# Patient Record
Sex: FEMALE | Race: WHITE | Employment: OTHER | URBAN - METROPOLITAN AREA
[De-identification: names, ages, dates, MRNs, and addresses within clinical notes are randomized per-mention and may not be internally consistent; named-entity substitution may affect disease eponyms.]

---

## 2017-01-31 PROBLEM — R73.03 PREDIABETES: Status: ACTIVE | Noted: 2017-01-31

## 2017-02-14 ENCOUNTER — TELEPHONE (OUTPATIENT)
Dept: FAMILY MEDICINE CLINIC | Age: 72
End: 2017-02-14

## 2017-02-14 RX ORDER — DESVENLAFAXINE 50 MG/1
50 TABLET, EXTENDED RELEASE ORAL DAILY
Qty: 63 TABLET | Refills: 0 | COMMUNITY
Start: 2017-02-14 | End: 2017-05-02 | Stop reason: SDUPTHER

## 2017-03-29 ENCOUNTER — TELEPHONE (OUTPATIENT)
Dept: FAMILY MEDICINE CLINIC | Age: 72
End: 2017-03-29

## 2017-04-03 ENCOUNTER — TELEPHONE (OUTPATIENT)
Dept: FAMILY MEDICINE CLINIC | Age: 72
End: 2017-04-03

## 2017-04-03 DIAGNOSIS — Z13.9 SCREENING: Primary | ICD-10-CM

## 2017-04-08 ENCOUNTER — TELEPHONE (OUTPATIENT)
Dept: FAMILY MEDICINE CLINIC | Age: 72
End: 2017-04-08

## 2017-04-08 RX ORDER — ONDANSETRON 4 MG/1
4 TABLET, FILM COATED ORAL EVERY 8 HOURS PRN
Qty: 20 TABLET | Refills: 0 | Status: SHIPPED | OUTPATIENT
Start: 2017-04-08 | End: 2017-06-05 | Stop reason: ALTCHOICE

## 2017-04-08 RX ORDER — AMLODIPINE BESYLATE 5 MG/1
5 TABLET ORAL NIGHTLY
Qty: 10 TABLET | Refills: 0 | Status: SHIPPED | OUTPATIENT
Start: 2017-04-08 | End: 2017-04-10 | Stop reason: SDUPTHER

## 2017-04-10 ENCOUNTER — OFFICE VISIT (OUTPATIENT)
Dept: FAMILY MEDICINE CLINIC | Age: 72
End: 2017-04-10

## 2017-04-10 VITALS
BODY MASS INDEX: 27.02 KG/M2 | SYSTOLIC BLOOD PRESSURE: 124 MMHG | WEIGHT: 146.8 LBS | HEIGHT: 62 IN | HEART RATE: 143 BPM | DIASTOLIC BLOOD PRESSURE: 70 MMHG | OXYGEN SATURATION: 96 % | RESPIRATION RATE: 14 BRPM

## 2017-04-10 DIAGNOSIS — I10 ESSENTIAL HYPERTENSION: ICD-10-CM

## 2017-04-10 DIAGNOSIS — Z51.81 MEDICATION MONITORING ENCOUNTER: ICD-10-CM

## 2017-04-10 DIAGNOSIS — R11.2 NON-INTRACTABLE VOMITING WITH NAUSEA, UNSPECIFIED VOMITING TYPE: Primary | ICD-10-CM

## 2017-04-10 PROCEDURE — 99213 OFFICE O/P EST LOW 20 MIN: CPT | Performed by: FAMILY MEDICINE

## 2017-04-10 PROCEDURE — 3288F FALL RISK ASSESSMENT DOCD: CPT | Performed by: FAMILY MEDICINE

## 2017-04-10 RX ORDER — AMLODIPINE BESYLATE 5 MG/1
5 TABLET ORAL NIGHTLY
Qty: 30 TABLET | Refills: 5 | Status: SHIPPED | OUTPATIENT
Start: 2017-04-10 | End: 2017-05-08 | Stop reason: SDUPTHER

## 2017-04-12 ENCOUNTER — TELEPHONE (OUTPATIENT)
Dept: FAMILY MEDICINE CLINIC | Age: 72
End: 2017-04-12

## 2017-04-19 ENCOUNTER — TELEPHONE (OUTPATIENT)
Dept: FAMILY MEDICINE CLINIC | Age: 72
End: 2017-04-19

## 2017-04-24 ENCOUNTER — TELEPHONE (OUTPATIENT)
Dept: FAMILY MEDICINE CLINIC | Age: 72
End: 2017-04-24

## 2017-05-02 RX ORDER — DESVENLAFAXINE 50 MG/1
50 TABLET, EXTENDED RELEASE ORAL DAILY
Qty: 30 TABLET | Refills: 5 | Status: SHIPPED | OUTPATIENT
Start: 2017-05-02 | End: 2018-11-07 | Stop reason: ALTCHOICE

## 2017-05-08 ENCOUNTER — OFFICE VISIT (OUTPATIENT)
Dept: FAMILY MEDICINE CLINIC | Age: 72
End: 2017-05-08

## 2017-05-08 VITALS
WEIGHT: 146 LBS | SYSTOLIC BLOOD PRESSURE: 104 MMHG | DIASTOLIC BLOOD PRESSURE: 74 MMHG | OXYGEN SATURATION: 98 % | HEART RATE: 76 BPM | BODY MASS INDEX: 26.7 KG/M2

## 2017-05-08 DIAGNOSIS — I10 ESSENTIAL HYPERTENSION: ICD-10-CM

## 2017-05-08 DIAGNOSIS — F33.42 RECURRENT MAJOR DEPRESSIVE DISORDER, IN FULL REMISSION (HCC): Primary | ICD-10-CM

## 2017-05-08 PROCEDURE — 99213 OFFICE O/P EST LOW 20 MIN: CPT | Performed by: FAMILY MEDICINE

## 2017-05-08 PROCEDURE — G8510 SCR DEP NEG, NO PLAN REQD: HCPCS | Performed by: FAMILY MEDICINE

## 2017-05-08 RX ORDER — AMLODIPINE BESYLATE 5 MG/1
5 TABLET ORAL NIGHTLY
Qty: 90 TABLET | Refills: 3 | Status: SHIPPED | OUTPATIENT
Start: 2017-05-08 | End: 2018-06-05 | Stop reason: SDUPTHER

## 2017-05-08 ASSESSMENT — PATIENT HEALTH QUESTIONNAIRE - PHQ9
1. LITTLE INTEREST OR PLEASURE IN DOING THINGS: 0
SUM OF ALL RESPONSES TO PHQ9 QUESTIONS 1 & 2: 0
SUM OF ALL RESPONSES TO PHQ QUESTIONS 1-9: 0
2. FEELING DOWN, DEPRESSED OR HOPELESS: 0

## 2017-06-05 ENCOUNTER — OFFICE VISIT (OUTPATIENT)
Dept: FAMILY MEDICINE CLINIC | Age: 72
End: 2017-06-05

## 2017-06-05 ENCOUNTER — TELEPHONE (OUTPATIENT)
Dept: FAMILY MEDICINE CLINIC | Age: 72
End: 2017-06-05

## 2017-06-05 VITALS
HEART RATE: 67 BPM | BODY MASS INDEX: 26.76 KG/M2 | RESPIRATION RATE: 14 BRPM | HEIGHT: 62 IN | WEIGHT: 145.4 LBS | OXYGEN SATURATION: 95 % | SYSTOLIC BLOOD PRESSURE: 136 MMHG | DIASTOLIC BLOOD PRESSURE: 64 MMHG

## 2017-06-05 DIAGNOSIS — M25.562 ACUTE PAIN OF LEFT KNEE: Primary | ICD-10-CM

## 2017-06-05 DIAGNOSIS — F33.41 RECURRENT MAJOR DEPRESSIVE DISORDER, IN PARTIAL REMISSION (HCC): ICD-10-CM

## 2017-06-05 PROCEDURE — 99213 OFFICE O/P EST LOW 20 MIN: CPT | Performed by: FAMILY MEDICINE

## 2017-06-05 RX ORDER — DESVENLAFAXINE 50 MG/1
50 TABLET, EXTENDED RELEASE ORAL DAILY
Qty: 90 TABLET | Refills: 3 | Status: SHIPPED | OUTPATIENT
Start: 2017-06-05 | End: 2020-08-26 | Stop reason: SDUPTHER

## 2017-06-06 ENCOUNTER — TELEPHONE (OUTPATIENT)
Dept: FAMILY MEDICINE CLINIC | Age: 72
End: 2017-06-06

## 2017-06-07 ENCOUNTER — TELEPHONE (OUTPATIENT)
Dept: FAMILY MEDICINE CLINIC | Age: 72
End: 2017-06-07

## 2017-06-07 DIAGNOSIS — M25.562 ACUTE PAIN OF LEFT KNEE: Primary | ICD-10-CM

## 2017-07-17 ENCOUNTER — TELEPHONE (OUTPATIENT)
Dept: FAMILY MEDICINE CLINIC | Age: 72
End: 2017-07-17

## 2017-07-17 DIAGNOSIS — M25.562 LEFT KNEE PAIN, UNSPECIFIED CHRONICITY: Primary | ICD-10-CM

## 2017-10-11 ENCOUNTER — TELEPHONE (OUTPATIENT)
Dept: FAMILY MEDICINE CLINIC | Age: 72
End: 2017-10-11

## 2017-10-11 NOTE — TELEPHONE ENCOUNTER
Pt scheduled physical for 10/31/17 and is asking if she should have a mammogram or any other routine screening completed before appt.  Please advise

## 2017-10-31 ENCOUNTER — OFFICE VISIT (OUTPATIENT)
Dept: FAMILY MEDICINE CLINIC | Age: 72
End: 2017-10-31

## 2017-10-31 VITALS
DIASTOLIC BLOOD PRESSURE: 70 MMHG | HEART RATE: 67 BPM | BODY MASS INDEX: 27.75 KG/M2 | HEIGHT: 61 IN | OXYGEN SATURATION: 97 % | SYSTOLIC BLOOD PRESSURE: 138 MMHG | WEIGHT: 147 LBS

## 2017-10-31 DIAGNOSIS — E78.49 OTHER HYPERLIPIDEMIA: ICD-10-CM

## 2017-10-31 DIAGNOSIS — I10 ESSENTIAL HYPERTENSION: ICD-10-CM

## 2017-10-31 DIAGNOSIS — Z00.00 ENCOUNTER FOR MEDICARE ANNUAL WELLNESS EXAM: Primary | ICD-10-CM

## 2017-10-31 DIAGNOSIS — I34.0 MODERATE MITRAL REGURGITATION BY PRIOR ECHOCARDIOGRAM: ICD-10-CM

## 2017-10-31 DIAGNOSIS — Z23 FLU VACCINE NEED: ICD-10-CM

## 2017-10-31 DIAGNOSIS — R73.03 PREDIABETES: ICD-10-CM

## 2017-10-31 LAB
BILIRUBIN, POC: NORMAL
BLOOD URINE, POC: NORMAL
CLARITY, POC: CLEAR
COLOR, POC: YELLOW
GLUCOSE URINE, POC: NORMAL
KETONES, POC: NORMAL
LEUKOCYTE EST, POC: NORMAL
NITRITE, POC: NORMAL
PH, POC: 8.5
PROTEIN, POC: NORMAL
SPECIFIC GRAVITY, POC: 1.01
UROBILINOGEN, POC: 0.2

## 2017-10-31 PROCEDURE — 90662 IIV NO PRSV INCREASED AG IM: CPT | Performed by: FAMILY MEDICINE

## 2017-10-31 PROCEDURE — G0439 PPPS, SUBSEQ VISIT: HCPCS | Performed by: FAMILY MEDICINE

## 2017-10-31 PROCEDURE — 81002 URINALYSIS NONAUTO W/O SCOPE: CPT | Performed by: FAMILY MEDICINE

## 2017-10-31 PROCEDURE — G0008 ADMIN INFLUENZA VIRUS VAC: HCPCS | Performed by: FAMILY MEDICINE

## 2017-10-31 RX ORDER — LOSARTAN POTASSIUM AND HYDROCHLOROTHIAZIDE 25; 100 MG/1; MG/1
1 TABLET ORAL DAILY
Qty: 90 TABLET | Refills: 3 | Status: SHIPPED | OUTPATIENT
Start: 2017-10-31 | End: 2018-11-07 | Stop reason: SDUPTHER

## 2017-10-31 ASSESSMENT — PATIENT HEALTH QUESTIONNAIRE - PHQ9
SUM OF ALL RESPONSES TO PHQ9 QUESTIONS 1 & 2: 2
2. FEELING DOWN, DEPRESSED OR HOPELESS: 2
SUM OF ALL RESPONSES TO PHQ QUESTIONS 1-9: 2
1. LITTLE INTEREST OR PLEASURE IN DOING THINGS: 0

## 2017-10-31 ASSESSMENT — ANXIETY QUESTIONNAIRES: GAD7 TOTAL SCORE: 3

## 2017-10-31 NOTE — PROGRESS NOTES
Vaccine Information Sheet, \"Influenza - Inactivated\"  given to Sofia, or parent/legal guardian of  Sofia and verbalized understanding. Patient responses:    Have you ever had a reaction to a flu vaccine? No  Are you able to eat eggs without adverse effects? Yes  Do you have any current illness? No  Have you ever had Guillian Trinity Syndrome? No    Flu vaccine given per order. Please see immunization tab.

## 2017-10-31 NOTE — PROGRESS NOTES
Anxiety     Colon polyp     Headache(784.0)     Hyperlipidemia     Hypertension     Osteoarthritis      Past Surgical History:   Procedure Laterality Date    COLONOSCOPY  7/7     Family History   Problem Relation Age of Onset    Kidney Disease Mother      Patient's medications, allergies, past medical, surgical, social and family histories were reviewed and updated as appropriate. CareTeam (Including outside providers/suppliers regularly involved in providing care):   Patient Care Team:  Siobhan Gonzalez MD as PCP - Analisa Campbell MD as PCP - S Attributed Provider    Wt Readings from Last 3 Encounters:   10/31/17 147 lb (66.7 kg)   06/05/17 145 lb 6.4 oz (66 kg)   05/08/17 146 lb (66.2 kg)     Vitals:    10/31/17 1052 10/31/17 1118   BP: (!) 150/64 138/70   Pulse: 67    SpO2: 97%    Weight: 147 lb (66.7 kg)    Height: 5' 0.5\" (1.537 m)      The following problems were reviewed today and where indicated follow up appointments were made and/or referrals ordered. Risk Factor Screenings with Interventions   Please see Screenings under MA note    Fall Risk Interventions:    · None indicated  Cognitive Impairment Interventions:  · None indicated  Depression Interventions:  · None indicated  Anxiety Interventions:  · None indicated          Health Risk Assessment:   Please see Screenings under MA note  General Health Risk Interventions:  · None indicated     Wt Readings from Last 5 Encounters:   10/31/17 147 lb (66.7 kg)   06/05/17 145 lb 6.4 oz (66 kg)   05/08/17 146 lb (66.2 kg)   04/10/17 146 lb 12.8 oz (66.6 kg)   12/16/16 150 lb (68 kg)      Body mass index is 28.24 kg/m². Health Habits/Nutrition Interventions:  · None indicated    Substance Abuse:  Social History     Social History    Marital status:       Spouse name: N/A    Number of children: N/A    Years of education: N/A     Social History Main Topics    Smoking status: Never Smoker    Smokeless tobacco: Never Used    Alcohol Comment:     Desirable range <100 mg/dL for patients with CHD ordiabetes and <70 mg/dL for diabetic   patients withknown heart disease. HDL   Date Value Ref Range Status   09/28/2016 56 > OR = 46 mg/dL Final   04/14/2011 79 (H) 40 - 60 mg/dl Final     Triglycerides   Date Value Ref Range Status   09/28/2016 139 <150 mg/dL Final     Lab Results   Component Value Date    ALT 13 09/28/2016    AST 17 09/28/2016    ALKPHOS 66 09/28/2016    BILITOT 0.6 09/28/2016      Lab Results   Component Value Date    WBC 6.1 09/28/2016    HGB 12.1 09/28/2016    HCT 36.9 09/28/2016    MCV 94.8 09/28/2016     09/28/2016     TSH (mIU/L)   Date Value   09/28/2016 4.32     Lab Results   Component Value Date    LABA1C 5.7 (H) 09/28/2016     Blood pressure is Good. BP Readings from Last 5 Encounters:   10/31/17 138/70   06/05/17 136/64   05/08/17 104/74   04/10/17 124/70   12/16/16 134/70     Weight is unchanged. Wt Readings from Last 5 Encounters:   10/31/17 147 lb (66.7 kg)   06/05/17 145 lb 6.4 oz (66 kg)   05/08/17 146 lb (66.2 kg)   04/10/17 146 lb 12.8 oz (66.6 kg)   12/16/16 150 lb (68 kg)      Vitals:    10/31/17 1052 10/31/17 1118   BP: (!) 150/64 138/70   Pulse: 67    SpO2: 97%    Weight: 147 lb (66.7 kg)    Height: 5' 0.5\" (1.537 m)      Body mass index is 28.24 kg/m². Physical Exam:  /70   Pulse 67   Ht 5' 0.5\" (1.537 m)   Wt 147 lb (66.7 kg)   SpO2 97%   BMI 28.24 kg/m²   GENERAL: well-developed, well-nourished, alert, no distress, calm, assistive device: none    EYES: negative findings: lids and lashes normal, conjunctivae and sclerae normal, corneas clear and pupils equal, round, reactive to light and accomodation  ENT: normal TM's and external ear canals both ears  · External nose and ears appear normal  · Pharynx: normal. Exudates: None  · Lips, mucosa, and tongue normal and teeth normal   · Hearing grossly normal.  Patient does not have hearing aids at this time.    NECK: No adenopathy, cataracts, macular degeneration, and other eye disorders  · A preventive dental visit is recommended every 6 months  · Try to get at least 150 minutes of exercise per week or 10,000 steps per day on a pedometer  · Order FREE \"Exercise and Physical Activity\" book from the [employer] on Agin1-312.372.6527 or https://order.misael.nih.gov/health/publication/order/  · You need 7390-6070 mg of calcium and 6152-3550 IU of vitamin D per day- it is possible to meet your calcium requirement with diet alone, but a vitamin D supplement is usually necessary to meet this goal  · When exposed to the sun, use a sunscreen that protects against both UVA and UVB radiation with an SPF of 30 or greater- reapply every 2 to 3 hours or after sweating, drying off with a towel, or swimming  · Always wear a seat belt when traveling in a car, and a helmet when riding a bicycle or motorcycle    Follow up: Every 6 months or as needed  Half maintenance up-to-date

## 2017-11-03 LAB
A/G RATIO: 1.6 (CALC) (ref 1–2.5)
ALBUMIN SERPL-MCNC: 4.2 G/DL (ref 3.6–5.1)
ALP BLD-CCNC: 70 U/L (ref 33–130)
ALT SERPL-CCNC: 15 U/L (ref 6–29)
AST SERPL-CCNC: 17 U/L (ref 10–35)
ATYPICAL LYMPHOCYTE RELATIVE PERCENT: NORMAL % (ref 0–10)
BAND NEUTROPHILS: NORMAL %
BANDED NEUTROPHILS ABSOLUTE COUNT: NORMAL CELLS/UL (ref 0–750)
BASOPHILS ABSOLUTE: 49 CELLS/UL (ref 0–200)
BASOPHILS RELATIVE PERCENT: 0.8 %
BILIRUB SERPL-MCNC: 0.4 MG/DL (ref 0.2–1.2)
BLASTS ABSOLUTE COUNT: NORMAL CELLS/UL
BLASTS RELATIVE PERCENT: NORMAL %
BUN / CREAT RATIO: ABNORMAL (CALC) (ref 6–22)
BUN BLDV-MCNC: 15 MG/DL (ref 7–25)
CALCIUM SERPL-MCNC: 9.7 MG/DL (ref 8.6–10.4)
CHLORIDE BLD-SCNC: 101 MMOL/L (ref 98–110)
CHOLESTEROL, TOTAL: 226 MG/DL
CHOLESTEROL/HDL RATIO: 3.4 (CALC)
CHOLESTEROL: 159 MG/DL (CALC)
CO2: 32 MMOL/L (ref 20–31)
COMMENT: NORMAL
CREAT SERPL-MCNC: 0.76 MG/DL (ref 0.6–0.93)
EOSINOPHILS ABSOLUTE: 67 CELLS/UL (ref 15–500)
EOSINOPHILS RELATIVE PERCENT: 1.1 %
GFR AFRICAN AMERICAN: 90 ML/MIN/1.73M2
GFR SERPL CREATININE-BSD FRML MDRD: 78 ML/MIN/1.73M2
GLOBULIN: 2.6 G/DL (CALC) (ref 1.9–3.7)
GLUCOSE BLD-MCNC: 83 MG/DL (ref 65–99)
HBA1C MFR BLD: 5.3 % OF TOTAL HGB
HCT VFR BLD CALC: 36.6 % (ref 35–45)
HDLC SERPL-MCNC: 67 MG/DL
HEMOGLOBIN: 12.2 G/DL (ref 11.7–15.5)
LDL CHOLESTEROL CALCULATED: 134 MG/DL (CALC)
LYMPHOCYTES ABSOLUTE: 1257 CELLS/UL (ref 850–3900)
LYMPHOCYTES RELATIVE PERCENT: 20.6 %
MCH RBC QN AUTO: 31 PG (ref 27–33)
MCHC RBC AUTO-ENTMCNC: 33.3 G/DL (ref 32–36)
MCV RBC AUTO: 93.1 FL (ref 80–100)
METAMYELOCYTES ABSOLUTE COUNT: NORMAL CELLS/UL
METAMYELOCYTES RELATIVE PERCENT: NORMAL %
MONOCYTES ABSOLUTE: 421 CELLS/UL (ref 200–950)
MONOCYTES RELATIVE PERCENT: 6.9 %
MYELOCYTE PERCENT: NORMAL %
MYELOCYTES ABSOLUTE COUNT: NORMAL CELLS/UL
NEUTROPHILS ABSOLUTE: 4307 CELLS/UL (ref 1500–7800)
NUCLEATED RED BLOOD CELLS: NORMAL /100 WBC
NUCLEATED RED BLOOD CELLS: NORMAL CELLS/UL
PDW BLD-RTO: 12.4 % (ref 11–15)
PLATELET # BLD: 301 THOUSAND/UL (ref 140–400)
PMV BLD AUTO: 10.2 FL (ref 7.5–12.5)
POTASSIUM SERPL-SCNC: 4.7 MMOL/L (ref 3.5–5.3)
PROMYELOCYTES ABSOLUTE COUNT: NORMAL CELLS/UL
PROMYELOCYTES PERCENT: NORMAL %
RBC # BLD: 3.93 MILLION/UL (ref 3.8–5.1)
SEGMENTED NEUTROPHILS RELATIVE PERCENT: 70.6 %
SODIUM BLD-SCNC: 139 MMOL/L (ref 135–146)
TOTAL PROTEIN: 6.8 G/DL (ref 6.1–8.1)
TRIGL SERPL-MCNC: 130 MG/DL
TSH ULTRASENSITIVE: 3.43 MIU/L (ref 0.4–4.5)
WBC # BLD: 6.1 THOUSAND/UL (ref 3.8–10.8)

## 2017-11-16 LAB
LV EF: 65 %
LVEF MODALITY: NORMAL

## 2017-11-20 ENCOUNTER — HOSPITAL ENCOUNTER (OUTPATIENT)
Dept: NON INVASIVE DIAGNOSTICS | Age: 72
Discharge: HOME | End: 2017-11-16

## 2017-11-20 DIAGNOSIS — I34.0 NONRHEUMATIC MITRAL VALVE INSUFFICIENCY: ICD-10-CM

## 2018-02-12 ENCOUNTER — TELEPHONE (OUTPATIENT)
Dept: FAMILY MEDICINE CLINIC | Age: 73
End: 2018-02-12

## 2018-02-12 DIAGNOSIS — M25.562 CHRONIC PAIN OF LEFT KNEE: Primary | ICD-10-CM

## 2018-02-12 DIAGNOSIS — G89.29 CHRONIC PAIN OF LEFT KNEE: Primary | ICD-10-CM

## 2018-02-22 ENCOUNTER — TELEPHONE (OUTPATIENT)
Dept: FAMILY MEDICINE CLINIC | Age: 73
End: 2018-02-22

## 2018-03-02 ENCOUNTER — TELEPHONE (OUTPATIENT)
Dept: FAMILY MEDICINE CLINIC | Age: 73
End: 2018-03-02

## 2018-03-14 ENCOUNTER — OFFICE VISIT (OUTPATIENT)
Dept: FAMILY MEDICINE CLINIC | Age: 73
End: 2018-03-14

## 2018-03-14 VITALS
WEIGHT: 145 LBS | OXYGEN SATURATION: 95 % | TEMPERATURE: 98.3 F | DIASTOLIC BLOOD PRESSURE: 78 MMHG | BODY MASS INDEX: 27.38 KG/M2 | HEART RATE: 73 BPM | SYSTOLIC BLOOD PRESSURE: 134 MMHG | HEIGHT: 61 IN

## 2018-03-14 DIAGNOSIS — M25.562 CHRONIC PAIN OF LEFT KNEE: Primary | ICD-10-CM

## 2018-03-14 DIAGNOSIS — I34.0 MODERATE MITRAL REGURGITATION BY PRIOR ECHOCARDIOGRAM: ICD-10-CM

## 2018-03-14 DIAGNOSIS — Z01.818 PRE-OP EXAM: ICD-10-CM

## 2018-03-14 DIAGNOSIS — F33.42 RECURRENT MAJOR DEPRESSIVE EPISODES, IN FULL REMISSION (HCC): ICD-10-CM

## 2018-03-14 DIAGNOSIS — G89.29 CHRONIC PAIN OF LEFT KNEE: Primary | ICD-10-CM

## 2018-03-14 DIAGNOSIS — I10 ESSENTIAL HYPERTENSION: ICD-10-CM

## 2018-03-14 DIAGNOSIS — M23.204 DEGENERATIVE TEAR OF LEFT MEDIAL MENISCUS: ICD-10-CM

## 2018-03-14 PROCEDURE — 93000 ELECTROCARDIOGRAM COMPLETE: CPT | Performed by: FAMILY MEDICINE

## 2018-03-14 PROCEDURE — 1123F ACP DISCUSS/DSCN MKR DOCD: CPT | Performed by: FAMILY MEDICINE

## 2018-03-14 PROCEDURE — G8419 CALC BMI OUT NRM PARAM NOF/U: HCPCS | Performed by: FAMILY MEDICINE

## 2018-03-14 PROCEDURE — 4040F PNEUMOC VAC/ADMIN/RCVD: CPT | Performed by: FAMILY MEDICINE

## 2018-03-14 PROCEDURE — G8482 FLU IMMUNIZE ORDER/ADMIN: HCPCS | Performed by: FAMILY MEDICINE

## 2018-03-14 PROCEDURE — 3014F SCREEN MAMMO DOC REV: CPT | Performed by: FAMILY MEDICINE

## 2018-03-14 PROCEDURE — G8399 PT W/DXA RESULTS DOCUMENT: HCPCS | Performed by: FAMILY MEDICINE

## 2018-03-14 PROCEDURE — G8427 DOCREV CUR MEDS BY ELIG CLIN: HCPCS | Performed by: FAMILY MEDICINE

## 2018-03-14 PROCEDURE — 1036F TOBACCO NON-USER: CPT | Performed by: FAMILY MEDICINE

## 2018-03-14 PROCEDURE — 3017F COLORECTAL CA SCREEN DOC REV: CPT | Performed by: FAMILY MEDICINE

## 2018-03-14 PROCEDURE — 1090F PRES/ABSN URINE INCON ASSESS: CPT | Performed by: FAMILY MEDICINE

## 2018-03-14 PROCEDURE — 99214 OFFICE O/P EST MOD 30 MIN: CPT | Performed by: FAMILY MEDICINE

## 2018-03-14 NOTE — PROGRESS NOTES
[address] [facility] Preoperative Evaluation       Luanne Jewell M.D.     [address] [facility], [address]     (phone) [phone]       (fax) [phone]    Dear Dr. Cecille Eddy,           Thank you for referring Abdelrahman Morejon to me for Preoperative Evaluation left knee arthroscopic surgery for partial lateral meniscectomy. Below are the relevant portions of my assessment and plan of care. Abdelrahman Morejon    67 y.o.   1945    [address] [facility]    Vitals:    03/14/18 1302   BP: 134/78   Pulse: 73   Temp: 98.3 °F (36.8 °C)   SpO2: 95%   Weight: 145 lb (65.8 kg)   Height: 5' 0.5\" (1.537 m)      Wt Readings from Last 2 Encounters:   03/14/18 145 lb (65.8 kg)   10/31/17 147 lb (66.7 kg)     BP Readings from Last 3 Encounters:   03/14/18 134/78   10/31/17 138/70   06/05/17 136/64        Allergies   Allergen Reactions    Hydroxybenzoate     Prednisolone Acetate     Tixocortol Pivalate     Ace Inhibitors      Cough, itchy throat    Methylchloroisothiazolinone      Current Outpatient Prescriptions   Medication Sig Dispense Refill    losartan-hydrochlorothiazide (HYZAAR) 100-25 MG per tablet Take 1 tablet by mouth daily 90 tablet 3    desvenlafaxine succinate (PRISTIQ) 50 MG TB24 extended release tablet Take 1 tablet by mouth daily 90 tablet 3    amLODIPine (NORVASC) 5 MG tablet Take 1 tablet by mouth nightly 90 tablet 3    desvenlafaxine succinate (PRISTIQ) 50 MG TB24 extended release tablet Take 1 tablet by mouth daily 30 tablet 5    aspirin 81 MG tablet Take 81 mg by mouth daily      miconazole (MICOTIN) 2 % cream Apply topically 2 times daily. 60 g 0    clobetasol (TEMOVATE) 0.05 % cream Apply to rash or itching skin on body 3 to 4 times daily as directed      mometasone (NASONEX) 50 MCG/ACT nasal spray 2 sprays by Nasal route daily. 1 Inhaler 0    Cholecalciferol (VITAMIN D3) 2000 UNITS CAPS Take 2,000 Units by mouth daily.       econazole nitrate 1 % cream Apply  topically 2 times daily. Apply topically daily.  naproxen (NAPROSYN) 250 MG tablet Take 500 mg by mouth 2 times daily as needed.  Calcium Carbonate-Vitamin D (CALCIUM + D) 600-200 MG-UNIT TABS Take 1 tablet by mouth 2 times daily.  Multiple Vitamins-Minerals (OCUVITE) TABS tablet Take 1 tablet by mouth daily. No current facility-administered medications for this visit. She presents to the office today for a preoperative consultation at the request of surgeon, Dr. Tamara Shine who plans on performing surgery on March 20 ,2018. The current problem began 2 months ago and has worsened. Conservative therapy, including injection and physical therapy, has failed. Planned anesthesia is General.  The patient has no known anesthesia issues. Patient has no bleeding risk. NO LOOSE TEETH       Past Medical History:   Diagnosis Date    Anxiety     Colon polyp     Headache(784.0)     Hyperlipidemia     Hypertension     Osteoarthritis      Past Surgical History:   Procedure Laterality Date    COLONOSCOPY  7/7     Family History is not significant for reactions to anesthesia  Social History     Social History    Marital status:      Spouse name: N/A    Number of children: N/A    Years of education: N/A     Occupational History    Not on file. Social History Main Topics    Smoking status: Never Smoker    Smokeless tobacco: Never Used    Alcohol use 0.0 oz/week      Comment: occ wine    Drug use: No    Sexual activity: Not Currently     Other Topics Concern    Not on file     Social History Narrative    No narrative on file       REVIEW OF SYSTEMS:   CONSTITUTIONAL: No major weight gain or loss, fatigue, weakness, night sweats or fever. HEENT: No new vision difficulties or ringing in the ears. RESPIRATORY: No new SOB, PND, orthopnea or cough.    CARDIOVASCULAR: no CP, palpitations or SOB with exertion  GI: No nausea, vomiting, diarrhea, 12.2 11/02/2017    HCT 36.6 11/02/2017    MCV 93.1 11/02/2017     11/02/2017     Lab Results   Component Value Date     11/02/2017    K 4.7 11/02/2017     11/02/2017    CO2 32 (H) 11/02/2017     Lab Results   Component Value Date    CREATININE 0.76 11/02/2017     Lab Results   Component Value Date    TSH 3.43 11/02/2017       Assessment:  Encounter Diagnoses   Name Primary?  Chronic pain of left knee Yes    Pre-op exam     Degenerative tear of left medial meniscus     Essential hypertension     Moderate mitral regurgitation by prior echocardiogram         67 y.o. patient with planned surgery as above. Known risk factors for perioperative complications: None        Plan:    1. Preoperative workup as follows: ECG. 2. Change in medication regimen before surgery: discontinue NSAIDs (asaIn fish oil) 14d before surgery. 3. Prophylaxis for cardiac events with perioperative beta-blockers: not indicated. 4. Invasive hemodynamic monitoring perioperatively: not indicated. 5. Patient is cleared for upcoming surgery. If you have questions, please do not hesitate to call me. Sincerely,        Janice Guerrier.  Ivy Gonsales M.D.

## 2018-05-21 ENCOUNTER — OFFICE VISIT (OUTPATIENT)
Dept: FAMILY MEDICINE CLINIC | Age: 73
End: 2018-05-21

## 2018-05-21 VITALS
DIASTOLIC BLOOD PRESSURE: 64 MMHG | WEIGHT: 146.2 LBS | HEART RATE: 69 BPM | BODY MASS INDEX: 27.6 KG/M2 | HEIGHT: 61 IN | OXYGEN SATURATION: 96 % | SYSTOLIC BLOOD PRESSURE: 128 MMHG

## 2018-05-21 DIAGNOSIS — L23.9 ALLERGIC DERMATITIS: Primary | ICD-10-CM

## 2018-05-21 DIAGNOSIS — L98.7 LOOSE SKIN: ICD-10-CM

## 2018-05-21 DIAGNOSIS — F32.5 MAJOR DEPRESSIVE DISORDER WITH SINGLE EPISODE, IN FULL REMISSION (HCC): ICD-10-CM

## 2018-05-21 PROCEDURE — 1036F TOBACCO NON-USER: CPT | Performed by: FAMILY MEDICINE

## 2018-05-21 PROCEDURE — 3017F COLORECTAL CA SCREEN DOC REV: CPT | Performed by: FAMILY MEDICINE

## 2018-05-21 PROCEDURE — 1090F PRES/ABSN URINE INCON ASSESS: CPT | Performed by: FAMILY MEDICINE

## 2018-05-21 PROCEDURE — 1123F ACP DISCUSS/DSCN MKR DOCD: CPT | Performed by: FAMILY MEDICINE

## 2018-05-21 PROCEDURE — 99213 OFFICE O/P EST LOW 20 MIN: CPT | Performed by: FAMILY MEDICINE

## 2018-05-21 PROCEDURE — 4040F PNEUMOC VAC/ADMIN/RCVD: CPT | Performed by: FAMILY MEDICINE

## 2018-05-21 PROCEDURE — G8427 DOCREV CUR MEDS BY ELIG CLIN: HCPCS | Performed by: FAMILY MEDICINE

## 2018-05-21 PROCEDURE — G8417 CALC BMI ABV UP PARAM F/U: HCPCS | Performed by: FAMILY MEDICINE

## 2018-05-21 PROCEDURE — G8399 PT W/DXA RESULTS DOCUMENT: HCPCS | Performed by: FAMILY MEDICINE

## 2018-06-05 DIAGNOSIS — I10 ESSENTIAL HYPERTENSION: ICD-10-CM

## 2018-06-05 RX ORDER — DESVENLAFAXINE 50 MG/1
TABLET, EXTENDED RELEASE ORAL
Qty: 90 TABLET | Refills: 3 | Status: SHIPPED | OUTPATIENT
Start: 2018-06-05 | End: 2020-01-10

## 2018-06-05 RX ORDER — AMLODIPINE BESYLATE 5 MG/1
TABLET ORAL
Qty: 90 TABLET | Refills: 3 | Status: SHIPPED | OUTPATIENT
Start: 2018-06-05 | End: 2019-07-29 | Stop reason: SDUPTHER

## 2018-06-08 ENCOUNTER — TELEPHONE (OUTPATIENT)
Dept: FAMILY MEDICINE CLINIC | Age: 73
End: 2018-06-08

## 2018-06-08 DIAGNOSIS — Z12.31 SCREENING MAMMOGRAM, ENCOUNTER FOR: Primary | ICD-10-CM

## 2018-06-14 ENCOUNTER — HOSPITAL ENCOUNTER (OUTPATIENT)
Dept: MAMMOGRAPHY | Age: 73
Discharge: HOME | End: 2018-06-14
Attending: FAMILY MEDICINE | Admitting: FAMILY MEDICINE

## 2018-06-14 DIAGNOSIS — Z12.31 SCREENING MAMMOGRAM, ENCOUNTER FOR: ICD-10-CM

## 2018-08-29 ENCOUNTER — TELEPHONE (OUTPATIENT)
Dept: FAMILY MEDICINE CLINIC | Age: 73
End: 2018-08-29

## 2018-10-25 ENCOUNTER — TELEPHONE (OUTPATIENT)
Dept: FAMILY MEDICINE CLINIC | Age: 73
End: 2018-10-25

## 2018-10-25 DIAGNOSIS — F32.0 CURRENT MILD EPISODE OF MAJOR DEPRESSIVE DISORDER, UNSPECIFIED WHETHER RECURRENT (HCC): ICD-10-CM

## 2018-10-25 DIAGNOSIS — E78.2 MIXED HYPERLIPIDEMIA: ICD-10-CM

## 2018-10-25 DIAGNOSIS — I10 ESSENTIAL HYPERTENSION: Primary | ICD-10-CM

## 2018-10-30 DIAGNOSIS — I10 ESSENTIAL HYPERTENSION: ICD-10-CM

## 2018-10-30 DIAGNOSIS — F32.0 CURRENT MILD EPISODE OF MAJOR DEPRESSIVE DISORDER, UNSPECIFIED WHETHER RECURRENT (HCC): ICD-10-CM

## 2018-10-30 DIAGNOSIS — E78.2 MIXED HYPERLIPIDEMIA: ICD-10-CM

## 2018-10-30 LAB
A/G RATIO: 1.7 (ref 1.1–2.2)
ALBUMIN SERPL-MCNC: 4.7 G/DL (ref 3.4–5)
ALP BLD-CCNC: 87 U/L (ref 40–129)
ALT SERPL-CCNC: 14 U/L (ref 10–40)
ANION GAP SERPL CALCULATED.3IONS-SCNC: 17 MMOL/L (ref 3–16)
AST SERPL-CCNC: 20 U/L (ref 15–37)
BASOPHILS ABSOLUTE: 0.1 K/UL (ref 0–0.2)
BASOPHILS RELATIVE PERCENT: 0.7 %
BILIRUB SERPL-MCNC: <0.2 MG/DL (ref 0–1)
BUN BLDV-MCNC: 26 MG/DL (ref 7–20)
CALCIUM SERPL-MCNC: 9.9 MG/DL (ref 8.3–10.6)
CHLORIDE BLD-SCNC: 100 MMOL/L (ref 99–110)
CHOLESTEROL, TOTAL: 238 MG/DL (ref 0–199)
CO2: 27 MMOL/L (ref 21–32)
CREAT SERPL-MCNC: 0.8 MG/DL (ref 0.6–1.2)
EOSINOPHILS ABSOLUTE: 0.1 K/UL (ref 0–0.6)
EOSINOPHILS RELATIVE PERCENT: 1 %
GFR AFRICAN AMERICAN: >60
GFR NON-AFRICAN AMERICAN: >60
GLOBULIN: 2.7 G/DL
GLUCOSE BLD-MCNC: 93 MG/DL (ref 70–99)
HCT VFR BLD CALC: 39.3 % (ref 36–48)
HDLC SERPL-MCNC: 76 MG/DL (ref 40–60)
HEMOGLOBIN: 12.8 G/DL (ref 12–16)
LDL CHOLESTEROL CALCULATED: 147 MG/DL
LYMPHOCYTES ABSOLUTE: 1.5 K/UL (ref 1–5.1)
LYMPHOCYTES RELATIVE PERCENT: 18.9 %
MCH RBC QN AUTO: 31.2 PG (ref 26–34)
MCHC RBC AUTO-ENTMCNC: 32.7 G/DL (ref 31–36)
MCV RBC AUTO: 95.6 FL (ref 80–100)
MONOCYTES ABSOLUTE: 0.5 K/UL (ref 0–1.3)
MONOCYTES RELATIVE PERCENT: 6.6 %
NEUTROPHILS ABSOLUTE: 5.8 K/UL (ref 1.7–7.7)
NEUTROPHILS RELATIVE PERCENT: 72.8 %
PDW BLD-RTO: 14.4 % (ref 12.4–15.4)
PLATELET # BLD: 285 K/UL (ref 135–450)
PMV BLD AUTO: 9.2 FL (ref 5–10.5)
POTASSIUM SERPL-SCNC: 4.8 MMOL/L (ref 3.5–5.1)
RBC # BLD: 4.11 M/UL (ref 4–5.2)
SODIUM BLD-SCNC: 144 MMOL/L (ref 136–145)
TOTAL PROTEIN: 7.4 G/DL (ref 6.4–8.2)
TRIGL SERPL-MCNC: 76 MG/DL (ref 0–150)
TSH REFLEX: 3.02 UIU/ML (ref 0.27–4.2)
VLDLC SERPL CALC-MCNC: 15 MG/DL
WBC # BLD: 7.9 K/UL (ref 4–11)

## 2018-10-31 LAB
ESTIMATED AVERAGE GLUCOSE: 122.6 MG/DL
HBA1C MFR BLD: 5.9 %

## 2018-11-01 LAB — VITAMIN D 1,25-DIHYDROXY: 48.7 PG/ML (ref 19.9–79.3)

## 2018-11-07 ENCOUNTER — OFFICE VISIT (OUTPATIENT)
Dept: FAMILY MEDICINE CLINIC | Age: 73
End: 2018-11-07
Payer: MEDICARE

## 2018-11-07 VITALS
SYSTOLIC BLOOD PRESSURE: 132 MMHG | HEIGHT: 60 IN | WEIGHT: 146.2 LBS | BODY MASS INDEX: 28.7 KG/M2 | HEART RATE: 70 BPM | DIASTOLIC BLOOD PRESSURE: 70 MMHG | OXYGEN SATURATION: 99 % | TEMPERATURE: 98.4 F

## 2018-11-07 DIAGNOSIS — F32.5 MAJOR DEPRESSIVE DISORDER WITH SINGLE EPISODE, IN FULL REMISSION (HCC): ICD-10-CM

## 2018-11-07 DIAGNOSIS — E78.00 PURE HYPERCHOLESTEROLEMIA: ICD-10-CM

## 2018-11-07 DIAGNOSIS — I10 ESSENTIAL HYPERTENSION: Primary | ICD-10-CM

## 2018-11-07 DIAGNOSIS — I34.0 MODERATE MITRAL REGURGITATION BY PRIOR ECHOCARDIOGRAM: ICD-10-CM

## 2018-11-07 DIAGNOSIS — R73.03 PREDIABETES: ICD-10-CM

## 2018-11-07 LAB
BILIRUBIN, POC: NORMAL
BLOOD URINE, POC: NORMAL
CLARITY, POC: CLEAR
COLOR, POC: YELLOW
GLUCOSE URINE, POC: NORMAL
KETONES, POC: NORMAL
LEUKOCYTE EST, POC: NORMAL
NITRITE, POC: NORMAL
PH, POC: 8.5
PROTEIN, POC: NORMAL
SPECIFIC GRAVITY, POC: 1.02
UROBILINOGEN, POC: 0.2

## 2018-11-07 PROCEDURE — G8482 FLU IMMUNIZE ORDER/ADMIN: HCPCS | Performed by: FAMILY MEDICINE

## 2018-11-07 PROCEDURE — 1101F PT FALLS ASSESS-DOCD LE1/YR: CPT | Performed by: FAMILY MEDICINE

## 2018-11-07 PROCEDURE — 99214 OFFICE O/P EST MOD 30 MIN: CPT | Performed by: FAMILY MEDICINE

## 2018-11-07 PROCEDURE — 1036F TOBACCO NON-USER: CPT | Performed by: FAMILY MEDICINE

## 2018-11-07 PROCEDURE — 4040F PNEUMOC VAC/ADMIN/RCVD: CPT | Performed by: FAMILY MEDICINE

## 2018-11-07 PROCEDURE — 3017F COLORECTAL CA SCREEN DOC REV: CPT | Performed by: FAMILY MEDICINE

## 2018-11-07 PROCEDURE — 1123F ACP DISCUSS/DSCN MKR DOCD: CPT | Performed by: FAMILY MEDICINE

## 2018-11-07 PROCEDURE — 1090F PRES/ABSN URINE INCON ASSESS: CPT | Performed by: FAMILY MEDICINE

## 2018-11-07 PROCEDURE — G8427 DOCREV CUR MEDS BY ELIG CLIN: HCPCS | Performed by: FAMILY MEDICINE

## 2018-11-07 PROCEDURE — G8399 PT W/DXA RESULTS DOCUMENT: HCPCS | Performed by: FAMILY MEDICINE

## 2018-11-07 PROCEDURE — 81002 URINALYSIS NONAUTO W/O SCOPE: CPT | Performed by: FAMILY MEDICINE

## 2018-11-07 PROCEDURE — G8417 CALC BMI ABV UP PARAM F/U: HCPCS | Performed by: FAMILY MEDICINE

## 2018-11-07 PROCEDURE — 3288F FALL RISK ASSESSMENT DOCD: CPT | Performed by: FAMILY MEDICINE

## 2018-11-07 RX ORDER — LOSARTAN POTASSIUM AND HYDROCHLOROTHIAZIDE 25; 100 MG/1; MG/1
1 TABLET ORAL DAILY
Qty: 90 TABLET | Refills: 3 | Status: SHIPPED | OUTPATIENT
Start: 2018-11-07 | End: 2020-01-10 | Stop reason: SDUPTHER

## 2018-11-07 NOTE — PROGRESS NOTES
Chief Complaint:   Gwen Gordon is a 75 y.o. female who presents for check up, med check  And lab review  Overall doing fairly well, visited family in [facility] in [facility] this past summer  History of Present Illness:    Meds, vitamins and allergies reviewed with pt  Labs reviewed with pt in detail    Wt Readings from Last 3 Encounters:   11/07/18 146 lb 3.2 oz (66.3 kg)   05/21/18 146 lb 3.2 oz (66.3 kg)   03/14/18 145 lb (65.8 kg)       Patient Active Problem List   Diagnosis    Hypertension    Hyperlipidemia    Osteopenia    Hx of eczema    Depression    Chronic idiopathic urticaria    Moderate mitral regurgitation by prior echocardiogram    Prediabetes    BMI 27.0-27.9,adult    Major depressive disorder with single episode, in full remission ([facility] [address]     Past Medical History:   Diagnosis Date    Anxiety     Colon polyp     Headache(784.0)     Hyperlipidemia     Hypertension     Osteoarthritis        Past Surgical History:   Procedure Laterality Date    COLONOSCOPY  8/10       Current Outpatient Prescriptions   Medication Sig Dispense Refill    losartan-hydrochlorothiazide (HYZAAR) 100-25 MG per tablet Take 1 tablet by mouth daily 90 tablet 3    amLODIPine (NORVASC) 5 MG tablet TAKE 1 TABLET EVERY NIGHT 90 tablet 3    aspirin 81 MG tablet Take 81 mg by mouth daily      mometasone (NASONEX) 50 MCG/ACT nasal spray 2 sprays by Nasal route daily. 1 Inhaler 0    Cholecalciferol (VITAMIN D3) 2000 UNITS CAPS Take 2,000 Units by mouth daily.  naproxen (NAPROSYN) 250 MG tablet Take 500 mg by mouth 2 times daily as needed.  Calcium Carbonate-Vitamin D (CALCIUM + D) 600-200 MG-UNIT TABS Take 1 tablet by mouth 2 times daily.  Multiple Vitamins-Minerals (OCUVITE) TABS tablet Take 1 tablet by mouth daily.         desvenlafaxine succinate (PRISTIQ) 50 MG TB24 extended release tablet TAKE 1 TABLET EVERY DAY 90 tablet 3    desvenlafaxine succinate (PRISTIQ) 50 MG TB24 extended release tablet

## 2019-05-16 ENCOUNTER — HOSPITAL ENCOUNTER (OUTPATIENT)
Dept: NON INVASIVE DIAGNOSTICS | Age: 74
Discharge: HOME | End: 2019-05-16
Payer: MEDICARE

## 2019-05-16 DIAGNOSIS — I34.0 MODERATE MITRAL REGURGITATION BY PRIOR ECHOCARDIOGRAM: ICD-10-CM

## 2019-05-16 LAB
LV EF: 63 %
LVEF MODALITY: NORMAL

## 2019-05-16 PROCEDURE — 93306 TTE W/DOPPLER COMPLETE: CPT

## 2019-05-17 LAB
CHOLESTEROL, TOTAL: 225 MG/DL
CHOLESTEROL/HDL RATIO: 3.1 (CALC)
CHOLESTEROL: 153 MG/DL (CALC)
GLUCOSE BLD-MCNC: 97 MG/DL (ref 65–99)
HBA1C MFR BLD: 5.4 % OF TOTAL HGB
HDLC SERPL-MCNC: 72 MG/DL
LDL CHOLESTEROL CALCULATED: 133 MG/DL (CALC)
TRIGL SERPL-MCNC: 101 MG/DL

## 2019-06-26 ENCOUNTER — TELEPHONE (OUTPATIENT)
Dept: FAMILY MEDICINE CLINIC | Age: 74
End: 2019-06-26

## 2019-07-15 ENCOUNTER — TELEPHONE (OUTPATIENT)
Dept: FAMILY MEDICINE CLINIC | Age: 74
End: 2019-07-15

## 2019-07-15 RX ORDER — DESVENLAFAXINE 50 MG/1
TABLET, EXTENDED RELEASE ORAL
Qty: 90 TABLET | Refills: 3 | Status: SHIPPED | OUTPATIENT
Start: 2019-07-15 | End: 2020-01-10

## 2019-07-15 NOTE — TELEPHONE ENCOUNTER
Patient is going out of town for a couple of months and wants to know if she needs any lab work done before she leaves.

## 2019-07-29 DIAGNOSIS — I10 ESSENTIAL HYPERTENSION: ICD-10-CM

## 2019-07-29 RX ORDER — AMLODIPINE BESYLATE 5 MG/1
TABLET ORAL
Qty: 15 TABLET | Refills: 0 | Status: SHIPPED | OUTPATIENT
Start: 2019-07-29 | End: 2019-08-20 | Stop reason: SDUPTHER

## 2019-08-20 DIAGNOSIS — I10 ESSENTIAL HYPERTENSION: ICD-10-CM

## 2019-08-20 RX ORDER — AMLODIPINE BESYLATE 5 MG/1
TABLET ORAL
Qty: 90 TABLET | Refills: 0 | Status: SHIPPED | OUTPATIENT
Start: 2019-08-20 | End: 2020-01-10

## 2019-09-03 ENCOUNTER — TELEPHONE (OUTPATIENT)
Dept: FAMILY MEDICINE CLINIC | Age: 74
End: 2019-09-03

## 2019-09-03 NOTE — TELEPHONE ENCOUNTER
Patient scalded the top of her hand over 10 days ago before she left for [place]. She called the pharmacy and they told her to use cold water and some ice, keep elevated and apply neosporin and band aid. Now it is oozing and she itches all over. Should she take Benedryl or go to urgent care? Please call patient on her cell to advise.

## 2019-09-03 NOTE — TELEPHONE ENCOUNTER
Did not realize she was out of town, probably best for her to go to a local urgent care to make sure she does not need an oral antibiotic

## 2019-09-12 ENCOUNTER — TELEPHONE (OUTPATIENT)
Dept: FAMILY MEDICINE CLINIC | Age: 74
End: 2019-09-12

## 2019-09-12 NOTE — TELEPHONE ENCOUNTER
Patient has requested to have a copy of her medication and allergy list to [facility] in [place] at [phone]. Done.

## 2019-10-08 ENCOUNTER — TELEPHONE (OUTPATIENT)
Dept: FAMILY MEDICINE CLINIC | Age: 74
End: 2019-10-08

## 2019-10-16 ENCOUNTER — OFFICE VISIT (OUTPATIENT)
Dept: FAMILY MEDICINE CLINIC | Age: 74
End: 2019-10-16
Payer: MEDICARE

## 2019-10-16 VITALS
OXYGEN SATURATION: 95 % | DIASTOLIC BLOOD PRESSURE: 60 MMHG | HEART RATE: 74 BPM | BODY MASS INDEX: 28.67 KG/M2 | WEIGHT: 147.8 LBS | SYSTOLIC BLOOD PRESSURE: 134 MMHG

## 2019-10-16 DIAGNOSIS — F32.5 MAJOR DEPRESSIVE DISORDER WITH SINGLE EPISODE, IN FULL REMISSION (HCC): ICD-10-CM

## 2019-10-16 DIAGNOSIS — Z23 FLU VACCINE NEED: ICD-10-CM

## 2019-10-16 DIAGNOSIS — R29.898 LEG WEAKNESS, BILATERAL: ICD-10-CM

## 2019-10-16 DIAGNOSIS — R73.03 PREDIABETES: ICD-10-CM

## 2019-10-16 DIAGNOSIS — I10 ESSENTIAL HYPERTENSION: Primary | ICD-10-CM

## 2019-10-16 DIAGNOSIS — E78.2 MIXED HYPERLIPIDEMIA: ICD-10-CM

## 2019-10-16 PROCEDURE — 99214 OFFICE O/P EST MOD 30 MIN: CPT | Performed by: FAMILY MEDICINE

## 2019-10-16 PROCEDURE — 90653 IIV ADJUVANT VACCINE IM: CPT | Performed by: FAMILY MEDICINE

## 2019-10-16 PROCEDURE — G0008 ADMIN INFLUENZA VIRUS VAC: HCPCS | Performed by: FAMILY MEDICINE

## 2019-10-16 PROCEDURE — 1090F PRES/ABSN URINE INCON ASSESS: CPT | Performed by: FAMILY MEDICINE

## 2019-10-16 PROCEDURE — G8427 DOCREV CUR MEDS BY ELIG CLIN: HCPCS | Performed by: FAMILY MEDICINE

## 2019-10-16 PROCEDURE — 1036F TOBACCO NON-USER: CPT | Performed by: FAMILY MEDICINE

## 2019-10-16 PROCEDURE — 1123F ACP DISCUSS/DSCN MKR DOCD: CPT | Performed by: FAMILY MEDICINE

## 2019-10-16 PROCEDURE — 3017F COLORECTAL CA SCREEN DOC REV: CPT | Performed by: FAMILY MEDICINE

## 2019-10-16 PROCEDURE — G8482 FLU IMMUNIZE ORDER/ADMIN: HCPCS | Performed by: FAMILY MEDICINE

## 2019-10-16 PROCEDURE — G8417 CALC BMI ABV UP PARAM F/U: HCPCS | Performed by: FAMILY MEDICINE

## 2019-10-16 PROCEDURE — 4040F PNEUMOC VAC/ADMIN/RCVD: CPT | Performed by: FAMILY MEDICINE

## 2019-10-16 PROCEDURE — G8399 PT W/DXA RESULTS DOCUMENT: HCPCS | Performed by: FAMILY MEDICINE

## 2019-10-21 ENCOUNTER — OFFICE VISIT (OUTPATIENT)
Dept: PSYCHOLOGY | Age: 74
End: 2019-10-21
Payer: MEDICARE

## 2019-10-21 DIAGNOSIS — F33.1 MODERATE EPISODE OF RECURRENT MAJOR DEPRESSIVE DISORDER (HCC): Primary | ICD-10-CM

## 2019-10-21 PROBLEM — F32.1 CURRENT MODERATE EPISODE OF MAJOR DEPRESSIVE DISORDER WITHOUT PRIOR EPISODE (HCC): Status: ACTIVE | Noted: 2019-10-21

## 2019-10-21 PROCEDURE — 90791 PSYCH DIAGNOSTIC EVALUATION: CPT | Performed by: PSYCHOLOGIST

## 2019-10-21 ASSESSMENT — PATIENT HEALTH QUESTIONNAIRE - PHQ9
3. TROUBLE FALLING OR STAYING ASLEEP: 3
6. FEELING BAD ABOUT YOURSELF - OR THAT YOU ARE A FAILURE OR HAVE LET YOURSELF OR YOUR FAMILY DOWN: 0
10. IF YOU CHECKED OFF ANY PROBLEMS, HOW DIFFICULT HAVE THESE PROBLEMS MADE IT FOR YOU TO DO YOUR WORK, TAKE CARE OF THINGS AT HOME, OR GET ALONG WITH OTHER PEOPLE: 1
2. FEELING DOWN, DEPRESSED OR HOPELESS: 2
SUM OF ALL RESPONSES TO PHQ9 QUESTIONS 1 & 2: 4
SUM OF ALL RESPONSES TO PHQ QUESTIONS 1-9: 11
7. TROUBLE CONCENTRATING ON THINGS, SUCH AS READING THE NEWSPAPER OR WATCHING TELEVISION: 2
5. POOR APPETITE OR OVEREATING: 0
SUM OF ALL RESPONSES TO PHQ QUESTIONS 1-9: 11
1. LITTLE INTEREST OR PLEASURE IN DOING THINGS: 2
9. THOUGHTS THAT YOU WOULD BE BETTER OFF DEAD, OR OF HURTING YOURSELF: 0
4. FEELING TIRED OR HAVING LITTLE ENERGY: 2
8. MOVING OR SPEAKING SO SLOWLY THAT OTHER PEOPLE COULD HAVE NOTICED. OR THE OPPOSITE, BEING SO FIGETY OR RESTLESS THAT YOU HAVE BEEN MOVING AROUND A LOT MORE THAN USUAL: 0

## 2019-12-17 LAB
ALBUMIN SERPL-MCNC: 4.5 G/DL
ALP BLD-CCNC: 69 U/L
ALT SERPL-CCNC: 15 U/L
ANION GAP SERPL CALCULATED.3IONS-SCNC: 2.1 MMOL/L
AST SERPL-CCNC: 20 U/L
AVERAGE GLUCOSE: 91
BASOPHILS ABSOLUTE: 0 /ΜL
BASOPHILS RELATIVE PERCENT: 1 %
BILIRUB SERPL-MCNC: 0.4 MG/DL (ref 0.1–1.4)
BUN BLDV-MCNC: 14 MG/DL
CALCIUM SERPL-MCNC: 9.7 MG/DL
CHLORIDE BLD-SCNC: 99 MMOL/L
CHOLESTEROL, TOTAL: 222 MG/DL
CHOLESTEROL/HDL RATIO: 21
CO2: 25 MMOL/L
CREAT SERPL-MCNC: 0.82 MG/DL
EOSINOPHILS ABSOLUTE: 0.1 /ΜL
EOSINOPHILS RELATIVE PERCENT: 2 %
GFR CALCULATED: 70
GLUCOSE BLD-MCNC: 91 MG/DL
HBA1C MFR BLD: 5.6 %
HCT VFR BLD CALC: 36.5 % (ref 36–46)
HDLC SERPL-MCNC: 81 MG/DL (ref 35–70)
HEMOGLOBIN: 12.2 G/DL (ref 12–16)
LDL CHOLESTEROL CALCULATED: 120 MG/DL (ref 0–160)
LYMPHOCYTES ABSOLUTE: 1.6 /ΜL
LYMPHOCYTES RELATIVE PERCENT: 24 %
MCH RBC QN AUTO: 31 PG
MCHC RBC AUTO-ENTMCNC: 33.4 G/DL
MCV RBC AUTO: 93 FL
MONOCYTES ABSOLUTE: 0.6 /ΜL
MONOCYTES RELATIVE PERCENT: 9 %
NEUTROPHILS ABSOLUTE: 4.4 /ΜL
NEUTROPHILS RELATIVE PERCENT: 64 %
PDW BLD-RTO: 13.7 %
PLATELET # BLD: 304 K/ΜL
PMV BLD AUTO: NORMAL FL
POTASSIUM SERPL-SCNC: 4.2 MMOL/L
RBC # BLD: 3.93 10^6/ΜL
SODIUM BLD-SCNC: 140 MMOL/L
TOTAL PROTEIN: 6.6
TRIGL SERPL-MCNC: 107 MG/DL
TSH SERPL DL<=0.05 MIU/L-ACNC: 4.16 UIU/ML
VLDLC SERPL CALC-MCNC: ABNORMAL MG/DL
WBC # BLD: 6.7 10^3/ML

## 2020-01-09 NOTE — PROGRESS NOTES
route daily. Yes Aubrie Peters MD   Cholecalciferol (VITAMIN D3) 2000 UNITS CAPS Take 2,000 Units by mouth daily. Yes Historical Provider, MD   econazole nitrate 1 % cream Apply  topically 2 times daily. Apply topically daily. Yes Historical Provider, MD   naproxen (NAPROSYN) 250 MG tablet Take 500 mg by mouth 2 times daily as needed. Yes Historical Provider, MD   Calcium Carbonate-Vitamin D (CALCIUM + D) 600-200 MG-UNIT TABS Take 1 tablet by mouth 2 times daily. Yes Historical Provider, MD   Multiple Vitamins-Minerals (OCUVITE) TABS tablet Take 1 tablet by mouth daily. Yes Historical Provider, MD       Past Medical History:   Diagnosis Date    Anxiety     Colon polyp     Headache(784.0)     Hyperlipidemia     Hypertension     Osteoarthritis        Social History     Tobacco Use    Smoking status: Never Smoker    Smokeless tobacco: Never Used   Substance Use Topics    Alcohol use: Yes     Alcohol/week: 0.0 standard drinks     Comment: occ wine       Family History   Problem Relation Age of Onset    Kidney Disease Mother     Kidney Disease Sister        OBJECTIVE:  BP (!) 140/70   Pulse 71   Ht 5' (1.524 m)   Wt 143 lb (64.9 kg)   SpO2 98%   BMI 27.93 kg/m²   GEN:  in NAD  HEENT:  NCAT, TMs:normal and throat: clear  NECK:  Supple without adenopathy. CV:  Regular rate and rhythm, S1 and S2 normal, mitral regurg murmur appreciated  PULM:  Chest is clear, no wheezing ,  symmetric air entry throughout both lung fields. ABD: Soft, NT, no masses appreciated  EXT: No rash or edema  NEURO: Alert oriented ×3, nonfocal, no assistive device    ASSESSMENT/PLAN:  1. Essential hypertension  Stable, continue medication, be sure to take blood pressure medicine regularly  - losartan-hydrochlorothiazide (HYZAAR) 100-25 MG per tablet; Take 1 tablet by mouth daily  Dispense: 90 tablet; Refill: 3    2. Mixed hyperlipidemia  Elevated LDL, CT calcium score    3.  Major depressive disorder with single episode, in full

## 2020-01-10 ENCOUNTER — OFFICE VISIT (OUTPATIENT)
Dept: FAMILY MEDICINE CLINIC | Age: 75
End: 2020-01-10
Payer: MEDICARE

## 2020-01-10 VITALS
WEIGHT: 143 LBS | HEIGHT: 60 IN | OXYGEN SATURATION: 98 % | HEART RATE: 71 BPM | DIASTOLIC BLOOD PRESSURE: 70 MMHG | BODY MASS INDEX: 28.07 KG/M2 | SYSTOLIC BLOOD PRESSURE: 140 MMHG

## 2020-01-10 PROCEDURE — 3017F COLORECTAL CA SCREEN DOC REV: CPT | Performed by: FAMILY MEDICINE

## 2020-01-10 PROCEDURE — 99214 OFFICE O/P EST MOD 30 MIN: CPT | Performed by: FAMILY MEDICINE

## 2020-01-10 PROCEDURE — 1036F TOBACCO NON-USER: CPT | Performed by: FAMILY MEDICINE

## 2020-01-10 PROCEDURE — G8427 DOCREV CUR MEDS BY ELIG CLIN: HCPCS | Performed by: FAMILY MEDICINE

## 2020-01-10 PROCEDURE — G8482 FLU IMMUNIZE ORDER/ADMIN: HCPCS | Performed by: FAMILY MEDICINE

## 2020-01-10 PROCEDURE — G8417 CALC BMI ABV UP PARAM F/U: HCPCS | Performed by: FAMILY MEDICINE

## 2020-01-10 PROCEDURE — 1123F ACP DISCUSS/DSCN MKR DOCD: CPT | Performed by: FAMILY MEDICINE

## 2020-01-10 PROCEDURE — 4040F PNEUMOC VAC/ADMIN/RCVD: CPT | Performed by: FAMILY MEDICINE

## 2020-01-10 PROCEDURE — G8399 PT W/DXA RESULTS DOCUMENT: HCPCS | Performed by: FAMILY MEDICINE

## 2020-01-10 PROCEDURE — 1090F PRES/ABSN URINE INCON ASSESS: CPT | Performed by: FAMILY MEDICINE

## 2020-01-10 RX ORDER — LOSARTAN POTASSIUM AND HYDROCHLOROTHIAZIDE 25; 100 MG/1; MG/1
1 TABLET ORAL DAILY
Qty: 90 TABLET | Refills: 3 | Status: SHIPPED | OUTPATIENT
Start: 2020-01-10 | End: 2020-10-28 | Stop reason: ALTCHOICE

## 2020-01-23 ENCOUNTER — HOSPITAL ENCOUNTER (OUTPATIENT)
Dept: GENERAL RADIOLOGY | Age: 75
Discharge: HOME | End: 2020-01-23
Payer: MEDICARE

## 2020-01-23 ENCOUNTER — TELEPHONE (OUTPATIENT)
Dept: FAMILY MEDICINE CLINIC | Age: 75
End: 2020-01-23

## 2020-01-23 ENCOUNTER — HOSPITAL ENCOUNTER (OUTPATIENT)
Dept: CT IMAGING | Age: 75
Discharge: HOME | End: 2020-01-23
Payer: MEDICARE

## 2020-01-23 PROCEDURE — 75571 CT HRT W/O DYE W/CA TEST: CPT

## 2020-01-23 PROCEDURE — 77080 DXA BONE DENSITY AXIAL: CPT

## 2020-01-23 NOTE — TELEPHONE ENCOUNTER
Patient is returning call regarding results of the bone scan and cardiac calcium scoring and was informed of Dr. Andrews Garcia result notes. She will call back another time to schedule a f/u appointment.

## 2020-01-26 NOTE — PROGRESS NOTES
spray 2 sprays by Nasal route daily. Yes Aubrie Peters MD   Cholecalciferol (VITAMIN D3) 2000 UNITS CAPS Take 2,000 Units by mouth daily. Yes Historical Provider, MD   naproxen (NAPROSYN) 250 MG tablet Take 500 mg by mouth 2 times daily as needed. Yes Historical Provider, MD   Calcium Carbonate-Vitamin D (CALCIUM + D) 600-200 MG-UNIT TABS Take 1 tablet by mouth 2 times daily. Yes Historical Provider, MD   Multiple Vitamins-Minerals (OCUVITE) TABS tablet Take 1 tablet by mouth daily. Yes Historical Provider, MD   miconazole (MICOTIN) 2 % cream Apply topically 2 times daily. Patient not taking: Reported on 1/27/2020  Aubrie Peters MD   econazole nitrate 1 % cream Apply  topically 2 times daily. Apply topically daily. Historical Provider, MD       Past Medical History:   Diagnosis Date    Anxiety     Colon polyp     Headache(784.0)     Hyperlipidemia     Hypertension     Osteoarthritis        Social History     Tobacco Use    Smoking status: Never Smoker    Smokeless tobacco: Never Used   Substance Use Topics    Alcohol use: Yes     Alcohol/week: 0.0 standard drinks     Comment: occ wine       Family History   Problem Relation Age of Onset    Kidney Disease Mother     Kidney Disease Sister        OBJECTIVE:  /68 (Site: Left Upper Arm, Position: Sitting, Cuff Size: Medium Adult)   Pulse 66   Ht 5' (1.524 m)   Wt 146 lb (66.2 kg)   SpO2 98%   BMI 28.51 kg/m²   GEN:  in NAD  HEENT:  NCAT, TMs:normal and throat: clear  NECK:  Supple without adenopathy. CV:  Regular rate and rhythm, S1 and S2 normal, no murmurs, clicks  PULM:  Chest is clear, no wheezing ,  symmetric air entry throughout both lung fields. ABD: Soft, NT  EXT: No rash or edema  NEURO: Alert oriented ×3, nonfocal     The scan and CT calcium score reviewed with patient    ASSESSMENT/PLAN:  1. Mixed hyperlipidemia  Results of CT calcium score discussed with the patient, to consider low-dose statin    2.  Encounter to discuss test

## 2020-01-27 ENCOUNTER — OFFICE VISIT (OUTPATIENT)
Dept: FAMILY MEDICINE CLINIC | Age: 75
End: 2020-01-27
Payer: MEDICARE

## 2020-01-27 VITALS
SYSTOLIC BLOOD PRESSURE: 138 MMHG | OXYGEN SATURATION: 98 % | HEIGHT: 60 IN | HEART RATE: 66 BPM | BODY MASS INDEX: 28.66 KG/M2 | DIASTOLIC BLOOD PRESSURE: 68 MMHG | WEIGHT: 146 LBS

## 2020-01-27 PROCEDURE — 1090F PRES/ABSN URINE INCON ASSESS: CPT | Performed by: FAMILY MEDICINE

## 2020-01-27 PROCEDURE — G8482 FLU IMMUNIZE ORDER/ADMIN: HCPCS | Performed by: FAMILY MEDICINE

## 2020-01-27 PROCEDURE — G8417 CALC BMI ABV UP PARAM F/U: HCPCS | Performed by: FAMILY MEDICINE

## 2020-01-27 PROCEDURE — 1123F ACP DISCUSS/DSCN MKR DOCD: CPT | Performed by: FAMILY MEDICINE

## 2020-01-27 PROCEDURE — 3017F COLORECTAL CA SCREEN DOC REV: CPT | Performed by: FAMILY MEDICINE

## 2020-01-27 PROCEDURE — 4040F PNEUMOC VAC/ADMIN/RCVD: CPT | Performed by: FAMILY MEDICINE

## 2020-01-27 PROCEDURE — G8399 PT W/DXA RESULTS DOCUMENT: HCPCS | Performed by: FAMILY MEDICINE

## 2020-01-27 PROCEDURE — G8427 DOCREV CUR MEDS BY ELIG CLIN: HCPCS | Performed by: FAMILY MEDICINE

## 2020-01-27 PROCEDURE — 1036F TOBACCO NON-USER: CPT | Performed by: FAMILY MEDICINE

## 2020-01-27 PROCEDURE — 99213 OFFICE O/P EST LOW 20 MIN: CPT | Performed by: FAMILY MEDICINE

## 2020-02-05 RX ORDER — AMLODIPINE BESYLATE 5 MG/1
TABLET ORAL
Qty: 90 TABLET | Refills: 3 | Status: ON HOLD | OUTPATIENT
Start: 2020-02-05 | End: 2020-08-03 | Stop reason: HOSPADM

## 2020-02-05 NOTE — TELEPHONE ENCOUNTER
Medication:   Requested Prescriptions     Pending Prescriptions Disp Refills    amLODIPine (NORVASC) 5 MG tablet [Pharmacy Med Name: AMLODIPINE BESYLATE 5 MG Tablet] 90 tablet 1     Sig: TAKE 1 TABLET EVERY NIGHT       Last Filled:  09/09/2019 #90 1rf    Patient Phone Number: [phone] (home)     Last appt: 1/27/2020   Next appt: Visit date not found    Lab Results   Component Value Date     12/16/2019    K 4.2 12/16/2019    CL 99 12/16/2019    CO2 25 12/16/2019    BUN 14 12/16/2019    CREATININE 0.82 12/16/2019    GLUCOSE 91 12/16/2019    CALCIUM 9.7 12/16/2019    PROT 6.6 12/16/2019    LABALBU 4.5 12/16/2019    BILITOT 0.4 12/16/2019    ALKPHOS 69 12/16/2019    AST 20 12/16/2019    ALT 15 12/16/2019    LABGLOM 70 12/16/2019    GFRAA 81 12/16/2019    AGRATIO 2.1 12/16/2019    GLOB 2.1 12/16/2019

## 2020-02-18 ENCOUNTER — TELEPHONE (OUTPATIENT)
Dept: FAMILY MEDICINE CLINIC | Age: 75
End: 2020-02-18

## 2020-02-18 RX ORDER — ATORVASTATIN CALCIUM 10 MG/1
10 TABLET, FILM COATED ORAL EVERY OTHER DAY
Qty: 60 TABLET | Refills: 3 | Status: SHIPPED | OUTPATIENT
Start: 2020-02-18 | End: 2021-03-10 | Stop reason: SDUPTHER

## 2020-02-18 NOTE — TELEPHONE ENCOUNTER
Pt called  Last time you recommended her to take atorvastatin, she is concerned with joint pain side effects,   Ok to take baby aspirin with it.    If ok please order at pharmacy    Human mail order

## 2020-03-13 ENCOUNTER — TELEPHONE (OUTPATIENT)
Dept: FAMILY MEDICINE CLINIC | Age: 75
End: 2020-03-13

## 2020-03-20 ENCOUNTER — TELEPHONE (OUTPATIENT)
Dept: FAMILY MEDICINE CLINIC | Age: 75
End: 2020-03-20

## 2020-03-20 NOTE — TELEPHONE ENCOUNTER
Patient started taking Atorvastatin a few weeks ago and a couple of days later she has started having middle back pain. It does not radiate. Could this be a side affect?

## 2020-04-20 ENCOUNTER — NURSE TRIAGE (OUTPATIENT)
Dept: OTHER | Facility: CLINIC | Age: 75
End: 2020-04-20

## 2020-04-20 ENCOUNTER — TELEPHONE (OUTPATIENT)
Dept: FAMILY MEDICINE CLINIC | Age: 75
End: 2020-04-20

## 2020-04-20 ENCOUNTER — OFFICE VISIT (OUTPATIENT)
Dept: PRIMARY CARE CLINIC | Age: 75
End: 2020-04-20
Payer: MEDICARE

## 2020-04-20 VITALS
SYSTOLIC BLOOD PRESSURE: 130 MMHG | DIASTOLIC BLOOD PRESSURE: 68 MMHG | HEART RATE: 79 BPM | TEMPERATURE: 99.2 F | OXYGEN SATURATION: 92 %

## 2020-04-20 PROCEDURE — 99214 OFFICE O/P EST MOD 30 MIN: CPT | Performed by: NURSE PRACTITIONER

## 2020-04-20 NOTE — TELEPHONE ENCOUNTER
Pt scheduled and transferred to [facility] for advise on what symptoms to look out for

## 2020-04-20 NOTE — PATIENT INSTRUCTIONS
before eating or preparing food. If soap and water are not readily available, use an alcohol-based hand  with at least 60% alcohol, covering all surfaces of your hands and rubbing them together until they feel dry. Soap and water are the best option if hands are visibly dirty. Avoid touching your eyes, nose, and mouth with unwashed hands. Avoid sharing personal household items  You should not share dishes, drinking glasses, cups, eating utensils, towels, or bedding with other people or pets in your home. After using these items, they should be washed thoroughly with soap and water. Clean all high-touch surfaces everyday  High touch surfaces include counters, tabletops, doorknobs, bathroom fixtures, toilets, phones, keyboards, tablets, and bedside tables. Also, clean any surfaces that may have blood, stool, or body fluids on them. Use a household cleaning spray or wipe, according to the label instructions. Labels contain instructions for safe and effective use of the cleaning product including precautions you should take when applying the product, such as wearing gloves and making sure you have good ventilation during use of the product. Monitor your symptoms  Seek prompt medical attention if your illness is worsening (e.g., difficulty breathing). Before seeking care, call your healthcare provider and tell them that you have, or are being evaluated for, COVID-19. Put on a facemask before you enter the facility. These steps will help the healthcare providers office to keep other people in the office or waiting room from getting infected or exposed. Ask your healthcare provider to call the local or state health department. Persons who are placed under active monitoring or facilitated self-monitoring should follow instructions provided by their local health department or occupational health professionals, as appropriate. When working with your local health department check their available hours.   If you have a medical emergency and need to call 911, notify the dispatch personnel that you have, or are being evaluated for COVID-19. If possible, put on a facemask before emergency medical services arrive. Discontinuing home isolation  Patients with confirmed COVID-19 should remain under home isolation precautions until the risk of secondary transmission to others is thought to be low. The decision to discontinue home isolation precautions should be made on a case-by-case basis, in consultation with healthcare providers and state and local health departments. Thank you for enrolling in [address] Please follow the instructions below to securely access your online medical record. [employer] allows you to send messages to your doctor, view your test results, renew your prescriptions, schedule appointments, and more. How Do I Sign Up? 1. In your Internet browser, go to [email or web address]  2. Click on the Sign Up Now link in the Sign In box. You will see the New Member Sign Up page. 3. Enter your [employer] Access Code exactly as it appears below. You will not need to use this code after youve completed the sign-up process. If you do not sign up before the expiration date, you must request a new code. [employer] Access Code: Activation code not generated  Current [employer] Status: Active    4. Enter your Social Security Number ([id number]) and Date of Birth (mm/dd/yyyy) as indicated and click Submit. You will be taken to the next sign-up page. 5. Create a [employer] ID. This will be your [employer] login ID and cannot be changed, so think of one that is secure and easy to remember. 6. Create a [employer] password. You can change your password at any time. 7. Enter your Password Reset Question and Answer. This can be used at a later time if you forget your password. 8. Enter your e-mail address. You will receive e-mail notification when new information is available in [address] 9. Click Sign Up.

## 2020-04-20 NOTE — PROGRESS NOTES
tachypneic         []Speaking in full sentences     Bilateral TM- WNL, Bilateral canals-  WNL   No nodes neck  Lung clear with good air movement and effort  CV -RRR        TESTS:    [x] COVID-19 Test sent:      ASSESSMENT:    [] Flu  [] Strep Throat  [] Uncertain Viral Respiratory Illness  [x] Possible COVID-19  [] Exposure COVID-19  [] Other    PLAN: Patient is going to come back tomorrow (4/21/2020) for recheck of O2 Sat; patient verbalized understanding and will return as directed. ASSESSMENT AND PLAN:               [x] Discharge home with written instructions for:  [] Flu management  [] Strep throat management  [] Possible COVID-19 exposure with self-quarantine   [x] Possible COVID-19 with isolation instructions and management of symptoms  [x] Follow-up with primary care physician or emergency department if worsens  [] Referred to emergency department for evaluation    [] Evaluation per physician/nurse practitioner in clinic      An  electronic signature was used to authenticate this note.      --Edd Pink LPN on 6/81/9909 at 8:77 PM

## 2020-04-21 ENCOUNTER — TELEPHONE (OUTPATIENT)
Dept: PRIMARY CARE CLINIC | Age: 75
End: 2020-04-21

## 2020-04-21 ENCOUNTER — OFFICE VISIT (OUTPATIENT)
Dept: PRIMARY CARE CLINIC | Age: 75
End: 2020-04-21
Payer: MEDICARE

## 2020-04-21 VITALS — OXYGEN SATURATION: 97 %

## 2020-04-21 PROCEDURE — 99212 OFFICE O/P EST SF 10 MIN: CPT | Performed by: NURSE PRACTITIONER

## 2020-04-21 NOTE — PATIENT INSTRUCTIONS
have a medical emergency and need to call 911, notify the dispatch personnel that you have, or are being evaluated for COVID-19. If possible, put on a facemask before emergency medical services arrive. Discontinuing home isolation  Patients with confirmed COVID-19 should remain under home isolation precautions until the risk of secondary transmission to others is thought to be low. The decision to discontinue home isolation precautions should be made on a case-by-case basis, in consultation with healthcare providers and state and local health departments. Thank you for enrolling in [address] Please follow the instructions below to securely access your online medical record. [employer] allows you to send messages to your doctor, view your test results, renew your prescriptions, schedule appointments, and more. How Do I Sign Up? 1. In your Internet browser, go to [email or web address]  2. Click on the Sign Up Now link in the Sign In box. You will see the New Member Sign Up page. 3. Enter your [employer] Access Code exactly as it appears below. You will not need to use this code after youve completed the sign-up process. If you do not sign up before the expiration date, you must request a new code. [employer] Access Code: Activation code not generated  Current [facility] Status: Active    4. Enter your Social Security Number ([id number]) and Date of Birth (mm/dd/yyyy) as indicated and click Submit. You will be taken to the next sign-up page. 5. Create a [employer] ID. This will be your [employer] login ID and cannot be changed, so think of one that is secure and easy to remember. 6. Create a [employer] password. You can change your password at any time. 7. Enter your Password Reset Question and Answer. This can be used at a later time if you forget your password. 8. Enter your e-mail address. You will receive e-mail notification when new information is available in [address] 9. Click Sign Up.

## 2020-04-23 LAB
SARS-COV-2: NOT DETECTED
SOURCE: NORMAL

## 2020-04-24 ENCOUNTER — TELEPHONE (OUTPATIENT)
Dept: PRIMARY CARE CLINIC | Age: 75
End: 2020-04-24

## 2020-04-27 ENCOUNTER — TELEPHONE (OUTPATIENT)
Dept: ADMINISTRATIVE | Age: 75
End: 2020-04-27

## 2020-05-12 ENCOUNTER — TELEPHONE (OUTPATIENT)
Dept: FAMILY MEDICINE CLINIC | Age: 75
End: 2020-05-12

## 2020-06-11 ENCOUNTER — TELEPHONE (OUTPATIENT)
Dept: FAMILY MEDICINE CLINIC | Age: 75
End: 2020-06-11

## 2020-06-15 ENCOUNTER — TELEPHONE (OUTPATIENT)
Dept: FAMILY MEDICINE CLINIC | Age: 75
End: 2020-06-15

## 2020-07-02 ENCOUNTER — TELEPHONE (OUTPATIENT)
Dept: FAMILY MEDICINE CLINIC | Age: 75
End: 2020-07-02

## 2020-07-02 RX ORDER — ZOSTER VACCINE RECOMBINANT, ADJUVANTED 50 MCG/0.5
0.5 KIT INTRAMUSCULAR SEE ADMIN INSTRUCTIONS
Qty: 0.5 ML | Refills: 0 | Status: SHIPPED | OUTPATIENT
Start: 2020-07-02 | End: 2020-07-03

## 2020-07-11 ENCOUNTER — APPOINTMENT (OUTPATIENT)
Dept: CT IMAGING | Age: 75
DRG: 095 | End: 2020-07-11
Payer: MEDICARE

## 2020-07-11 ENCOUNTER — APPOINTMENT (OUTPATIENT)
Dept: GENERAL RADIOLOGY | Age: 75
DRG: 095 | End: 2020-07-11
Payer: MEDICARE

## 2020-07-11 ENCOUNTER — HOSPITAL ENCOUNTER (INPATIENT)
Age: 75
LOS: 8 days | Discharge: NURSING, REHAB OR LONG-TERM CARE | DRG: 095 | End: 2020-07-19
Attending: EMERGENCY MEDICINE | Admitting: HOSPITALIST
Payer: MEDICARE

## 2020-07-11 PROBLEM — R53.1 WEAKNESS: Status: ACTIVE | Noted: 2020-07-11

## 2020-07-11 PROBLEM — R29.6 FREQUENT FALLS: Status: ACTIVE | Noted: 2020-07-11

## 2020-07-11 PROBLEM — I47.1 PSVT (PAROXYSMAL SUPRAVENTRICULAR TACHYCARDIA) (HCC): Status: ACTIVE | Noted: 2020-07-11

## 2020-07-11 PROBLEM — I47.10 PSVT (PAROXYSMAL SUPRAVENTRICULAR TACHYCARDIA): Status: ACTIVE | Noted: 2020-07-11

## 2020-07-11 LAB
A/G RATIO: 1.4 (ref 1.1–2.2)
A/G RATIO: 1.4 (ref 1.1–2.2)
ALBUMIN SERPL-MCNC: 4.2 G/DL (ref 3.4–5)
ALBUMIN SERPL-MCNC: 4.3 G/DL (ref 3.4–5)
ALP BLD-CCNC: 67 U/L (ref 40–129)
ALP BLD-CCNC: 73 U/L (ref 40–129)
ALT SERPL-CCNC: 15 U/L (ref 10–40)
ALT SERPL-CCNC: 15 U/L (ref 10–40)
ANION GAP SERPL CALCULATED.3IONS-SCNC: 11 MMOL/L (ref 3–16)
ANION GAP SERPL CALCULATED.3IONS-SCNC: 11 MMOL/L (ref 3–16)
AST SERPL-CCNC: 22 U/L (ref 15–37)
AST SERPL-CCNC: 23 U/L (ref 15–37)
BASOPHILS ABSOLUTE: 0 K/UL (ref 0–0.2)
BASOPHILS ABSOLUTE: 0 K/UL (ref 0–0.2)
BASOPHILS RELATIVE PERCENT: 0.4 %
BASOPHILS RELATIVE PERCENT: 0.7 %
BILIRUB SERPL-MCNC: 0.3 MG/DL (ref 0–1)
BILIRUB SERPL-MCNC: 0.3 MG/DL (ref 0–1)
BILIRUBIN URINE: NEGATIVE
BLOOD, URINE: NEGATIVE
BUN BLDV-MCNC: 16 MG/DL (ref 7–20)
BUN BLDV-MCNC: 19 MG/DL (ref 7–20)
CALCIUM SERPL-MCNC: 9.5 MG/DL (ref 8.3–10.6)
CALCIUM SERPL-MCNC: 9.8 MG/DL (ref 8.3–10.6)
CHLORIDE BLD-SCNC: 101 MMOL/L (ref 99–110)
CHLORIDE BLD-SCNC: 98 MMOL/L (ref 99–110)
CLARITY: ABNORMAL
CO2: 25 MMOL/L (ref 21–32)
CO2: 28 MMOL/L (ref 21–32)
COLOR: YELLOW
CREAT SERPL-MCNC: 0.6 MG/DL (ref 0.6–1.2)
CREAT SERPL-MCNC: 0.8 MG/DL (ref 0.6–1.2)
EKG ATRIAL RATE: 70 BPM
EKG DIAGNOSIS: NORMAL
EKG P AXIS: 9 DEGREES
EKG P-R INTERVAL: 110 MS
EKG Q-T INTERVAL: 400 MS
EKG QRS DURATION: 76 MS
EKG QTC CALCULATION (BAZETT): 432 MS
EKG R AXIS: 31 DEGREES
EKG T AXIS: 71 DEGREES
EKG VENTRICULAR RATE: 70 BPM
EOSINOPHILS ABSOLUTE: 0 K/UL (ref 0–0.6)
EOSINOPHILS ABSOLUTE: 0.1 K/UL (ref 0–0.6)
EOSINOPHILS RELATIVE PERCENT: 0.4 %
EOSINOPHILS RELATIVE PERCENT: 1 %
EPITHELIAL CELLS, UA: 0 /HPF (ref 0–5)
GFR AFRICAN AMERICAN: >60
GFR AFRICAN AMERICAN: >60
GFR NON-AFRICAN AMERICAN: >60
GFR NON-AFRICAN AMERICAN: >60
GLOBULIN: 2.9 G/DL
GLOBULIN: 3.1 G/DL
GLUCOSE BLD-MCNC: 103 MG/DL (ref 70–99)
GLUCOSE BLD-MCNC: 153 MG/DL (ref 70–99)
GLUCOSE URINE: NEGATIVE MG/DL
HCT VFR BLD CALC: 38.3 % (ref 36–48)
HCT VFR BLD CALC: 38.5 % (ref 36–48)
HEMOGLOBIN: 13 G/DL (ref 12–16)
HEMOGLOBIN: 13.8 G/DL (ref 12–16)
HYALINE CASTS: 0 /LPF (ref 0–8)
KETONES, URINE: 15 MG/DL
LEUKOCYTE ESTERASE, URINE: NEGATIVE
LYMPHOCYTES ABSOLUTE: 1 K/UL (ref 1–5.1)
LYMPHOCYTES ABSOLUTE: 1.4 K/UL (ref 1–5.1)
LYMPHOCYTES RELATIVE PERCENT: 11.9 %
LYMPHOCYTES RELATIVE PERCENT: 21.1 %
MAGNESIUM: 2.1 MG/DL (ref 1.8–2.4)
MCH RBC QN AUTO: 31.9 PG (ref 26–34)
MCH RBC QN AUTO: 33.3 PG (ref 26–34)
MCHC RBC AUTO-ENTMCNC: 34.1 G/DL (ref 31–36)
MCHC RBC AUTO-ENTMCNC: 35.8 G/DL (ref 31–36)
MCV RBC AUTO: 93.1 FL (ref 80–100)
MCV RBC AUTO: 93.5 FL (ref 80–100)
MICROSCOPIC EXAMINATION: YES
MONOCYTES ABSOLUTE: 0.3 K/UL (ref 0–1.3)
MONOCYTES ABSOLUTE: 0.4 K/UL (ref 0–1.3)
MONOCYTES RELATIVE PERCENT: 4.1 %
MONOCYTES RELATIVE PERCENT: 5.1 %
NEUTROPHILS ABSOLUTE: 4.8 K/UL (ref 1.7–7.7)
NEUTROPHILS ABSOLUTE: 7.2 K/UL (ref 1.7–7.7)
NEUTROPHILS RELATIVE PERCENT: 72.1 %
NEUTROPHILS RELATIVE PERCENT: 83.2 %
NITRITE, URINE: NEGATIVE
PDW BLD-RTO: 14.5 % (ref 12.4–15.4)
PDW BLD-RTO: 14.8 % (ref 12.4–15.4)
PH UA: 7 (ref 5–8)
PLATELET # BLD: 244 K/UL (ref 135–450)
PLATELET # BLD: 264 K/UL (ref 135–450)
PMV BLD AUTO: 8.1 FL (ref 5–10.5)
PMV BLD AUTO: 8.1 FL (ref 5–10.5)
POTASSIUM REFLEX MAGNESIUM: 3.4 MMOL/L (ref 3.5–5.1)
POTASSIUM SERPL-SCNC: 3.8 MMOL/L (ref 3.5–5.1)
PRO-BNP: 218 PG/ML (ref 0–449)
PROTEIN UA: NEGATIVE MG/DL
RBC # BLD: 4.09 M/UL (ref 4–5.2)
RBC # BLD: 4.14 M/UL (ref 4–5.2)
RBC UA: 3 /HPF (ref 0–4)
SEDIMENTATION RATE, ERYTHROCYTE: 39 MM/HR (ref 0–30)
SODIUM BLD-SCNC: 137 MMOL/L (ref 136–145)
SODIUM BLD-SCNC: 137 MMOL/L (ref 136–145)
SPECIFIC GRAVITY UA: 1.02 (ref 1–1.03)
TOTAL CK: 158 U/L (ref 26–192)
TOTAL PROTEIN: 7.1 G/DL (ref 6.4–8.2)
TOTAL PROTEIN: 7.4 G/DL (ref 6.4–8.2)
TROPONIN: <0.01 NG/ML
URINE REFLEX TO CULTURE: ABNORMAL
URINE TYPE: ABNORMAL
UROBILINOGEN, URINE: 0.2 E.U./DL
WBC # BLD: 6.6 K/UL (ref 4–11)
WBC # BLD: 8.7 K/UL (ref 4–11)
WBC UA: 2 /HPF (ref 0–5)

## 2020-07-11 PROCEDURE — 93005 ELECTROCARDIOGRAM TRACING: CPT | Performed by: NURSE PRACTITIONER

## 2020-07-11 PROCEDURE — 82306 VITAMIN D 25 HYDROXY: CPT

## 2020-07-11 PROCEDURE — G0378 HOSPITAL OBSERVATION PER HR: HCPCS

## 2020-07-11 PROCEDURE — 84439 ASSAY OF FREE THYROXINE: CPT

## 2020-07-11 PROCEDURE — 82550 ASSAY OF CK (CPK): CPT

## 2020-07-11 PROCEDURE — 86140 C-REACTIVE PROTEIN: CPT

## 2020-07-11 PROCEDURE — 82607 VITAMIN B-12: CPT

## 2020-07-11 PROCEDURE — 80053 COMPREHEN METABOLIC PANEL: CPT

## 2020-07-11 PROCEDURE — 83540 ASSAY OF IRON: CPT

## 2020-07-11 PROCEDURE — 85652 RBC SED RATE AUTOMATED: CPT

## 2020-07-11 PROCEDURE — 83735 ASSAY OF MAGNESIUM: CPT

## 2020-07-11 PROCEDURE — 84484 ASSAY OF TROPONIN QUANT: CPT

## 2020-07-11 PROCEDURE — 83550 IRON BINDING TEST: CPT

## 2020-07-11 PROCEDURE — 6370000000 HC RX 637 (ALT 250 FOR IP): Performed by: HOSPITALIST

## 2020-07-11 PROCEDURE — 83880 ASSAY OF NATRIURETIC PEPTIDE: CPT

## 2020-07-11 PROCEDURE — 82746 ASSAY OF FOLIC ACID SERUM: CPT

## 2020-07-11 PROCEDURE — 71045 X-RAY EXAM CHEST 1 VIEW: CPT

## 2020-07-11 PROCEDURE — 1200000000 HC SEMI PRIVATE

## 2020-07-11 PROCEDURE — 93010 ELECTROCARDIOGRAM REPORT: CPT | Performed by: INTERNAL MEDICINE

## 2020-07-11 PROCEDURE — 70450 CT HEAD/BRAIN W/O DYE: CPT

## 2020-07-11 PROCEDURE — 99285 EMERGENCY DEPT VISIT HI MDM: CPT

## 2020-07-11 PROCEDURE — 6360000002 HC RX W HCPCS: Performed by: HOSPITALIST

## 2020-07-11 PROCEDURE — 85025 COMPLETE CBC W/AUTO DIFF WBC: CPT

## 2020-07-11 PROCEDURE — 83036 HEMOGLOBIN GLYCOSYLATED A1C: CPT

## 2020-07-11 PROCEDURE — 84443 ASSAY THYROID STIM HORMONE: CPT

## 2020-07-11 PROCEDURE — 81001 URINALYSIS AUTO W/SCOPE: CPT

## 2020-07-11 RX ORDER — NAPROXEN 250 MG/1
500 TABLET ORAL 2 TIMES DAILY PRN
Status: DISCONTINUED | OUTPATIENT
Start: 2020-07-11 | End: 2020-07-19 | Stop reason: HOSPADM

## 2020-07-11 RX ORDER — M-VIT,TX,IRON,MINS/CALC/FOLIC 27MG-0.4MG
1 TABLET ORAL DAILY
Status: DISCONTINUED | OUTPATIENT
Start: 2020-07-11 | End: 2020-07-19 | Stop reason: HOSPADM

## 2020-07-11 RX ORDER — ACETAMINOPHEN 650 MG/1
650 SUPPOSITORY RECTAL EVERY 6 HOURS PRN
Status: DISCONTINUED | OUTPATIENT
Start: 2020-07-11 | End: 2020-07-19 | Stop reason: HOSPADM

## 2020-07-11 RX ORDER — HYDROCHLOROTHIAZIDE 25 MG/1
25 TABLET ORAL DAILY
Status: DISCONTINUED | OUTPATIENT
Start: 2020-07-11 | End: 2020-07-14

## 2020-07-11 RX ORDER — POTASSIUM CHLORIDE 7.45 MG/ML
10 INJECTION INTRAVENOUS PRN
Status: DISCONTINUED | OUTPATIENT
Start: 2020-07-11 | End: 2020-07-19 | Stop reason: HOSPADM

## 2020-07-11 RX ORDER — PANTOPRAZOLE SODIUM 40 MG/1
40 TABLET, DELAYED RELEASE ORAL
Status: DISCONTINUED | OUTPATIENT
Start: 2020-07-12 | End: 2020-07-19 | Stop reason: HOSPADM

## 2020-07-11 RX ORDER — ACETAMINOPHEN 325 MG/1
650 TABLET ORAL EVERY 6 HOURS PRN
Status: DISCONTINUED | OUTPATIENT
Start: 2020-07-11 | End: 2020-07-19 | Stop reason: HOSPADM

## 2020-07-11 RX ORDER — ATORVASTATIN CALCIUM 10 MG/1
10 TABLET, FILM COATED ORAL EVERY OTHER DAY
Status: DISCONTINUED | OUTPATIENT
Start: 2020-07-12 | End: 2020-07-19 | Stop reason: HOSPADM

## 2020-07-11 RX ORDER — LOSARTAN POTASSIUM 100 MG/1
100 TABLET ORAL DAILY
Status: DISCONTINUED | OUTPATIENT
Start: 2020-07-11 | End: 2020-07-19 | Stop reason: HOSPADM

## 2020-07-11 RX ORDER — DESVENLAFAXINE 50 MG/1
50 TABLET, EXTENDED RELEASE ORAL DAILY
Status: DISCONTINUED | OUTPATIENT
Start: 2020-07-11 | End: 2020-07-19 | Stop reason: HOSPADM

## 2020-07-11 RX ORDER — VITAMIN B COMPLEX
2000 TABLET ORAL DAILY
Status: DISCONTINUED | OUTPATIENT
Start: 2020-07-11 | End: 2020-07-19 | Stop reason: HOSPADM

## 2020-07-11 RX ORDER — MAGNESIUM SULFATE 1 G/100ML
1 INJECTION INTRAVENOUS PRN
Status: DISCONTINUED | OUTPATIENT
Start: 2020-07-11 | End: 2020-07-19 | Stop reason: HOSPADM

## 2020-07-11 RX ORDER — SODIUM CHLORIDE 0.9 % (FLUSH) 0.9 %
10 SYRINGE (ML) INJECTION PRN
Status: DISCONTINUED | OUTPATIENT
Start: 2020-07-11 | End: 2020-07-19 | Stop reason: HOSPADM

## 2020-07-11 RX ORDER — LOSARTAN POTASSIUM AND HYDROCHLOROTHIAZIDE 25; 100 MG/1; MG/1
1 TABLET ORAL DAILY
Status: DISCONTINUED | OUTPATIENT
Start: 2020-07-11 | End: 2020-07-11 | Stop reason: CLARIF

## 2020-07-11 RX ORDER — POTASSIUM CHLORIDE 20 MEQ/1
40 TABLET, EXTENDED RELEASE ORAL PRN
Status: DISCONTINUED | OUTPATIENT
Start: 2020-07-11 | End: 2020-07-19 | Stop reason: HOSPADM

## 2020-07-11 RX ADMIN — HYDROCHLOROTHIAZIDE 25 MG: 25 TABLET ORAL at 21:01

## 2020-07-11 RX ADMIN — OYSTER SHELL CALCIUM WITH VITAMIN D 1 TABLET: 500; 200 TABLET, FILM COATED ORAL at 21:02

## 2020-07-11 RX ADMIN — LOSARTAN POTASSIUM 100 MG: 100 TABLET, FILM COATED ORAL at 21:01

## 2020-07-11 RX ADMIN — DESVENLAFAXINE SUCCINATE 50 MG: 50 TABLET, EXTENDED RELEASE ORAL at 21:02

## 2020-07-11 RX ADMIN — VITAMIN D, TAB 1000IU (100/BT) 2000 UNITS: 25 TAB at 21:02

## 2020-07-11 RX ADMIN — ENOXAPARIN SODIUM 40 MG: 40 INJECTION SUBCUTANEOUS at 21:02

## 2020-07-11 ASSESSMENT — ENCOUNTER SYMPTOMS
VOMITING: 0
DIARRHEA: 0
NAUSEA: 0
CHEST TIGHTNESS: 0
ABDOMINAL PAIN: 0
SHORTNESS OF BREATH: 0

## 2020-07-11 ASSESSMENT — PAIN SCALES - GENERAL: PAINLEVEL_OUTOF10: 0

## 2020-07-11 NOTE — ED NOTES
Patient has called out every 10-20 minutes with request to adjust her bed. This RN has entered and readjusted patient. Debby Parson NP and Dr. Enrico Salgado have updated patient 3 times each. Debby Parson NP at bedside again for another update. Patient is very frustrated telling staff she is now paralyzed because we left her in bed. Patient educated on need to stay in bed due to instability. IV patent. Will continue to monitor.       Hilda Dixon RN  07/11/20 0536

## 2020-07-11 NOTE — ED PROVIDER NOTES
[facility]        Pt Name: Aracelis Woodard  MRN: [id number]  Birthdate [birth year]  Date of evaluation: 7/11/2020  Provider: KIMBERLY Barrera - KAY  PCP: Pramod Harden MD     I have seen and evaluated this patient with my supervising physician Rissa Sorensen MD.    [address] [place] [address]       Chief Complaint   Patient presents with    Extremity Weakness     Patient in with complaints of weakness in legs since yesterday morning. States she started a new shot one week ago and was told she would feel sick and weak following the shot        HISTORY OF PRESENT ILLNESS   (Location, Timing/Onset, Context/Setting, Quality, Duration, Modifying Factors, Severity, Associated Signs and Symptoms)  Note limiting factors. Aracelis Woodard is a 75 y.o. female presents to the ER with a complaint of bilateral extremity weakness. States that she awoke yesterday and was having difficulty walking, states that she fell down one step and sat on her bottom. She had difficulty walking throughout the day. This morning she was able to navigate the steps but fell while walking across the front room to get her coffee. She did not injure herself. No upper extremity weakness. Reports that she got a shingles vaccine last week and was warned of possible side effects including myalgias and fatigue but did not expect weakness. She denies any sensory loss. No headache or URI symptoms, including fever. Denies any headache, fever, visual disturbances. No chest pain or pressure. No neck or back pain. No shortness of breath, cough, or congestion. No abdominal pain, nausea, vomiting, diarrhea, constipation, or dysuria. No rash. Nursing Notes were all reviewed and agreed with or any disagreements were addressed in the HPI.     REVIEW OF SYSTEMS    (2-9 systems for level 4, 10 or more for level 5)     Review of Systems   Constitutional: Negative for activity change, chills and fever. Respiratory: Negative for chest tightness and shortness of breath. Cardiovascular: Negative for chest pain. Gastrointestinal: Negative for abdominal pain, diarrhea, nausea and vomiting. Genitourinary: Negative for dysuria. Neurological: Positive for weakness. All other systems reviewed and are negative. Positives and Pertinent negatives as per HPI. Except as noted above in the ROS, all other systems were reviewed and negative. PAST MEDICAL HISTORY     Past Medical History:   Diagnosis Date    Anxiety     Colon polyp     Headache(784.0)     Hyperlipidemia     Hypertension     Osteoarthritis          SURGICAL HISTORY     Past Surgical History:   Procedure Laterality Date    COLONOSCOPY  8/10         CURRENTMEDICATIONS       Previous Medications    AMLODIPINE (NORVASC) 5 MG TABLET    TAKE 1 TABLET EVERY NIGHT    ATORVASTATIN (LIPITOR) 10 MG TABLET    Take 1 tablet by mouth every other day    CALCIUM CARBONATE-VITAMIN D (CALCIUM + D) 600-200 MG-UNIT TABS    Take 1 tablet by mouth 2 times daily. CHOLECALCIFEROL (VITAMIN D3) 2000 UNITS CAPS    Take 2,000 Units by mouth daily. CLOBETASOL (TEMOVATE) 0.05 % CREAM    Apply to rash or itching skin on body 3 to 4 times daily as directed    DESVENLAFAXINE SUCCINATE (PRISTIQ) 50 MG TB24 EXTENDED RELEASE TABLET    Take 1 tablet by mouth daily    ECONAZOLE NITRATE 1 % CREAM    Apply  topically 2 times daily. Apply topically daily. LOSARTAN-HYDROCHLOROTHIAZIDE (HYZAAR) 100-25 MG PER TABLET    Take 1 tablet by mouth daily    MICONAZOLE (MICOTIN) 2 % CREAM    Apply topically 2 times daily. MOMETASONE (NASONEX) 50 MCG/ACT NASAL SPRAY    2 sprays by Nasal route daily. MULTIPLE VITAMINS-MINERALS (OCUVITE) TABS TABLET    Take 1 tablet by mouth daily. NAPROXEN (NAPROSYN) 250 MG TABLET    Take 500 mg by mouth 2 times daily as needed.          ALLERGIES     Hydroxybenzoate; Prednisolone acetate; Tixocortol pivalate; Neosporin [neomycin-polymyxin-gramicidin]; Ace inhibitors; and Methylchloroisothiazolinone    FAMILYHISTORY       Family History   Problem Relation Age of Onset    Kidney Disease Mother     Kidney Disease Sister           SOCIAL HISTORY       Social History     Tobacco Use    Smoking status: Never Smoker    Smokeless tobacco: Never Used   Substance Use Topics    Alcohol use: Yes     Alcohol/week: 0.0 standard drinks     Comment: occ wine    Drug use: No       SCREENINGS   NIH Stroke Scale  NIH Stroke Scale Assessed: Yes  Interval: Baseline  Level of Consciousness (1a. ): Alert  LOC Questions (1b. ): Answers both correctly  LOC Commands (1c. ): Performs both tasks correctly  Best Gaze (2. ): Normal  Visual (3. ): No visual loss  Facial Palsy (4. ): Normal symmetrical movement  Motor Arm, Left (5a. ): No drift  Motor Arm, Right (5b. ): No drift  Motor Leg, Left (6a. ): No drift  Motor Leg, Right (6b. ): No drift  Limb Ataxia (7. ): Absent  Sensory (8. ): Normal  Best Language (9. ): No aphasia  Dysarthria (10. ): Normal  Extinction and Inattention (11): No abnormality  Total: 0         PHYSICAL EXAM    (up to 7 for level 4, 8 or more for level 5)     ED Triage Vitals [07/11/20 0932]   BP Temp Temp Source Pulse Resp SpO2 Height Weight   (!) 158/68 98.4 °F (36.9 °C) Oral 78 18 98 % -- --       Physical Exam  Vitals signs and nursing note reviewed. Constitutional:       Appearance: She is well-developed. She is not diaphoretic. HENT:      Head: Normocephalic and atraumatic. Right Ear: External ear normal.      Left Ear: External ear normal.   Eyes:      General:         Right eye: No discharge. Left eye: No discharge. Neck:      Musculoskeletal: Normal range of motion and neck supple. Vascular: No JVD. Cardiovascular:      Rate and Rhythm: Regular rhythm. Tachycardia present. Pulses: Normal pulses. Heart sounds: Normal heart sounds.    Pulmonary:      Effort: Pulmonary effort is normal. No respiratory distress. Breath sounds: Normal breath sounds. Abdominal:      Palpations: Abdomen is soft. Musculoskeletal: Normal range of motion. Skin:     General: Skin is warm and dry. Coloration: Skin is not pale. Neurological:      Mental Status: She is alert and oriented to person, place, and time. Cranial Nerves: Cranial nerves are intact. Sensory: Sensation is intact. Motor: Motor function is intact. Coordination: Coordination is intact. Coordination normal. Finger-Nose-Finger Test and Heel to Rockney Goldstein Test normal.      Comments: 5/5 strength upper and lower extremities, no dysmetria, dysarthria, or dysphagia. Gait not assessed. Psychiatric:         Behavior: Behavior normal.         DIAGNOSTIC RESULTS   LABS:    Labs Reviewed   COMPREHENSIVE METABOLIC PANEL - Abnormal; Notable for the following components:       Result Value    Glucose 103 (*)     All other components within normal limits    Narrative:     Performed at:  [facility] [address], [facility], [address]   Phone [phone]   CBC WITH AUTO DIFFERENTIAL    Narrative:     Performed at:  [facility] [address], [facility], [address]   Phone [phone]   TROPONIN    Narrative:     Performed at:  [facility] [address], [facility], [address]   Phone [phone]     Narrative:     Performed at:  [facility] [address], [facility], [address]   Phone [phone]   URINE RT REFLEX TO CULTURE       All other labs were within normal range or not returned as of this dictation. EKG: All EKG's are interpreted by the Emergency Department Physician in the absence of a cardiologist.  Please see their note for interpretation of EKG.       RADIOLOGY:   Non-plain film images such as CT, Ultrasound and MRI are read by the radiologist. Caitymiles Bojorquez radiographic images are visualized and preliminarily interpreted by the ED Provider with the below findings:        Interpretation per the Radiologist below, if available at the time of this note:    CT Head WO Contrast   Final Result   No acute intracranial abnormality. XR CHEST PORTABLE   Final Result   No acute cardiopulmonary abnormality           Xr Chest Portable    Result Date: 7/11/2020  EXAMINATION: ONE XRAY VIEW OF THE CHEST 7/11/2020 10:42 am COMPARISON: 04/29/2016 HISTORY: ORDERING SYSTEM PROVIDED HISTORY: SOB TECHNOLOGIST PROVIDED HISTORY: Reason for exam:->SOB Reason for Exam: Extremity Weakness (Patient in with complaints of weakness in legs since yesterday morning. States she started a new shot one week ago and was told she would feel sick and weak following the shot ) Acuity: Unknown Type of Exam: Unknown FINDINGS: Cardiomediastinal silhouette is normal in size. Aortic atherosclerosis. No consolidation, pleural effusion, or pneumothorax. No acute osseous abnormality. No acute cardiopulmonary abnormality           PROCEDURES   Unless otherwise noted below, none     Procedures    CRITICAL CARE TIME   The total critical care time spent while evaluating and treating this patient was at least 44 minutes. This excludes time spent doing separately billable procedures. This includes time at the bedside, data interpretation, medication management, obtaining critical history from collateral sources if the patient is unable to provide it directly, and physician consultation. Specifics of interventions taken and potentially life-threatening diagnostic considerations are listed above in the medical decision making.       CONSULTS:  None      EMERGENCY DEPARTMENT COURSE and DIFFERENTIAL DIAGNOSIS/MDM:   Vitals:    Vitals:    07/11/20 0932 07/11/20 1133   BP: (!) 158/68 (!) 142/67   Pulse: 78 70   Resp: 18 18   Temp: 98.4 °F (36.9 °C)    TempSrc: Oral    SpO2: 98% 99%       Patient was given the following medications:  Medications - No data to display        Briefly, this is a 75year old female that  presents to the ER with a complaint of bilateral extremity weakness. States that she awoke yesterday and was having difficulty walking, states that she fell down one step and sat on her bottom. She had difficulty walking throughout the day. This morning she was able to navigate the steps but fell while walking across the front room to get her coffee. She did not injure herself. No upper extremity weakness. Reports that she got a shingles vaccine last week and was warned of possible side effects including myalgias and fatigue but did not expect weakness. She denies any sensory loss. No headache or URI symptoms, including fever. Patient did have an episode of tachycardia, strip printed and placed on chart. Appears to be SVT, she did convert without any medications. CBC is unremarkable. CMP unremarkable. Troponin negative. . CT Head WO Contrast (Final result)   Result time 07/11/20 13:35:23   Final result by Kaelyn Arizmendi MD (07/11/20 13:35:23)                 Impression:     No acute intracranial abnormality. XR CHEST PORTABLE (Final result)   Result time 07/11/20 10:55:05   Final result by Luis Fernando Donahue MD (07/11/20 10:55:05)                 Impression:     No acute cardiopulmonary abnormality               I did speak with hospitalist services at 1500 and they are agreeable to accept this patient in admission. Patient is agreeable regarding plan for admission. We do have concern of SVT versus tachycardia and fatigue. The patient fell twice yesterday. FINAL IMPRESSION      1. Fatigue, unspecified type    2. Frequent falls    3. Tachycardia          DISPOSITION/PLAN   DISPOSITION Decision To Admit 07/11/2020 02:01:42 PM      PATIENT REFERREDTO:  No follow-up provider specified.     DISCHARGE MEDICATIONS:  New Prescriptions    No

## 2020-07-11 NOTE — PROGRESS NOTES
Pt admit to CVU bed 13 from ED. Pt attached to cvu monitors. HR 74. Pt oriented to room.   Will monitor

## 2020-07-11 NOTE — ED NOTES
Bed: 23  Expected date:   Expected time:   Means of arrival: [facility]  Comments:  BRITNEY Coreas RN  07/11/20 2774

## 2020-07-11 NOTE — ED NOTES
Patient meal tray ordered. Patient asking to pee but refuses to use bedpan. States she will walk but then also states that she is paralyzed from the waist down and cannot stand.       Vesta Rhodes RN  07/11/20 4539

## 2020-07-11 NOTE — H&P
Gait disturbance/ataxia  Skin: Negative for itching,rash  Psychiatric: Negative for depression  Endocrine: Negative for polydipsia,polyuria,heat /cold intolerance. Past Medical History:   has a past medical history of Anxiety, Colon polyp, Headache(784.0), Hyperlipidemia, Hypertension, and Osteoarthritis. Past Surgical History:   has a past surgical history that includes Colonoscopy (8/10). Medications:  No current facility-administered medications on file prior to encounter. Current Outpatient Medications on File Prior to Encounter   Medication Sig Dispense Refill    atorvastatin (LIPITOR) 10 MG tablet Take 1 tablet by mouth every other day 60 tablet 3    amLODIPine (NORVASC) 5 MG tablet TAKE 1 TABLET EVERY NIGHT 90 tablet 3    losartan-hydrochlorothiazide (HYZAAR) 100-25 MG per tablet Take 1 tablet by mouth daily 90 tablet 3    desvenlafaxine succinate (PRISTIQ) 50 MG TB24 extended release tablet Take 1 tablet by mouth daily 90 tablet 3    miconazole (MICOTIN) 2 % cream Apply topically 2 times daily. (Patient not taking: Reported on 1/27/2020) 60 g 0    clobetasol (TEMOVATE) 0.05 % cream Apply to rash or itching skin on body 3 to 4 times daily as directed      mometasone (NASONEX) 50 MCG/ACT nasal spray 2 sprays by Nasal route daily. 1 Inhaler 0    Cholecalciferol (VITAMIN D3) 2000 UNITS CAPS Take 2,000 Units by mouth daily.  econazole nitrate 1 % cream Apply  topically 2 times daily. Apply topically daily.  naproxen (NAPROSYN) 250 MG tablet Take 500 mg by mouth 2 times daily as needed.  Calcium Carbonate-Vitamin D (CALCIUM + D) 600-200 MG-UNIT TABS Take 1 tablet by mouth 2 times daily.  Multiple Vitamins-Minerals (OCUVITE) TABS tablet Take 1 tablet by mouth daily. Allergies:   Allergies   Allergen Reactions    Hydroxybenzoate     Prednisolone Acetate     Tixocortol Pivalate     Neosporin [Neomycin-Polymyxin-Gramicidin] Rash     Allergic rash     Ace Inhibitors      Cough, itchy throat    Methylchloroisothiazolinone         Social History:  Patient Lives  reports that she has never smoked. She has never used smokeless tobacco. She reports current alcohol use. She reports that she does not use drugs. Family History:  family history includes Kidney Disease in her mother and sister. Physical Exam:  /70   Pulse 84   Temp 98.4 °F (36.9 °C) (Oral)   Resp 14   SpO2 100%     General appearance:  Appears comfortable. [place] female who appears younger than her stated age  Eyes: Sclera clear, pupils equal  ENT: Moist mucus membranes, no thrush. Trachea midline. Cardiovascular: Regular rhythm, normal S1, S2 with 2 out of 6 JESSICA; no edema in lower extremities  Respiratory: Clear to auscultation bilaterally, no wheeze, good inspiratory effort  Gastrointestinal: Abdomen soft, non-tender, not distended, normal bowel sounds  Musculoskeletal: No cyanosis in digits, neck supple  Neurology: Cranial nerves grossly intact. Alert and oriented in time, place and person.  No speech defect; motor 4+ out of 5 bilateral upper extremities and 4 out of 5 bilateral lower extremities  Psychiatry: Appropriate affect but anxious  Skin: Warm, dry, normal turgor, no rash  Brisk capillary refill, peripheral pulses palpable   Labs:  CBC:   Lab Results   Component Value Date    WBC 8.7 07/11/2020    RBC 4.14 07/11/2020    HGB 13.8 07/11/2020    HCT 38.5 07/11/2020    MCV 93.1 07/11/2020    MCH 33.3 07/11/2020    MCHC 35.8 07/11/2020    RDW 14.8 07/11/2020     07/11/2020    MPV 8.1 07/11/2020     BMP:    Lab Results   Component Value Date     07/11/2020    K 3.8 07/11/2020     07/11/2020    CO2 25 07/11/2020    BUN 19 07/11/2020    CREATININE 0.6 07/11/2020    CALCIUM 9.8 07/11/2020    GFRAA >60 07/11/2020    GFRAA >60 09/17/2012    LABGLOM >60 07/11/2020    LABGLOM 78 11/02/2017    GLUCOSE 103 07/11/2020     CT Head WO Contrast   Final Result   No acute intracranial abnormality. XR CHEST PORTABLE   Final Result   No acute cardiopulmonary abnormality           Chest Xray:   EKG:    I visualized CXR images and EKG strips    Ventricular Rate  70 BPM  QTc Calculation (Bazett)  432 ms    Atrial Rate  70 BPM  P Axis  9 degrees    P-R Interval  110 ms  R Axis  31 degrees    QRS Duration  76 ms  T Axis  71 degrees    Q-T Interval  400 ms  Diagnosis  Sinus rhythm with short MT with occasional Premature ventricular complexesNonspecific T wave abno. .. Discussed case  with ED provider  Problem List  Principal Problem:    PSVT (paroxysmal supraventricular tachycardia) ([facility])  Active Problems:    Hypertension    Hyperlipidemia    Weakness    Frequent falls  Resolved Problems:    * No resolved hospital problems. *        Assessment/Plan:     1. Paroxysmal SVT (noted in ED)  -Patient admitted to telemetry floor for close observation  - Cardiology consult for further recommendations  - TSH, free T4, ICA, mag, phos pending  -Echo scheduled for a.m. 2.  Hypertension  -Continue ARB/diuretic combo  - Norvasc held pending cardiology consult; may benefit from calcium channel blocker with rate control i.e. Cardizem, Calan, Procardia. 3.  Generalized weakness  -Electrolyte and thyroid studies as noted above  -Urine sent for culture  - Folate, vitamin B and vitamin D studies  -ESR and CRP to rule out PMR  --PT/OT consultation for recommendations  -Fall precautions  - Bilateral carotid Dopplers to assess \"drop attacks\"          DVT prophylaxis- Lovenox 40 subcu daily  Code status-full code  Diet-cardiac with carb restriction; n.p.o. midnight  IV access- peripheral established in ED      Admit as observation. I anticipate hospitalization spanning less than two midnights for investigation and treatment of the above medically necessary diagnoses. Please note that some part of this chart was generated using Dragon dictation software.  Although every effort was made to ensure

## 2020-07-11 NOTE — ED NOTES
Patient called out again yelling at this RN. Upon entering room patient yells at nurse and states that her bed is uncomfortable. Awaiting room upstairs to be ready. Will continue to monitor.       Christoph Polk RN  07/11/20 2086

## 2020-07-11 NOTE — ED NOTES
Patient refusing bedpan at this time. Patient states she wants to see Doctor and does not want to be admitted.       Vahe Melendrez RN  07/11/20 1200

## 2020-07-11 NOTE — ED NOTES
Patient keeps throwing everything on the floor, tissues picked up and patient throws more on ground. Patient threw mask on ground after being told that each patient gets one mask. Patient states she needs a bottle of hand . A handful offered patient requesting entire bottle to be given.       Bereket Brody RN  07/11/20 9295

## 2020-07-12 LAB
C-REACTIVE PROTEIN: 3.3 MG/L (ref 0–5.1)
ESTIMATED AVERAGE GLUCOSE: 105.4 MG/DL
FOLATE: 18.52 NG/ML (ref 4.78–24.2)
HBA1C MFR BLD: 5.3 %
IRON SATURATION: 26 % (ref 15–50)
IRON: 77 UG/DL (ref 37–145)
T4 FREE: 1 NG/DL (ref 0.9–1.8)
TOTAL IRON BINDING CAPACITY: 295 UG/DL (ref 260–445)
TSH REFLEX: 5.38 UIU/ML (ref 0.27–4.2)
VITAMIN B-12: 678 PG/ML (ref 211–911)
VITAMIN D 25-HYDROXY: 47.2 NG/ML

## 2020-07-12 PROCEDURE — 6360000002 HC RX W HCPCS: Performed by: HOSPITALIST

## 2020-07-12 PROCEDURE — 94760 N-INVAS EAR/PLS OXIMETRY 1: CPT

## 2020-07-12 PROCEDURE — G0378 HOSPITAL OBSERVATION PER HR: HCPCS

## 2020-07-12 PROCEDURE — 6370000000 HC RX 637 (ALT 250 FOR IP): Performed by: HOSPITALIST

## 2020-07-12 PROCEDURE — 99204 OFFICE O/P NEW MOD 45 MIN: CPT | Performed by: INTERNAL MEDICINE

## 2020-07-12 PROCEDURE — 1200000000 HC SEMI PRIVATE

## 2020-07-12 PROCEDURE — 93005 ELECTROCARDIOGRAM TRACING: CPT | Performed by: HOSPITALIST

## 2020-07-12 RX ORDER — AMLODIPINE BESYLATE 5 MG/1
5 TABLET ORAL DAILY
Status: DISCONTINUED | OUTPATIENT
Start: 2020-07-12 | End: 2020-07-16

## 2020-07-12 RX ORDER — HYDRALAZINE HYDROCHLORIDE 20 MG/ML
10 INJECTION INTRAMUSCULAR; INTRAVENOUS EVERY 4 HOURS PRN
Status: DISCONTINUED | OUTPATIENT
Start: 2020-07-12 | End: 2020-07-19 | Stop reason: HOSPADM

## 2020-07-12 RX ADMIN — NAPROXEN 500 MG: 250 TABLET ORAL at 01:45

## 2020-07-12 RX ADMIN — AMLODIPINE BESYLATE 5 MG: 5 TABLET ORAL at 00:35

## 2020-07-12 RX ADMIN — ENOXAPARIN SODIUM 40 MG: 40 INJECTION SUBCUTANEOUS at 09:17

## 2020-07-12 RX ADMIN — OYSTER SHELL CALCIUM WITH VITAMIN D 1 TABLET: 500; 200 TABLET, FILM COATED ORAL at 09:18

## 2020-07-12 RX ADMIN — ATORVASTATIN CALCIUM 10 MG: 10 TABLET, FILM COATED ORAL at 09:19

## 2020-07-12 RX ADMIN — LOSARTAN POTASSIUM 100 MG: 100 TABLET, FILM COATED ORAL at 09:19

## 2020-07-12 RX ADMIN — OYSTER SHELL CALCIUM WITH VITAMIN D 1 TABLET: 500; 200 TABLET, FILM COATED ORAL at 20:50

## 2020-07-12 RX ADMIN — VITAMIN D, TAB 1000IU (100/BT) 2000 UNITS: 25 TAB at 09:18

## 2020-07-12 RX ADMIN — HYDRALAZINE HYDROCHLORIDE 10 MG: 20 INJECTION INTRAMUSCULAR; INTRAVENOUS at 16:31

## 2020-07-12 RX ADMIN — HYDROCHLOROTHIAZIDE 25 MG: 25 TABLET ORAL at 09:19

## 2020-07-12 RX ADMIN — DESVENLAFAXINE SUCCINATE 50 MG: 50 TABLET, EXTENDED RELEASE ORAL at 09:21

## 2020-07-12 RX ADMIN — HYDRALAZINE HYDROCHLORIDE 10 MG: 20 INJECTION INTRAMUSCULAR; INTRAVENOUS at 23:58

## 2020-07-12 ASSESSMENT — PAIN DESCRIPTION - LOCATION
LOCATION: HEAD
LOCATION: HEAD

## 2020-07-12 ASSESSMENT — PAIN SCALES - GENERAL
PAINLEVEL_OUTOF10: 4
PAINLEVEL_OUTOF10: 0
PAINLEVEL_OUTOF10: 2
PAINLEVEL_OUTOF10: 0
PAINLEVEL_OUTOF10: 0

## 2020-07-12 ASSESSMENT — PAIN DESCRIPTION - PAIN TYPE
TYPE: ACUTE PAIN
TYPE: ACUTE PAIN

## 2020-07-12 NOTE — CONSULTS
daily as directed 7/14/15   Historical Provider, MD   mometasone (NASONEX) 50 MCG/ACT nasal spray 2 sprays by Nasal route daily. 2/18/15   John Nevarez MD   Cholecalciferol (VITAMIN D3) 2000 UNITS CAPS Take 2,000 Units by mouth daily. Historical Provider, MD   econazole nitrate 1 % cream Apply  topically 2 times daily. Apply topically daily. Historical Provider, MD   naproxen (NAPROSYN) 250 MG tablet Take 500 mg by mouth 2 times daily as needed. Historical Provider, MD   Calcium Carbonate-Vitamin D (CALCIUM + D) 600-200 MG-UNIT TABS Take 1 tablet by mouth 2 times daily. Historical Provider, MD   Multiple Vitamins-Minerals (OCUVITE) TABS tablet Take 1 tablet by mouth daily. Historical Provider, MD       PHYSICAL EXAM        Vitals:    07/12/20 0442   BP: (!) 183/73   Pulse: 76   Resp: 16   Temp: 97 °F (36.1 °C)   SpO2: 94%    Weight: 144 lb 2.9 oz (65.4 kg)     Gen Alert, coop, no distress Heart  Rrr, 2/6   Head NC, AT, no abnorm Abd  Soft, NT, +BS, no mass, no OM   Eyes PERRLA, conj/corn clear Ext  Ext nl, AT, no C/C, no edema   Nose Nares nl, no drain, NT Pulse 1+ and symmetric   Throat Lips, mucosa, tongue nl Skin Color/text/turg nl, no rash/lesions   Neck S/S, TM, NT, no bruit/JVD Psych Nl mood and affect   Lung CTA-B, unlabored, no DTP Lymph   No cervical or axillary LA   Ch wall NT, no deform Neuro  Nl gross M/S exam     LABS     Relevant and available CV data reviewed    ASSESSMENT AND PLAN     *SVT?   Hx No strips available for review, tele essentially normal  SESAR 1/20 85 (mild plaque burden, likely minimal stenosis)  TTE 5/19 65%, mild-mod MR  Plan Continue tele monitoring   MPI and echo given nonspecific EKG changes  *HTN  Status Elevated  Plan Titrate antihypertensives  *CHOL  Status On statin  Plan Continue statin  *LE weakness  Status Stable but unimproved, recent shingles vaccination  Plan Consider neuro consult, MRI

## 2020-07-12 NOTE — PLAN OF CARE
Problem: Falls - Risk of:  Goal: Will remain free from falls  Description: Will remain free from falls  Outcome: Ongoing  Note: Pt free from falls this shift, bed side table next to bed, call light in reach, bed in lowest position, wheels locked.  Encouraged to call for any assistance

## 2020-07-12 NOTE — PLAN OF CARE
Problem: Falls - Risk of:  Goal: Will remain free from falls  Description: Will remain free from falls  Outcome: Ongoing  Goal: Absence of physical injury  Description: Absence of physical injury  Outcome: Ongoing     Problem: Pain:  Goal: Pain level will decrease  Description: Pain level will decrease  Outcome: Ongoing  Goal: Control of acute pain  Description: Control of acute pain  Outcome: Ongoing  Goal: Control of chronic pain  Description: Control of chronic pain  Outcome: Ongoing     Problem: Discharge Planning:  Goal: Participates in care planning  Description: Participates in care planning  Outcome: Ongoing  Goal: Discharged to appropriate level of care  Description: Discharged to appropriate level of care  Outcome: Ongoing     Problem: Anxiety/Stress:  Goal: Level of anxiety will decrease  Description: Level of anxiety will decrease  Outcome: Ongoing     Problem:  Bowel Function - Altered:  Goal: Bowel elimination is within specified parameters  Description: Bowel elimination is within specified parameters  Outcome: Ongoing

## 2020-07-12 NOTE — PROGRESS NOTES
[facility] PROGRESS NOTE    7/12/2020 9:34 AM        Name: Gustavo Castanon . Admitted: 7/11/2020  Primary Care Provider: Sourav Tinoco MD (Tel: [phone])                        Hospital course:  Gustavo Castanon is a 75 y.o. female who presented to the ED to be evaluated for progressive bilateral lower extremity weakness increasing over 3-day timeframe. She states that she began to experience an ataxic gait without recent injury, loss of consciousness, numbness, CVA symptoms, or medication changes. Subjective: The patient sitting up in the bedside chair eating breakfast and appears comfortable    No acute events overnight. Resting well. Pain control. Diet ok. Labs reviewed  Denies any chest pain sob.      Reviewed interval ancillary notes    Current Medications  amLODIPine (NORVASC) tablet 5 mg, Daily  perflutren lipid microspheres (DEFINITY) injection 1.65 mg, ONCE PRN  atorvastatin (LIPITOR) tablet 10 mg, Every Other Day  calcium-vitamin D 500-200 MG-UNIT per tablet 1 tablet, BID  Vitamin D (CHOLECALCIFEROL) tablet 2,000 Units, Daily  desvenlafaxine succinate (PRISTIQ) extended release tablet 50 mg, Daily  therapeutic multivitamin-minerals 1 tablet, Daily  naproxen (NAPROSYN) tablet 500 mg, BID PRN  sodium chloride flush 0.9 % injection 10 mL, PRN  potassium chloride (KLOR-CON M) extended release tablet 40 mEq, PRN    Or  potassium bicarb-citric acid (EFFER-K) effervescent tablet 40 mEq, PRN    Or  potassium chloride 10 mEq/100 mL IVPB (Peripheral Line), PRN  magnesium sulfate 1 g in dextrose 5% 100 mL IVPB, PRN  acetaminophen (TYLENOL) tablet 650 mg, Q6H PRN    Or  acetaminophen (TYLENOL) suppository 650 mg, Q6H PRN  pantoprazole (PROTONIX) tablet 40 mg, QAM AC  enoxaparin (LOVENOX) injection 40 mg, Daily  losartan (COZAAR) tablet 100 mg, Daily normal sinus rhythm right now,  cardiology wants myocardial perfusion scan. TSH elevated however free T4 normal,  most likely subclinical hypothyroidism noted    2. Hypertension: Continue with amlodipine, hydrochlorothiazide and losartan, I will add hydralazine intravenously as needed      3. Generalized weakness: Consult PT and OT vitamin D within normal levels, ESR slightly elevated     4.   Duplex scan of bilateral carotid arteries and 2D echo with Doppler pending         DVT prophylaxis- Lovenox 40 subcu daily  Code status-full code  Diet-cardiac with carb restriction; n.p.o. midnight  IV access- peripheral established in ED         Diet: DIET CARDIAC; No Added Salt (3-4 GM)  Code:Full Code  DVT PPX lovenox       Hue Richards MD   7/12/2020 9:34 AM

## 2020-07-13 LAB
ANION GAP SERPL CALCULATED.3IONS-SCNC: 16 MMOL/L (ref 3–16)
APTT: 33.4 SEC (ref 24.2–36.2)
BUN BLDV-MCNC: 16 MG/DL (ref 7–20)
CALCIUM SERPL-MCNC: 10.2 MG/DL (ref 8.3–10.6)
CHLORIDE BLD-SCNC: 90 MMOL/L (ref 99–110)
CO2: 23 MMOL/L (ref 21–32)
CREAT SERPL-MCNC: 0.6 MG/DL (ref 0.6–1.2)
GFR AFRICAN AMERICAN: >60
GFR NON-AFRICAN AMERICAN: >60
GLUCOSE BLD-MCNC: 139 MG/DL (ref 70–99)
HCT VFR BLD CALC: 39 % (ref 36–48)
HEMOGLOBIN: 13.2 G/DL (ref 12–16)
INR BLD: 0.94 (ref 0.86–1.14)
LEFT VENTRICULAR EJECTION FRACTION MODE: NORMAL
LV EF: 60 %
LV EF: 60 %
LV EF: 75 %
LVEF MODALITY: NORMAL
LVEF MODALITY: NORMAL
MAGNESIUM: 2.1 MG/DL (ref 1.8–2.4)
MCH RBC QN AUTO: 31.2 PG (ref 26–34)
MCHC RBC AUTO-ENTMCNC: 33.8 G/DL (ref 31–36)
MCV RBC AUTO: 92.1 FL (ref 80–100)
PDW BLD-RTO: 14.4 % (ref 12.4–15.4)
PHOSPHORUS: 4.1 MG/DL (ref 2.5–4.9)
PLATELET # BLD: 291 K/UL (ref 135–450)
PMV BLD AUTO: 8.6 FL (ref 5–10.5)
POTASSIUM SERPL-SCNC: 3.8 MMOL/L (ref 3.5–5.1)
PROTHROMBIN TIME: 10.9 SEC (ref 10–13.2)
RBC # BLD: 4.24 M/UL (ref 4–5.2)
SODIUM BLD-SCNC: 129 MMOL/L (ref 136–145)
TOTAL CK: 383 U/L (ref 26–192)
WBC # BLD: 9 K/UL (ref 4–11)

## 2020-07-13 PROCEDURE — 82550 ASSAY OF CK (CPK): CPT

## 2020-07-13 PROCEDURE — U0003 INFECTIOUS AGENT DETECTION BY NUCLEIC ACID (DNA OR RNA); SEVERE ACUTE RESPIRATORY SYNDROME CORONAVIRUS 2 (SARS-COV-2) (CORONAVIRUS DISEASE [COVID-19]), AMPLIFIED PROBE TECHNIQUE, MAKING USE OF HIGH THROUGHPUT TECHNOLOGIES AS DESCRIBED BY CMS-2020-01-R: HCPCS

## 2020-07-13 PROCEDURE — G0378 HOSPITAL OBSERVATION PER HR: HCPCS

## 2020-07-13 PROCEDURE — 80048 BASIC METABOLIC PNL TOTAL CA: CPT

## 2020-07-13 PROCEDURE — 6370000000 HC RX 637 (ALT 250 FOR IP): Performed by: HOSPITALIST

## 2020-07-13 PROCEDURE — 99215 OFFICE O/P EST HI 40 MIN: CPT | Performed by: INTERNAL MEDICINE

## 2020-07-13 PROCEDURE — 36415 COLL VENOUS BLD VENIPUNCTURE: CPT

## 2020-07-13 PROCEDURE — 6360000002 HC RX W HCPCS: Performed by: INTERNAL MEDICINE

## 2020-07-13 PROCEDURE — 85610 PROTHROMBIN TIME: CPT

## 2020-07-13 PROCEDURE — 1200000000 HC SEMI PRIVATE

## 2020-07-13 PROCEDURE — A9502 TC99M TETROFOSMIN: HCPCS | Performed by: INTERNAL MEDICINE

## 2020-07-13 PROCEDURE — 3430000000 HC RX DIAGNOSTIC RADIOPHARMACEUTICAL: Performed by: INTERNAL MEDICINE

## 2020-07-13 PROCEDURE — 78452 HT MUSCLE IMAGE SPECT MULT: CPT | Performed by: INTERNAL MEDICINE

## 2020-07-13 PROCEDURE — 99223 1ST HOSP IP/OBS HIGH 75: CPT | Performed by: PSYCHIATRY & NEUROLOGY

## 2020-07-13 PROCEDURE — 93880 EXTRACRANIAL BILAT STUDY: CPT

## 2020-07-13 PROCEDURE — 84100 ASSAY OF PHOSPHORUS: CPT

## 2020-07-13 PROCEDURE — 93306 TTE W/DOPPLER COMPLETE: CPT

## 2020-07-13 PROCEDURE — 93017 CV STRESS TEST TRACING ONLY: CPT | Performed by: INTERNAL MEDICINE

## 2020-07-13 PROCEDURE — 85730 THROMBOPLASTIN TIME PARTIAL: CPT

## 2020-07-13 PROCEDURE — U0002 COVID-19 LAB TEST NON-CDC: HCPCS

## 2020-07-13 PROCEDURE — 85027 COMPLETE CBC AUTOMATED: CPT

## 2020-07-13 PROCEDURE — 6360000002 HC RX W HCPCS

## 2020-07-13 PROCEDURE — 83735 ASSAY OF MAGNESIUM: CPT

## 2020-07-13 RX ORDER — ONDANSETRON 2 MG/ML
INJECTION INTRAMUSCULAR; INTRAVENOUS
Status: COMPLETED
Start: 2020-07-13 | End: 2020-07-13

## 2020-07-13 RX ORDER — AMINOPHYLLINE DIHYDRATE 25 MG/ML
100 INJECTION, SOLUTION INTRAVENOUS ONCE
Status: COMPLETED | OUTPATIENT
Start: 2020-07-13 | End: 2020-07-13

## 2020-07-13 RX ORDER — ONDANSETRON 2 MG/ML
4 INJECTION INTRAMUSCULAR; INTRAVENOUS EVERY 6 HOURS PRN
Status: DISCONTINUED | OUTPATIENT
Start: 2020-07-13 | End: 2020-07-19 | Stop reason: HOSPADM

## 2020-07-13 RX ADMIN — TETROFOSMIN 30 MILLICURIE: 1.38 INJECTION, POWDER, LYOPHILIZED, FOR SOLUTION INTRAVENOUS at 10:17

## 2020-07-13 RX ADMIN — REGADENOSON 0.4 MG: 0.08 INJECTION, SOLUTION INTRAVENOUS at 09:53

## 2020-07-13 RX ADMIN — HYDROCHLOROTHIAZIDE 25 MG: 25 TABLET ORAL at 13:08

## 2020-07-13 RX ADMIN — TETROFOSMIN 10 MILLICURIE: 1.38 INJECTION, POWDER, LYOPHILIZED, FOR SOLUTION INTRAVENOUS at 08:45

## 2020-07-13 RX ADMIN — LOSARTAN POTASSIUM 100 MG: 100 TABLET, FILM COATED ORAL at 13:08

## 2020-07-13 RX ADMIN — AMLODIPINE BESYLATE 5 MG: 5 TABLET ORAL at 13:08

## 2020-07-13 RX ADMIN — OYSTER SHELL CALCIUM WITH VITAMIN D 1 TABLET: 500; 200 TABLET, FILM COATED ORAL at 13:07

## 2020-07-13 RX ADMIN — ONDANSETRON 4 MG: 2 INJECTION INTRAMUSCULAR; INTRAVENOUS at 02:31

## 2020-07-13 RX ADMIN — AMINOPHYLLINE 100 MG: 25 INJECTION, SOLUTION INTRAVENOUS at 09:58

## 2020-07-13 RX ADMIN — DESVENLAFAXINE SUCCINATE 50 MG: 50 TABLET, EXTENDED RELEASE ORAL at 13:07

## 2020-07-13 RX ADMIN — OYSTER SHELL CALCIUM WITH VITAMIN D 1 TABLET: 500; 200 TABLET, FILM COATED ORAL at 22:21

## 2020-07-13 RX ADMIN — PANTOPRAZOLE SODIUM 40 MG: 40 TABLET, DELAYED RELEASE ORAL at 13:08

## 2020-07-13 RX ADMIN — NAPROXEN 500 MG: 250 TABLET ORAL at 00:41

## 2020-07-13 ASSESSMENT — PAIN SCALES - GENERAL
PAINLEVEL_OUTOF10: 0
PAINLEVEL_OUTOF10: 5
PAINLEVEL_OUTOF10: 0

## 2020-07-13 NOTE — PROGRESS NOTES
[facility] PROGRESS NOTE    7/13/2020 10:28 AM        Name: Teresa Funes . Admitted: 7/11/2020  Primary Care Provider: Mary Jane Person MD (Tel: [phone])                        Hospital course:  Ray Bradshaw a 75 y. o. female who presented to the ED to be evaluated for progressive bilateral lower extremity weakness increasing over 3-day timeframe.  She states that she began to experience an ataxic gait without recent injury, loss of consciousness, numbness, CVA symptoms, or medication changes. Subjective:  . No acute events overnight. Resting well. Pain control. Diet ok. Labs reviewed  Denies any chest pain sob.      Reviewed interval ancillary notes    Current Medications  ondansetron (ZOFRAN) injection 4 mg, Q6H PRN  amLODIPine (NORVASC) tablet 5 mg, Daily  perflutren lipid microspheres (DEFINITY) injection 1.65 mg, ONCE PRN  hydrALAZINE (APRESOLINE) injection 10 mg, Q4H PRN  atorvastatin (LIPITOR) tablet 10 mg, Every Other Day  calcium-vitamin D 500-200 MG-UNIT per tablet 1 tablet, BID  Vitamin D (CHOLECALCIFEROL) tablet 2,000 Units, Daily  desvenlafaxine succinate (PRISTIQ) extended release tablet 50 mg, Daily  therapeutic multivitamin-minerals 1 tablet, Daily  naproxen (NAPROSYN) tablet 500 mg, BID PRN  sodium chloride flush 0.9 % injection 10 mL, PRN  potassium chloride (KLOR-CON M) extended release tablet 40 mEq, PRN    Or  potassium bicarb-citric acid (EFFER-K) effervescent tablet 40 mEq, PRN    Or  potassium chloride 10 mEq/100 mL IVPB (Peripheral Line), PRN  magnesium sulfate 1 g in dextrose 5% 100 mL IVPB, PRN  acetaminophen (TYLENOL) tablet 650 mg, Q6H PRN    Or  acetaminophen (TYLENOL) suppository 650 mg, Q6H PRN  pantoprazole (PROTONIX) tablet 40 mg, QAM AC  enoxaparin (LOVENOX) injection 40 mg, Daily  losartan (COZAAR) tablet 100 mg, Daily And  hydroCHLOROthiazide (HYDRODIURIL) tablet 25 mg, Daily        Objective:  BP (!) 141/63   Pulse 77   Temp 97.2 °F (36.2 °C) (Temporal)   Resp 18   Ht 5' 1\" (1.549 m)   Wt 144 lb 2.9 oz (65.4 kg)   SpO2 94%   Breastfeeding No   BMI 27.24 kg/m²     Intake/Output Summary (Last 24 hours) at 7/13/2020 1028  Last data filed at 7/13/2020 0200  Gross per 24 hour   Intake 660 ml   Output 1125 ml   Net -465 ml      Wt Readings from Last 3 Encounters:   07/12/20 144 lb 2.9 oz (65.4 kg)   01/27/20 146 lb (66.2 kg)   01/10/20 143 lb (64.9 kg)       General appearance: [facility] elderly lady, appears comfortable  Eyes: Sclera clear. Pupils equal.  ENT: Moist oral mucosa. Trachea midline, no adenopathy. Cardiovascular: Regular rhythm, normal S1, S2. No murmur. No edema in lower extremities  Respiratory: Not using accessory muscles. Good inspiratory effort. Clear to auscultation bilaterally, no wheeze or crackles. GI: Abdomen soft, no tenderness, not distended, normal bowel sounds  Musculoskeletal: No cyanosis in digits, neck supple  Neurology: CN 2-12 grossly intact. No speech or motor deficits  Psych: Normal affect.  Alert and oriented in time, place and person  Skin: Warm, dry, normal turgor     Labs and Tests:  CBC:   Recent Labs     07/11/20  1116 07/11/20 1914 07/13/20  0244   WBC 8.7 6.6 9.0   HGB 13.8 13.0 13.2   HCT 38.5 38.3 39.0   MCV 93.1 93.5 92.1    264 291     BMP:    Recent Labs     07/11/20  1116 07/11/20 1914 07/13/20  0244    137 129*   K 3.8 3.4* 3.8    98* 90*   CO2 25 28 23   BUN 19 16 16   CREATININE 0.6 0.8 0.6   GLUCOSE 103* 153* 139*     Hepatic:   Recent Labs     07/11/20  1116 07/11/20 1914   AST 23 22   ALT 15 15   BILITOT 0.3 0.3   ALKPHOS 73 67         Problem List  Principal Problem:    PSVT (paroxysmal supraventricular tachycardia) ([facility])  Active Problems:    Hypertension    Hyperlipidemia    Weakness    Frequent falls  Resolved Problems:    * No resolved hospital problems. *     Assessment & Plan:      1.  Paroxysmal SVT: Monitor shows normal sinus rhythm right now,  cardiology wants myocardial perfusion scan. TSH elevated however free T4 normal,  most likely subclinical hypothyroidism noted.     2.  Hypertension: Continue with amlodipine, hydrochlorothiazide and losartan, I will add hydralazine intravenously as needed      3.  Generalized weakness: Consult PT and OT vitamin D within normal levels, ESR slightly elevated, will consult neurology.     4.   Duplex scan of bilateral carotid arteries and 2D echo with Doppler pending      Diet: Diet NPO Effective Now Exceptions are: Sips with Meds  Code:Full Code  DVT PPX lovenox       Morgan Dang MD   7/13/2020 10:28 AM

## 2020-07-13 NOTE — CARE COORDINATION
Patient admitted as Observation/OPIB with an anticipated short hospitalization length of stay. Chart reviewed and it appears that patient has minimal needs for discharge at this time. Discussed with patients nurse and requested that case management be notified if discharge needs are identified. Case management will continue to follow progress and update discharge plan as needed.     BETTINA SwansonN, RN  [phone]

## 2020-07-13 NOTE — PLAN OF CARE
Problem: Falls - Risk of:  Goal: Will remain free from falls  Description: Will remain free from falls  7/12/2020 2150 by Jeni Gannon RN  Outcome: Met This Shift  Note: Pt free from falls this shift, bed side table next to bed, call light in reach, bed in lowest position, wheels locked. Encouraged to call for any assistance      Problem: Pain:  Goal: Pain level will decrease  Description: Pain level will decrease  7/12/2020 2150 by Jeni Gannon RN  Outcome: Met This Shift  Note: Denies any pain at this time, will jennifer     Problem: Anxiety/Stress:  Goal: Level of anxiety will decrease  Description: Level of anxiety will decrease  7/12/2020 2150 by Jeni Gannon RN  Outcome: Met This Shift  Note: Appears calm and comfortable currently. Discussion about testing brings anxiety, but with education anxiety eases. Will cont to jennifer and provide emotional support.

## 2020-07-13 NOTE — PROGRESS NOTES
*  [facility]  Daily Progress Note    Admit Date:  7/11/2020      CC: SVT, HTN, CHOl, LE weakness  Subj: Today, doing fair. Legs still feel weak. No cp. Obj:   BP (!) 141/63   Pulse 77   Temp 97.2 °F (36.2 °C) (Temporal)   Resp 18   Ht 5' 1\" (1.549 m)   Wt 144 lb 2.9 oz (65.4 kg)   SpO2 94%   Breastfeeding No   BMI 27.24 kg/m²       Intake/Output Summary (Last 24 hours) at 7/13/2020 0657  Last data filed at 7/13/2020 0200  Gross per 24 hour   Intake 860 ml   Output 1425 ml   Net -565 ml     Gen Alert, coop, no distress Heart  RRR, no MRG, nl apical impulse   Head NC, AT, no abnorm Abd  Soft, NT, +BS, no mass, no OM   Eyes PERRLA, conj/corn clear Ext  Ext nl, AT, no C/C/E   Nose Nares nl, no drain, NT Pulse 2+ and symmetric   Throat Lips, mucosa, tongue nl Skin Color/text/turg nl, no rash/lesions   Neck S/S, TM, NT, no bruit/JVD Psych Nl mood and affect   Lung CTA-B, unlabored, no DTP Lymph   No cervical or axillary LA   Ch wall NT, no deform Neuro  Nl gross M/S exam     Medications:    amLODIPine  5 mg Oral Daily    atorvastatin  10 mg Oral Every Other Day    calcium-vitamin D  1 tablet Oral BID    Vitamin D  2,000 Units Oral Daily    desvenlafaxine succinate  50 mg Oral Daily    therapeutic multivitamin-minerals  1 tablet Oral Daily    pantoprazole  40 mg Oral QAM AC    enoxaparin  40 mg Subcutaneous Daily    losartan  100 mg Oral Daily    And    hydroCHLOROthiazide  25 mg Oral Daily     Lab Data: Relevant and available CV data reviewed    ASSESSMENT AND PLAN     *SVT?   Hx No strips available for review, tele essentially normal  SESAR 1/20 85 (mild plaque burden, likely minimal stenosis)  TTE 5/19 65%, mild-mod MR  Plan Continue tele monitoring   MPI and echo given nonspecific EKG changes  *HTN  Status Controlled this am  Plan Continue current meds  *CHOL  Status On statin  Plan Continue statin  *LE weakness  Status Stable but unimproved, recent shingles vaccination  Plan Consider neuro consult, MRI

## 2020-07-13 NOTE — PROGRESS NOTES
Instructed on Lexiscan Stress Test Procedure including possible side effects/ adverse reactions. Patient verbalizes  understanding and denies having any questions . See [address] [place] [address] Rd Cardiology. Aminophylline given per lexiscan protocol in stress lab for c/o persistant nausea.

## 2020-07-13 NOTE — PROGRESS NOTES
Physical/Occupational Therapy  Alfreda Coe    Attempted to see pt for PT/OT evaluation. Patient currently off the floor for testing. Will hold therapy at this time and will follow up with pt as schedule permits. Thanks, Ramírez Swann, PT, DPT [id number], Alicia Perry, OTR/L [id number]      Attempted to see patient x 2. Discussed with nursing and neurologist. Potential lumbar puncture this date. Will hold therapy and follow up with patient as appropriate.  Thanks, Ramírez Swann, PT, DPT [id number], Alicia Perry, OTR/L [id number]

## 2020-07-13 NOTE — CONSULTS
Worry: None     Inability: None    Transportation needs     Medical: None     Non-medical: None   Tobacco Use    Smoking status: Never Smoker    Smokeless tobacco: Never Used   Substance and Sexual Activity    Alcohol use:  Yes     Alcohol/week: 0.0 standard drinks     Comment: occ wine    Drug use: No    Sexual activity: Not Currently   Lifestyle    Physical activity     Days per week: None     Minutes per session: None    Stress: None   Relationships    Social connections     Talks on phone: None     Gets together: None     Attends Restorationist service: None     Active member of club or organization: None     Attends meetings of clubs or organizations: None     Relationship status: None    Intimate partner violence     Fear of current or ex partner: None     Emotionally abused: None     Physically abused: None     Forced sexual activity: None   Other Topics Concern    None   Social History Narrative    None     Current Facility-Administered Medications   Medication Dose Route Frequency Provider Last Rate Last Dose    ondansetron (ZOFRAN) injection 4 mg  4 mg Intravenous Q6H PRN Kenya Timmons MD   4 mg at 07/13/20 0231    amLODIPine (NORVASC) tablet 5 mg  5 mg Oral Daily Nikko Wheat MD   5 mg at 07/13/20 1308    perflutren lipid microspheres (DEFINITY) injection 1.65 mg  1.5 mL Intravenous ONCE PRN Kathie Breen MD        hydrALAZINE (APRESOLINE) injection 10 mg  10 mg Intravenous Q4H PRN Kenya Timmons MD   10 mg at 07/12/20 2358    atorvastatin (LIPITOR) tablet 10 mg  10 mg Oral Every Other Day Nikko Wheat MD   10 mg at 07/12/20 0919    calcium-vitamin D 500-200 MG-UNIT per tablet 1 tablet  1 tablet Oral BID Nikko Wheat MD   1 tablet at 07/13/20 1307    Vitamin D (CHOLECALCIFEROL) tablet 2,000 Units  2,000 Units Oral Daily Nikko Wheat MD   2,000 Units at 07/12/20 0918    desvenlafaxine succinate (PRISTIQ) extended release tablet 50 mg  50 mg Oral Daily Nikko Wheat oriented to person, place and time. Recent and remote memory is normal  Fund of Knowledge is normal  Attention/concentration is normal.   Speech : No dysarthria  Language : No aphasia  Funduscopic Exam: sharp disc margins  Cranial Nerves:   II: Visual fields:  Full to confrontation  III: Pupils:  equal, round, reactive to light  III,IV,VI: Extra Ocular Movements are intact. No nystagmus  V: Facial sensation is intact to pin prick and light touch  VII: Facial strength and movements: intact and symmetric smile,cheek puffing and eyebrow elevation  VIII: Hearing:  Intact to finger rub bilaterally  IX: Palate  elevation is symmetric  XI: Shoulder shrug is intact  XII: Tongue movements are normal  Motor:  Muscle tone and bulk are normal.   Strength is 3+/5 proximally in the upper extremities and 4+/5 distally in the upper extremities. In the lower extremities again proximal strength is 3/5 and distal strength is 4/5. There is mild bilateral foot drop noted. Sensory: Intact to light touch and  pin prick in all four extremities  Coordination: Slightly impaired finger to Nose and Heel to Shin bilaterally    . Reflexes:  Deep tendon reflexes are absent throughout except trace responses in the left arm.   Plantar response: Flexor bilaterally  Gait: Gait is significantly impaired Romberg: negative  Vascular: No carotid bruit bilaterally        DATA:  LABS:  General Labs:    CBC:   Lab Results   Component Value Date    WBC 9.0 07/13/2020    RBC 4.24 07/13/2020    HGB 13.2 07/13/2020    HCT 39.0 07/13/2020    MCV 92.1 07/13/2020    MCH 31.2 07/13/2020    MCHC 33.8 07/13/2020    RDW 14.4 07/13/2020     07/13/2020    MPV 8.6 07/13/2020     BMP:    Lab Results   Component Value Date     07/13/2020    K 3.8 07/13/2020    K 3.4 07/11/2020    CL 90 07/13/2020    CO2 23 07/13/2020    BUN 16 07/13/2020    LABALBU 4.2 07/11/2020    CREATININE 0.6 07/13/2020    CALCIUM 10.2 07/13/2020    GFRAA >60 07/13/2020    GFRAA >60 09/17/2012    LABGLOM >60 07/13/2020    LABGLOM 78 11/02/2017    GLUCOSE 139 07/13/2020     RADIOLOGY REVIEW:  I have reviewed radiology image(s) and reports(s) of: CT scan of the head    IMPRESSION :  Patient presents with ataxia and proximal muscle weakness both in the arm and the leg. She has absent reflexes. Guillian Barré syndrome should be considered in the differential diagnosis and needs to be ruled out. Patient Active Problem List   Diagnosis    Hypertension    Hyperlipidemia    Osteopenia    Hx of eczema    Chronic idiopathic urticaria    Moderate mitral regurgitation by prior echocardiogram    Prediabetes    BMI 27.0-27.9,adult    Major depressive disorder with single episode, in full remission ([facility] [address]    PSVT (paroxysmal supraventricular tachycardia) (HCC)    Weakness    Frequent falls     RECOMMENDATIONS ;  Discussed with patient  Discussed with patient's nurse  Lumbar puncture to look for elevated CSF protein  If the protein levels are high patient may need IVIG treatment  We will check serum protein immunofixation to rule out IgA deficiency  Physical therapy and Occupational Therapy evaluations  Check for COVID 19 virus infection since there is association between COVID and GBS  Thank you for this consultation. Please note a portion of this chart was generated using dragon dictation software. Although every effort was made to ensure the accuracy of this automated transcription, some errors in transcription may have occurred.

## 2020-07-14 ENCOUNTER — APPOINTMENT (OUTPATIENT)
Dept: GENERAL RADIOLOGY | Age: 75
DRG: 095 | End: 2020-07-14
Payer: MEDICARE

## 2020-07-14 ENCOUNTER — TELEPHONE (OUTPATIENT)
Dept: FAMILY MEDICINE CLINIC | Age: 75
End: 2020-07-14

## 2020-07-14 PROBLEM — I47.1 SVT (SUPRAVENTRICULAR TACHYCARDIA) (HCC): Status: ACTIVE | Noted: 2020-07-14

## 2020-07-14 PROBLEM — I47.10 SVT (SUPRAVENTRICULAR TACHYCARDIA): Status: ACTIVE | Noted: 2020-07-14

## 2020-07-14 LAB
ANION GAP SERPL CALCULATED.3IONS-SCNC: 12 MMOL/L (ref 3–16)
APPEARANCE CSF: CLEAR
BUN BLDV-MCNC: 18 MG/DL (ref 7–20)
CALCIUM SERPL-MCNC: 9.9 MG/DL (ref 8.3–10.6)
CHLORIDE BLD-SCNC: 91 MMOL/L (ref 99–110)
CLOT EVALUATION CSF: ABNORMAL
CO2: 29 MMOL/L (ref 21–32)
COLOR CSF: COLORLESS
CREAT SERPL-MCNC: 0.6 MG/DL (ref 0.6–1.2)
EKG ATRIAL RATE: 72 BPM
EKG DIAGNOSIS: NORMAL
EKG P AXIS: 75 DEGREES
EKG P-R INTERVAL: 148 MS
EKG Q-T INTERVAL: 396 MS
EKG QRS DURATION: 88 MS
EKG QTC CALCULATION (BAZETT): 433 MS
EKG R AXIS: 49 DEGREES
EKG T AXIS: 75 DEGREES
EKG VENTRICULAR RATE: 72 BPM
GFR AFRICAN AMERICAN: >60
GFR NON-AFRICAN AMERICAN: >60
GLUCOSE BLD-MCNC: 106 MG/DL (ref 70–99)
GLUCOSE, CSF: 70 MG/DL (ref 40–80)
NO DIFFERENTIAL CSF: ABNORMAL
POTASSIUM SERPL-SCNC: 3.3 MMOL/L (ref 3.5–5.1)
PROTEIN CSF: 69 MG/DL (ref 15–45)
RBC CSF: 6 /CUMM
SARS-COV-2, PCR: NOT DETECTED
SODIUM BLD-SCNC: 132 MMOL/L (ref 136–145)
TUBE NUMBER CSF: ABNORMAL
TUBE NUMBER CSF: ABNORMAL
WBC CSF: 1 /CUMM (ref 0–5)

## 2020-07-14 PROCEDURE — 86334 IMMUNOFIX E-PHORESIS SERUM: CPT

## 2020-07-14 PROCEDURE — 84157 ASSAY OF PROTEIN OTHER: CPT

## 2020-07-14 PROCEDURE — 93010 ELECTROCARDIOGRAM REPORT: CPT | Performed by: INTERNAL MEDICINE

## 2020-07-14 PROCEDURE — 1200000000 HC SEMI PRIVATE

## 2020-07-14 PROCEDURE — 80048 BASIC METABOLIC PNL TOTAL CA: CPT

## 2020-07-14 PROCEDURE — 97161 PT EVAL LOW COMPLEX 20 MIN: CPT

## 2020-07-14 PROCEDURE — 84165 PROTEIN E-PHORESIS SERUM: CPT

## 2020-07-14 PROCEDURE — 2500000003 HC RX 250 WO HCPCS: Performed by: PSYCHIATRY & NEUROLOGY

## 2020-07-14 PROCEDURE — 99214 OFFICE O/P EST MOD 30 MIN: CPT | Performed by: INTERNAL MEDICINE

## 2020-07-14 PROCEDURE — 6360000002 HC RX W HCPCS: Performed by: HOSPITALIST

## 2020-07-14 PROCEDURE — 82945 GLUCOSE OTHER FLUID: CPT

## 2020-07-14 PROCEDURE — 2580000003 HC RX 258: Performed by: HOSPITALIST

## 2020-07-14 PROCEDURE — 97165 OT EVAL LOW COMPLEX 30 MIN: CPT

## 2020-07-14 PROCEDURE — 97530 THERAPEUTIC ACTIVITIES: CPT

## 2020-07-14 PROCEDURE — 6370000000 HC RX 637 (ALT 250 FOR IP): Performed by: INTERNAL MEDICINE

## 2020-07-14 PROCEDURE — 6370000000 HC RX 637 (ALT 250 FOR IP): Performed by: HOSPITALIST

## 2020-07-14 PROCEDURE — 62328 DX LMBR SPI PNXR W/FLUOR/CT: CPT

## 2020-07-14 PROCEDURE — 6370000000 HC RX 637 (ALT 250 FOR IP): Performed by: PSYCHIATRY & NEUROLOGY

## 2020-07-14 PROCEDURE — 97535 SELF CARE MNGMENT TRAINING: CPT

## 2020-07-14 PROCEDURE — 6360000002 HC RX W HCPCS: Performed by: PSYCHIATRY & NEUROLOGY

## 2020-07-14 PROCEDURE — 84155 ASSAY OF PROTEIN SERUM: CPT

## 2020-07-14 PROCEDURE — 89050 BODY FLUID CELL COUNT: CPT

## 2020-07-14 PROCEDURE — B01B1ZZ FLUOROSCOPY OF SPINAL CORD USING LOW OSMOLAR CONTRAST: ICD-10-PCS | Performed by: RADIOLOGY

## 2020-07-14 PROCEDURE — 009U3ZX DRAINAGE OF SPINAL CANAL, PERCUTANEOUS APPROACH, DIAGNOSTIC: ICD-10-PCS | Performed by: RADIOLOGY

## 2020-07-14 PROCEDURE — 99233 SBSQ HOSP IP/OBS HIGH 50: CPT | Performed by: PSYCHIATRY & NEUROLOGY

## 2020-07-14 RX ORDER — DIPHENHYDRAMINE HCL 25 MG
25 TABLET ORAL DAILY
Status: COMPLETED | OUTPATIENT
Start: 2020-07-14 | End: 2020-07-18

## 2020-07-14 RX ORDER — ACETAMINOPHEN 325 MG/1
650 TABLET ORAL DAILY
Status: COMPLETED | OUTPATIENT
Start: 2020-07-14 | End: 2020-07-18

## 2020-07-14 RX ORDER — LIDOCAINE HYDROCHLORIDE 10 MG/ML
5 INJECTION, SOLUTION EPIDURAL; INFILTRATION; INTRACAUDAL; PERINEURAL ONCE
Status: COMPLETED | OUTPATIENT
Start: 2020-07-14 | End: 2020-07-14

## 2020-07-14 RX ORDER — DOCUSATE SODIUM 100 MG/1
100 CAPSULE, LIQUID FILLED ORAL DAILY
Status: DISCONTINUED | OUTPATIENT
Start: 2020-07-14 | End: 2020-07-19 | Stop reason: HOSPADM

## 2020-07-14 RX ORDER — POLYETHYLENE GLYCOL 3350 17 G/17G
17 POWDER, FOR SOLUTION ORAL DAILY PRN
Status: DISCONTINUED | OUTPATIENT
Start: 2020-07-14 | End: 2020-07-16

## 2020-07-14 RX ADMIN — LIDOCAINE HYDROCHLORIDE 5 ML: 10 INJECTION, SOLUTION EPIDURAL; INFILTRATION; INTRACAUDAL; PERINEURAL at 12:39

## 2020-07-14 RX ADMIN — ATORVASTATIN CALCIUM 10 MG: 10 TABLET, FILM COATED ORAL at 09:58

## 2020-07-14 RX ADMIN — OYSTER SHELL CALCIUM WITH VITAMIN D 1 TABLET: 500; 200 TABLET, FILM COATED ORAL at 09:58

## 2020-07-14 RX ADMIN — MULTIPLE VITAMINS W/ MINERALS TAB 1 TABLET: TAB at 09:58

## 2020-07-14 RX ADMIN — DIPHENHYDRAMINE HCL 25 MG: 25 TABLET ORAL at 17:23

## 2020-07-14 RX ADMIN — PANTOPRAZOLE SODIUM 40 MG: 40 TABLET, DELAYED RELEASE ORAL at 05:05

## 2020-07-14 RX ADMIN — ACETAMINOPHEN 650 MG: 325 TABLET, FILM COATED ORAL at 17:23

## 2020-07-14 RX ADMIN — VITAMIN D, TAB 1000IU (100/BT) 2000 UNITS: 25 TAB at 09:58

## 2020-07-14 RX ADMIN — LOSARTAN POTASSIUM 100 MG: 100 TABLET, FILM COATED ORAL at 09:57

## 2020-07-14 RX ADMIN — DESVENLAFAXINE SUCCINATE 50 MG: 50 TABLET, EXTENDED RELEASE ORAL at 16:37

## 2020-07-14 RX ADMIN — IMMUNE GLOBULIN INTRAVENOUS (HUMAN) 25000 MG: 5 INJECTION, SOLUTION INTRAVENOUS at 18:45

## 2020-07-14 RX ADMIN — Medication 10 ML: at 20:03

## 2020-07-14 RX ADMIN — ONDANSETRON 4 MG: 2 INJECTION INTRAMUSCULAR; INTRAVENOUS at 20:02

## 2020-07-14 RX ADMIN — AMLODIPINE BESYLATE 5 MG: 5 TABLET ORAL at 09:57

## 2020-07-14 RX ADMIN — HYDROCHLOROTHIAZIDE 25 MG: 25 TABLET ORAL at 09:58

## 2020-07-14 RX ADMIN — DOCUSATE SODIUM 100 MG: 100 CAPSULE ORAL at 16:37

## 2020-07-14 ASSESSMENT — PAIN SCALES - GENERAL
PAINLEVEL_OUTOF10: 0
PAINLEVEL_OUTOF10: 3
PAINLEVEL_OUTOF10: 0
PAINLEVEL_OUTOF10: 3
PAINLEVEL_OUTOF10: 0

## 2020-07-14 ASSESSMENT — PAIN DESCRIPTION - PAIN TYPE
TYPE: ACUTE PAIN
TYPE: CHRONIC PAIN

## 2020-07-14 ASSESSMENT — PAIN DESCRIPTION - LOCATION
LOCATION: BACK
LOCATION: BACK

## 2020-07-14 ASSESSMENT — PAIN DESCRIPTION - ORIENTATION: ORIENTATION: LOWER

## 2020-07-14 NOTE — PROGRESS NOTES
Occupational Therapy   Occupational Therapy Initial Assessment  Date: 2020   Patient Name: Pastor Diaz  MRN: [id number]     : 1945    Date of Service: 2020    Discharge Recommendations:  Pastor Diaz scored a 18/24 on the AM-PAC ADL Inpatient form. Current research shows that an AM-PAC score of 17 or less is typically not associated with a discharge to the patient's home setting. Based on the patient's AM-PAC score and their current ADL deficits, as well as functional mobility deficits w/weak LEs, it is recommended that the patient have 5-7 sessions per week of Occupational Therapy at d/c to increase the patient's independence. Pt is not safe to return home at this time w/LE weakness. At this time, this patient demonstrates the endurance, and/or tolerance for 3 hours of therapy each day, with a treatment frequency of 5-7x/wk. Please see assessment section for further patient specific details. If patient discharges prior to next session this note will serve as a discharge summary. Please see below for the latest assessment towards goals. OT Equipment Recommendations  Equipment Needed: Yes(once pt returns home)  Mobility Devices: ADL Assistive Devices  ADL Assistive Devices: Transfer Tub Bench;Grab Bars - shower    Assessment   Performance deficits / Impairments: Decreased functional mobility ; Decreased ADL status; Decreased endurance;Decreased high-level IADLs;Decreased strength;Decreased balance  Assessment: Pt is well below her baseline level of occupational function, based on the above deficits associated with PSVT. Pt would benefit from continued skilled acute OT services to address these deficits.   Treatment Diagnosis: Decreased ADL/IADL status, endurance, strength and balance associated with PSVT  Prognosis: Good  Decision Making: Low Complexity  History: Pt 74 yo, lives alone, no local family to assist, tri-level, BR/BR upstairs, independent ADLs, volunteers, 1 slide to floor PTA, no other falls. Exam: ROM, MMT, 6 clicks, 6 performance deficits/impairments, evolving presentation  Assistance / Modification: Mod A for functional mobility w/RW, CGA transfer, Min A LB dressing, CGA toileting  OT Education: OT Role;Plan of Care;Transfer Training;Equipment  Patient Education: OT eval, d/c recommendation. Pt verbalized understanding. Barriers to Learning: None  REQUIRES OT FOLLOW UP: Yes  Safety Devices  Safety Devices in place: Yes  Type of devices: Call light within reach; Left in chair;Nurse notified;Gait belt;Patient at risk for falls           Patient Diagnosis(es): The primary encounter diagnosis was Fatigue, unspecified type. Diagnoses of Frequent falls and Tachycardia were also pertinent to this visit. has a past medical history of Anxiety, Colon polyp, Headache(784.0), Hyperlipidemia, Hypertension, and Osteoarthritis. has a past surgical history that includes Colonoscopy (8/10). Treatment Diagnosis: Decreased ADL/IADL status, endurance, strength and balance associated with PSVT      Restrictions  Restrictions/Precautions  Restrictions/Precautions: Fall Risk(high fall risk, NPO at time of eval)  Required Braces or Orthoses?: No  Position Activity Restriction  Other position/activity restrictions: 7/11: Kane Fine is a 75 y.o. female presents to the ER with a complaint of bilateral extremity weakness. States that she awoke yesterday and was having difficulty walking, states that she fell down one step and sat on her bottom. She had difficulty walking throughout the day. This morning she was able to navigate the steps but fell while walking across the front room to get her coffee.     Subjective   General  Chart Reviewed: Yes  Patient assessed for rehabilitation services?: Yes  Family / Caregiver Present: No  Referring Practitioner: Elmer Huff MD, for [place] planning  Diagnosis: PSVT  Subjective  Subjective: Pt seated in recliner, pleasant and agreeable to OT toby.  Pain Assessment  Pain Assessment: 0-10  Pain Level: 0  Vital Signs  Oxygen Therapy  O2 Device: None (Room air)  Social/Functional History  Social/Functional History  Lives With: Alone  Type of Home: House  Home Layout: Bed/Bath upstairs, Multi-level(trilevel)  Home Access: Stairs to enter with rails  Entrance Stairs - Number of Steps: 3-4 front no rail, garage 5 with railing  Entrance Stairs - Rails: Right  Bathroom Shower/Tub: Tub/Shower unit  Bathroom Toilet: Standard  Bathroom Equipment: Hand-held shower  ADL Assistance: Independent  Homemaking Assistance: Independent  Homemaking Responsibilities: Yes(occasional maid service if needed)  Ambulation Assistance: Independent  Transfer Assistance: Independent  Active : Yes  Mode of Transportation: [facility]  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 No falls reported. Objective        Orientation  Overall Orientation Status: Within Normal Limits     Balance  Sitting Balance: Supervision  Standing Balance: Moderate assistance(without and with RW)  Standing Balance  Time: ~30 sec, ~4 min  Activity: functional mobility to bathroom for toilet transfer; clothing mgt, wash hands at sink and functional mobility ~34 ft in room  Comment: Pt very unsteady without RW and still required Mod A for balance w/RW d/t LE weakness. Very narrow SHERIF, bilat foot drop  Functional Mobility  Functional - Mobility Device: Rolling Walker  Activity: To/from bathroom  Assist Level: Moderate assistance  Toilet Transfers  Toilet - Technique: Ambulating  Equipment Used: Standard toilet(grab bar)  Toilet Transfer: Contact guard assistance  Toilet Transfers Comments: Mod A to the bathroom  ADL  Grooming: Contact guard assistance(in stance at sink to wash hands)  LE Dressing: Minimal assistance; Increased time to complete(socks; pt's LEs very weak, unable to lift to cross LE; pt leaned over LEs to don/doff socks)  Toileting: Contact guard assistance  Additional Comments: Pt declined bathing. Tone RUE  RUE Tone: Normotonic  Tone LUE  LUE Tone: Normotonic  Coordination  Movements Are Fluid And Coordinated: Yes     Bed mobility  Comment: Not addressed; pt in chair at beginning and end of session. Transfers  Stand Step Transfers: Contact guard assistance  Sit to stand: Contact guard assistance  Stand to sit: Contact guard assistance  Vision - Basic Assessment  Prior Vision: Wears glasses only for reading  Visual History: No significant visual history  Patient Visual Report: No visual complaint reported.   Cognition  Overall Cognitive Status: WNL  Perception  Overall Perceptual Status: WFL     Sensation  Overall Sensation Status: WNL(UEs)        LUE AROM (degrees)  LUE AROM : WNL  Left Hand AROM (degrees)  Left Hand AROM: WNL  RUE AROM (degrees)  RUE AROM : WNL  Right Hand AROM (degrees)  Right Hand AROM: WNL  LUE Strength  Gross LUE Strength: Exceptions to WFL(elbow, shoulder grossly 4-/5)  L Hand General: 4/5  RUE Strength  Gross RUE Strength: Exceptions to WFL(elbow, shoulder grossly 4-/5)  R Hand General: 4/5                   Plan   Plan  Times per week: 5-7  Current Treatment Recommendations: Safety Education & Training, Self-Care / ADL, Endurance Training, Functional Mobility Training, Equipment Evaluation, Education, & procurement, Patient/Caregiver Education & Training      AM-PAC Score        AM-PAC Inpatient Daily Activity Raw Score: 18 (07/14/20 1252)  AM-PAC Inpatient ADL T-Scale Score : 38.66 (07/14/20 1252)  ADL Inpatient CMS 0-100% Score: 46.65 (07/14/20 1252)  ADL Inpatient CMS G-Code Modifier : CK (07/14/20 1252)    Goals  Short term goals  Time Frame for Short term goals: Discharge  Short term goal 1: SBA for functional transfers to ADL surfaces  Short term goal 2: CGA for functional mobility for ADL activity w/RW  Short term goal 3: S/U for UB ADLs  Short term goal 4: CGA for LB ADLs  Short term goal 5: SBA for toileting       Therapy Time   Individual Concurrent Group Co-treatment   Time In 0820         Time Out 5126         Minutes 39               Timed Code Treatment Minutes:   24    Total Treatment Minutes:  [address], [place] [id number], OTR/L, [id number]

## 2020-07-14 NOTE — PROGRESS NOTES
[facility] PROGRESS NOTE    7/14/2020 4:25 PM        Name: Trula Landau . Admitted: 7/11/2020  Primary Care Provider: Kayy Barnes MD (Tel: [phone])                        Hospital course:  Faisal Jones a 75 y. o. female who presented to the ED to be evaluated for progressive bilateral lower extremity weakness increasing over 3-day timeframe.  She states that she began to experience an ataxic gait without recent injury, loss of consciousness, numbness, CVA symptoms, or medication changes. Subjective:     Continues with  bilateral leg weakness. Constipated. Worked with physical therapy this morning.     Reviewed interval ancillary notes    Current Medications  polyethylene glycol (GLYCOLAX) packet 17 g, Daily PRN  docusate sodium (COLACE) capsule 100 mg, Daily  immune globulin (FLEBOGAMMA) 10% solution 25,000 mg, Daily  diphenhydrAMINE (BENADRYL) tablet 25 mg, Daily  acetaminophen (TYLENOL) tablet 650 mg, Daily  ondansetron (ZOFRAN) injection 4 mg, Q6H PRN  amLODIPine (NORVASC) tablet 5 mg, Daily  perflutren lipid microspheres (DEFINITY) injection 1.65 mg, ONCE PRN  hydrALAZINE (APRESOLINE) injection 10 mg, Q4H PRN  atorvastatin (LIPITOR) tablet 10 mg, Every Other Day  calcium-vitamin D 500-200 MG-UNIT per tablet 1 tablet, BID  Vitamin D (CHOLECALCIFEROL) tablet 2,000 Units, Daily  desvenlafaxine succinate (PRISTIQ) extended release tablet 50 mg, Daily  therapeutic multivitamin-minerals 1 tablet, Daily  naproxen (NAPROSYN) tablet 500 mg, BID PRN  sodium chloride flush 0.9 % injection 10 mL, PRN  potassium chloride (KLOR-CON M) extended release tablet 40 mEq, PRN    Or  potassium bicarb-citric acid (EFFER-K) effervescent tablet 40 mEq, PRN    Or  potassium chloride 10 mEq/100 mL IVPB (Peripheral Line), PRN  magnesium sulfate 1 g in dextrose 5% 100 mL IVPB, PRN  acetaminophen (TYLENOL) tablet 650 mg, Q6H PRN    Or  acetaminophen (TYLENOL) suppository 650 mg, Q6H PRN  pantoprazole (PROTONIX) tablet 40 mg, QAM AC  enoxaparin (LOVENOX) injection 40 mg, Daily  losartan (COZAAR) tablet 100 mg, Daily    And  hydroCHLOROthiazide (HYDRODIURIL) tablet 25 mg, Daily        Objective:  BP (!) 146/61   Pulse 82   Temp 97.1 °F (36.2 °C) (Temporal)   Resp 18   Ht 5' 1\" (1.549 m)   Wt 146 lb 2.6 oz (66.3 kg)   SpO2 95%   Breastfeeding No   BMI 27.62 kg/m²     Intake/Output Summary (Last 24 hours) at 7/14/2020 1625  Last data filed at 7/14/2020 1023  Gross per 24 hour   Intake 480 ml   Output 1560 ml   Net -1080 ml      Wt Readings from Last 3 Encounters:   07/14/20 146 lb 2.6 oz (66.3 kg)   01/27/20 146 lb (66.2 kg)   01/10/20 143 lb (64.9 kg)       General appearance: [facility] elderly lady, appears comfortable  Eyes: Sclera clear. Pupils equal.  ENT: Moist oral mucosa. Trachea midline, no adenopathy. Cardiovascular: Regular rhythm, normal S1, S2. No murmur. No edema in lower extremities  Respiratory: Not using accessory muscles. Good inspiratory effort. Clear to auscultation bilaterally, no wheeze or crackles. GI: Abdomen soft, no tenderness, not distended, normal bowel sounds  Musculoskeletal: No cyanosis in digits, neck supple  Neurology: CN 2-12 grossly intact. Bilateral upper extremity weakness noted. Subjective bilateral leg weakness  Psych: Normal affect.  Alert and oriented in time, place and person  Skin: Warm, dry, normal turgor     Labs and Tests:  CBC:   Recent Labs     07/11/20 1914 07/13/20  0244   WBC 6.6 9.0   HGB 13.0 13.2   HCT 38.3 39.0   MCV 93.5 92.1    291     BMP:    Recent Labs     07/11/20 1914 07/13/20  0244    129*   K 3.4* 3.8   CL 98* 90*   CO2 28 23   BUN 16 16   CREATININE 0.8 0.6   GLUCOSE 153* 139*     Hepatic:   Recent Labs     07/11/20 1914   AST 22   ALT 15   BILITOT 0.3   ALKPHOS 67         Problem List  Principal Problem:    PSVT (paroxysmal supraventricular tachycardia) ([facility])  Active Problems:    Hypertension    Hyperlipidemia    Weakness    Frequent falls    SVT (supraventricular tachycardia) ([facility])  Resolved Problems:    * No resolved hospital problems. *     Assessment & Plan:      1.  Paroxysmal SVT: Now in sinus rhythm. Cardiology on board. Stress test was normal.  Echo reviewed. TSH elevated however free T4 normal,  most likely subclinical hypothyroidism noted.     2.   Bilateral limb weakness: Neurology consulted. GBS suspected. Lumbar puncture showed elevated protein. Serum immunofixation pending. Discussed with neurology. Start on IV IgG-5-day course. 3.  Constipation: Start on bowel regimen. 4.  Hyponatremia: Repeat BMP this morning. Stop HCTZ.  ontinue with amlodipine,  and losartan,and iv  hydralazine   as needed         Diet: DIET GENERAL; No Added Salt (3-4 GM)  Code:Full Code  DVT PPX lovenox   Ptot: ARU suggested-consult PMR    Disp: Inpatient for 5 days    Delia Alaniz MD   7/14/2020 4:25 PM

## 2020-07-14 NOTE — FLOWSHEET NOTE
Consent signed for lumbar puncture. Pt transported via bed to IR for procedure.  Electronically signed by Sarah Garnett RN on 7/14/2020 at 11:52 AM

## 2020-07-14 NOTE — PROGRESS NOTES
Attempted to return call regarding IV access without success but was able to see a PIV was obtained through Epic.

## 2020-07-14 NOTE — PROGRESS NOTES
Pt feels better this shift. A&Ox4, NSR, VSS, room air, afebrile, denies any pain. Helped pt get out of bed to bathroom and return to bed with contact guard assistance. Tolerates ambulation well with right leg weakness. High fall risk. Pt goes to sleep after taking a full bath and whole bed set change. Will continue to monitor.

## 2020-07-14 NOTE — PROGRESS NOTES
Physical Therapy    Facility/Department: [facility]  Initial Assessment    NAME: Allan Arita  : 1945  MRN: [id number]    Date of Service: 2020    Discharge Recommendations:  Allan Arita scored a 15/24 on the AM-PAC short mobility form. Current research shows that an AM-PAC score of 17 or less is typically not associated with a discharge to the patient's home setting. Based on the patient's AM-PAC score and their current functional mobility deficits, it is recommended that the patient have 5-7 sessions per week of Physical Therapy at d/c to increase the patient's independence. At this time, this patient demonstrates the endurance, and/or tolerance for 3 hours of therapy each day, with a treatment frequency of 5-7x/wk. Please see assessment section for further patient specific details. If patient discharges prior to next session this note will serve as a discharge summary. Please see below for the latest assessment towards goals. PT Equipment Recommendations  Equipment Needed: (TBD with progress)    Assessment   Body structures, Functions, Activity limitations: Decreased functional mobility ; Decreased ADL status; Decreased balance;Decreased strength  Assessment: Pt presenting with LE weakness placing her at risk for falls. Unsafe to discharge home alone at this time. Prognosis: Good  Decision Making: Low Complexity  PT Education: PT Role;Plan of Care;Goals  Patient Education: verbalized understanding  REQUIRES PT FOLLOW UP: Yes  Activity Tolerance  Activity Tolerance: Patient Tolerated treatment well       Patient Diagnosis(es): The primary encounter diagnosis was Fatigue, unspecified type. Diagnoses of Frequent falls and Tachycardia were also pertinent to this visit. has a past medical history of Anxiety, Colon polyp, Headache(784.0), Hyperlipidemia, Hypertension, and Osteoarthritis.    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 No falls reported. Cognition        Objective     Observation/Palpation  Posture: Good    PROM RLE (degrees)  RLE PROM: WFL  AROM RLE (degrees)  RLE General AROM: full knee extension and flexion,  ankle and hip AROM limited by weakness  PROM LLE (degrees)  LLE PROM: WFL  AROM LLE (degrees)  LLE General AROM: full knee extension and flexion,  ankle and hip AROM limited by weakness  Strength RLE  Comment: quad 4/5,  hip flexion 2+/5, ankle DF 2/5, ankle PF 3/5, HS 3/5  Strength LLE  Comment: quad 4/5,  hip flexion 2+/5, ankle DF 2/5, ankle PF 3/5, HS 3/5     Sensation  Overall Sensation Status: [facility]  Bed mobility  Comment: not observed, pt reported needing some assistance  Transfers  Sit to Stand: Contact guard assistance(from chair and commode)  Stand to sit: Contact guard assistance  Ambulation  Ambulation?: Yes  More Ambulation?: Yes  Ambulation 1  Surface: level tile  Device: No Device;Hand-Held Assist  Assistance:  Moderate assistance  Quality of Gait: very unsteady, 1 LOB requiring mod A to correct,  varied foot placement and SHERIF, noticeable weakness in legs  Distance: 10 ft to bathroom  Ambulation 2  Surface - 2: level tile  Device 2: Rolling Walker  Assistance 2: Moderate assistance;Minimal assistance  Quality of Gait 2: foot drop B with occasional toe drag and hip hike to compensate, varied SHERIF and foot placement/step length, leg weakness observable  Gait Deviations: Decreased step height;Slow [place]  Distance: 30 ft     Balance  Posture: Good  Sitting - Static: Good  Sitting - Dynamic: Good(able to bend to touch toes for LB dressing)  Standing - Static: Fair  Standing - Dynamic: [facility]  Times per week: 5-7x/wk  Times per day: Daily  Current Treatment Recommendations: Strengthening, Balance Training, Functional Mobility Training, Transfer Training, Gait Training  Safety Devices  Type of devices:  All fall risk precautions in place, Call light within reach, Left in chair, Nurse notified    G-Code       OutComes Score                                                  AM-PAC Score  AM-PAC Inpatient Mobility Raw Score : 15 (07/14/20 1325)  AM-PAC Inpatient T-Scale Score : 39.45 (07/14/20 1325)  Mobility Inpatient CMS 0-100% Score: 57.7 (07/14/20 1325)  Mobility Inpatient CMS G-Code Modifier : CK (07/14/20 1325)          Goals  Short term goals  Time Frame for Short term goals: to be met by discharge  Short term goal 1: pt able to perform bed mobility with SBA  Short term goal 2: pt able to perform transfers with SBA  Short term goal 3: pt able to ambulate with LRAD 75 ft with CGA  Patient Goals   Patient goals : to regain leg strength       Therapy Time   Individual Concurrent Group Co-treatment   Time In 0820         Time Out 0858         Minutes 38         Timed Code Treatment Minutes: Far[address] 57, [id number]

## 2020-07-14 NOTE — FLOWSHEET NOTE
Pt remained flat for 5 hrs post lumbar puncture. Ambulated to bathroom with minimal assistance and urinated 1000 ml. Lumbar puncture site assessed, unremarkable. Will continue to monitor.  Electronically signed by Pauline Maddox RN on 7/14/2020 at 5:38 PM

## 2020-07-14 NOTE — PROGRESS NOTES
NEUROLOGY FOLLOWUP    HISTORY OF PRESENT ILLNESS :     Alissa Antonio is a 75 y.o. female   History was obtained from the patient and dictations in the chart. Additional history was obtained from the patient's nurse at the bedside. Patient denies any changes in her neurological symptoms today. She continues to have weakness and gait difficulties along with poor balance. Detailed history:  Patient states that she received a shingles immunization about 10 days ago. A couple days before admission she started having some leg weakness. Since it was not getting better and she was having difficulty with her gait she came to the hospital and was admitted for further evaluation. She had some tachycardia and was evaluated by cardiology as well. Patient denies any numbness. She denies any bladder or bowel symptoms. No vertigo or diplopia. Symptom onset was subacute. Symptoms are moderately severe without any other aggravating or relieving factors. REVIEW OF SYSTEMS       Constitutional:  []   Chills   []  Fatigue   []  Fevers   []  Malaise   []  Weight loss     [x] Denies all of the above    Respiratory:   []  Cough    []  Shortness of breath         [x] Denies all of the above     Cardiovascular:   []  Chest pain    []  Exertional chest pressure/discomfort           [] Palpitations    []  Syncope     [x] Denies all of the above    Past Medical History:   Diagnosis Date    Anxiety     Colon polyp     Headache(784.0)     Hyperlipidemia     Hypertension     Osteoarthritis      Family History   Problem Relation Age of Onset    Kidney Disease Mother     Heart Disease Father     Kidney Disease Sister     Cancer Sister     Kidney Disease Brother      Social History     Socioeconomic History    Marital status:       Spouse name: None    Number of children: None    Years of education: None    Highest education level: None   Occupational History    None   Social Needs    Financial resource strain: None    Food insecurity     Worry: None     Inability: None    Transportation needs     Medical: None     Non-medical: None   Tobacco Use    Smoking status: Never Smoker    Smokeless tobacco: Never Used   Substance and Sexual Activity    Alcohol use: Yes     Alcohol/week: 0.0 standard drinks     Comment: occ wine    Drug use: No    Sexual activity: Not Currently   Lifestyle    Physical activity     Days per week: None     Minutes per session: None    Stress: None   Relationships    Social connections     Talks on phone: None     Gets together: None     Attends [employer] service: None     Active member of club or organization: None     Attends meetings of clubs or organizations: None     Relationship status: None    Intimate partner violence     Fear of current or ex partner: None     Emotionally abused: None     Physically abused: None     Forced sexual activity: None   Other Topics Concern    None   Social History Narrative    None        PHYSICAL EXAMINATION      BP (!) 146/61   Pulse 82   Temp 97.1 °F (36.2 °C) (Temporal)   Resp 18   Ht 5' 1\" (1.549 m)   Wt 146 lb 2.6 oz (66.3 kg)   SpO2 95%   Breastfeeding No   BMI 27.62 kg/m²   This is a well-nourished patient in no acute distress  Patient is awake and alert. She is oriented x3. Memory judgment speech and language functions are normal.  Pupils are equal round reactive to light. Visual fields are full. External ocular movements are intact. Face appears symmetrical.  Tongue is in the midline. Strength is 3+/5 in the arms and legs proximally and 4/5 distally. There is mild foot drop bilaterally. Deep tendon reflexes are absent throughout. Plantar reflexes are withdrawal.  Sensory examination does not show any focal deficit. Coordination was slightly impaired but the weakness. Unable to ambulate due to weakness. No carotid bruit.   No neck stiffness. DATA :  LABS:  General Labs:    CBC:   Lab Results   Component Value Date    WBC 9.0 07/13/2020    RBC 4.24 07/13/2020    HGB 13.2 07/13/2020    HCT 39.0 07/13/2020    MCV 92.1 07/13/2020    MCH 31.2 07/13/2020    MCHC 33.8 07/13/2020    RDW 14.4 07/13/2020     07/13/2020    MPV 8.6 07/13/2020     BMP:    Lab Results   Component Value Date     07/13/2020    K 3.8 07/13/2020    K 3.4 07/11/2020    CL 90 07/13/2020    CO2 23 07/13/2020    BUN 16 07/13/2020    LABALBU 4.2 07/11/2020    CREATININE 0.6 07/13/2020    CALCIUM 10.2 07/13/2020    GFRAA >60 07/13/2020    GFRAA >60 09/17/2012    LABGLOM >60 07/13/2020    LABGLOM 78 11/02/2017    GLUCOSE 139 07/13/2020         IMPRESSION :  Guillian Barré syndrome  Patient had lumbar puncture done earlier today. CSF results were reviewed. CSF protein was high and is consistent with a diagnosis of Guillian-[place] syndrome. Patient Active Problem List   Diagnosis    Hypertension    Hyperlipidemia    Osteopenia    Hx of eczema    Chronic idiopathic urticaria    Moderate mitral regurgitation by prior echocardiogram    Prediabetes    BMI 27.0-27.9,adult    Major depressive disorder with single episode, in full remission (HCC)    PSVT (paroxysmal supraventricular tachycardia) (HCC)    Weakness    Frequent falls    SVT (supraventricular tachycardia) (HCC)       RECOMMENDATIONS :  Discussed with patient. She had a number of questions and these were answered. Discussed with patient's nurse in the intensive care unit. Discussed with Dr. Sigrid Fish. I will start the patient on IVIG 400 mg/kg body weight daily for 5 days. I will premedicate her with Tylenol and Benadryl. Side effects were discussed. Continue physical therapy and Occupational Therapy evaluations. High complexity patient. Please note a portion of this chart was generated using dragon dictation software.  Although every effort was made to ensure the accuracy of this automated transcription, some errors in transcription may have occurred.          Bebo Fleming M.D.

## 2020-07-15 LAB
ALBUMIN SERPL-MCNC: 3.1 G/DL (ref 3.1–4.9)
ALPHA-1-GLOBULIN: 0.3 G/DL (ref 0.2–0.4)
ALPHA-2-GLOBULIN: 0.8 G/DL (ref 0.4–1.1)
BETA GLOBULIN: 1 G/DL (ref 0.9–1.6)
GAMMA GLOBULIN: 0.8 G/DL (ref 0.6–1.8)
SPE/IFE INTERPRETATION: NORMAL
TOTAL PROTEIN: 6 G/DL (ref 6.4–8.2)

## 2020-07-15 PROCEDURE — 2580000003 HC RX 258: Performed by: HOSPITALIST

## 2020-07-15 PROCEDURE — 97116 GAIT TRAINING THERAPY: CPT

## 2020-07-15 PROCEDURE — 99233 SBSQ HOSP IP/OBS HIGH 50: CPT | Performed by: PSYCHIATRY & NEUROLOGY

## 2020-07-15 PROCEDURE — 6360000002 HC RX W HCPCS: Performed by: HOSPITALIST

## 2020-07-15 PROCEDURE — 97530 THERAPEUTIC ACTIVITIES: CPT

## 2020-07-15 PROCEDURE — 6360000002 HC RX W HCPCS: Performed by: PSYCHIATRY & NEUROLOGY

## 2020-07-15 PROCEDURE — 30233S1 TRANSFUSION OF NONAUTOLOGOUS GLOBULIN INTO PERIPHERAL VEIN, PERCUTANEOUS APPROACH: ICD-10-PCS | Performed by: INTERNAL MEDICINE

## 2020-07-15 PROCEDURE — 97535 SELF CARE MNGMENT TRAINING: CPT

## 2020-07-15 PROCEDURE — 6370000000 HC RX 637 (ALT 250 FOR IP): Performed by: HOSPITALIST

## 2020-07-15 PROCEDURE — 6370000000 HC RX 637 (ALT 250 FOR IP): Performed by: INTERNAL MEDICINE

## 2020-07-15 PROCEDURE — 99223 1ST HOSP IP/OBS HIGH 75: CPT | Performed by: INTERNAL MEDICINE

## 2020-07-15 PROCEDURE — 6370000000 HC RX 637 (ALT 250 FOR IP): Performed by: PSYCHIATRY & NEUROLOGY

## 2020-07-15 PROCEDURE — 1200000000 HC SEMI PRIVATE

## 2020-07-15 RX ORDER — POTASSIUM CHLORIDE 20 MEQ/1
40 TABLET, EXTENDED RELEASE ORAL ONCE
Status: COMPLETED | OUTPATIENT
Start: 2020-07-15 | End: 2020-07-15

## 2020-07-15 RX ADMIN — OYSTER SHELL CALCIUM WITH VITAMIN D 1 TABLET: 500; 200 TABLET, FILM COATED ORAL at 09:01

## 2020-07-15 RX ADMIN — POTASSIUM CHLORIDE 40 MEQ: 1500 TABLET, EXTENDED RELEASE ORAL at 10:42

## 2020-07-15 RX ADMIN — ENOXAPARIN SODIUM 40 MG: 40 INJECTION SUBCUTANEOUS at 09:02

## 2020-07-15 RX ADMIN — METOPROLOL TARTRATE 25 MG: 25 TABLET, FILM COATED ORAL at 22:21

## 2020-07-15 RX ADMIN — AMLODIPINE BESYLATE 5 MG: 5 TABLET ORAL at 09:02

## 2020-07-15 RX ADMIN — VITAMIN D, TAB 1000IU (100/BT) 2000 UNITS: 25 TAB at 09:02

## 2020-07-15 RX ADMIN — ACETAMINOPHEN 650 MG: 325 TABLET, FILM COATED ORAL at 16:09

## 2020-07-15 RX ADMIN — IMMUNE GLOBULIN INTRAVENOUS (HUMAN) 25000 MG: 5 INJECTION, SOLUTION INTRAVENOUS at 17:11

## 2020-07-15 RX ADMIN — DOCUSATE SODIUM 100 MG: 100 CAPSULE ORAL at 09:02

## 2020-07-15 RX ADMIN — LOSARTAN POTASSIUM 100 MG: 100 TABLET, FILM COATED ORAL at 09:02

## 2020-07-15 RX ADMIN — Medication 10 ML: at 09:03

## 2020-07-15 RX ADMIN — OYSTER SHELL CALCIUM WITH VITAMIN D 1 TABLET: 500; 200 TABLET, FILM COATED ORAL at 22:21

## 2020-07-15 RX ADMIN — DIPHENHYDRAMINE HCL 25 MG: 25 TABLET ORAL at 16:09

## 2020-07-15 RX ADMIN — NAPROXEN 500 MG: 250 TABLET ORAL at 22:26

## 2020-07-15 RX ADMIN — DESVENLAFAXINE SUCCINATE 50 MG: 50 TABLET, EXTENDED RELEASE ORAL at 09:02

## 2020-07-15 RX ADMIN — PANTOPRAZOLE SODIUM 40 MG: 40 TABLET, DELAYED RELEASE ORAL at 05:53

## 2020-07-15 ASSESSMENT — PAIN SCALES - GENERAL
PAINLEVEL_OUTOF10: 0
PAINLEVEL_OUTOF10: 0
PAINLEVEL_OUTOF10: 3
PAINLEVEL_OUTOF10: 0
PAINLEVEL_OUTOF10: 6
PAINLEVEL_OUTOF10: 0

## 2020-07-15 ASSESSMENT — PAIN DESCRIPTION - PAIN TYPE: TYPE: ACUTE PAIN

## 2020-07-15 ASSESSMENT — PAIN DESCRIPTION - FREQUENCY: FREQUENCY: INTERMITTENT

## 2020-07-15 ASSESSMENT — PAIN DESCRIPTION - ONSET: ONSET: OTHER (COMMENT)

## 2020-07-15 ASSESSMENT — PAIN DESCRIPTION - ORIENTATION: ORIENTATION: RIGHT;LEFT

## 2020-07-15 ASSESSMENT — PAIN DESCRIPTION - LOCATION: LOCATION: LEG

## 2020-07-15 NOTE — PLAN OF CARE
Problem: Falls - Risk of:  Goal: Will remain free from falls  Description: Will remain free from falls  Outcome: Ongoing   Patient remains free of falls at this time. Verbalized understanding of need to call for assistance with transfers and ambulation. Uses four wheel walker for steadier ambulation at this time. Will continue to monitor. Problem: Falls - Risk of:  Goal: Absence of physical injury  Description: Absence of physical injury  Outcome: Ongoing   Remains free of injury at this time. Room kept uncluttered with call light, bedside table and personal items within easy reach. Hourly rounding for daily care needs. Will continue to monitor. Problem: Pain:  Goal: Pain level will decrease  Description: Pain level will decrease  Outcome: Ongoing   Patient has denied the presence of pain so far this shift. Will continue to monitor. Problem: Pain:  Goal: Control of acute pain  Description: Control of acute pain  Outcome: Ongoing    Patient has denied the presence of pain so far this shift. Will continue to monitor. Problem: Pain:  Goal: Control of chronic pain  Description: Control of chronic pain  Outcome: Ongoing    Patient has denied the presence of pain so far this shift. Will continue to monitor. Problem: Discharge Planning:  Goal: Participates in care planning  Description: Participates in care planning  Outcome: Ongoing   Patient does participate in care planning. Current plan is to discharge to ARU when immunoglobulin therapy is complete and it is considered medically appropriate. Problem: Discharge Planning:  Goal: Discharged to appropriate level of care  Description: Discharged to appropriate level of care  Outcome: Ongoing   Current plan is to discharge to ARU when immunoglobulin therapy is complete and it is considered medically appropriate.        Problem: Anxiety/Stress:  Goal: Level of anxiety will decrease  Description: Level of anxiety will decrease  Outcome:

## 2020-07-15 NOTE — CONSULTS
pain or loss of vision  · ENT ROS: negative for - headaches, sore throat   · Respiratory: negative for - cough, sputum  · Cardiovascular: Reviewed in HPI  · Gastrointestinal: negative for - abdominal pain, diarrhea, N/V  · Hematology: negative for - bleeding, blood clots, bruising or jaundice  · Genito-Urinary:  negative for - Dysuria or incontinence  · Musculoskeletal: negative for - Joint swelling, muscle pain  · Neurological: negative for - confusion, dizziness, headaches   · Psychiatric: No anxiety, no depression. · Dermatological: negative for - rash    Physical Examination:  Vitals:    07/15/20 1150   BP: (!) 124/54   Pulse: 90   Resp: 16   Temp: 97 °F (36.1 °C)   SpO2: 97%      In: 418.5 [P.O.:180; I.V.:238.5]  Out: 1700    Wt Readings from Last 3 Encounters:   07/15/20 145 lb 1 oz (65.8 kg)   20 146 lb (66.2 kg)   01/10/20 143 lb (64.9 kg)     Temp  Av.2 °F (36.2 °C)  Min: 96.9 °F (36.1 °C)  Max: 97.8 °F (36.6 °C)  Pulse  Av.5  Min: 70  Max: 105  BP  Min: 114/79  Max: 162/73  SpO2  Av.9 %  Min: 89 %  Max: 97 %  FiO2   Av %  Min: 2 %  Max: 2 %    Intake/Output Summary (Last 24 hours) at 7/15/2020 1542  Last data filed at 7/15/2020 1100  Gross per 24 hour   Intake 418.52 ml   Output 2950 ml   Net -2531.48 ml       · Telemetry: Sinus rhythm   · Constitutional: Oriented. No distress. · Head: Normocephalic and atraumatic. · Mouth/Throat: Oropharynx is clear and moist.   · Eyes: Conjunctivae normal. EOM are normal.   · Neck: Neck supple. No rigidity. No JVD present. · Cardiovascular: Normal rate, regular rhythm, S1&S2. · Pulmonary/Chest: Bilateral respiratory sounds. No wheezes, No rhonchi. · Abdominal: Soft. Bowel sounds present. No distension, No tenderness. · Musculoskeletal: No tenderness. No edema    · Lymphadenopathy: Has no cervical adenopathy. · Neurological: Alert and oriented.  Cranial nerve appears intact, + lower extremity weakness  · Skin: Skin is warm and dry. No rash noted. · Psychiatric: Has a normal behavior     Labs, diagnostic and imaging results reviewed. Reviewed. Recent Labs     07/13/20  0244 07/14/20  1622   * 132*   K 3.8 3.3*   CL 90* 91*   CO2 23 29   PHOS 4.1  --    BUN 16 18   CREATININE 0.6 0.6     Recent Labs     07/13/20  0244   WBC 9.0   HGB 13.2   HCT 39.0   MCV 92.1        Lab Results   Component Value Date    CKTOTAL 383 07/13/2020    TROPONINI <0.01 07/11/2020     No results found for: BNP  Lab Results   Component Value Date    PROTIME 10.9 07/13/2020    INR 0.94 07/13/2020     Lab Results   Component Value Date    CHOL 181 06/18/2020    HDL 79 06/18/2020    HDL 79 04/14/2011    TRIG 82 06/18/2020       ECG: Sinus rhythm   Echo:   7/13/2020    -Normal left ventricle size, mild wall thickness and normal systolic   function with an estimated ejection fraction of 60%. -No regional wall motion abnormalities are seen. -Grade I diastolic dysfunction with normal LV filling pressures. E/e\"=7.4   -Calcification of the mitral valve noted. -Mild mitral regurgitation.   -The aortic valve is mildly thickened/calcified with upper normal gradients.   -Trivial aortic regurgitation.   -Mild tricuspid regurgitation.   -Estimated pulmonary artery systolic pressure is borderline normal at 30-35   mmHg assuming a right atrial pressure of 3 mmHg. Myoview:   Normal myocardial perfusion study.     Normal LV size and systolic function.           Scheduled Meds:   docusate sodium  100 mg Oral Daily    immune globulin  400 mg/kg Intravenous Daily    diphenhydrAMINE  25 mg Oral Daily    acetaminophen  650 mg Oral Daily    amLODIPine  5 mg Oral Daily    atorvastatin  10 mg Oral Every Other Day    calcium-vitamin D  1 tablet Oral BID    Vitamin D  2,000 Units Oral Daily    desvenlafaxine succinate  50 mg Oral Daily    therapeutic multivitamin-minerals  1 tablet Oral Daily    pantoprazole  40 mg Oral QAM AC    enoxaparin  40 mg study and ablation for future. Start metoprolol 25 mg twice daily. If episodes are sustained can use intermittent bolus of beta-blocker or calcium channel blockers. Consider outpatient Holter monitoring to assess for episodes of SVT as outpatient. -DM Barré syndrome receiving IVIG    HTN:  Controlled. Continue current medications. HLD on statin. Thank you for allowing me to participate in the care of Sofia     All questions and concerns were addressed to the patient/family. Alternatives to my treatment were discussed. I have discussed the above stated plan and the patient verbalized understanding and agreed with the plan. NOTE: This report was transcribed using voice recognition software. Every effort was made to ensure accuracy, however, inadvertent computerized transcription errors may be present.      Soledad Martinez MD, MPH  [facility]   Office: [phone]

## 2020-07-15 NOTE — PROGRESS NOTES
Patient got very nauseated and started chilling about 2000 on the 80 ml/hr rate. Decreased rate to 40 ml/hr and gave Zofran for nausea. Within about 15 min the nausea and chilling subsided. No other adverse reaction noted thus far. Continuing infusion at 40 ml/hr. Will continue to monitor.

## 2020-07-15 NOTE — PROGRESS NOTES
NEUROLOGY FOLLOWUP    HISTORY OF PRESENT ILLNESS :     Alissa Antonio is a 75 y.o. female   History was obtained from the patient and dictations in the chart. She continues to have weakness and gait difficulties along with poor balance. She did not have any side effects with the first dose of IVIG yesterday. She is somewhat anxious and nervous about the diagnosis and about the prognosis. She feels that her legs may be a little stronger today. Detailed history:  Patient states that she received a shingles immunization about 10 days ago. A couple days before admission she started having some leg weakness. Since it was not getting better and she was having difficulty with her gait she came to the hospital and was admitted for further evaluation. She had some tachycardia and was evaluated by cardiology as well. Patient denies any numbness. She denies any bladder or bowel symptoms. No vertigo or diplopia. Symptom onset was subacute. Symptoms are moderately severe without any other aggravating or relieving factors. REVIEW OF SYSTEMS       Constitutional:  []   Chills   []  Fatigue   []  Fevers   []  Malaise   []  Weight loss     [x] Denies all of the above    Respiratory:   []  Cough    []  Shortness of breath         [x] Denies all of the above     Cardiovascular:   []  Chest pain    []  Exertional chest pressure/discomfort           [] Palpitations    []  Syncope     [x] Denies all of the above    Past Medical History:   Diagnosis Date    Anxiety     Colon polyp     Headache(784.0)     Hyperlipidemia     Hypertension     Osteoarthritis      Family History   Problem Relation Age of Onset    Kidney Disease Mother     Heart Disease Father     Kidney Disease Sister     Cancer Sister     Kidney Disease Brother      Social History     Socioeconomic History    Marital status:       Spouse name: None    Number of children: None    Years of education: None    Highest education level: None   Occupational History    None   Social Needs    Financial resource strain: None    Food insecurity     Worry: None     Inability: None    Transportation needs     Medical: None     Non-medical: None   Tobacco Use    Smoking status: Never Smoker    Smokeless tobacco: Never Used   Substance and Sexual Activity    Alcohol use: Yes     Alcohol/week: 0.0 standard drinks     Comment: occ wine    Drug use: No    Sexual activity: Not Currently   Lifestyle    Physical activity     Days per week: None     Minutes per session: None    Stress: None   Relationships    Social connections     Talks on phone: None     Gets together: None     Attends [employer] service: None     Active member of club or organization: None     Attends meetings of clubs or organizations: None     Relationship status: None    Intimate partner violence     Fear of current or ex partner: None     Emotionally abused: None     Physically abused: None     Forced sexual activity: None   Other Topics Concern    None   Social History Narrative    None        PHYSICAL EXAMINATION      BP (!) 124/54   Pulse 90   Temp 97 °F (36.1 °C) (Temporal)   Resp 16   Ht 5' 1\" (1.549 m)   Wt 145 lb 1 oz (65.8 kg)   SpO2 97%   Breastfeeding No   BMI 27.41 kg/m²   This is a well-nourished patient in no acute distress  Patient is awake and alert. She is oriented x3. Memory judgment speech and language functions are normal.  Pupils are equal round reactive to light. Visual fields are full. External ocular movements are intact. Face appears symmetrical.  Tongue is in the midline. Strength is 3+/5 in the arms and legs proximally and 4/5 distally. There is mild foot drop bilaterally. Deep tendon reflexes are absent throughout. Plantar reflexes are withdrawal.  Sensory examination does not show any focal deficit.   Coordination was slightly impaired but the weakness. Unable to ambulate due to weakness. No carotid bruit. No neck stiffness. DATA :  LABS:  General Labs:    CBC:   Lab Results   Component Value Date    WBC 9.0 07/13/2020    RBC 4.24 07/13/2020    HGB 13.2 07/13/2020    HCT 39.0 07/13/2020    MCV 92.1 07/13/2020    MCH 31.2 07/13/2020    MCHC 33.8 07/13/2020    RDW 14.4 07/13/2020     07/13/2020    MPV 8.6 07/13/2020     BMP:    Lab Results   Component Value Date     07/14/2020    K 3.3 07/14/2020    K 3.4 07/11/2020    CL 91 07/14/2020    CO2 29 07/14/2020    BUN 18 07/14/2020    LABALBU 3.1 07/14/2020    CREATININE 0.6 07/14/2020    CALCIUM 9.9 07/14/2020    GFRAA >60 07/14/2020    GFRAA >60 09/17/2012    LABGLOM >60 07/14/2020    LABGLOM 78 11/02/2017    GLUCOSE 106 07/14/2020         IMPRESSION :  Guillian Barré syndrome  CSF protein was high consistent with a diagnosis of Guillian Barré syndrome. Patient now on IVIG therapy  Patient Active Problem List   Diagnosis    Hypertension    Hyperlipidemia    Osteopenia    Hx of eczema    Chronic idiopathic urticaria    Moderate mitral regurgitation by prior echocardiogram    Prediabetes    BMI 27.0-27.9,adult    Major depressive disorder with single episode, in full remission ([facility] [address]    PSVT (paroxysmal supraventricular tachycardia) (HCC)    Weakness    Frequent falls    SVT (supraventricular tachycardia) ([facility])       RECOMMENDATIONS :  Discussed with patient. Discussed with Dr. Tonia Marx. Continue IVIG 400 mg/kg body weight daily for 5 days. She will be premedicated  with Tylenol and Benadryl. Side effects were discussed. Continue physical therapy and Occupational Therapy evaluations. High complexity patient. Please note a portion of this chart was generated using dragon dictation software. Although every effort was made to ensure the accuracy of this automated transcription, some errors in transcription may have occurred.          Ashley Manning M.D.

## 2020-07-15 NOTE — PROGRESS NOTES
Physical Therapy  Facility/Department: [facility]  Daily Treatment Note  NAME: Lisa Myles  : 1945  MRN: [id number]    Date of Service: 7/15/2020    Discharge Recommendations:Marisol Mann scored a 15/24 on the AM-PAC short mobility form. Current research shows that an AM-PAC score of 17 or less is typically not associated with a discharge to the patient's home setting. Based on the patient's AM-PAC score and their current functional mobility deficits, it is recommended that the patient have 5-7 sessions per week of Physical Therapy at d/c to increase the patient's independence. At this time, this patient demonstrates the endurance, and/or tolerance for 3 hours of therapy each day, with a treatment frequency of 5-7x/wk. Please see assessment section for further patient specific details. If patient discharges prior to next session this note will serve as a discharge summary. Please see below for the latest assessment towards goals. PT Equipment Recommendations  Equipment Needed: No    Assessment   Body structures, Functions, Activity limitations: Decreased functional mobility ; Decreased ADL status; Decreased balance;Decreased strength  Assessment: Pt presenting with LE weakness placing her at risk for falls. Unsafe to discharge home alone at this time. Prognosis: Good  PT Education: PT Role;Plan of Care;Goals;Transfer Training;Gait Training  Patient Education: verbalized understanding  Barriers to Learning: none  REQUIRES PT FOLLOW UP: Yes  Activity Tolerance  Activity Tolerance: Patient Tolerated treatment well;Patient limited by pain     Patient Diagnosis(es): The primary encounter diagnosis was Fatigue, unspecified type. Diagnoses of Frequent falls and Tachycardia were also pertinent to this visit. has a past medical history of Anxiety, Colon polyp, Headache(784.0), Hyperlipidemia, Hypertension, and Osteoarthritis.    has a past surgical history that includes Colonoscopy (8/10). 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 States that she awoke yesterday and was having difficulty walking, states that she fell down one step and sat on her bottom. She had difficulty walking throughout the day. This morning she was able to navigate the steps but fell while walking across the front room to get her coffee.   Subjective   General  Chart Reviewed: Yes  Family / Caregiver Present: No  Subjective  Subjective: Pt reports no pain at rest; pain in R hip and thigh with active R hip flexion; pt denies need for tylenol at this time;  General Comment  Comments: Pt seated in chair upon arrival; agreeable to PT/OT          Orientation  Orientation  Overall Orientation Status: Within Functional Limits  Cognition      Objective      Transfers  Sit to Stand: Minimal Assistance(from chairs and toilet; cues for hand placement)  Stand to sit: Minimal Assistance(cues for safety with RW placement and with hand placement)  Ambulation  Ambulation?: Yes  Ambulation 1  Surface: level tile  Device: Rolling Walker  Assistance: Dependent/Total;Minimal assistance  Quality of Gait: pt amb with very short step lengths, poor foot clearance bilaterally R<L, increased hip flexion to advance BLEs with more difficulty on RLE; instructed in step to pattern with RW  Gait Deviations: Slow [place];Decreased step length;Decreased step height  Distance: 25 ft, 12 ft x 2  Comments: Pt required CGA of 2-Min A of 2 with fatigue and RLE pain; Min A of 1 for short distance to bathroom; unsteady, knees flexed as pt fatigues     Balance  Posture: Good  Sitting - Static: Good  Sitting - Dynamic: Good(able to reach to feet to don socks; using UEs to lift LEs partially due to LE weakness)  Standing - Static: Fair(leaning on sink to wash hands, B knees flexed, CGA; stands with RW with CGA-MIN A)  Standing - Dynamic: Poor(requiring up to Min A of 2 for short distance amb with RW)  Exercises  Comments: Pt instructed in APs in bed and chair         AM-PAC Score  AM-PAC Inpatient Mobility Raw Score : 15 (07/15/20 1325)  AM-PAC Inpatient T-Scale Score : 39.45 (07/15/20 1325)  Mobility Inpatient CMS 0-100% Score: 57.7 (07/15/20 1325)  Mobility Inpatient CMS G-Code Modifier : CK (07/15/20 1325)          Goals  Short term goals  Time Frame for Short term goals: to be met by discharge  Short term goal 1: pt able to perform bed mobility with SBA  Short term goal 2: pt able to perform transfers with SBA  Short term goal 3: pt able to ambulate with LRAD 75 ft with CGA  Patient Goals   Patient goals : to regain leg strength  No goals met    Plan    Plan  Times per week: 5-7x/wk  Times per day: Daily  Current Treatment Recommendations: Strengthening, Balance Training, Functional Mobility Training, Transfer Training, Gait Training  Safety Devices  Type of devices:  All fall risk precautions in place, Call light within reach, Left in chair, Nurse notified, Gait belt     Therapy Time   Individual Concurrent Group Co-treatment   Time In       1218   Time Out       1300   Minutes       42   Timed Code Treatment Minutes: Fadumo Cazares [address], [address] [facility], [address] [place] [address]

## 2020-07-15 NOTE — PROGRESS NOTES
Pt noted to have a little sleep apnea with drop in pulse ox readings. Placed on [facility]. No further reactions to Immunoglobulin therapy noted. Will continue to monitor.

## 2020-07-15 NOTE — PLAN OF CARE
Problem: Falls - Risk of:  Goal: Will remain free from falls  Outcome: Ongoing  Note: Fall Risk = high; Up as tolerated; Patient has unsteady gait and BLE weakness; Ambulation Equipment: walker; Call-light within reach and patient uses call-light for assistance; Bed alarm: Yes; Non-skid socks worn when out of bed; Side rails up 2/4; Educated patient on fall risk status and need to call for assistance;  Patient verbalizes understanding of fall risk status and fall education; Pt experienced no falls/injuries this shift      Problem: Pain:  Goal: Pain level will decrease  Outcome: Ongoing  Note: Pt has no c/o pain      Problem: Discharge Planning:  Goal: Participates in care planning  Outcome: Ongoing  Note: No D/C plans at this time

## 2020-07-15 NOTE — PROGRESS NOTES
Occupational Therapy  Facility/Department: [facility] CVU  Daily Treatment Note  NAME: Andrea Davies  : 1945  MRN: [id number]    Date of Service: 7/15/2020    Discharge Recommendations:  Andrea Davies scored a 19/24 on the AM-PAC ADL Inpatient form. Current research shows that an AM-PAC score of 17 or less is typically not associated with a discharge to the patient's home setting. Based on the patient's AM-PAC score and their current ADL deficits, as well as functional mobility deficits w/weak LEs, it is recommended that the patient have 5-7 sessions per week of Occupational Therapy at d/c to increase the patient's independence. Pt is not safe to return home at this time w/LE weakness. At this time, this patient demonstrates the endurance, and/or tolerance for 3 hours of therapy each day, with a treatment frequency of 5-7x/wk. Please see assessment section for further patient specific details. If patient discharges prior to next session this note will serve as a discharge summary. Please see below for the latest assessment towards goals. OT Equipment Recommendations  Equipment Needed: Yes(one pt returns home)  ADL Assistive Devices: Transfer Tub Bench;Grab Bars - shower    Assessment   Performance deficits / Impairments: Decreased functional mobility ; Decreased ADL status; Decreased endurance;Decreased high-level IADLs;Decreased strength;Decreased balance  Assessment: Pt is well below her baseline level of occupational function, based on the above deficits associated with PSVT. Pt would benefit from continued skilled acute OT services to address these deficits. Treatment Diagnosis: Decreased ADL/IADL status, endurance, strength and balance associated with PSVT  Prognosis: Good  History: Pt 76 yo, lives alone, no local family to assist, tri-level, BR/BR upstairs, independent ADLs, volunteers, 1 slide to floor PTA, no other falls.   Exam: ROM, MMT, 6 clicks, 6 performance deficits/impairments, evolving presentation  Assistance / Modification: Mod A for functional mobility w/RW, CGA transfer, Min A LB dressing, CGA toileting  REQUIRES OT FOLLOW UP: Yes  Activity Tolerance  Activity Tolerance: Patient Tolerated treatment well  Safety Devices  Safety Devices in place: Yes  Type of devices: Call light within reach; Patient at risk for falls;Nurse notified; Left in chair(RN aware chair alarm not placed; pt not getting up without assist)         Patient Diagnosis(es): The primary encounter diagnosis was Fatigue, unspecified type. Diagnoses of Frequent falls and Tachycardia were also pertinent to this visit. has a past medical history of Anxiety, Colon polyp, Headache(784.0), Hyperlipidemia, Hypertension, and Osteoarthritis. has a past surgical history that includes Colonoscopy (8/10). Restrictions  Restrictions/Precautions  Restrictions/Precautions: Fall Risk(high fall risk, NPO at time of eval)  Required Braces or Orthoses?: No  Position Activity Restriction  Other position/activity restrictions: 7/11: Joselin George is a 75 y.o. female presents to the ER with a complaint of bilateral extremity weakness. States that she awoke yesterday and was having difficulty walking, states that she fell down one step and sat on her bottom. She had difficulty walking throughout the day. This morning she was able to navigate the steps but fell while walking across the front room to get her coffee. Subjective   General  Chart Reviewed: Yes  Patient assessed for rehabilitation services?: Yes  Response to previous treatment: Patient with no complaints from previous session  Family / Caregiver Present: No  Referring Practitioner: Fiona Tejeda MD, for dc planning  Diagnosis: PSVT  Subjective  Subjective: Pt seated in recliner, pleasant and agreeable to OT tx.  Pt denies pain at rest.  Pre Treatment Pain Screening  Intervention List: Patient able to continue with treatment  Vital Signs  Patient Currently in Pain: Denies   Orientation  Orientation  Overall Orientation Status: Within Normal Limits  Objective    ADL  LE Dressing: Moderate assistance(socks; able to lean forward & don/doff L sock; needed assist to don R sock, able to doff; CGA clothing mgt in after toileting.)  Toileting: Contact guard assistance  Additional Comments: Pt declined bathing, stated that an aide assisted her earlier. Balance  Sitting Balance: Independent  Standing Balance: Dependent/Total(CGA of 2 progressing to Min A of 2 as pt fatigued; after rest break, Min A to/from bathroom. CGA in stance at sink for grooming, leaning on UEs.)  Standing Balance  Time: 2-3 min, ~1 min, ~30 sec, ~1 min  Activity: functionial mobility ~25 ft into rogers; ~16 ft from rogers to chair; functional mobility to bathroom for toilet transfer; clothing mgt, washing hands at sink, mobility back to recliner  Comment: Pt unsteady, requiring increasing assistance, needed a seated rest break after 25 ft. Functional Mobility  Functional - Mobility Device: Rolling Walker  Activity: To/from bathroom; Other  Assist Level: Dependent/Total(CGA of 2 progressing to Min A of 2, Min A of 1 to/from bathroom)  Functional Mobility Comments: unsteady d/t LE weakness, R LE pain  Toilet Transfers  Toilet - Technique: Ambulating  Equipment Used: Standard toilet(verbal cues to use grab bar)  Toilet Transfer: Minimal assistance(poor ecentric control)  Bed mobility  Comment: Not addressed; pt in recliner at beginning and end of session.   Transfers  Stand Step Transfers: Minimal assistance  Sit to stand: Minimal assistance(Min A of 1 X 2 trials from recliner, CGA from toilet)  Stand to sit: Minimal assistance(onto recliner X 2 trials; poor eccentric control; verbal cues to reach back to chair)                                                                 Plan   Plan  Times per week: 5-7  Current Treatment Recommendations: Safety Education & Training, Self-Care / ADL, Endurance Training, Functional Mobility Training, Equipment Evaluation, Education, & procurement, Patient/Caregiver Education & Training           AM-PAC Score        AM-PAC Inpatient Daily Activity Raw Score: 18 (07/14/20 1252)  AM-PAC Inpatient ADL T-Scale Score : 38.66 (07/14/20 1252)  ADL Inpatient CMS 0-100% Score: 46.65 (07/14/20 1252)  ADL Inpatient CMS G-Code Modifier : CK (07/14/20 1252)    Goals  Short term goals  Time Frame for Short term goals: Discharge  Short term goal 1: SBA for functional transfers to ADL surfaces--Min A w/RW 7/15  Short term goal 2: CGA for functional mobility for ADL activity w/RW--CGA of 2 progressing to Min A of 2 w/RW 7/15  Short term goal 3: S/U for UB ADLs--Not addressed 7/15  Short term goal 4: CGA for LB ADLs--Min A LB dressing 7/15  Short term goal 5: SBA for toileting--CGA 7/15       Therapy Time   Individual Concurrent Group Co-treatment   Time In       1218   Time Out       1300   Minutes       42        Timed Code Treatment Minutes:  42 Minutes    Total Treatment Minutes:  [address], [address], [place], [place]

## 2020-07-15 NOTE — PROGRESS NOTES
Immunoglobulin therapy complete with no further adverse reactions noted. VSS. Will continue to monitor.

## 2020-07-15 NOTE — PROGRESS NOTES
Line), PRN  magnesium sulfate 1 g in dextrose 5% 100 mL IVPB, PRN  acetaminophen (TYLENOL) tablet 650 mg, Q6H PRN    Or  acetaminophen (TYLENOL) suppository 650 mg, Q6H PRN  pantoprazole (PROTONIX) tablet 40 mg, QAM AC  enoxaparin (LOVENOX) injection 40 mg, Daily  losartan (COZAAR) tablet 100 mg, Daily        Objective:  BP (!) 124/54   Pulse 90   Temp 97 °F (36.1 °C) (Temporal)   Resp 16   Ht 5' 1\" (1.549 m)   Wt 145 lb 1 oz (65.8 kg)   SpO2 97%   Breastfeeding No   BMI 27.41 kg/m²     Intake/Output Summary (Last 24 hours) at 7/15/2020 1558  Last data filed at 7/15/2020 1100  Gross per 24 hour   Intake 418.52 ml   Output 2950 ml   Net -2531.48 ml      Wt Readings from Last 3 Encounters:   07/15/20 145 lb 1 oz (65.8 kg)   01/27/20 146 lb (66.2 kg)   01/10/20 143 lb (64.9 kg)       General appearance: [facility] elderly lady, appears comfortable  Eyes: Sclera clear. Pupils equal.  ENT: Moist oral mucosa. Trachea midline, no adenopathy. Cardiovascular: Regular rhythm, normal S1, S2. No murmur. No edema in lower extremities  Respiratory: Not using accessory muscles. Good inspiratory effort. Clear to auscultation bilaterally, no wheeze or crackles. GI: Abdomen soft, no tenderness, not distended, normal bowel sounds  Musculoskeletal: No cyanosis in digits, neck supple  Neurology: CN 2-12 grossly intact. Bilateral upper extremity weakness noted. Subjective bilateral leg weakness improving  Psych: Normal affect. Alert and oriented in time, place and person  Skin: Warm, dry, normal turgor     Labs and Tests:  CBC:   Recent Labs     07/13/20  0244   WBC 9.0   HGB 13.2   HCT 39.0   MCV 92.1        BMP:    Recent Labs     07/13/20  0244 07/14/20  1622   * 132*   K 3.8 3.3*   CL 90* 91*   CO2 23 29   BUN 16 18   CREATININE 0.6 0.6   GLUCOSE 139* 106*     Hepatic:   No results for input(s): AST, ALT, ALB, BILITOT, ALKPHOS in the last 72 hours.       Problem List  Principal Problem:    PSVT

## 2020-07-15 NOTE — CARE COORDINATION
Per Dr. Hali Cortez, patient to be evaluated for ARU admission when IV IG treatments closer to be completed. Case management will continue to follow progress and update discharge plan as needed.     BETTINA DorantesN, .[phone]

## 2020-07-16 LAB
ANION GAP SERPL CALCULATED.3IONS-SCNC: 11 MMOL/L (ref 3–16)
BUN BLDV-MCNC: 16 MG/DL (ref 7–20)
CALCIUM SERPL-MCNC: 9.4 MG/DL (ref 8.3–10.6)
CHLORIDE BLD-SCNC: 96 MMOL/L (ref 99–110)
CO2: 25 MMOL/L (ref 21–32)
CREAT SERPL-MCNC: 0.7 MG/DL (ref 0.6–1.2)
GFR AFRICAN AMERICAN: >60
GFR NON-AFRICAN AMERICAN: >60
GLUCOSE BLD-MCNC: 115 MG/DL (ref 70–99)
POTASSIUM SERPL-SCNC: 4.8 MMOL/L (ref 3.5–5.1)
SODIUM BLD-SCNC: 132 MMOL/L (ref 136–145)

## 2020-07-16 PROCEDURE — 99233 SBSQ HOSP IP/OBS HIGH 50: CPT | Performed by: INTERNAL MEDICINE

## 2020-07-16 PROCEDURE — 97530 THERAPEUTIC ACTIVITIES: CPT

## 2020-07-16 PROCEDURE — 97110 THERAPEUTIC EXERCISES: CPT

## 2020-07-16 PROCEDURE — 1200000000 HC SEMI PRIVATE

## 2020-07-16 PROCEDURE — 97535 SELF CARE MNGMENT TRAINING: CPT

## 2020-07-16 PROCEDURE — 6370000000 HC RX 637 (ALT 250 FOR IP): Performed by: INTERNAL MEDICINE

## 2020-07-16 PROCEDURE — 99233 SBSQ HOSP IP/OBS HIGH 50: CPT | Performed by: PSYCHIATRY & NEUROLOGY

## 2020-07-16 PROCEDURE — 6360000002 HC RX W HCPCS: Performed by: PSYCHIATRY & NEUROLOGY

## 2020-07-16 PROCEDURE — 6370000000 HC RX 637 (ALT 250 FOR IP): Performed by: HOSPITALIST

## 2020-07-16 PROCEDURE — 80048 BASIC METABOLIC PNL TOTAL CA: CPT

## 2020-07-16 PROCEDURE — 97116 GAIT TRAINING THERAPY: CPT

## 2020-07-16 PROCEDURE — 6360000002 HC RX W HCPCS: Performed by: HOSPITALIST

## 2020-07-16 PROCEDURE — 2580000003 HC RX 258: Performed by: HOSPITALIST

## 2020-07-16 PROCEDURE — 6370000000 HC RX 637 (ALT 250 FOR IP): Performed by: PSYCHIATRY & NEUROLOGY

## 2020-07-16 RX ORDER — AMLODIPINE BESYLATE 5 MG/1
10 TABLET ORAL DAILY
Status: DISCONTINUED | OUTPATIENT
Start: 2020-07-17 | End: 2020-07-19 | Stop reason: HOSPADM

## 2020-07-16 RX ORDER — POLYETHYLENE GLYCOL 3350 17 G/17G
17 POWDER, FOR SOLUTION ORAL DAILY
Status: DISCONTINUED | OUTPATIENT
Start: 2020-07-16 | End: 2020-07-19 | Stop reason: HOSPADM

## 2020-07-16 RX ADMIN — METOPROLOL TARTRATE 25 MG: 25 TABLET, FILM COATED ORAL at 21:59

## 2020-07-16 RX ADMIN — PANTOPRAZOLE SODIUM 40 MG: 40 TABLET, DELAYED RELEASE ORAL at 08:29

## 2020-07-16 RX ADMIN — DESVENLAFAXINE SUCCINATE 50 MG: 50 TABLET, EXTENDED RELEASE ORAL at 08:28

## 2020-07-16 RX ADMIN — DOCUSATE SODIUM 100 MG: 100 CAPSULE ORAL at 08:28

## 2020-07-16 RX ADMIN — METOPROLOL TARTRATE 25 MG: 25 TABLET, FILM COATED ORAL at 08:28

## 2020-07-16 RX ADMIN — ACETAMINOPHEN 650 MG: 325 TABLET, FILM COATED ORAL at 15:53

## 2020-07-16 RX ADMIN — OYSTER SHELL CALCIUM WITH VITAMIN D 1 TABLET: 500; 200 TABLET, FILM COATED ORAL at 21:59

## 2020-07-16 RX ADMIN — DIPHENHYDRAMINE HCL 25 MG: 25 TABLET ORAL at 15:53

## 2020-07-16 RX ADMIN — POLYETHYLENE GLYCOL 3350 17 G: 17 POWDER, FOR SOLUTION ORAL at 11:49

## 2020-07-16 RX ADMIN — ENOXAPARIN SODIUM 40 MG: 40 INJECTION SUBCUTANEOUS at 08:29

## 2020-07-16 RX ADMIN — OYSTER SHELL CALCIUM WITH VITAMIN D 1 TABLET: 500; 200 TABLET, FILM COATED ORAL at 08:28

## 2020-07-16 RX ADMIN — AMLODIPINE BESYLATE 5 MG: 5 TABLET ORAL at 08:28

## 2020-07-16 RX ADMIN — Medication 10 ML: at 22:02

## 2020-07-16 RX ADMIN — LOSARTAN POTASSIUM 100 MG: 100 TABLET, FILM COATED ORAL at 08:28

## 2020-07-16 RX ADMIN — ATORVASTATIN CALCIUM 10 MG: 10 TABLET, FILM COATED ORAL at 08:28

## 2020-07-16 RX ADMIN — MULTIPLE VITAMINS W/ MINERALS TAB 1 TABLET: TAB at 08:28

## 2020-07-16 RX ADMIN — IMMUNE GLOBULIN INTRAVENOUS (HUMAN) 25000 MG: 5 INJECTION, SOLUTION INTRAVENOUS at 16:53

## 2020-07-16 RX ADMIN — VITAMIN D, TAB 1000IU (100/BT) 2000 UNITS: 25 TAB at 08:28

## 2020-07-16 ASSESSMENT — PAIN SCALES - GENERAL
PAINLEVEL_OUTOF10: 0

## 2020-07-16 NOTE — PROGRESS NOTES
[facility]   Electrophysiology Progress Note     Admit Date: 2020     Reason for follow up: Tachycardia    HPI and Interval History: 75 y.o. female presented with lower extremity weakness and found to have DM Barré syndrome. She is being treated with IVIG. Had episodes of SVT on telemetry with a rapid ventricular rate. She was asymptomatic with these episodes. No prior history of arrhythmia. Patient seen and examined. Clinical notes reviewed. Telemetry reviewed. No major events overnight. Denies having chest pain, shortness of breath, dyspnea on exertion, Orthopnea, PND at the time of this visit. Continues having lower extremity weakness. Working with PT OT. Review of System:  All other systems reviewed except for that noted above. Pertinent negatives and positives are:     · General: negative for fever, chills   · Ophthalmic ROS: negative for - eye pain or loss of vision  · ENT ROS: negative for - headaches, sore throat   · Respiratory: negative for - cough, sputum  · Cardiovascular: Reviewed in HPI  · Gastrointestinal: negative for - abdominal pain, diarrhea, N/V  · Hematology: negative for - bleeding, blood clots, bruising or jaundice  · Genito-Urinary:  negative for - Dysuria or incontinence  · Musculoskeletal: negative for - Joint swelling, muscle pain  · Neurological: negative for - confusion, dizziness, headaches + lower extremity weakness  · Psychiatric: No anxiety, no depression.   · Dermatological: negative for - rash      Physical Examination:  Vitals:    20 0820   BP: (!) 166/61   Pulse: 68   Resp: 16   Temp: 97.1 °F (36.2 °C)   SpO2: 98%      In: 418 [P.O.:360; I.V.:58]  Out: 1075    Wt Readings from Last 3 Encounters:   20 148 lb 5.9 oz (67.3 kg)   20 146 lb (66.2 kg)   01/10/20 143 lb (64.9 kg)     Temp  Av.9 °F (36.1 °C)  Min: 96.7 °F (35.9 °C)  Max: 97.1 °F (36.2 °C)  Pulse  Av.6  Min: 57  Max: 90  BP  Min: 118/50  Max: 166/61  SpO2 Av.9 %  Min: 90 %  Max: 98 %    Intake/Output Summary (Last 24 hours) at 2020 0940  Last data filed at 2020 0230  Gross per 24 hour   Intake 418 ml   Output 1475 ml   Net -1057 ml       · Telemetry: Sinus rhythm   · Constitutional: Oriented. No distress. · Head: Normocephalic and atraumatic. · Mouth/Throat: Oropharynx is clear and moist.   · Eyes: Conjunctivae normal. EOM are normal.   · Neck: Neck supple. No rigidity. No JVD present. · Cardiovascular: Normal rate, regular rhythm, S1&S2. · Pulmonary/Chest: Bilateral respiratory sounds. No wheezes, No rhonchi. · Abdominal: Soft. Bowel sounds present. No distension, No tenderness. · Musculoskeletal: No tenderness. No edema    · Lymphadenopathy: Has no cervical adenopathy. · Neurological: Alert and oriented. Cranial nerve appears intact, + lower extremity weakness  · Skin: Skin is warm and dry. No rash noted. · Psychiatric: Has a normal behavior     Labs, diagnostic and imaging results reviewed. Reviewed. Recent Labs     20  1622   *   K 3.3*   CL 91*   CO2 29   BUN 18   CREATININE 0.6     No results for input(s): WBC, HGB, HCT, MCV, PLT in the last 72 hours. Lab Results   Component Value Date    CKTOTAL 383 2020    TROPONINI <0.01 2020     Estimated Creatinine Clearance: 71 mL/min (based on SCr of 0.6 mg/dL).    No results found for: BNP  Lab Results   Component Value Date    PROTIME 10.9 2020    INR 0.94 2020     Lab Results   Component Value Date    CHOL 181 2020    HDL 79 2020    HDL 79 2011    TRIG 82 2020       Scheduled Meds:   metoprolol tartrate  25 mg Oral BID    docusate sodium  100 mg Oral Daily    immune globulin  400 mg/kg Intravenous Daily    diphenhydrAMINE  25 mg Oral Daily    acetaminophen  650 mg Oral Daily    amLODIPine  5 mg Oral Daily    atorvastatin  10 mg Oral Every Other Day    calcium-vitamin D  1 tablet Oral BID    Vitamin D  2,000 Units Oral Daily    desvenlafaxine succinate  50 mg Oral Daily    therapeutic multivitamin-minerals  1 tablet Oral Daily    pantoprazole  40 mg Oral QAM AC    enoxaparin  40 mg Subcutaneous Daily    losartan  100 mg Oral Daily     Continuous Infusions:  PRN Meds:polyethylene glycol, ondansetron, perflutren lipid microspheres, hydrALAZINE, naproxen, sodium chloride flush, potassium chloride **OR** potassium alternative oral replacement **OR** potassium chloride, magnesium sulfate, acetaminophen **OR** acetaminophen     Patient Active Problem List    Diagnosis Date Noted    [facility] 07/14/2020    PSVT (paroxysmal supraventricular tachycardia) ([facility] 07/11/2020    Weakness 07/11/2020    Frequent falls 07/11/2020    Major depressive disorder with single episode, in full remission ([facility] 05/21/2018    Prediabetes 01/31/2017    BMI 27.0-27.9,adult 01/31/2017    Moderate mitral regurgitation by prior echocardiogram 10/04/2016    Chronic idiopathic urticaria 05/24/2016    Hx of eczema 10/02/2012    Osteopenia 07/11/2012    Hypertension     Hyperlipidemia       Active Hospital Problems    Diagnosis Date Noted    [facility] [I47.1] 07/14/2020    PSVT ([facility]oxysmal [facility]ricular tachycardia) ([facility] [I47.1] 07/11/2020    Weakness [R53.1] 07/11/2020    Frequent falls [R29.6] 07/11/2020    Hypertension [I10]     Hyperlipidemia [E78.5]        Assessment:   -Paroxysmal atrial tachycardia   Episodes of SVT with tachycardia on telemetry. Patient appeared to be asymptomatic with denies any palpitation or chest pain. Added metoprolol yesterday. No further episodes since yesterday. EP study and ablation can be considered as outpatient after acute illness has resolved. Continue with metoprolol     Use intermittent boluses of metoprolol or Cardizem for acute episodes of tachycardia.      Will provide outpatient Holter monitoring at discharge for further follow-up of arrhythmia burden at discharge. -Guillain-Barré syndrome   Patient is being treated with IVIG   Continues having lower extremity weakness.    -Dyslipidemia controlled on statin      -Hypertension controlled    No further EP recommendation at this time. Arrhythmia is asymptomatic and appears to be controlled with beta-blocker therapy. Use boluses of beta-blocker or calcium channel blockers in case of sustained episodes. Needs outpatient Holter monitoring at discharge and follow-up with EP office. I have spent 35 minutes of face to face time with the patient with more than 50% spent counseling and coordinating care for this patient    All questions and concerns were addressed to the patient/family. Alternatives to my treatment were discussed. I have discussed the above stated plan and the patient verbalized understanding and agreed with the plan. NOTE: This report was transcribed using voice recognition software. Every effort was made to ensure accuracy, however, inadvertent computerized transcription errors may be present.      Lu Babb MD, MPH  [facility]   Office: [phone]

## 2020-07-16 NOTE — PLAN OF CARE
Problem: Falls - Risk of:  Goal: Will remain free from falls  Description: Will remain free from falls  7/16/2020 0353 by Rocio Escudero RN  Outcome: Ongoing  Note: Fall Risk = high; Up as tolerated; Patient has unsteady gait and BLE weakness; Ambulation Equipment: walker; Call-light within reach and patient uses call-light for assistance; Bed alarm: Yes; Non-skid socks worn when out of bed; Side rails up 2/4; Educated patient on fall risk status and need to call for assistance; Patient verbalizes understanding of fall risk status and fall education; Pt experienced no falls/injuries this shift        Problem: Falls - Risk of:  Goal: Absence of physical injury  Description: Absence of physical injury  Outcome: Ongoing   Patient remains free of injury at this time. Room kept uncluttered with call light, bedside table and personal items within easy reach. Will continue to monitor.

## 2020-07-16 NOTE — CONSULTS
Patient: Lisa Myles  [id number]  Date: 7/16/2020      Chief Complaint: BLE weakness    History of Present Illness/Hospital Course:  11-year-old female with a history of anxiety, hypertension, hyperlipidemia, and arthritis who was admitted on 7/11 with progressive lower extremity weakness. Neurology evaluated and suspected Guillian Barré syndrome. She was initiated on IVIG. She reports no significant improvement in her weakness at this time. Her hospital course was also complicated by episodes of SVT. She was evaluated by therapy and suggested to continue in an inpatient setting prior to returning home. 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 She is hoping to return to both shortly after recovery. Prior Level of Function:  Independent    Current Level of Function:  Total assist     has a past medical history of Anxiety, Colon polyp, Headache(784.0), Hyperlipidemia, Hypertension, and Osteoarthritis. has a past surgical history that includes Colonoscopy (8/10). reports that she has never smoked. She has never used smokeless tobacco. She reports current alcohol use. She reports that she does not use drugs. family history includes Cancer in her sister; Heart Disease in her father; Kidney Disease in her brother, mother, and sister. REVIEW OF SYSTEMS:   CONSTITUTIONAL: negative for fevers, chills, diaphoresis, appetite change, night sweats and unexpected weight change. HEENT: negative for hearing loss, tinnitus, ear drainage, sinus pressure, nasal congestion, epistaxis and snoring. RESPIRATORY: Negative for hemoptysis, cough, sputum production. CARDIOVASCULAR: negative for chest pain, palpitations, exertional chest pressure/discomfort, edema, syncope. GASTROINTESTINAL: negative for nausea, vomiting, diarrhea, constipation, blood in stool and abdominal pain.   GENITOURINARY: negative for frequency, dysuria, urinary incontinence, decreased 1715 (!) 162/74 -- -- 86 -- 94 % --   07/15/20 1705 (!) 149/64 -- -- 85 -- 95 % --   07/15/20 1607 (!) 154/61 96.9 °F (36.1 °C) Temporal 89 16 97 % --     Psych: Stable mood, normal judgement, normal affect. Const: No distress  Eyes: Conjunctiva noninjected, no icterus noted; pupils equal, round. HENT: Atraumatic, normocephalic; Oral mucosa moist  Neck: Trachea midline, neck supple. No thyromegaly noted. CV: No audible murmurs  Resp: No increased WOB, no audible wheezing   GI: Nondistended   Neuro: Alert, oriented, appropriate. BLE 4/5 HF/KE 3/5 DF/PF. Areflexic  Skin: No visible abnormalities  MSK: No joint abnormalities noted. Ext: No significant edema appreciated. No varicosities. Lab Results   Component Value Date    WBC 9.0 07/13/2020    HGB 13.2 07/13/2020    HCT 39.0 07/13/2020    MCV 92.1 07/13/2020     07/13/2020     Lab Results   Component Value Date    INR 0.94 07/13/2020    PROTIME 10.9 07/13/2020     Lab Results   Component Value Date    CREATININE 0.7 07/16/2020    BUN 16 07/16/2020     (L) 07/16/2020    K 4.8 07/16/2020    CL 96 (L) 07/16/2020    CO2 25 07/16/2020     Lab Results   Component Value Date    ALT 15 07/11/2020    AST 22 07/11/2020    ALKPHOS 67 07/11/2020    BILITOT 0.3 07/11/2020     Most recent imaging studies revealed   EXAMINATION:    CT OF THE HEAD WITHOUT CONTRAST  7/11/2020 12:24 pm         TECHNIQUE:    CT of the head was performed without the administration of intravenous    contrast. Dose modulation, iterative reconstruction, and/or weight based    adjustment of the mA/kV was utilized to reduce the radiation dose to as low    as reasonably achievable.         COMPARISON:    None.         HISTORY:    ORDERING SYSTEM PROVIDED HISTORY: weakness, difficulty walking > 24 hours    TECHNOLOGIST PROVIDED HISTORY:    If patient is on cardiac monitor and/or pulse ox, they may be taken off    cardiac monitor and pulse ox, left on O2 if currently on.  All monitors reattached when patient returns to room. Has a \"code stroke\" or \"stroke alert\" been called? ->No    Reason for exam:->weakness, difficulty walking > 24 hours    Reason for Exam: weakness, difficulty walking > 24 hours    Acuity: Acute    Type of Exam: Initial         FINDINGS:    BRAIN/VENTRICLES: There is no acute intracranial hemorrhage, mass effect or    midline shift.  No abnormal extra-axial fluid collection.  The gray-white    differentiation is maintained without evidence of an acute infarct.  There is    no evidence of hydrocephalus.         ORBITS: The visualized portion of the orbits demonstrate no acute abnormality.         SINUSES: The visualized paranasal sinuses and mastoid air cells demonstrate    no acute abnormality.         SOFT TISSUES/SKULL:  No acute abnormality of the visualized skull or soft    tissues.              Impression    No acute intracranial abnormality. The above laboratory data have been reviewed. The above imaging data have been reviewed. The above medical testing have been reviewed. Body mass index is 28.03 kg/m². Assessment and Plan:  Guillian Barré syndrome: IVIG day 3/5. PT/OT  Paroxysmal SVT  Hyponatremia  HTN  HLD    Dispo: Patient is an appropriate ARU candidate. Able to tolerate the required 3 hours of therapy in an ARU setting. Precert to be initiated today in anticipation of completion of her IVIG in the next few days. Would be hopeful for an admission to ARU early next week. We will continue to follow. Thank you for the consultation. Rebecca Adam MD 7/16/2020, 12:10 PM     * This document was created using dictation software. While all precautions were taken to ensure accuracy, errors may have occurred. Please disregard any typographical errors.

## 2020-07-16 NOTE — PROGRESS NOTES
NEUROLOGY FOLLOWUP    HISTORY OF PRESENT ILLNESS :     Millie Riedel is a 75 y.o. female   History was obtained from the patient and dictations in the chart. She continues to have weakness and gait difficulties along with poor balance. Patient complains of significant constipation over the last couple of weeks. She states that she did not sleep well last night. Her right leg seems weaker today. Patient will be getting her third dose of IVIG today. She is somewhat anxious and nervous about the diagnosis and about the prognosis. Detailed history:  Patient states that she received a shingles immunization about 10 days ago. A couple days before admission she started having some leg weakness. Since it was not getting better and she was having difficulty with her gait she came to the hospital and was admitted for further evaluation. She had some tachycardia and was evaluated by cardiology as well. Patient denies any numbness. She denies any bladder or bowel symptoms. No vertigo or diplopia. Symptom onset was subacute. Symptoms are moderately severe without any other aggravating or relieving factors.   REVIEW OF SYSTEMS       Constitutional:  []   Chills   []  Fatigue   []  Fevers   []  Malaise   []  Weight loss     [x] Denies all of the above    Respiratory:   []  Cough    []  Shortness of breath         [x] Denies all of the above     Cardiovascular:   []  Chest pain    []  Exertional chest pressure/discomfort           [] Palpitations    []  Syncope     [x] Denies all of the above    Past Medical History:   Diagnosis Date    Anxiety     Colon polyp     Headache(784.0)     Hyperlipidemia     Hypertension     Osteoarthritis      Family History   Problem Relation Age of Onset    Kidney Disease Mother     Heart Disease Father     Kidney Disease Sister     Cancer Sister     Kidney Disease Brother      Social History     Socioeconomic History    Marital status:      Spouse name: None    Number of children: None    Years of education: None    Highest education level: None   Occupational History    None   Social Needs    Financial resource strain: None    Food insecurity     Worry: None     Inability: None    Transportation needs     Medical: None     Non-medical: None   Tobacco Use    Smoking status: Never Smoker    Smokeless tobacco: Never Used   Substance and Sexual Activity    Alcohol use: Yes     Alcohol/week: 0.0 standard drinks     Comment: occ wine    Drug use: No    Sexual activity: Not Currently   Lifestyle    Physical activity     Days per week: None     Minutes per session: None    Stress: None   Relationships    Social connections     Talks on phone: None     Gets together: None     Attends Mandaeism service: None     Active member of club or organization: None     Attends meetings of clubs or organizations: None     Relationship status: None    Intimate partner violence     Fear of current or ex partner: None     Emotionally abused: None     Physically abused: None     Forced sexual activity: None   Other Topics Concern    None   Social History Narrative    None        PHYSICAL EXAMINATION      BP (!) 157/64   Pulse 88   Temp 97 °F (36.1 °C) (Temporal)   Resp 16   Ht 5' 1\" (1.549 m)   Wt 148 lb 5.9 oz (67.3 kg)   SpO2 98%   Breastfeeding No   BMI 28.03 kg/m²   This is a well-nourished patient in no acute distress  Patient is awake and alert. She is oriented x3. Memory judgment speech and language functions are normal.  Pupils are equal round reactive to light. Visual fields are full. External ocular movements are intact. Face appears symmetrical.  Tongue is in the midline. Strength is 4/5 in the arms bilaterally. Patient also has more weakness diffusely in the right leg. Left leg is 3+/5. There is mild foot drop bilaterally.   Deep tendon reflexes are absent throughout. Plantar reflexes are withdrawal.  Sensory examination does not show any focal deficit. Coordination was slightly impaired but the weakness. Unable to ambulate due to weakness. No carotid bruit. No neck stiffness. DATA :  LABS:  General Labs:    CBC:   Lab Results   Component Value Date    WBC 9.0 07/13/2020    RBC 4.24 07/13/2020    HGB 13.2 07/13/2020    HCT 39.0 07/13/2020    MCV 92.1 07/13/2020    MCH 31.2 07/13/2020    MCHC 33.8 07/13/2020    RDW 14.4 07/13/2020     07/13/2020    MPV 8.6 07/13/2020     BMP:    Lab Results   Component Value Date     07/16/2020    K 4.8 07/16/2020    K 3.4 07/11/2020    CL 96 07/16/2020    CO2 25 07/16/2020    BUN 16 07/16/2020    LABALBU 3.1 07/14/2020    CREATININE 0.7 07/16/2020    CALCIUM 9.4 07/16/2020    GFRAA >60 07/16/2020    GFRAA >60 09/17/2012    LABGLOM >60 07/16/2020    LABGLOM 78 11/02/2017    GLUCOSE 115 07/16/2020         IMPRESSION :  Guillian Barré syndrome  CSF protein was high consistent with a diagnosis of Guillian Barré syndrome. Patient now on IVIG therapy  Right leg weakness seems to be slightly worse today. The arm strength seems to be improved. Patient Active Problem List   Diagnosis    Hypertension    Hyperlipidemia    Osteopenia    Hx of eczema    Chronic idiopathic urticaria    Moderate mitral regurgitation by prior echocardiogram    Prediabetes    BMI 27.0-27.9,adult    Major depressive disorder with single episode, in full remission ([facility])    PSVT (paroxysmal supraventricular tachycardia) ([facility])    Weakness    Frequent falls    SVT (supraventricular tachycardia) ([facility])       RECOMMENDATIONS :  Discussed with patient. Discussed with Dr. HALL [facility]. Continue IVIG 400 mg/kg body weight daily for 5 days. She will be premedicated  with Tylenol and Benadryl. Side effects were discussed. Continue physical therapy and Occupational Therapy evaluations. High complexity patient.           Please note a portion of this chart was generated using dragon dictation software. Although every effort was made to ensure the accuracy of this automated transcription, some errors in transcription may have occurred.          Ade Ag M.D.

## 2020-07-16 NOTE — PROGRESS NOTES
sulfate 1 g in dextrose 5% 100 mL IVPB, PRN  acetaminophen (TYLENOL) tablet 650 mg, Q6H PRN    Or  acetaminophen (TYLENOL) suppository 650 mg, Q6H PRN  pantoprazole (PROTONIX) tablet 40 mg, QAM AC  enoxaparin (LOVENOX) injection 40 mg, Daily  losartan (COZAAR) tablet 100 mg, Daily        Objective:  BP (!) 157/64   Pulse 88   Temp 97 °F (36.1 °C) (Temporal)   Resp 16   Ht 5' 1\" (1.549 m)   Wt 148 lb 5.9 oz (67.3 kg)   SpO2 98%   Breastfeeding No   BMI 28.03 kg/m²     Intake/Output Summary (Last 24 hours) at 7/16/2020 1600  Last data filed at 7/16/2020 0900  Gross per 24 hour   Intake 418 ml   Output 1875 ml   Net -1457 ml      Wt Readings from Last 3 Encounters:   07/16/20 148 lb 5.9 oz (67.3 kg)   01/27/20 146 lb (66.2 kg)   01/10/20 143 lb (64.9 kg)       General appearance: [facility] elderly lady, appears comfortable  Eyes: Sclera clear. Pupils equal.  ENT: Moist oral mucosa. Trachea midline, no adenopathy. Cardiovascular: Regular rhythm, normal S1, S2. No murmur. No edema in lower extremities  Respiratory: Not using accessory muscles. Good inspiratory effort. Clear to auscultation bilaterally, no wheeze or crackles. GI: Abdomen soft, no tenderness, not distended, normal bowel sounds  Musculoskeletal: No cyanosis in digits, neck supple  Neurology: CN 2-12 grossly intact. Bilateral upper extremity weakness noted. Subjective bilateral leg weakness improving  Psych: Normal affect. Alert and oriented in time, place and person  Skin: Warm, dry, normal turgor     Labs and Tests:  CBC:   No results for input(s): WBC, HGB, HCT, MCV, PLT in the last 72 hours. BMP:    Recent Labs     07/14/20  1622 07/16/20  0955   * 132*   K 3.3* 4.8   CL 91* 96*   CO2 29 25   BUN 18 16   CREATININE 0.6 0.7   GLUCOSE 106* 115*     Hepatic:   No results for input(s): AST, ALT, ALB, BILITOT, ALKPHOS in the last 72 hours.       Problem List  Principal Problem:    PSVT (paroxysmal supraventricular tachycardia)

## 2020-07-16 NOTE — PROGRESS NOTES
Patient had small BM this am.  Gave her a cup of coffee to try and help get her bowels moving along better. Still somewhat unsteady on her feet. BP a bit elevated this am.  Patient very worried about her current health status and her financial situation. Reviewed with patient some relaxation techniques and was able to get her blood pressure back down without medication. Will continue to monitor.

## 2020-07-16 NOTE — PROGRESS NOTES
Occupational Therapy  Facility/Department: [facility]  Daily Treatment Note  NAME: Nathaly López  : 1945  MRN: [id number]    Date of Service: 2020    Discharge Recommendations:  Nathaly López scored a 19/24 on the AM-PAC ADL Inpatient form. Current research shows that an AM-PAC score of 17 or less is typically not associated with a discharge to the patient's home setting. Based on the patient's AM-PAC score and their current ADL deficits, as well as functional mobility deficits w/weak LEs, it is recommended that the patient have 5-7 sessions per week of Occupational Therapy at d/c to increase the patient's independence.  Pt is not safe to return home at this time w/LE weakness. At this time, this patient demonstrates the endurance, and/or tolerance for 3 hours of therapy each day, with a treatment frequency of 5-7x/wk.  Please see assessment section for further patient specific details. If patient discharges prior to next session this note will serve as a discharge summary. Please see below for the latest assessment towards goals. OT Equipment Recommendations  ADL Assistive Devices: Transfer Tub Bench;Grab Bars - shower    Assessment   Performance deficits / Impairments: Decreased functional mobility ; Decreased ADL status; Decreased endurance;Decreased high-level IADLs;Decreased strength;Decreased balance  Assessment: Pt is well below her baseline level of occupational function, based on the above deficits associated with PSVT and new GB dx. Pt would benefit from continued skilled acute OT services to address these deficits. Treatment Diagnosis: Decreased ADL/IADL status, endurance, strength and balance associated with PSVT and new GB dx  Prognosis: Good  History: Pt 74 yo, lives alone, no local family to assist, tri-level, BR/BR upstairs, independent ADLs, volunteers, 1 slide to floor PTA, no other falls.   Exam: ROM, MMT, 6 clicks, 6 performance deficits/impairments, evolving presentation  Assistance / Modification: Mod A for functional mobility w/RW, CGA transfer, Min A LB dressing, CGA toileting  OT Education: OT Role;Plan of Care;Transfer Training;Equipment;Home Exercise Program  Patient Education: OT eval, d/c recommendation. Pt verbalized and demonstrated understanding. Needs reinforcement. Barriers to Learning: None  REQUIRES OT FOLLOW UP: Yes  Activity Tolerance  Activity Tolerance: Patient Tolerated treatment well;Patient limited by pain; Patient limited by fatigue  Safety Devices  Safety Devices in place: Yes  Type of devices: Call light within reach; Patient at risk for falls;Nurse notified; Left in chair(RN aware chair alarm not placed; pt not getting up without assist)         Patient Diagnosis(es): The primary encounter diagnosis was Fatigue, unspecified type. Diagnoses of Frequent falls and Tachycardia were also pertinent to this visit. has a past medical history of Anxiety, Colon polyp, Headache(784.0), Hyperlipidemia, Hypertension, and Osteoarthritis. has a past surgical history that includes Colonoscopy (8/10). Restrictions  Restrictions/Precautions  Restrictions/Precautions: Fall Risk(high fall risk, NPO at time of eval)  Required Braces or Orthoses?: No  Position Activity Restriction  Other position/activity restrictions: 7/11: Lisa Myles is a 75 y.o. female presents to the ER with a complaint of bilateral extremity weakness. States that she awoke yesterday and was having difficulty walking, states that she fell down one step and sat on her bottom. She had difficulty walking throughout the day. This morning she was able to navigate the steps but fell while walking across the front room to get her coffee. Per neurology: CSF protein was high consistent with a diagnosis of Guillian Barré syndrome. On IVIG therapy.   Subjective   General  Chart Reviewed: Yes  Patient assessed for rehabilitation services?: Yes  Response to previous treatment: Patient with no complaints from previous session  Family / Caregiver Present: No  Referring Practitioner: Missy Joseph MD, for dc planning  Diagnosis: PSVT, new GB dx  Subjective  Subjective: Pt seated in recliner, pleasant and agreeable to OT tx. Pt denies pain at rest. Reports pain in R LE, groin to calf w/mobility. Pre Treatment Pain Screening  Intervention List: Patient able to continue with treatment  Vital Signs  Patient Currently in Pain: Denies   Orientation  Orientation  Overall Orientation Status: Within Normal Limits  Objective    ADL  Grooming: Contact guard assistance(washing hands in stance at sink)  LE Dressing: Contact guard assistance(clothing mgt for toileting)  Toileting: Contact guard assistance  Additional Comments: Pt declined bathing, stated that an aide assisted her earlier. Balance  Sitting Balance: Supervision(on toilet)  Standing Balance: (CGA of 2 progressing to Min A of 2 w/4WW; Min A of 1 & Mod A of 1 on turn)  Standing Balance  Time: ~1 min x 2, ~2 min  Activity: functional mobility ~26 ft in rogers, ~30 ft to toilet; toilet transfer, washing hands in stance at sink and mobility to recliner  Comment: Pt unsteady, requiring increasing assistance, needed a seated rest break after 26 ft. Functional Mobility  Functional - Mobility Device: 4-Wheeled Walker  Activity: Other; To/from bathroom  Assist Level: Dependent/Total(CGA of 2 progressing to Min A of 2, Mod A of 1 & Min A of 1 on turn)  Functional Mobility Comments: unsteady d/t LE weakness, R LE pain  Toilet Transfers  Toilet - Technique: Stand step(w/RW)  Equipment Used: Standard toilet  Toilet Transfer: Contact guard assistance  Bed mobility  Comment: Not addressed; pt in recliner at beginning and end of session.   Transfers  Stand Step Transfers: Contact guard assistance;Minimal assistance  Sit to stand: Contact guard assistance(verbal cues for hand placement)  Stand to sit: Minimal assistance(poor eccentric control; verbal cues for hand

## 2020-07-16 NOTE — CARE COORDINATION
Prior authorization started for ARU, please call with any questions.      Thank you,     Viola Gamboa RN Clinical Liaison ARU   [phone]

## 2020-07-16 NOTE — PROGRESS NOTES
Physical Therapy  Facility/Department: [facility]  Daily Treatment Note  NAME: Tita Pitts  : 1945  MRN: [id number]    Date of Service: 2020    Discharge Recommendations:  Tita Pitts scored a 15/24 on the AM-PAC short mobility form. Current research shows that an AM-PAC score of 17 or less is typically not associated with a discharge to the patient's home setting. Based on the patient's AM-PAC score and their current functional mobility deficits, it is recommended that the patient have 5-7 sessions per week of Physical Therapy at d/c to increase the patient's independence. At this time, this patient demonstrates the endurance, and/or tolerance for 3 hours of therapy each day, with a treatment frequency of 5-7x/wk. Please see assessment section for further patient specific details. If patient discharges prior to next session this note will serve as a discharge summary. Please see below for the latest assessment towards goals. 5-7 sessions per week   PT Equipment Recommendations  Equipment Needed: No    Assessment   Body structures, Functions, Activity limitations: Decreased functional mobility ; Decreased ADL status; Decreased balance;Decreased strength  Assessment: Patient continues to present with BLE weakness (weaker distally compared to proximally).  Continues to need increased assistance with fatigue and demonstrate poor eccentric control  Treatment Diagnosis: decreased functional mobility, impaired gait, decreased balance  PT Education: PT Role;Plan of Care;Goals;Transfer Training;Gait Training;Disease Specific Education;Home Exercise Program  Patient Education: d/c recommendations, answered several disease-specific questions - verbalized understanding  Barriers to Learning: none  REQUIRES PT FOLLOW UP: Yes  Activity Tolerance  Activity Tolerance: Patient Tolerated treatment well;Patient limited by pain     Patient Diagnosis(es): The primary encounter diagnosis was Fatigue, In       0916   Time Out       1024   Minutes       68   Timed Code Treatment Minutes: 821 Mesha Andrade, PT       Thanks, Josee Mccormick, PT, DPT [id number]

## 2020-07-17 ENCOUNTER — TELEPHONE (OUTPATIENT)
Dept: FAMILY MEDICINE CLINIC | Age: 75
End: 2020-07-17

## 2020-07-17 PROCEDURE — 1200000000 HC SEMI PRIVATE

## 2020-07-17 PROCEDURE — 97110 THERAPEUTIC EXERCISES: CPT

## 2020-07-17 PROCEDURE — 6360000002 HC RX W HCPCS: Performed by: PSYCHIATRY & NEUROLOGY

## 2020-07-17 PROCEDURE — 6360000002 HC RX W HCPCS: Performed by: HOSPITALIST

## 2020-07-17 PROCEDURE — 6370000000 HC RX 637 (ALT 250 FOR IP): Performed by: INTERNAL MEDICINE

## 2020-07-17 PROCEDURE — 99233 SBSQ HOSP IP/OBS HIGH 50: CPT | Performed by: PSYCHIATRY & NEUROLOGY

## 2020-07-17 PROCEDURE — 6370000000 HC RX 637 (ALT 250 FOR IP): Performed by: HOSPITALIST

## 2020-07-17 PROCEDURE — 97116 GAIT TRAINING THERAPY: CPT

## 2020-07-17 PROCEDURE — 6370000000 HC RX 637 (ALT 250 FOR IP): Performed by: PSYCHIATRY & NEUROLOGY

## 2020-07-17 PROCEDURE — 97530 THERAPEUTIC ACTIVITIES: CPT

## 2020-07-17 PROCEDURE — 2580000003 HC RX 258: Performed by: HOSPITALIST

## 2020-07-17 RX ADMIN — METOPROLOL TARTRATE 25 MG: 25 TABLET, FILM COATED ORAL at 07:49

## 2020-07-17 RX ADMIN — METOPROLOL TARTRATE 25 MG: 25 TABLET, FILM COATED ORAL at 22:23

## 2020-07-17 RX ADMIN — ENOXAPARIN SODIUM 40 MG: 40 INJECTION SUBCUTANEOUS at 07:49

## 2020-07-17 RX ADMIN — AMLODIPINE BESYLATE 10 MG: 5 TABLET ORAL at 07:49

## 2020-07-17 RX ADMIN — PANTOPRAZOLE SODIUM 40 MG: 40 TABLET, DELAYED RELEASE ORAL at 07:49

## 2020-07-17 RX ADMIN — DOCUSATE SODIUM 100 MG: 100 CAPSULE ORAL at 07:48

## 2020-07-17 RX ADMIN — IMMUNE GLOBULIN INTRAVENOUS (HUMAN) 25000 MG: 5 INJECTION, SOLUTION INTRAVENOUS at 17:40

## 2020-07-17 RX ADMIN — OYSTER SHELL CALCIUM WITH VITAMIN D 1 TABLET: 500; 200 TABLET, FILM COATED ORAL at 07:48

## 2020-07-17 RX ADMIN — DIPHENHYDRAMINE HCL 25 MG: 25 TABLET ORAL at 16:13

## 2020-07-17 RX ADMIN — DESVENLAFAXINE SUCCINATE 50 MG: 50 TABLET, EXTENDED RELEASE ORAL at 07:49

## 2020-07-17 RX ADMIN — POLYETHYLENE GLYCOL 3350 17 G: 17 POWDER, FOR SOLUTION ORAL at 07:59

## 2020-07-17 RX ADMIN — Medication 10 ML: at 07:50

## 2020-07-17 RX ADMIN — OYSTER SHELL CALCIUM WITH VITAMIN D 1 TABLET: 500; 200 TABLET, FILM COATED ORAL at 22:22

## 2020-07-17 RX ADMIN — VITAMIN D, TAB 1000IU (100/BT) 2000 UNITS: 25 TAB at 07:49

## 2020-07-17 RX ADMIN — LOSARTAN POTASSIUM 100 MG: 100 TABLET, FILM COATED ORAL at 07:49

## 2020-07-17 RX ADMIN — ACETAMINOPHEN 650 MG: 325 TABLET, FILM COATED ORAL at 16:13

## 2020-07-17 RX ADMIN — MULTIPLE VITAMINS W/ MINERALS TAB 1 TABLET: TAB at 07:48

## 2020-07-17 ASSESSMENT — PAIN SCALES - GENERAL
PAINLEVEL_OUTOF10: 0

## 2020-07-17 NOTE — PROGRESS NOTES
[facility] PROGRESS NOTE    7/17/2020 6:48 PM        Name: Tammy Rivera . Admitted: 7/11/2020  Primary Care Provider: Lola Chery MD (Tel: [phone])                        Hospital course:  August Erp a 75 y. o. female who presented to the ED to be evaluated for progressive bilateral lower extremity weakness increasing over 3-day timeframe.  She states that she began to experience an ataxic gait without recent injury, loss of consciousness, numbness, CVA symptoms, or medication changes. Subjective:     Had a bowel movement this morning. Leg weakness continue. No new complaints.     Reviewed interval ancillary notes    Current Medications  polyethylene glycol (GLYCOLAX) packet 17 g, Daily  amLODIPine (NORVASC) tablet 10 mg, Daily  metoprolol tartrate (LOPRESSOR) tablet 25 mg, BID  docusate sodium (COLACE) capsule 100 mg, Daily  immune globulin (FLEBOGAMMA) 10% solution 25,000 mg, Daily  diphenhydrAMINE (BENADRYL) tablet 25 mg, Daily  acetaminophen (TYLENOL) tablet 650 mg, Daily  ondansetron (ZOFRAN) injection 4 mg, Q6H PRN  perflutren lipid microspheres (DEFINITY) injection 1.65 mg, ONCE PRN  hydrALAZINE (APRESOLINE) injection 10 mg, Q4H PRN  atorvastatin (LIPITOR) tablet 10 mg, Every Other Day  calcium-vitamin D 500-200 MG-UNIT per tablet 1 tablet, BID  Vitamin D (CHOLECALCIFEROL) tablet 2,000 Units, Daily  desvenlafaxine succinate (PRISTIQ) extended release tablet 50 mg, Daily  therapeutic multivitamin-minerals 1 tablet, Daily  naproxen (NAPROSYN) tablet 500 mg, BID PRN  sodium chloride flush 0.9 % injection 10 mL, PRN  potassium chloride (KLOR-CON M) extended release tablet 40 mEq, PRN    Or  potassium bicarb-citric acid (EFFER-K) effervescent tablet 40 mEq, PRN    Or  potassium chloride 10 mEq/100 mL IVPB (Peripheral Line), PRN  magnesium sulfate 1 g in dextrose 5% 100 mL IVPB, PRN  acetaminophen (TYLENOL) tablet 650 mg, Q6H PRN    Or  acetaminophen (TYLENOL) suppository 650 mg, Q6H PRN  pantoprazole (PROTONIX) tablet 40 mg, QAM AC  enoxaparin (LOVENOX) injection 40 mg, Daily  losartan (COZAAR) tablet 100 mg, Daily        Objective:  BP (!) 161/52   Pulse 75   Temp 98.3 °F (36.8 °C) (Temporal)   Resp 16   Ht 5' 1\" (1.549 m)   Wt 144 lb 10 oz (65.6 kg)   SpO2 98%   Breastfeeding No   BMI 27.33 kg/m²     Intake/Output Summary (Last 24 hours) at 7/17/2020 1848  Last data filed at 7/17/2020 1735  Gross per 24 hour   Intake 480 ml   Output 3400 ml   Net -2920 ml      Wt Readings from Last 3 Encounters:   07/17/20 144 lb 10 oz (65.6 kg)   01/27/20 146 lb (66.2 kg)   01/10/20 143 lb (64.9 kg)       General appearance: [facility] elderly lady, appears comfortable  Eyes: Sclera clear. Pupils equal.  ENT: Moist oral mucosa. Trachea midline, no adenopathy. Cardiovascular: Regular rhythm, normal S1, S2. No murmur. No edema in lower extremities  Respiratory: Not using accessory muscles. Good inspiratory effort. Clear to auscultation bilaterally, no wheeze or crackles. GI: Abdomen soft, no tenderness, not distended, normal bowel sounds  Musculoskeletal: No cyanosis in digits, neck supple  Neurology: CN 2-12 grossly intact. Bilateral upper extremity weakness noted. Subjective bilateral leg weakness improving  Psych: Normal affect. Alert and oriented in time, place and person  Skin: Warm, dry, normal turgor     Labs and Tests:  CBC:   No results for input(s): WBC, HGB, HCT, MCV, PLT in the last 72 hours. BMP:    Recent Labs     07/16/20  0955   *   K 4.8   CL 96*   CO2 25   BUN 16   CREATININE 0.7   GLUCOSE 115*     Hepatic:   No results for input(s): AST, ALT, ALB, BILITOT, ALKPHOS in the last 72 hours.       Problem List  Principal Problem:    PSVT (paroxysmal supraventricular tachycardia) ([facility])  Active Problems:    Hypertension    Hyperlipidemia Weakness    Frequent falls    SVT (supraventricular tachycardia) (HCC)  Resolved Problems:    * No resolved hospital problems. *     Assessment & Plan:      1.  Paroxysmal SVT: Now in sinus rhythm. Cardiology on board. Stress test was normal.  Echo reviewed. TSH elevated however free T4 normal,  most likely subclinical hypothyroidism noted.     2.   Bilateral limb weakness: Neurology consulted. GBS suspected. Lumbar puncture showed elevated protein. Serum immunofixation resulted continue IV IgG -day 4.    3.  Constipation:   Improved. Continue bowel regimen  4. Hyponatremia: Improving. Continue to hold hydrochlorothiazide. 5. HTN: Holding hydrochlorothiazide. Continue higher dose of amlodipine, home medications including losartan.  iv  hydralazine   as needed         Diet: DIET GENERAL; No Added Salt (3-4 GM)  Code:Full Code  DVT PPX lovenox   Ptot: ARU suggested-consulted PMR    Disp: Inpatient    Mary Mark MD   7/17/2020 6:48 PM

## 2020-07-17 NOTE — TELEPHONE ENCOUNTER
Called Pt and spoke with her  Going into rehab for GBS , encouraged her to stay positive and work hard in rehab  I Will check back with her in early next week    Close this encounter

## 2020-07-17 NOTE — PROGRESS NOTES
Trula Landau  7/17/2020  [id number]    Chief Complaint: PSVT (paroxysmal supraventricular tachycardia) (HCC)    Subjective:   No overnight events. Feels her legs are slightly improved today. Tolerating IVIG. ROS: No CP, SOB, dyspnea    Objective:  Patient Vitals for the past 24 hrs:   BP Temp Temp src Pulse Resp SpO2 Weight   07/17/20 1120 (!) 157/59 97.4 °F (36.3 °C) Temporal 79 16 98 % --   07/17/20 0745 (!) 152/56 97.6 °F (36.4 °C) Temporal 83 16 99 % --   07/17/20 0423 (!) 152/56 98 °F (36.7 °C) Temporal 69 16 94 % 144 lb 10 oz (65.6 kg)   07/17/20 0103 (!) 155/96 -- -- 67 -- -- --   07/17/20 0000 -- -- -- 64 -- -- --   07/16/20 2200 -- -- -- 71 -- -- --   07/16/20 1943 (!) 185/70 97.2 °F (36.2 °C) Temporal 75 16 92 % --   07/16/20 1640 (!) 161/54 96.8 °F (36 °C) Temporal 67 16 97 % --     Gen: No distress, pleasant. Resting in bed  HEENT: Normocephalic, atraumatic. CV: No audible murmurs, well perfused extremities  Resp: No respiratory distress. No increased WOB  Abd: Soft, nontender nondistended  Ext: No edema. Neuro: Alert, oriented, appropriately interactive. BLE 4/5 HF/KE 3/5 DF/PF. Areflexic    Laboratory data: Available via EMR. Therapy progress:  PT  Position Activity Restriction  Other position/activity restrictions: 7/11: Trula Landau is a 75 y.o. female presents to the ER with a complaint of bilateral extremity weakness. States that she awoke yesterday and was having difficulty walking, states that she fell down one step and sat on her bottom. She had difficulty walking throughout the day. This morning she was able to navigate the steps but fell while walking across the front room to get her coffee. Per neurology: CSF protein was high consistent with a diagnosis of Guillian Barré syndrome. On IVIG therapy.   Objective     Sit to Stand: Contact guard assistance(from chair, rollator, toilet)  Stand to sit: Minimal Assistance, Moderate Assistance(poor eccentric control with increased assistance needed with fatigue)  Device: Rollator  Assistance: 2 Person assistance(CGA of 2 progressing to min A of 2 with fatigue, min A of 1, mod A of 1 for turning x 2 (to/from rollator))  Distance: 26 feet x 2 + 12 feet  OT  PT Equipment Recommendations  Equipment Needed: No  Toilet - Technique: Stand step(w/RW)  Equipment Used: Standard toilet  Toilet Transfers Comments: Mod A to the bathroom  Assessment        SLP                Body mass index is 27.33 kg/m². Assessment:  Patient Active Problem List   Diagnosis    Hypertension    Hyperlipidemia    Osteopenia    Hx of eczema    Chronic idiopathic urticaria    Moderate mitral regurgitation by prior echocardiogram    Prediabetes    BMI 27.0-27.9,adult    Major depressive disorder with single episode, in full remission (HCC)    [facility]    Weakness    Frequent falls    SVT (supraventricular tachycardia) ([facility])       Plan:   Guillian Barré syndrome: IVIG day 4/5. PT/OT  Paroxysmal SVT  Hyponatremia  HTN  HLD     Dispo: Patient is an appropriate ARU candidate. Able to tolerate the required 3 hours of therapy in an ARU setting. Precert approved. Given continued IVIG, we will be able to admit on Sunday at the earliest pending progress. We will continue to follow. Sheree Delgado MD 7/17/2020, 2:13 PM    * This document was created using dictation software. While all precautions were taken to ensure accuracy, errors may have occurred. Please disregard any typographical errors.

## 2020-07-17 NOTE — PROGRESS NOTES
Physical Therapy  Facility/Department: [facility] CVU  Daily Treatment Note  NAME: Brenden Ma  : 1945  MRN: [id number]    Date of Service: 2020    Discharge Recommendations:  Brenden Ma scored a 16/24 on the AM-PAC short mobility form. Current research shows that an AM-PAC score of 17 or less is typically not associated with a discharge to the patient's home setting. Based on the patient's AM-PAC score and their current functional mobility deficits, it is recommended that the patient have 5-7 sessions per week of Physical Therapy at d/c to increase the patient's independence. At this time, this patient demonstrates the endurance, and/or tolerance for 3 hours of therapy each day, with a treatment frequency of 5-7x/wk. Please see assessment section for further patient specific details. If patient discharges prior to next session this note will serve as a discharge summary. Please see below for the latest assessment towards goals. 5-7 sessions per week, 24 hour supervision or assist   PT Equipment Recommendations  Equipment Needed: No  Other: Defer to next level of care. Assessment   Body structures, Functions, Activity limitations: Decreased functional mobility ; Decreased ADL status; Decreased balance;Decreased strength  Assessment: Patient demonstrates BLE weakness, difficulty following instructions for ther ex, requires 24 hour assist for safety, would benefit from ongoing therapy services. Treatment Diagnosis: decreased functional mobility, impaired gait, decreased balance  Prognosis: Good  Decision Making: Low Complexity  PT Education: PT Role;Plan of Care;Goals;Transfer Training;Gait Training;Disease Specific Education;Home Exercise Program  Patient Education: Reviewed HEP. Patient needs reinforcement. Barriers to Learning: anxiety  REQUIRES PT FOLLOW UP: Yes  Activity Tolerance  Activity Tolerance: Patient limited by pain; Patient limited by endurance     Patient Diagnosis(es): The primary encounter diagnosis was Fatigue, unspecified type. Diagnoses of Frequent falls and Tachycardia were also pertinent to this visit. has a past medical history of Anxiety, Colon polyp, Headache(784.0), Hyperlipidemia, Hypertension, and Osteoarthritis. has a past surgical history that includes Colonoscopy (8/10). Restrictions  Restrictions/Precautions  Restrictions/Precautions: Fall Risk(high fall risk)  Required Braces or Orthoses?: No  Position Activity Restriction  Other position/activity restrictions: 7/11: Russ Salazar is a 75 y.o. female presents to the ER with a complaint of bilateral extremity weakness. States that she awoke yesterday and was having difficulty walking, states that she fell down one step and sat on her bottom. She had difficulty walking throughout the day. This morning she was able to navigate the steps but fell while walking across the front room to get her coffee. Per neurology: CSF protein was high consistent with a diagnosis of Guillian Barré syndrome. On IVIG therapy. Subjective   General  Chart Reviewed: Yes  Family / Caregiver Present: No  Subjective  Subjective: Patient reports RLE weakness greater than LLE. General Comment  Comments: Patient supine in bed. Pain Screening  Patient Currently in Pain: Denies  Vital Signs  Patient Currently in Pain: Denies       Orientation  Orientation  Overall Orientation Status: Within Normal Limits     Objective   Bed mobility  Supine to Sit: Stand by assistance  Sit to Supine: Moderate assistance  Comment: Patient required assistance for BLEs to elevate back into bed. Transfers  Sit to Stand: Contact guard assistance  Stand to sit: Minimal Assistance  Bed to Chair: Minimal assistance  Stand Pivot Transfers: Minimal Assistance  Comment: Patient required verbal cues for safety to complete turn before sitting.   Ambulation  Ambulation?: Yes  Ambulation 1  Surface: level tile  Device: Rollator  Assistance: Minimal assistance  Quality of Gait: shuffling, BLE foot drop and dec'd hip flexion during swing phase, unsteady, high fall risk  Gait Deviations: Slow [place];Decreased step length;Decreased step height;Shuffles  Distance: 40' + 44'     Balance  Posture: Good  Sitting - Static: Good  Sitting - Dynamic: Good  Standing - Static: Fair  Standing - Dynamic: Poor  Exercises  Comments: HEP reviewed, patient requiring constant cues to complete despite having handout with pictures and instructions, anxiety an issue to comprehension   PROM RLE (degrees)  RLE PROM: WNL  AROM RLE (degrees)  RLE General AROM: limited by weakness  PROM LLE (degrees)  LLE PROM: WNL  AROM LLE (degrees)  LLE General AROM: limited by weakness  Strength RLE  Comment: hip flexion 2/5, ankle DF 1/5  Strength LLE  Comment: hip flexion 2/5, ankle DF 1/5     AM-PAC Score  AM-PAC Inpatient Mobility Raw Score : 16 (07/17/20 1613)  AM-PAC Inpatient T-Scale Score : 40.78 (07/17/20 1613)  Mobility Inpatient CMS 0-100% Score: 54.16 (07/17/20 1613)  Mobility Inpatient CMS G-Code Modifier : CK (07/17/20 1613)     Goals  Short term goals  Time Frame for Short term goals: to be met by discharge  Short term goal 1: pt able to perform bed mobility with SBA  Short term goal 2: pt able to perform transfers with SBA  Short term goal 3: pt able to ambulate with LRAD 75 ft with CGA  Patient Goals   Patient goals : to regain leg strength    Plan    Plan  Times per week: 5-7x/wk  Times per day: Daily  Current Treatment Recommendations: Strengthening, Balance Training, Functional Mobility Training, Transfer Training, Gait Training  Safety Devices  Type of devices:  All fall risk precautions in place, Call light within reach, Left in chair, Nurse notified, Gait belt  Restraints  Initially in place: No     Therapy Time   Individual Concurrent Group Co-treatment   Time In       1500   Time Out       1540   Minutes       40   Timed Code Treatment Minutes: [address] [place], [address] [place], [place], [address] [place] [address]

## 2020-07-17 NOTE — PROGRESS NOTES
Occupational Therapy  Facility/Department: [facility]  Daily Treatment Note  NAME: Scarlette Pallas  : 1945  MRN: [id number]    Date of Service: 2020    Discharge Recommendations:  Scarlette Pallas scored a 19/24 on the AM-PAC ADL Inpatient form. Current research shows that an AM-PAC score of 17 or less is typically not associated with a discharge to the patient's home setting. Based on the patient's AM-PAC score and their current ADL deficits, it is recommended that the patient have 5-7 sessions per week of Occupational Therapy at d/c to increase the patient's independence. PT is not safe to return home alone at this time with LE weakness. At this time, this patient demonstrates the endurance, and/or tolerance for 3 hours of therapy each day, with a treatment frequency of 5-7x/wk. Please see assessment section for further patient specific details. If patient discharges prior to next session this note will serve as a discharge summary. Please see below for the latest assessment towards goals. OT Equipment Recommendations  Equipment Needed: Yes(once pt returns home)  ADL Assistive Devices: Transfer Tub Bench;Grab Bars - shower    Assessment   Performance deficits / Impairments: Decreased functional mobility ; Decreased ADL status; Decreased endurance;Decreased high-level IADLs;Decreased strength;Decreased balance  Assessment: Pt is well below her baseline level of occupational function, based on the above deficits associated with PSVT and new GB dx. Pt would benefit from continued skilled acute OT services to address these deficits. Treatment Diagnosis: Decreased ADL/IADL status, endurance, strength and balance associated with PSVT and new GB dx  Prognosis: Good  History: Pt 74 yo, lives alone, no local family to assist, tri-level, BR/BR upstairs, independent ADLs, volunteers, 1 slide to floor PTA, no other falls.   Exam: ROM, MMT, 6 clicks, 6 performance deficits/impairments, evolving presentation  Assistance / Modification: Mod A for functional mobility w/RW, CGA transfer, Min A LB dressing, CGA toileting  OT Education: OT Role;Plan of Care;Transfer Training;Equipment;Home Exercise Program  Patient Education: OT toby, d/c recommendation. Pt verbalized and demonstrated understanding. Needs reinforcement of transfer technique and UE ther ex. Barriers to Learning: Emotional  REQUIRES OT FOLLOW UP: Yes  Activity Tolerance  Activity Tolerance: Patient limited by pain; Patient limited by fatigue  Activity Tolerance: Limited by anxiety  Safety Devices  Safety Devices in place: Yes  Type of devices: Call light within reach; Patient at risk for falls;Nurse notified; Left in bed;Bed alarm in place;Gait belt(RN aware chair alarm not placed; pt not getting up without assist)         Patient Diagnosis(es): The primary encounter diagnosis was Fatigue, unspecified type. Diagnoses of Frequent falls and Tachycardia were also pertinent to this visit. has a past medical history of Anxiety, Colon polyp, Headache(784.0), Hyperlipidemia, Hypertension, and Osteoarthritis. has a past surgical history that includes Colonoscopy (8/10). Restrictions  Restrictions/Precautions  Restrictions/Precautions: Fall Risk(high fall risk)  Required Braces or Orthoses?: No  Position Activity Restriction  Other position/activity restrictions: 7/11: Lisa Myles is a 75 y.o. female presents to the ER with a complaint of bilateral extremity weakness. States that she awoke yesterday and was having difficulty walking, states that she fell down one step and sat on her bottom. She had difficulty walking throughout the day. This morning she was able to navigate the steps but fell while walking across the front room to get her coffee. Per neurology: CSF protein was high consistent with a diagnosis of Guillian Barré syndrome. On IVIG therapy.   Subjective   General  Chart Reviewed: Yes  Patient assessed for rehabilitation for pt to sit. Cognition  Overall Cognitive Status: [facility]  Cognition Comment: Pt very anxious, has distractable and difficulty attending to directions d/t her anxiety. Type of ROM/Therapeutic Exercise  Type of ROM/Therapeutic Exercise: Resistive Bands  Comment: Light green theraband. Educated pt re: 3 new ther ex on HEP handout that pt had not performed yesterday. Pt performed bicep curls w/theraband, needing correction. Pt also performed triceps exercises X 10 each UE. OT demonstrated shoulder flexion & extension, as pt back in bed and unable to perform. Difference between bicep curls and shoulder flexion emphasized, as well as difference between tricep ther ex and shoulder flexion. Pt verbalized understanding, but needs reinforcement.   Exercises  Elbow Flexion: X 10 reps B UEs  Elbow Extension: X10 reps B UEs                    Plan   Plan  Times per week: 5-7  Current Treatment Recommendations: Safety Education & Training, Self-Care / ADL, Endurance Training, Functional Mobility Training, Equipment Evaluation, Education, & procurement, Patient/Caregiver Education & Training, Strengthening    AM-PAC Score        AM-[facility] Inpatient Daily Activity Raw Score: 19 (07/16/20 1418)  -PAC Inpatient ADL T-Scale Score : 40.22 (07/16/20 1418)  ADL Inpatient CMS 0-100% Score: 42.8 (07/16/20 1418)  ADL Inpatient CMS G-Code Modifier : CK (07/16/20 1418)    Goals  Short term goals  Time Frame for Short term goals: Discharge  Short term goal 1: SBA for functional transfers to ADL surfaces--Min A w/4WW 7/17  Short term goal 2: CGA for functional mobility for ADL activity w/RW--CGA to Min A of 1 w/4WW 7/17  Short term goal 3: S/U for UB ADLs--Not addressed 7/16  Short term goal 4: CGA for LB ADLs--Min A LB dressing 7/15--Educated pt re: use of sock aide; pt not attempted 7/17  Short term goal 5: SBA for toileting--CGA 7/16--Not addressed 7/17       Therapy Time   Individual Concurrent Group

## 2020-07-17 NOTE — TELEPHONE ENCOUNTER
----- Message from Isacc Olsen sent at 7/17/2020  8:24 AM EDT -----  Subject: Message to Provider    QUESTIONS  Information for Provider? Patient would like to have Dr. Rock Nicolas give her a   call to explain condition. Please advise  ---------------------------------------------------------------------------  --------------  CALL BACK INFO  What is the best way for the office to contact you? OK to leave message on   voicemail  Preferred Call Back Phone Number? [phone]  ---------------------------------------------------------------------------  --------------  SCRIPT ANSWERS  Relationship to Patient?  Self

## 2020-07-17 NOTE — CARE COORDINATION
CM updated by Francisco Álvarez, insurance approved patient to ARU. When patient has completed IVIG regimen patient can be transferred to ARU. Bedside RN, patient and attending MD updated. Case management will continue to follow progress and update discharge plan as needed.     Court Portillo, BSN, [phone]

## 2020-07-17 NOTE — PROGRESS NOTES
NEUROLOGY FOLLOWUP    HISTORY OF PRESENT ILLNESS :     Teresa Funes is a 75 y.o. female   History was obtained from the patient and dictations in the chart. Patient states that her leg weakness seems to be slightly better today compared to yesterday. Is more cheerful. Patient will be getting her fourth dose of IVIG today. She is somewhat anxious and nervous about the diagnosis and about the prognosis. Detailed history:  Patient states that she received a shingles immunization about 10 days ago. A couple days before admission she started having some leg weakness. Since it was not getting better and she was having difficulty with her gait she came to the hospital and was admitted for further evaluation. She had some tachycardia and was evaluated by cardiology as well. Patient denies any numbness. She denies any bladder or bowel symptoms. No vertigo or diplopia. Symptom onset was subacute. Symptoms are moderately severe without any other aggravating or relieving factors. REVIEW OF SYSTEMS       Constitutional:  []   Chills   []  Fatigue   []  Fevers   []  Malaise   []  Weight loss     [x] Denies all of the above    Respiratory:   []  Cough    []  Shortness of breath         [x] Denies all of the above     Cardiovascular:   []  Chest pain    []  Exertional chest pressure/discomfort           [] Palpitations    []  Syncope     [x] Denies all of the above    Past Medical History:   Diagnosis Date    Anxiety     Colon polyp     Headache(784.0)     Hyperlipidemia     Hypertension     Osteoarthritis      Family History   Problem Relation Age of Onset    Kidney Disease Mother     Heart Disease Father     Kidney Disease Sister     Cancer Sister     Kidney Disease Brother      Social History     Socioeconomic History    Marital status:       Spouse name: None    Number of children: None    Years of education: None    Highest education level: None   Occupational History    None   Social Needs    Financial resource strain: None    Food insecurity     Worry: None     Inability: None    Transportation needs     Medical: None     Non-medical: None   Tobacco Use    Smoking status: Never Smoker    Smokeless tobacco: Never Used   Substance and Sexual Activity    Alcohol use: Yes     Alcohol/week: 0.0 standard drinks     Comment: occ wine    Drug use: No    Sexual activity: Not Currently   Lifestyle    Physical activity     Days per week: None     Minutes per session: None    Stress: None   Relationships    Social connections     Talks on phone: None     Gets together: None     Attends Anabaptist service: None     Active member of club or organization: None     Attends meetings of clubs or organizations: None     Relationship status: None    Intimate partner violence     Fear of current or ex partner: None     Emotionally abused: None     Physically abused: None     Forced sexual activity: None   Other Topics Concern    None   Social History Narrative    None        PHYSICAL EXAMINATION      BP (!) 157/59   Pulse 79   Temp 97.4 °F (36.3 °C) (Temporal)   Resp 16   Ht 5' 1\" (1.549 m)   Wt 144 lb 10 oz (65.6 kg)   SpO2 98%   Breastfeeding No   BMI 27.33 kg/m²   This is a well-nourished patient in no acute distress  Patient is awake and alert. She is oriented x3. Memory judgment speech and language functions are normal.  Pupils are equal round reactive to light. Visual fields are full. External ocular movements are intact. Face appears symmetrical.  Tongue is in the midline. Strength is 4/5 in the arms bilaterally. Patient also has more weakness diffusely in the right leg. Left leg is 3+/5. There is mild foot drop bilaterally. Deep tendon reflexes are absent throughout. Plantar reflexes are withdrawal.  Sensory examination does not show any focal deficit.   Coordination was slightly impaired but the weakness. Unable to ambulate due to weakness. No carotid bruit. No neck stiffness. DATA :  LABS:  General Labs:    CBC:   Lab Results   Component Value Date    WBC 9.0 07/13/2020    RBC 4.24 07/13/2020    HGB 13.2 07/13/2020    HCT 39.0 07/13/2020    MCV 92.1 07/13/2020    MCH 31.2 07/13/2020    MCHC 33.8 07/13/2020    RDW 14.4 07/13/2020     07/13/2020    MPV 8.6 07/13/2020     BMP:    Lab Results   Component Value Date     07/16/2020    K 4.8 07/16/2020    K 3.4 07/11/2020    CL 96 07/16/2020    CO2 25 07/16/2020    BUN 16 07/16/2020    LABALBU 3.1 07/14/2020    CREATININE 0.7 07/16/2020    CALCIUM 9.4 07/16/2020    GFRAA >60 07/16/2020    GFRAA >60 09/17/2012    LABGLOM >60 07/16/2020    LABGLOM 78 11/02/2017    GLUCOSE 115 07/16/2020         IMPRESSION :  Guillian Barré syndrome  CSF protein was high consistent with a diagnosis of Guillian Barré syndrome. Patient now on IVIG therapy  The right leg weakness seems to be slightly better today compared to yesterday. Patient Active Problem List   Diagnosis    Hypertension    Hyperlipidemia    Osteopenia    Hx of eczema    Chronic idiopathic urticaria    Moderate mitral regurgitation by prior echocardiogram    Prediabetes    BMI 27.0-27.9,adult    Major depressive disorder with single episode, in full remission (HCC)    PSVT [facility]ox[facility]mal [facility])    Weakness    Frequent falls    SVT (supraventricular tachycardia) ([facility])       RECOMMENDATIONS :  Discussed with patient. Discussed with Dr. Mabel Moore. Continue IVIG 400 mg/kg body weight daily for 5 days. She will be premedicated  with Tylenol and Benadryl. Side effects were discussed. Continue physical therapy and Occupational Therapy evaluations. High complexity patient. Please note a portion of this chart was generated using dragon dictation software.  Although every effort was made to ensure the accuracy of this automated transcription, some errors in transcription may have occurred.          Laquita Jimenez M.D.

## 2020-07-18 PROCEDURE — 1200000000 HC SEMI PRIVATE

## 2020-07-18 PROCEDURE — 6370000000 HC RX 637 (ALT 250 FOR IP): Performed by: INTERNAL MEDICINE

## 2020-07-18 PROCEDURE — 6360000002 HC RX W HCPCS: Performed by: PSYCHIATRY & NEUROLOGY

## 2020-07-18 PROCEDURE — 6370000000 HC RX 637 (ALT 250 FOR IP): Performed by: HOSPITALIST

## 2020-07-18 PROCEDURE — 6370000000 HC RX 637 (ALT 250 FOR IP): Performed by: PSYCHIATRY & NEUROLOGY

## 2020-07-18 PROCEDURE — 2580000003 HC RX 258: Performed by: HOSPITALIST

## 2020-07-18 PROCEDURE — 6360000002 HC RX W HCPCS: Performed by: HOSPITALIST

## 2020-07-18 PROCEDURE — 99233 SBSQ HOSP IP/OBS HIGH 50: CPT | Performed by: PSYCHIATRY & NEUROLOGY

## 2020-07-18 RX ADMIN — METOPROLOL TARTRATE 25 MG: 25 TABLET, FILM COATED ORAL at 22:08

## 2020-07-18 RX ADMIN — ENOXAPARIN SODIUM 40 MG: 40 INJECTION SUBCUTANEOUS at 10:32

## 2020-07-18 RX ADMIN — MULTIPLE VITAMINS W/ MINERALS TAB 1 TABLET: TAB at 10:29

## 2020-07-18 RX ADMIN — ACETAMINOPHEN 650 MG: 325 TABLET, FILM COATED ORAL at 16:05

## 2020-07-18 RX ADMIN — IMMUNE GLOBULIN INTRAVENOUS (HUMAN) 25000 MG: 5 INJECTION, SOLUTION INTRAVENOUS at 17:04

## 2020-07-18 RX ADMIN — METOPROLOL TARTRATE 25 MG: 25 TABLET, FILM COATED ORAL at 10:30

## 2020-07-18 RX ADMIN — LOSARTAN POTASSIUM 100 MG: 100 TABLET, FILM COATED ORAL at 10:31

## 2020-07-18 RX ADMIN — OYSTER SHELL CALCIUM WITH VITAMIN D 1 TABLET: 500; 200 TABLET, FILM COATED ORAL at 22:10

## 2020-07-18 RX ADMIN — AMLODIPINE BESYLATE 10 MG: 5 TABLET ORAL at 10:29

## 2020-07-18 RX ADMIN — ATORVASTATIN CALCIUM 10 MG: 10 TABLET, FILM COATED ORAL at 10:29

## 2020-07-18 RX ADMIN — Medication 10 ML: at 10:34

## 2020-07-18 RX ADMIN — POLYETHYLENE GLYCOL 3350 17 G: 17 POWDER, FOR SOLUTION ORAL at 10:34

## 2020-07-18 RX ADMIN — DIPHENHYDRAMINE HCL 25 MG: 25 TABLET ORAL at 16:04

## 2020-07-18 RX ADMIN — OYSTER SHELL CALCIUM WITH VITAMIN D 1 TABLET: 500; 200 TABLET, FILM COATED ORAL at 10:31

## 2020-07-18 RX ADMIN — VITAMIN D, TAB 1000IU (100/BT) 2000 UNITS: 25 TAB at 10:29

## 2020-07-18 RX ADMIN — DESVENLAFAXINE SUCCINATE 50 MG: 50 TABLET, EXTENDED RELEASE ORAL at 10:29

## 2020-07-18 ASSESSMENT — PAIN SCALES - GENERAL: PAINLEVEL_OUTOF10: 0

## 2020-07-18 NOTE — PROGRESS NOTES
NEUROLOGY FOLLOWUP    HISTORY OF PRESENT ILLNESS :     Jr Morris is a 75 y.o. female   History was obtained from the patient and dictations in the chart. Patient's leg weakness continues to improve. .  Is more cheerful. Patient will be getting her fifth and final dose of IVIG today. She is now less nervous and less anxious because of the continued improvement in her strength. Detailed history:  Patient states that she received a shingles immunization about 10 days ago. A couple days before admission she started having some leg weakness. Since it was not getting better and she was having difficulty with her gait she came to the hospital and was admitted for further evaluation. She had some tachycardia and was evaluated by cardiology as well. Patient denies any numbness. She denies any bladder or bowel symptoms. No vertigo or diplopia. Symptom onset was subacute. Symptoms are moderately severe without any other aggravating or relieving factors. REVIEW OF SYSTEMS       Constitutional:  []   Chills   []  Fatigue   []  Fevers   []  Malaise   []  Weight loss     [x] Denies all of the above    Respiratory:   []  Cough    []  Shortness of breath         [x] Denies all of the above     Cardiovascular:   []  Chest pain    []  Exertional chest pressure/discomfort           [] Palpitations    []  Syncope     [x] Denies all of the above    Past Medical History:   Diagnosis Date    Anxiety     Colon polyp     Headache(784.0)     Hyperlipidemia     Hypertension     Osteoarthritis      Family History   Problem Relation Age of Onset    Kidney Disease Mother     Heart Disease Father     Kidney Disease Sister     Cancer Sister     Kidney Disease Brother      Social History     Socioeconomic History    Marital status:       Spouse name: None    Number of children: None    Years of education: None    Highest education level: None   Occupational History    None   Social Needs    Financial resource strain: None    Food insecurity     Worry: None     Inability: None    Transportation needs     Medical: None     Non-medical: None   Tobacco Use    Smoking status: Never Smoker    Smokeless tobacco: Never Used   Substance and Sexual Activity    Alcohol use: Yes     Alcohol/week: 0.0 standard drinks     Comment: occ wine    Drug use: No    Sexual activity: Not Currently   Lifestyle    Physical activity     Days per week: None     Minutes per session: None    Stress: None   Relationships    Social connections     Talks on phone: None     Gets together: None     Attends Mormonism service: None     Active member of club or organization: None     Attends meetings of clubs or organizations: None     Relationship status: None    Intimate partner violence     Fear of current or ex partner: None     Emotionally abused: None     Physically abused: None     Forced sexual activity: None   Other Topics Concern    None   Social History Narrative    None        PHYSICAL EXAMINATION      BP (!) 161/68   Pulse 66   Temp 98.3 °F (36.8 °C) (Oral)   Resp 19   Ht 5' 1\" (1.549 m)   Wt 144 lb 10 oz (65.6 kg)   SpO2 94%   Breastfeeding No   BMI 27.33 kg/m²   This is a well-nourished patient in no acute distress  Patient is awake and alert. She is oriented x3. Memory judgment speech and language functions are normal.  Pupils are equal round reactive to light. Visual fields are full. External ocular movements are intact. Face appears symmetrical.  Tongue is in the midline. Strength is 4/5 in the arms bilaterally. Patient's bilateral leg weakness has improved now to 3+ to 4/5 strength. There is mild foot drop bilaterally. Deep tendon reflexes are absent throughout. Plantar reflexes are withdrawal.  Sensory examination does not show any focal deficit. Coordination was slightly impaired but the weakness.   Unable to ambulate due to weakness. No carotid bruit. No neck stiffness. DATA :  LABS:  General Labs:    CBC:   Lab Results   Component Value Date    WBC 9.0 07/13/2020    RBC 4.24 07/13/2020    HGB 13.2 07/13/2020    HCT 39.0 07/13/2020    MCV 92.1 07/13/2020    MCH 31.2 07/13/2020    MCHC 33.8 07/13/2020    RDW 14.4 07/13/2020     07/13/2020    MPV 8.6 07/13/2020     BMP:    Lab Results   Component Value Date     07/16/2020    K 4.8 07/16/2020    K 3.4 07/11/2020    CL 96 07/16/2020    CO2 25 07/16/2020    BUN 16 07/16/2020    LABALBU 3.1 07/14/2020    CREATININE 0.7 07/16/2020    CALCIUM 9.4 07/16/2020    GFRAA >60 07/16/2020    GFRAA >60 09/17/2012    LABGLOM >60 07/16/2020    LABGLOM 78 11/02/2017    GLUCOSE 115 07/16/2020         IMPRESSION :  Guillian Barré syndrome  CSF protein was high consistent with a diagnosis of Guillian Barré syndrome. Patient now on IVIG therapy  The right leg weakness seems to be slightly better today compared to yesterday. Patient Active Problem List   Diagnosis    Hypertension    Hyperlipidemia    Osteopenia    Hx of eczema    Chronic idiopathic urticaria    Moderate mitral regurgitation by prior echocardiogram    Prediabetes    BMI 27.0-27.9,adult    Major depressive disorder with single episode, in full remission (HCC)    PSVT (paroxysmal supraventricular tachycardia) ([facility])    Weakness    Frequent falls    SVT (supraventricular tachycardia) ([facility])       RECOMMENDATIONS :  Discussed with patient. Discussed with Dr. Jumana Parker. Patient will finish with the final dose of IVIG today. She will be premedicated  with Tylenol and Benadryl. Side effects were discussed. Continue physical therapy and Occupational Therapy evaluations.   Hopefully patient can be transferred to inpatient rehab in the next few days  High complexity/high risk patient because of IVIG treatment and diagnosis of GBS          Please note a portion of this chart was generated using dragon dictation software. Although every effort was made to ensure the accuracy of this automated transcription, some errors in transcription may have occurred.          Susan Gamboa M.D.

## 2020-07-18 NOTE — PROGRESS NOTES
[facility] PROGRESS NOTE    7/18/2020 1:32 PM        Name: Love Brody . Admitted: 7/11/2020  Primary Care Provider: Rigo Coffey MD (Tel: [phone])                        Hospital course:  Ambar Nice a 75 y. o. female who presented to the ED to be evaluated for progressive bilateral lower extremity weakness increasing over 3-day timeframe.  She states that she began to experience an ataxic gait without recent injury, loss of consciousness, numbness, CVA symptoms, or medication changes. Subjective:     Right leg weakness is little better. No new complaints.   Walking with walker    Reviewed interval ancillary notes    Current Medications  polyethylene glycol (GLYCOLAX) packet 17 g, Daily  amLODIPine (NORVASC) tablet 10 mg, Daily  metoprolol tartrate (LOPRESSOR) tablet 25 mg, BID  docusate sodium (COLACE) capsule 100 mg, Daily  immune globulin (FLEBOGAMMA) 10% solution 25,000 mg, Daily  diphenhydrAMINE (BENADRYL) tablet 25 mg, Daily  acetaminophen (TYLENOL) tablet 650 mg, Daily  ondansetron (ZOFRAN) injection 4 mg, Q6H PRN  perflutren lipid microspheres (DEFINITY) injection 1.65 mg, ONCE PRN  hydrALAZINE (APRESOLINE) injection 10 mg, Q4H PRN  atorvastatin (LIPITOR) tablet 10 mg, Every Other Day  calcium-vitamin D 500-200 MG-UNIT per tablet 1 tablet, BID  Vitamin D (CHOLECALCIFEROL) tablet 2,000 Units, Daily  desvenlafaxine succinate (PRISTIQ) extended release tablet 50 mg, Daily  therapeutic multivitamin-minerals 1 tablet, Daily  naproxen (NAPROSYN) tablet 500 mg, BID PRN  sodium chloride flush 0.9 % injection 10 mL, PRN  potassium chloride (KLOR-CON M) extended release tablet 40 mEq, PRN    Or  potassium bicarb-citric acid (EFFER-K) effervescent tablet 40 mEq, PRN    Or  potassium chloride 10 mEq/100 mL IVPB (Peripheral Line), PRN  magnesium sulfate 1 g in dextrose 5% 100 mL IVPB, PRN  acetaminophen (TYLENOL) tablet 650 mg, Q6H PRN    Or  acetaminophen (TYLENOL) suppository 650 mg, Q6H PRN  pantoprazole (PROTONIX) tablet 40 mg, QAM AC  enoxaparin (LOVENOX) injection 40 mg, Daily  losartan (COZAAR) tablet 100 mg, Daily        Objective:  BP (!) 161/68   Pulse 66   Temp 98.3 °F (36.8 °C) (Oral)   Resp 19   Ht 5' 1\" (1.549 m)   Wt 144 lb 10 oz (65.6 kg)   SpO2 94%   Breastfeeding No   BMI 27.33 kg/m²     Intake/Output Summary (Last 24 hours) at 7/18/2020 1332  Last data filed at 7/17/2020 2039  Gross per 24 hour   Intake --   Output 900 ml   Net -900 ml      Wt Readings from Last 3 Encounters:   07/17/20 144 lb 10 oz (65.6 kg)   01/27/20 146 lb (66.2 kg)   01/10/20 143 lb (64.9 kg)       General appearance: [facility] elderly lady, appears comfortable  Eyes: Sclera clear. Pupils equal.  ENT: Moist oral mucosa. Trachea midline, no adenopathy. Cardiovascular: Regular rhythm, normal S1, S2. No murmur. No edema in lower extremities  Respiratory: Not using accessory muscles. Good inspiratory effort. Clear to auscultation bilaterally, no wheeze or crackles. GI: Abdomen soft, no tenderness, not distended, normal bowel sounds  Musculoskeletal: No cyanosis in digits, neck supple  Neurology: CN 2-12 grossly intact. bilateral leg weakness improving  Psych: Normal affect. Alert and oriented in time, place and person  Skin: Warm, dry, normal turgor     Labs and Tests:  CBC:   No results for input(s): WBC, HGB, HCT, MCV, PLT in the last 72 hours. BMP:    Recent Labs     07/16/20  0955   *   K 4.8   CL 96*   CO2 25   BUN 16   CREATININE 0.7   GLUCOSE 115*     Hepatic:   No results for input(s): AST, ALT, ALB, BILITOT, ALKPHOS in the last 72 hours.       Problem List  Principal Problem:    PSVT (paroxysmal supraventricular tachycardia) (HCC)  Active Problems:    Hypertension    Hyperlipidemia    Weakness    Frequent falls    SVT (supraventricular tachycardia) [facility])  Resolved Problems:    * No resolved hospital problems. *     Assessment & Plan:      1.  Paroxysmal SVT: Now in sinus rhythm. Cardiology on board. Stress test was normal.  Echo reviewed. TSH elevated however free T4 normal,  most likely subclinical hypothyroidism noted.     2.   Bilateral limb weakness: Neurology consulted. Diagnosed with Guillain-Barré syndrome. Lumbar puncture showed elevated protein. Serum immunofixation resulted. continue IV IgG -day 5    3. Constipation:   Improved. Continue bowel regimen  4. Hyponatremia: Improving. Continue to hold hydrochlorothiazide. 5. HTN: Holding hydrochlorothiazide. Continue higher dose of amlodipine, home medications including losartan. iv  hydralazine   as needed         Diet: DIET GENERAL; No Added Salt (3-4 GM)  Code:Full Code  DVT PPX lovenox   Ptot: ARU suggested-consulted PMR    Disp: Inpatient 1 more day. Transfer to ARU tomorrow.   Mac Bay MD   7/18/2020 1:32 PM

## 2020-07-18 NOTE — PROGRESS NOTES
Started IV IG, new IV placed. VSS, pt denies SOB. No reaction noted. Tylenol and benadryl given prior to start of infusion. RN stayed at bedside for 15 minutes. IG stayed at 40ml/hr due to pt tolerance.

## 2020-07-19 ENCOUNTER — HOSPITAL ENCOUNTER (INPATIENT)
Age: 75
LOS: 16 days | Discharge: HOME HEALTH | DRG: 948 | End: 2020-08-04
Attending: PHYSICAL MEDICINE & REHABILITATION | Admitting: PHYSICAL MEDICINE & REHABILITATION
Payer: MEDICARE

## 2020-07-19 VITALS
OXYGEN SATURATION: 97 % | WEIGHT: 148.6 LBS | TEMPERATURE: 97.4 F | SYSTOLIC BLOOD PRESSURE: 154 MMHG | HEIGHT: 61 IN | DIASTOLIC BLOOD PRESSURE: 84 MMHG | RESPIRATION RATE: 18 BRPM | HEART RATE: 68 BPM | BODY MASS INDEX: 28.05 KG/M2

## 2020-07-19 PROBLEM — G61.0 GBS (GUILLAIN BARRE SYNDROME) (HCC): Status: ACTIVE | Noted: 2020-07-19

## 2020-07-19 PROCEDURE — 6370000000 HC RX 637 (ALT 250 FOR IP): Performed by: PHYSICAL MEDICINE & REHABILITATION

## 2020-07-19 PROCEDURE — 6370000000 HC RX 637 (ALT 250 FOR IP): Performed by: INTERNAL MEDICINE

## 2020-07-19 PROCEDURE — 2580000003 HC RX 258: Performed by: PHYSICAL MEDICINE & REHABILITATION

## 2020-07-19 PROCEDURE — 1280000000 HC REHAB R&B

## 2020-07-19 PROCEDURE — 2580000003 HC RX 258: Performed by: HOSPITALIST

## 2020-07-19 PROCEDURE — 99232 SBSQ HOSP IP/OBS MODERATE 35: CPT | Performed by: PSYCHIATRY & NEUROLOGY

## 2020-07-19 PROCEDURE — 94760 N-INVAS EAR/PLS OXIMETRY 1: CPT

## 2020-07-19 PROCEDURE — 6360000002 HC RX W HCPCS: Performed by: HOSPITALIST

## 2020-07-19 PROCEDURE — 6370000000 HC RX 637 (ALT 250 FOR IP): Performed by: HOSPITALIST

## 2020-07-19 RX ORDER — VITAMIN B COMPLEX
2000 TABLET ORAL DAILY
Status: DISCONTINUED | OUTPATIENT
Start: 2020-07-20 | End: 2020-08-04 | Stop reason: HOSPADM

## 2020-07-19 RX ORDER — LOSARTAN POTASSIUM 100 MG/1
100 TABLET ORAL DAILY
Status: CANCELLED | OUTPATIENT
Start: 2020-07-20

## 2020-07-19 RX ORDER — NAPROXEN 250 MG/1
500 TABLET ORAL 2 TIMES DAILY PRN
Status: CANCELLED | OUTPATIENT
Start: 2020-07-19

## 2020-07-19 RX ORDER — POLYETHYLENE GLYCOL 3350 17 G/17G
17 POWDER, FOR SOLUTION ORAL DAILY
Status: CANCELLED | OUTPATIENT
Start: 2020-07-19

## 2020-07-19 RX ORDER — M-VIT,TX,IRON,MINS/CALC/FOLIC 27MG-0.4MG
1 TABLET ORAL DAILY
Status: DISCONTINUED | OUTPATIENT
Start: 2020-07-20 | End: 2020-08-04 | Stop reason: HOSPADM

## 2020-07-19 RX ORDER — ATORVASTATIN CALCIUM 10 MG/1
10 TABLET, FILM COATED ORAL EVERY OTHER DAY
Status: DISCONTINUED | OUTPATIENT
Start: 2020-07-20 | End: 2020-08-04 | Stop reason: HOSPADM

## 2020-07-19 RX ORDER — LOSARTAN POTASSIUM 100 MG/1
100 TABLET ORAL DAILY
Status: DISCONTINUED | OUTPATIENT
Start: 2020-07-20 | End: 2020-08-04 | Stop reason: HOSPADM

## 2020-07-19 RX ORDER — PANTOPRAZOLE SODIUM 40 MG/1
40 TABLET, DELAYED RELEASE ORAL
Status: DISCONTINUED | OUTPATIENT
Start: 2020-07-20 | End: 2020-08-04 | Stop reason: HOSPADM

## 2020-07-19 RX ORDER — AMLODIPINE BESYLATE 5 MG/1
10 TABLET ORAL DAILY
Status: DISCONTINUED | OUTPATIENT
Start: 2020-07-20 | End: 2020-08-04 | Stop reason: HOSPADM

## 2020-07-19 RX ORDER — ONDANSETRON 4 MG/1
4 TABLET, ORALLY DISINTEGRATING ORAL EVERY 8 HOURS PRN
Status: DISCONTINUED | OUTPATIENT
Start: 2020-07-19 | End: 2020-08-04 | Stop reason: HOSPADM

## 2020-07-19 RX ORDER — BISACODYL 10 MG
10 SUPPOSITORY, RECTAL RECTAL DAILY PRN
Status: CANCELLED | OUTPATIENT
Start: 2020-07-19

## 2020-07-19 RX ORDER — HYDRALAZINE HYDROCHLORIDE 20 MG/ML
10 INJECTION INTRAMUSCULAR; INTRAVENOUS EVERY 4 HOURS PRN
Status: DISCONTINUED | OUTPATIENT
Start: 2020-07-19 | End: 2020-07-20

## 2020-07-19 RX ORDER — M-VIT,TX,IRON,MINS/CALC/FOLIC 27MG-0.4MG
1 TABLET ORAL DAILY
Status: CANCELLED | OUTPATIENT
Start: 2020-07-20

## 2020-07-19 RX ORDER — ATORVASTATIN CALCIUM 10 MG/1
10 TABLET, FILM COATED ORAL EVERY OTHER DAY
Status: CANCELLED | OUTPATIENT
Start: 2020-07-20

## 2020-07-19 RX ORDER — NAPROXEN 250 MG/1
500 TABLET ORAL 2 TIMES DAILY PRN
Status: DISCONTINUED | OUTPATIENT
Start: 2020-07-19 | End: 2020-08-04 | Stop reason: HOSPADM

## 2020-07-19 RX ORDER — DESVENLAFAXINE 50 MG/1
50 TABLET, EXTENDED RELEASE ORAL DAILY
Status: CANCELLED | OUTPATIENT
Start: 2020-07-20

## 2020-07-19 RX ORDER — ONDANSETRON 4 MG/1
4 TABLET, FILM COATED ORAL EVERY 8 HOURS PRN
Status: CANCELLED | OUTPATIENT
Start: 2020-07-19

## 2020-07-19 RX ORDER — VITAMIN B COMPLEX
2000 TABLET ORAL DAILY
Status: CANCELLED | OUTPATIENT
Start: 2020-07-20

## 2020-07-19 RX ORDER — ACETAMINOPHEN 325 MG/1
650 TABLET ORAL EVERY 4 HOURS PRN
Status: CANCELLED | OUTPATIENT
Start: 2020-07-19

## 2020-07-19 RX ORDER — ACETAMINOPHEN 325 MG/1
650 TABLET ORAL EVERY 4 HOURS PRN
Status: DISCONTINUED | OUTPATIENT
Start: 2020-07-19 | End: 2020-08-04 | Stop reason: HOSPADM

## 2020-07-19 RX ORDER — PANTOPRAZOLE SODIUM 40 MG/1
40 TABLET, DELAYED RELEASE ORAL
Status: CANCELLED | OUTPATIENT
Start: 2020-07-20

## 2020-07-19 RX ORDER — BISACODYL 10 MG
10 SUPPOSITORY, RECTAL RECTAL DAILY PRN
Status: DISCONTINUED | OUTPATIENT
Start: 2020-07-19 | End: 2020-08-04 | Stop reason: HOSPADM

## 2020-07-19 RX ORDER — HYDRALAZINE HYDROCHLORIDE 20 MG/ML
10 INJECTION INTRAMUSCULAR; INTRAVENOUS EVERY 4 HOURS PRN
Status: CANCELLED | OUTPATIENT
Start: 2020-07-19

## 2020-07-19 RX ORDER — SODIUM CHLORIDE 0.9 % (FLUSH) 0.9 %
10 SYRINGE (ML) INJECTION PRN
Status: DISCONTINUED | OUTPATIENT
Start: 2020-07-19 | End: 2020-08-04 | Stop reason: HOSPADM

## 2020-07-19 RX ORDER — SODIUM CHLORIDE 0.9 % (FLUSH) 0.9 %
10 SYRINGE (ML) INJECTION PRN
Status: CANCELLED | OUTPATIENT
Start: 2020-07-19

## 2020-07-19 RX ORDER — POLYETHYLENE GLYCOL 3350 17 G/17G
17 POWDER, FOR SOLUTION ORAL DAILY
Status: DISCONTINUED | OUTPATIENT
Start: 2020-07-19 | End: 2020-08-04 | Stop reason: HOSPADM

## 2020-07-19 RX ORDER — AMLODIPINE BESYLATE 5 MG/1
10 TABLET ORAL DAILY
Status: CANCELLED | OUTPATIENT
Start: 2020-07-20

## 2020-07-19 RX ORDER — SENNA AND DOCUSATE SODIUM 50; 8.6 MG/1; MG/1
2 TABLET, FILM COATED ORAL 2 TIMES DAILY
Status: DISCONTINUED | OUTPATIENT
Start: 2020-07-19 | End: 2020-07-20

## 2020-07-19 RX ORDER — DESVENLAFAXINE 50 MG/1
50 TABLET, EXTENDED RELEASE ORAL DAILY
Status: DISCONTINUED | OUTPATIENT
Start: 2020-07-20 | End: 2020-08-04 | Stop reason: HOSPADM

## 2020-07-19 RX ORDER — SENNA AND DOCUSATE SODIUM 50; 8.6 MG/1; MG/1
2 TABLET, FILM COATED ORAL 2 TIMES DAILY
Status: CANCELLED | OUTPATIENT
Start: 2020-07-19

## 2020-07-19 RX ADMIN — AMLODIPINE BESYLATE 10 MG: 5 TABLET ORAL at 08:27

## 2020-07-19 RX ADMIN — METOPROLOL TARTRATE 25 MG: 25 TABLET, FILM COATED ORAL at 21:37

## 2020-07-19 RX ADMIN — NAPROXEN 500 MG: 250 TABLET ORAL at 23:14

## 2020-07-19 RX ADMIN — OYSTER SHELL CALCIUM WITH VITAMIN D 1 TABLET: 500; 200 TABLET, FILM COATED ORAL at 08:28

## 2020-07-19 RX ADMIN — Medication 10 ML: at 21:37

## 2020-07-19 RX ADMIN — ENOXAPARIN SODIUM 40 MG: 40 INJECTION SUBCUTANEOUS at 08:28

## 2020-07-19 RX ADMIN — Medication 10 ML: at 08:28

## 2020-07-19 RX ADMIN — DESVENLAFAXINE SUCCINATE 50 MG: 50 TABLET, EXTENDED RELEASE ORAL at 08:28

## 2020-07-19 RX ADMIN — DOCUSATE SODIUM 50MG AND SENNOSIDES 8.6MG 2 TABLET: 8.6; 5 TABLET, FILM COATED ORAL at 21:37

## 2020-07-19 RX ADMIN — OYSTER SHELL CALCIUM WITH VITAMIN D 1 TABLET: 500; 200 TABLET, FILM COATED ORAL at 21:37

## 2020-07-19 RX ADMIN — MULTIPLE VITAMINS W/ MINERALS TAB 1 TABLET: TAB at 08:28

## 2020-07-19 RX ADMIN — LOSARTAN POTASSIUM 100 MG: 100 TABLET, FILM COATED ORAL at 08:27

## 2020-07-19 RX ADMIN — VITAMIN D, TAB 1000IU (100/BT) 2000 UNITS: 25 TAB at 08:27

## 2020-07-19 RX ADMIN — POLYETHYLENE GLYCOL 3350 17 G: 17 POWDER, FOR SOLUTION ORAL at 08:27

## 2020-07-19 RX ADMIN — PANTOPRAZOLE SODIUM 40 MG: 40 TABLET, DELAYED RELEASE ORAL at 06:10

## 2020-07-19 RX ADMIN — METOPROLOL TARTRATE 25 MG: 25 TABLET, FILM COATED ORAL at 08:27

## 2020-07-19 ASSESSMENT — PAIN SCALES - GENERAL
PAINLEVEL_OUTOF10: 0
PAINLEVEL_OUTOF10: 0
PAINLEVEL_OUTOF10: 3

## 2020-07-19 NOTE — PROGRESS NOTES
At 1230, the patient admitted to room 4908 per transport. Patient arrived in bed from 5T w/ transport. Oriented to room, call light, phone, TV, thermostat, bed controls, bathroom, emergency cord, white board, therapy times, unit routine, visiting hours,  and meal times. Patient verbalized understanding. States bowel routine is every other day. Denies pain or discomfort. Lunch tray provided. Call light and personal items in reach, alarms active and in place.

## 2020-07-19 NOTE — PROGRESS NOTES
Report called to [facility] on ARU. Transport placed and all pt belongings gathered. IV in place and tele removed.

## 2020-07-19 NOTE — PROGRESS NOTES
[facility]  Dr. Jose D Leija Progress Note  7/19/2020  9:42 AM    Patient Name:   Tita Pitts  YOB: 1945    Diagnosis: PSVT (paroxysmal supraventricular tachycardia) ([facility]        Subjective:   Rehab unit follow-up. Patient with diagnosis of Guillian Barré syndrom, and just finished her IVIG treatment yesterday. She also has paroxysmal SVT, Hyponatremia, HTN and  HLD. Patient now ready for rehab unit admission today. BP (!) 156/68   Pulse 69   Temp 97.8 °F (36.6 °C) (Oral)   Resp 18   Ht 5' 1\" (1.549 m)   Wt 148 lb 9.6 oz (67.4 kg)   SpO2 96%   Breastfeeding No   BMI 28.08 kg/m²     Last 24 hour lab  No results found for this or any previous visit (from the past 24 hour(s)). Plan: We will plan to admit to our rehab unit today. We will put her admission orders in now.       Dr. Jose D Leija

## 2020-07-19 NOTE — PLAN OF CARE
ARU PATIENT TREATMENT PLAN  [facility]   [address] [facility], [address] [place] [address] Drive  [phone]      Cecily Musa    : 1945  Acct #: [de-identified]  MRN: [id number]  PHYSICIAN:  Jud Limon MD  Primary Problem    Patient Active Problem List   Diagnosis    Hypertension    Hyperlipidemia    Osteopenia    Hx of eczema    Chronic idiopathic urticaria    Moderate mitral regurgitation by prior echocardiogram    Prediabetes    BMI 27.0-27.9,adult    Major depressive disorder with single episode, in full remission (HCC)    PSVT [facility]ox[facility]    Weakness    Frequent falls    SVT [facility]supra[facility]ricular tachycardia) ([facility]    GBS (Guillain [place] syndrome) ([facility]       Rehabilitation Diagnosis:  Guillain [place] Syndrome     ADMIT DATE:2020    Patient Goals:  To return home  Admitting Impairments: Neurological Condition  Activities: Eating, bathing, dressing, toileting, transfers, ambulation and endurance   Participation: Prior to admission was independent, driving and doing volunteer work   CARE PLAN     NURSING:  Cecily Musa while on this unit will:  [] Be continent of bowel and bladder     [x] Have an adequate number of bowel movements  [] Urinate with no urinary retention >300ml in bladder  [] Complete bladder protocol with hernandez removal  [] Maintain O2 SATs at ___%  [x] Have pain managed while on ARU       [x] Be pain free by discharge   [x] Have no skin breakdown while on ARU  [] Have improved skin integrity via wound measurements  [x] Have no signs/symptoms of infection at the wound site  [x] Be free from injury during hospitalization   [x] Complete education with patient/family with understanding demonstrated for:  [] Adjustment   [] Other:     Nursing Interventions will include:  [] bowel/bladder training   [x] education for medical assistive devices   [x] medication education   [] O2 saturation management   [x] energy conservation   [] stress management techniques   [x] fall prevention   [] alarms protocol   [x] seating and positioning   [] skin/wound care   [] pressure relief instruction   [] dressing changes     [x] infection protection   [] DVT prophylaxis  [x] assistance with in room safety with transfers to bed, toilet, wheelchair, shower   [x] bathroom activities and hygiene  [] Other:    Patient/Caregiver Education for:  [x] Disease/sustained injury/management     [x] Medication Use  [] Surgical intervention  [x] Safety  [] Body mechanics and or joint protection  [x] Health maintenance     [] Other:     PHYSICAL THERAPY:  Goals:                  Short term goals  Time Frame for Short term goals: to be met in 14-17 days  Short term goal 1: pt able to perform bed mobility modified independently  Short term goal 2: pt able to perform transfers modified independently  Short term goal 3: pt able to ambulate with LRAD 150 ft modified independently  Short term goal 4: pt able to navigate 12 steps with handrailing and modified independently  Short term goal 5: pt able to perform car transfer modified independently               These goals were reviewed with this patient at the time of assessment and Sofia is in agreement.      Plan of Care: Pt to be seen 5 out of 7 days per week per ARU protocol ( 90 minutes with PT)                  Current Treatment Recommendations: Strengthening, Balance Training, Functional Mobility Training, Transfer Training, Gait Training, Stair training, Endurance Training, Neuromuscular Re-education    OCCUPATIONAL THERAPY:  Goals:             Short term goals  Short term goal 1: Mod I for functional transfers  Short term goal 2: Mod I for functional mobility for ADL  Short term goal 3: Mod I for UB ADLs  Short term goal 4: Mod I for LB ADLs  Short term goal 5: Mod I for IADLs :  Long term goals  Time Frame for Long term goals : STG=LTG :  These goals were reviewed with this patient at the time of assessment and Sofia is in agreement    Plan of Care:  Pt to be seen 5 out of 7 days per week per ARU protocol (90 minutes with OT)  Patient Education: OT toby, d/c recommendation. Pt verbalized and demonstrated understanding. Needs reinforcement of transfer technique and UE ther ex. SPEECH THERAPY: Goals will be left blank if speech is not following this patient. Goals:                                                                               Plan of Care:  Pt to be seen 5 out of 7 days per week per ARU protocol ( ** minutes with SLP)    Therapy Treatments will include:  [x]  therapeutic exercises    [x]  gait training     [x]  neuromuscular re-ed                            [x]  transfer training             [x] community reintegration    [x] bed mobility                          []  w/c mobility and training  [x]  self care    []home mgmt    [x]  cognitive training            [x]  energy conservation        []  dysphagia tx    []  speech/language/communication therapy   []  group therapy    [x]  patient/family education    [] Other:    CASE MANAGEMENT:  Goals:   Assist patient/family with discharge planning, patient/family counseling,   and coordination with insurance during ARU stay. Sofia will be seen a minimum of 3 hours of therapy per day, a minimum of 5 out of 7 days per week  (please see above for specific treatment plan per PT/OT/SLP). [] In this rare instance due to the nature of this patient's medical involvement, this patient will be seen 15 hours per week (900 minutes within a 7 day period). In addition, dietician/nutritionist may monitor calorie count as well as intake and collaboratively work with SLP on dietary upgrades. Neuropsychology/Psychology may evaluate and provide necessary support.     Medical issues being managed closely and that require 24 hour availability of a physician:  [] Swallowing Precautions  [x] Bowel/Bladder Fx  [] Weight bearing precautions  [] Wound Care    [x] Pain Mgmt   [] Infection Protection  [x] DVT Prophylaxis   [x] Fall Precautions  [x] Fluid/Electrolyte/Nutrition Balance  [] Voice Protection   [] Respiratory  [] Other:    Medical Prognosis: [x] Good  [] Fair    [] Guarded   Total expected IRF days 15 days  Anticipated discharge destination:Home  [x] Home Independently   [] Home with supervision    []SNF     [] Other                                           Physician anticipated functional outcomes: Will regain function to an independent level for activities   IPOC brief synthesis: 67-year-old female with a history of anxiety, hypertension, hyperlipidemia, and arthritis who was admitted on 7/11 with progressive lower extremity weakness.  Neurology evaluated and suspected Guillian Barré syndrome.  She was initiated on IVIG. [facility] course was also complicated by episodes of SVT.  She was evaluated by therapy and suggested to continue in an inpatient setting prior to returning home. She was admitted with the above goal.      I have reviewed this initial plan of care and agree with its contents:    Title   Name    Date    Time    Physician: Electronically signed by Julio C Whitten MD on 7/21/2020 at 12:16 PM    Case Mgmt: Arsenio Del Real, MSW, [facility], 7/20/2020, 6033    OT: Valerio Garduno OTR/L [id number], 7/20/2020, 11:29 AM    PT: Lynnette Renee, [address], [address], 7/20/2020    RN: Emelina Ruiz RN  07/19/2020 4760    : Yao Chen, [address] [place] [address], 7/21/2020 1026    Other:

## 2020-07-19 NOTE — PROGRESS NOTES
NEUROLOGY FOLLOWUP    HISTORY OF PRESENT ILLNESS :     Allan Arita is a 75 y.o. female   History was obtained from the patient and dictations in the chart. Patient's leg weakness continues to improve. . She still has some weakness of the dorsiflexion of her feet. Her proximal leg strength is much improved. Her ambulation is also slightly improved. Detailed history:  Patient states that she received a shingles immunization about 10 days ago. A couple days before admission she started having some leg weakness. Since it was not getting better and she was having difficulty with her gait she came to the hospital and was admitted for further evaluation. She had some tachycardia and was evaluated by cardiology as well. Patient denies any numbness. She denies any bladder or bowel symptoms. No vertigo or diplopia. Symptom onset was subacute. Symptoms are moderately severe without any other aggravating or relieving factors. REVIEW OF SYSTEMS       Constitutional:  []   Chills   []  Fatigue   []  Fevers   []  Malaise   []  Weight loss     [x] Denies all of the above    Respiratory:   []  Cough    []  Shortness of breath         [x] Denies all of the above     Cardiovascular:   []  Chest pain    []  Exertional chest pressure/discomfort           [] Palpitations    []  Syncope     [x] Denies all of the above    Past Medical History:   Diagnosis Date    Anxiety     Colon polyp     Headache(784.0)     Hyperlipidemia     Hypertension     Osteoarthritis      Family History   Problem Relation Age of Onset    Kidney Disease Mother     Heart Disease Father     Kidney Disease Sister     Cancer Sister     Kidney Disease Brother      Social History     Socioeconomic History    Marital status:       Spouse name: None    Number of children: None    Years of education: None    Highest education level: None Occupational History    None   Social Needs    Financial resource strain: None    Food insecurity     Worry: None     Inability: None    Transportation needs     Medical: None     Non-medical: None   Tobacco Use    Smoking status: Never Smoker    Smokeless tobacco: Never Used   Substance and Sexual Activity    Alcohol use: Yes     Alcohol/week: 0.0 standard drinks     Comment: occ wine    Drug use: No    Sexual activity: Not Currently   Lifestyle    Physical activity     Days per week: None     Minutes per session: None    Stress: None   Relationships    Social connections     Talks on phone: None     Gets together: None     Attends Confucianism service: None     Active member of club or organization: None     Attends meetings of clubs or organizations: None     Relationship status: None    Intimate partner violence     Fear of current or ex partner: None     Emotionally abused: None     Physically abused: None     Forced sexual activity: None   Other Topics Concern    None   Social History Narrative    None        PHYSICAL EXAMINATION      BP (!) 154/84   Pulse 68   Temp 97.4 °F (36.3 °C) (Oral)   Resp 18   Ht 5' 1\" (1.549 m)   Wt 148 lb 9.6 oz (67.4 kg)   SpO2 97%   Breastfeeding No   BMI 28.08 kg/m²   This is a well-nourished patient in no acute distress  Patient is awake and alert. She is oriented x3. Memory judgment speech and language functions are normal.  Pupils are equal round reactive to light. Visual fields are full. External ocular movements are intact. Face appears symmetrical.  Tongue is in the midline. Strength is 4/5 in the arms bilaterally. Patient's bilateral leg weakness has improved now to 3+ to 4/5 strength. There is mild foot drop bilaterally. Deep tendon reflexes are absent throughout. Plantar reflexes are withdrawal.  Sensory examination does not show any focal deficit. Coordination was slightly impaired but the weakness. Unable to ambulate due to weakness.

## 2020-07-19 NOTE — PLAN OF CARE
Problem: Safety:  Goal: Free from accidental physical injury  Description: Free from accidental physical injury  Outcome: Ongoing     Problem: Daily Care:  Goal: Daily care needs are met  Description: Daily care needs are met  Outcome: Ongoing     Problem: Pain:  Goal: Patient's pain/discomfort is manageable  Description: Patient's pain/discomfort is manageable  Outcome: Ongoing     Problem: Discharge Planning:  Goal: Patients continuum of care needs are met  Description: Patients continuum of care needs are met  Outcome: Ongoing   Problem: Falls - Risk of:  Goal: Will remain free from falls  Description: Will remain free from falls  Outcome: Ongoing

## 2020-07-19 NOTE — PROGRESS NOTES
Shift assessment complete. VSS, BP slightly elevated. Morning medications given. Pt up to bathroom and back to chair for breakfast. Spoke with charge RN in ARU to check status of transfer today, will f/u. Pt denies pain but only slight numbness and tingling in legs and feels a bit better from yesterday. No further needs, chair alarm engaged, call light and phone in reach. Will monitor.

## 2020-07-19 NOTE — PROGRESS NOTES
4 Eyes Skin Assessment     The patient is being assess for  Admission    I agree that 2 RN's have performed a thorough Head to Toe Skin Assessment on the patient. ALL assessment sites listed below have been assessed. Areas assessed by both nurses: Beacher Hammed  [x]   Head, Face, and Ears   [x]   Shoulders, Back, and Chest  [x]   Arms, Elbows, and Hands   [x]   Coccyx, Sacrum, and IschIum  [x]   Legs, Feet, and Heels        Does the Patient have Skin Breakdown?   No         Sunil Prevention initiated:  Yes   Wound Care Orders initiated:  NA      [facility] nurse consulted for Pressure Injury (Stage 3,4, Unstageable, DTI, NWPT, and Complex wounds), New and Established Ostomies:  NA      Nurse 1 eSignature: Electronically signed by Moses De La O RN on 7/19/20 at 2:22 PM EDT    **SHARE this note so that the co-signing nurse is able to place an eSignature**    Nurse 2 eSignature: Electronically signed by Eleanor Howe RN on 7/19/20 at 2:52 PM EDT

## 2020-07-19 NOTE — DISCHARGE SUMMARY
held. Hydrated with ivf. Improved.        5. HTN: held hydrochlorothiazide. Amlodipine dose increased. Losartan continued. Physical Exam Performed:     BP (!) 156/68   Pulse 69   Temp 97.8 °F (36.6 °C) (Oral)   Resp 18   Ht 5' 1\" (1.549 m)   Wt 148 lb 9.6 oz (67.4 kg)   SpO2 96%   Breastfeeding No   BMI 28.08 kg/m²       General appearance: Middle [facility] elderly lady, appears comfortable  Eyes: Sclera clear. Pupils equal.  ENT: Moist oral mucosa. Trachea midline, no adenopathy. Cardiovascular: Regular rhythm, normal S1, S2. No murmur. No edema in lower extremities  Respiratory: Not using accessory muscles. Good inspiratory effort. Clear to auscultation bilaterally, no wheeze or crackles. GI: Abdomen soft, no tenderness, not distended, normal bowel sounds  Musculoskeletal: No cyanosis in digits, neck supple  Neurology: CN 2-12 grossly intact. bilateral leg weakness improving  Psych: Normal affect. Alert and oriented in time, place and person  Skin: Warm, dry, normal turgor         Labs: For convenience and continuity at follow-up the following most recent labs are provided:      CBC:    Lab Results   Component Value Date    WBC 9.0 07/13/2020    HGB 13.2 07/13/2020    HCT 39.0 07/13/2020     07/13/2020       Renal:    Lab Results   Component Value Date     07/16/2020    K 4.8 07/16/2020    K 3.4 07/11/2020    CL 96 07/16/2020    CO2 25 07/16/2020    BUN 16 07/16/2020    CREATININE 0.7 07/16/2020    CALCIUM 9.4 07/16/2020    PHOS 4.1 07/13/2020         Significant Diagnostic Studies    Radiology:   [facility] LUMBAR PUNCTURE DIAG   Final Result   Successful fluoroscopic-guided lumbar puncture. VL DUP CAROTID BILATERAL   Final Result      NM MYOCARDIAL SPECT REST EXERCISE OR RX   Final Result      CT Head WO Contrast   Final Result   No acute intracranial abnormality.          XR CHEST PORTABLE   Final Result   No acute cardiopulmonary abnormality                Consults:     IP CONSULT TO CARDIOLOGY  IP CONSULT TO NEUROLOGY  IP CONSULT TO PHYSICAL MEDICINE REHAB  IP CONSULT TO DIETITIAN  IP CONSULT TO SOCIAL WORK    Disposition:  ARU at [place]     Condition at Discharge: Stable    Discharge Instructions/Follow-up:  Neurology per recs    Code Status:  Full Code     Activity: activity as tolerated    Diet: regular diet      Discharge Medications:     Current Discharge Medication List           Details   amLODIPine (NORVASC) 5 MG tablet TAKE 1 TABLET EVERY NIGHT  Qty: 90 tablet, Refills: 3    Associated Diagnoses: Essential hypertension      atorvastatin (LIPITOR) 10 MG tablet Take 1 tablet by mouth every other day  Qty: 60 tablet, Refills: 3      losartan-hydrochlorothiazide (HYZAAR) 100-25 MG per tablet Take 1 tablet by mouth daily  Qty: 90 tablet, Refills: 3    Associated Diagnoses: Essential hypertension      desvenlafaxine succinate (PRISTIQ) 50 MG TB24 extended release tablet Take 1 tablet by mouth daily  Qty: 90 tablet, Refills: 3    Associated Diagnoses: Recurrent major depressive disorder, in partial remission (HCC)      miconazole (MICOTIN) 2 % cream Apply topically 2 times daily. Qty: 60 g, Refills: 0      clobetasol (TEMOVATE) 0.05 % cream Apply to rash or itching skin on body 3 to 4 times daily as directed      mometasone (NASONEX) 50 MCG/ACT nasal spray 2 sprays by Nasal route daily. Qty: 1 Inhaler, Refills: 0      Cholecalciferol (VITAMIN D3) 2000 UNITS CAPS Take 2,000 Units by mouth daily. econazole nitrate 1 % cream Apply  topically 2 times daily. Apply topically daily. naproxen (NAPROSYN) 250 MG tablet Take 500 mg by mouth 2 times daily as needed. Calcium Carbonate-Vitamin D (CALCIUM + D) 600-200 MG-UNIT TABS Take 1 tablet by mouth 2 times daily. Multiple Vitamins-Minerals (OCUVITE) TABS tablet Take 1 tablet by mouth daily.                Time Spent on discharge is more than 30 minutes in the examination, evaluation, counseling and review of medications and discharge plan. Signed:    Nikki Baig MD   7/19/2020      Thank you Poncho Fernandez MD for the opportunity to be involved in this patient's care. If you have any questions or concerns please feel free to contact me at [phone].

## 2020-07-20 LAB
ANION GAP SERPL CALCULATED.3IONS-SCNC: 8 MMOL/L (ref 3–16)
BUN BLDV-MCNC: 21 MG/DL (ref 7–20)
CALCIUM SERPL-MCNC: 9.3 MG/DL (ref 8.3–10.6)
CHLORIDE BLD-SCNC: 101 MMOL/L (ref 99–110)
CO2: 24 MMOL/L (ref 21–32)
CREAT SERPL-MCNC: 0.6 MG/DL (ref 0.6–1.2)
GFR AFRICAN AMERICAN: >60
GFR NON-AFRICAN AMERICAN: >60
GLUCOSE BLD-MCNC: 105 MG/DL (ref 70–99)
HCT VFR BLD CALC: 35.7 % (ref 36–48)
HEMOGLOBIN: 12.2 G/DL (ref 12–16)
MAGNESIUM: 2.3 MG/DL (ref 1.8–2.4)
MCH RBC QN AUTO: 32.1 PG (ref 26–34)
MCHC RBC AUTO-ENTMCNC: 34.2 G/DL (ref 31–36)
MCV RBC AUTO: 93.8 FL (ref 80–100)
PDW BLD-RTO: 14.7 % (ref 12.4–15.4)
PLATELET # BLD: 248 K/UL (ref 135–450)
PMV BLD AUTO: 7.8 FL (ref 5–10.5)
POTASSIUM SERPL-SCNC: 4.1 MMOL/L (ref 3.5–5.1)
RBC # BLD: 3.81 M/UL (ref 4–5.2)
SODIUM BLD-SCNC: 133 MMOL/L (ref 136–145)
WBC # BLD: 4.8 K/UL (ref 4–11)

## 2020-07-20 PROCEDURE — 6360000002 HC RX W HCPCS: Performed by: PHYSICAL MEDICINE & REHABILITATION

## 2020-07-20 PROCEDURE — 97165 OT EVAL LOW COMPLEX 30 MIN: CPT

## 2020-07-20 PROCEDURE — 1280000000 HC REHAB R&B

## 2020-07-20 PROCEDURE — 83735 ASSAY OF MAGNESIUM: CPT

## 2020-07-20 PROCEDURE — 6370000000 HC RX 637 (ALT 250 FOR IP): Performed by: PHYSICAL MEDICINE & REHABILITATION

## 2020-07-20 PROCEDURE — 97530 THERAPEUTIC ACTIVITIES: CPT

## 2020-07-20 PROCEDURE — 97535 SELF CARE MNGMENT TRAINING: CPT

## 2020-07-20 PROCEDURE — 97161 PT EVAL LOW COMPLEX 20 MIN: CPT

## 2020-07-20 PROCEDURE — 85027 COMPLETE CBC AUTOMATED: CPT

## 2020-07-20 PROCEDURE — 80048 BASIC METABOLIC PNL TOTAL CA: CPT

## 2020-07-20 PROCEDURE — 97110 THERAPEUTIC EXERCISES: CPT

## 2020-07-20 PROCEDURE — 36415 COLL VENOUS BLD VENIPUNCTURE: CPT

## 2020-07-20 PROCEDURE — 97116 GAIT TRAINING THERAPY: CPT

## 2020-07-20 RX ORDER — TRAZODONE HYDROCHLORIDE 50 MG/1
50 TABLET ORAL NIGHTLY PRN
Status: DISCONTINUED | OUTPATIENT
Start: 2020-07-20 | End: 2020-08-04 | Stop reason: HOSPADM

## 2020-07-20 RX ORDER — HYDRALAZINE HYDROCHLORIDE 25 MG/1
50 TABLET, FILM COATED ORAL EVERY 8 HOURS PRN
Status: DISCONTINUED | OUTPATIENT
Start: 2020-07-20 | End: 2020-08-04 | Stop reason: HOSPADM

## 2020-07-20 RX ORDER — DIPHENHYDRAMINE HCL 25 MG
25 TABLET ORAL EVERY 6 HOURS PRN
Status: DISCONTINUED | OUTPATIENT
Start: 2020-07-20 | End: 2020-08-04 | Stop reason: HOSPADM

## 2020-07-20 RX ORDER — SENNA AND DOCUSATE SODIUM 50; 8.6 MG/1; MG/1
2 TABLET, FILM COATED ORAL DAILY
Status: DISCONTINUED | OUTPATIENT
Start: 2020-07-21 | End: 2020-07-23

## 2020-07-20 RX ORDER — TRAMADOL HYDROCHLORIDE 50 MG/1
50 TABLET ORAL EVERY 6 HOURS PRN
Status: DISCONTINUED | OUTPATIENT
Start: 2020-07-20 | End: 2020-08-04 | Stop reason: HOSPADM

## 2020-07-20 RX ADMIN — Medication 2000 UNITS: at 09:57

## 2020-07-20 RX ADMIN — POLYETHYLENE GLYCOL 3350 17 G: 17 POWDER, FOR SOLUTION ORAL at 10:02

## 2020-07-20 RX ADMIN — LOSARTAN POTASSIUM 100 MG: 100 TABLET, FILM COATED ORAL at 09:57

## 2020-07-20 RX ADMIN — OYSTER SHELL CALCIUM WITH VITAMIN D 1 TABLET: 500; 200 TABLET, FILM COATED ORAL at 10:00

## 2020-07-20 RX ADMIN — ENOXAPARIN SODIUM 40 MG: 40 INJECTION SUBCUTANEOUS at 10:01

## 2020-07-20 RX ADMIN — DOCUSATE SODIUM 50MG AND SENNOSIDES 8.6MG 2 TABLET: 8.6; 5 TABLET, FILM COATED ORAL at 10:00

## 2020-07-20 RX ADMIN — DESVENLAFAXINE SUCCINATE 50 MG: 50 TABLET, EXTENDED RELEASE ORAL at 10:00

## 2020-07-20 RX ADMIN — MULTIPLE VITAMINS W/ MINERALS TAB 1 TABLET: TAB at 09:57

## 2020-07-20 RX ADMIN — METOPROLOL TARTRATE 25 MG: 25 TABLET, FILM COATED ORAL at 21:57

## 2020-07-20 RX ADMIN — METOPROLOL TARTRATE 25 MG: 25 TABLET, FILM COATED ORAL at 09:59

## 2020-07-20 RX ADMIN — OYSTER SHELL CALCIUM WITH VITAMIN D 1 TABLET: 500; 200 TABLET, FILM COATED ORAL at 21:57

## 2020-07-20 RX ADMIN — PANTOPRAZOLE SODIUM 40 MG: 40 TABLET, DELAYED RELEASE ORAL at 06:35

## 2020-07-20 RX ADMIN — ATORVASTATIN CALCIUM 10 MG: 10 TABLET, FILM COATED ORAL at 09:59

## 2020-07-20 RX ADMIN — AMLODIPINE BESYLATE 10 MG: 5 TABLET ORAL at 09:57

## 2020-07-20 NOTE — PROGRESS NOTES
status, endurance, strength and balance associated with PSVT and GBS dx      Restrictions  Restrictions/Precautions  Restrictions/Precautions: Fall Risk  Required Braces or Orthoses?: No  Position Activity Restriction  Other position/activity restrictions: 7/11: Cora Miles is a 75 y.o. female presents to the ER with a complaint of bilateral extremity weakness. States that she awoke yesterday and was having difficulty walking, states that she fell down one step and sat on her bottom. She had difficulty walking throughout the day. This morning she was able to navigate the steps but fell while walking across the front room to get her coffee. Per neurology: CSF protein was high consistent with a diagnosis of Guillian Barré syndrome. On IVIG therapy. Subjective   General  Chart Reviewed: Yes  Patient assessed for rehabilitation services?: Yes  Response to previous treatment: Patient with no complaints from previous session  Family / Caregiver Present: No  Referring Practitioner: Oanh Ortiz MD, for dc planning  Diagnosis: PSVT, GBS dx  Subjective  Subjective: Pt seated in w/c upon arrival, agreeable to OT evaluation. Pt requested to receive medications from nurse. Pt denied pain.     Social/Functional History  Social/Functional History  Lives With: Alone  Type of Home: House  Home Layout: Bed/Bath upstairs, Multi-level  Home Access: Stairs to enter with rails  Entrance Stairs - Number of Steps: 3-4 front no rail, garage 3-5 with railing  Entrance Stairs - Rails: Right  Bathroom Shower/Tub: Tub/Shower unit  Bathroom Toilet: Standard  Bathroom Equipment: Hand-held shower  Home Equipment: Crutches  ADL Assistance: Independent  Homemaking Assistance: Independent  Homemaking Responsibilities: Yes  Ambulation Assistance: Independent  Transfer Assistance: Independent  Active : Yes  Mode of Transportation: [facility]  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 Cognition  Overall Cognitive Status: WFL  Perception  Overall Perceptual Status: WFL     Sensation  Overall Sensation Status: Impaired-Numbness in BLE from middle of shin to toes)  Light Touch: able to recognize light touch on BLE         Second Session:  Pt seated in  visiting with her friend upon arrival-agreeable to therapy. Pt reports no pain. Pt ambulated to toilet with RW. CGA for ambulating to toilet and completing transfer. Pt voided bowel and urine. Pt requested changing into new pants. I doffed pants- required min A for threading and pulling up underwear/pants. Pt wheeled to dining room in . Gross ROM/MMT tested, [facility]. Pt's fine motor control and  strength tested below normal limits but are within functional limits.  strength-R: 28,25,30 lbs L: 30, 20, 33 lbs. 1 SD below normal limits. 9 Hole Peg Test--R: 33 seconds L; 35 seconds. 1 SD below normal limits   Sit to stand from  CGA- stand to sit to bed CGA. Pt independent in bringing BLE up to bed and adjusting herself. Pt's bed alarm on and call button in reach.                Plan   Plan  Times per week: 90 minutes 5x/week  Times per day: Daily  Current Treatment Recommendations: Safety Education & Training, Self-Care / ADL, Endurance Training, Functional Mobility Training, Equipment Evaluation, Education, & procurement, Patient/Caregiver Education & Training, Strengthening      Goals  Short term goals  Short term goal 1: Mod I for functional transfers  Short term goal 2: Mod I for functional mobility for ADL  Short term goal 3: Mod I for UB ADLs  Short term goal 4: Mod I for LB ADLs  Short term goal 5: Mod I for IADLs  Long term goals  Time Frame for Long term goals : STG=LTG       Therapy Time   Individual Concurrent Group Co-treatment   Time In 0950         Time Out 1050         Minutes 60         Timed Code Treatment Minutes: 50 Minutes      Second Session Therapy Time: Individual Concurrent Group Co-treatment   Time In 1345         Time Out 1425         Minutes 40           Timed Code Treatment Minutes:  40 minutes     Total Treatment Minutes:  100 minutes     EDGAR Herrera  Supervised directed by Valerio Harper OTR/L [id number], 7/20/2020, 2:57 PM  Rox Mandujano OT

## 2020-07-20 NOTE — CARE COORDINATION
Social Work Admission Assessment    Objective:  Met with pt to complete initial assessment and review role of  in rehab process. Pt oriented to unit. Pt states understanding of this. Current Home Situation:  Patient lives at home alone. Patient does not receive home care. Patient has crutches at home already. Patient has advance directives in place. Pt's plans re:  Return to work/school/volunteer:  Patient is retired, volunteers at [employer]. Accessibility to community resources/transportation: Family to transport. Has pt experienced a recent loss or signigicant life event that would impact their care or ability to participate? _X_No  __Yes - Explain    Has pt ever been treated for emotional disorders? __No  _X_Yes--How does that affect current situation: Anxiety and depression, on medication- pt appears well today. How does pt and family cope with stressful events and this hospitalization? Pt appears pleasant and coping with this hospitalization appropriately. Special Problem Areas:  None     Discharge Plan: Home with appropriate support, await progress with therapy for recommendations. Impression/Plan:  Tammy Rivera (patient) is a 75year old female that has been admitted to ARU. Provided pt with this worker's card to contact as needed. Will continue to follow for support and discharge planning.  Prabhjot Mckeon, MSW, LSW

## 2020-07-20 NOTE — H&P
Patient: Alfreda Coe  [id number]  Date: 7/20/2020      Chief Complaint: BLE weakness     History of Present Illness/Hospital Course:  50-year-old female with a history of anxiety, hypertension, hyperlipidemia, and arthritis who was admitted on 7/11 with progressive lower extremity weakness. Neurology evaluated and suspected Guillian Barré syndrome. She was initiated on IVIG. Her hospital course was also complicated by episodes of SVT. She was evaluated by therapy and suggested to continue in an inpatient setting prior to returning home. 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 She reports her feet remain numb and weak.      Prior Level of Function:  Independent     Current Level of Function:  Dawood NGUYEN     has a past medical history of Anxiety, Colon polyp, Headache(784.0), Hyperlipidemia, Hypertension, and Osteoarthritis. has a past surgical history that includes Colonoscopy (8/10). reports that she has never smoked. She has never used smokeless tobacco. She reports current alcohol use. She reports that she does not use drugs. family history includes Cancer in her sister; Heart Disease in her father; Kidney Disease in her brother, mother, and sister. Allergies: Hydroxybenzoate; Prednisolone acetate; Tixocortol pivalate; Neosporin [neomycin-polymyxin-gramicidin];  Ace inhibitors; and Methylchloroisothiazolinone    Current Facility-Administered Medications   Medication Dose Route Frequency Provider Last Rate Last Dose    diphenhydrAMINE (BENADRYL) tablet 25 mg  25 mg Oral Q6H PRN Linnea Song MD        hydrALAZINE (APRESOLINE) tablet 50 mg  50 mg Oral Q8H PRN Linnea Song MD        traZODone (DESYREL) tablet 50 mg  50 mg Oral Nightly PRN Linnea Song MD        sodium chloride flush 0.9 % injection 10 mL  10 mL Intravenous PRN Xiomara Payne MD   10 mL at 07/19/20 2820    acetaminophen (TYLENOL) tablet 650 mg  650 mg Oral Q4H PRN Alma Goodrich MD        enoxaparin (LOVENOX) injection 40 mg  40 mg Subcutaneous Daily Regan Payne MD        magnesium hydroxide (MILK OF MAGNESIA) 400 MG/5ML suspension 30 mL  30 mL Oral Daily PRN Regan Payne MD        amLODIPine (NORVASC) tablet 10 mg  10 mg Oral Daily Regan Payne MD        atorvastatin (LIPITOR) tablet 10 mg  10 mg Oral Every Other Day Regan Payne MD        bisacodyl (DULCOLAX) suppository 10 mg  10 mg Rectal Daily PRN Alma Goodrich MD        calcium-vitamin D 500-200 MG-UNIT per tablet 1 tablet  1 tablet Oral BID Regan Payne MD   1 tablet at 07/19/20 2137    desvenlafaxine succinate (PRISTIQ) extended release tablet 50 mg  50 mg Oral Daily Regan Payne MD        losartan (COZAAR) tablet 100 mg  100 mg Oral Daily Regan Payne MD        metoprolol tartrate (LOPRESSOR) tablet 25 mg  25 mg Oral BID Regan Payne MD   25 mg at 07/19/20 2137    naproxen (NAPROSYN) tablet 500 mg  500 mg Oral BID PRN Regan Payne MD   500 mg at 07/19/20 2314    ondansetron (ZOFRAN-ODT) disintegrating tablet 4 mg  4 mg Oral Q8H PRN Regan Payne MD        pantoprazole (PROTONIX) tablet 40 mg  40 mg Oral QAM AC Regan Payne MD   40 mg at 07/20/20 1879    polyethylene glycol (GLYCOLAX) packet 17 g  17 g Oral Daily Regan Payne MD        sennosides-docusate sodium (SENOKOT-S) 8.6-50 MG tablet 2 tablet  2 tablet Oral BID Alma Goodrich MD   2 tablet at 07/19/20 2137    therapeutic multivitamin-minerals 1 tablet  1 tablet Oral Daily Regan Payne MD        Vitamin D (CHOLECALCIFEROL) tablet 2,000 Units  2,000 Units Oral Daily Regan Payne MD           REVIEW OF SYSTEMS:   CONSTITUTIONAL: negative for fevers, chills, diaphoresis, appetite change, night sweats and unexpected weight change. HEENT: negative for hearing loss, tinnitus, ear drainage, sinus pressure, nasal congestion, epistaxis and snoring.    RESPIRATORY: Negative for hemoptysis, cough, sputum production. CARDIOVASCULAR: negative for chest pain, palpitations, exertional chest pressure/discomfort, edema, syncope. GASTROINTESTINAL: negative for nausea, vomiting, diarrhea, constipation, blood in stool and abdominal pain. GENITOURINARY: negative for frequency, dysuria, urinary incontinence, decreased urine volume, and hematuria. HEMATOLOGIC/LYMPHATIC: negative for easy bruising, bleeding and lymphadenopathy. ALLERGIC/IMMUNOLOGIC: negative for recurrent infections, angioedema, anaphylaxis and drug reactions. ENDOCRINE: negative for weight changes and diabetic symptoms including polyuria, polydipsia and polyphagia. MUSCULOSKELETAL: negative for pain, joint swelling, decreased range of motion. Positive diffuse muscle weakness. NEUROLOGICAL: negative for headaches, slurred speech, unilateral weakness. PSYCHIATRIC/BEHAVIORAL: negative for hallucinations, behavioral problems, confusion and agitation.      All pertinent positives are noted in the HPI. Physical Examination:  Vitals:   Patient Vitals for the past 24 hrs:   BP Temp Temp src Pulse Resp SpO2 Height Weight   07/20/20 0430 -- -- -- -- -- -- -- 148 lb 12.8 oz (67.5 kg)   07/19/20 2130 (!) 157/74 98.1 °F (36.7 °C) Oral 73 16 93 % -- --   07/19/20 1308 -- -- -- -- -- -- 5' 1\" (1.549 m) 147 lb 12.8 oz (67 kg)   07/19/20 1245 (!) 153/70 97.7 °F (36.5 °C) Oral 68 17 94 % -- --     Psych: Stable mood, normal judgement, normal affect   Const: No distress  Eyes: Conjunctiva noninjected, no icterus noted; pupils equal, round. HENT: Atraumatic, normocephalic; Oral mucosa moist  Neck: Trachea midline, neck supple. No thyromegaly noted. CV: Regular rate and rhythm, no murmur rub or gallop noted  Resp: Lungs clear to auscultation bilaterally, no rales wheezes or ronchi, no retractions. Respirations unlabored. GI: Soft, nontender, nondistended. Normal bowel sounds. No palpable masses. Neuro: Alert, oriented, appropriate.  No cranial nerve deficits appreciated. Sensation decreased to light touch distal to mid-tibia bilaterally. Motor examination reveals: BLE 4/5 HF 5/5 KE 3/5 DF/PF. Areflexic in BLE  Skin: Normal temperature and turgor  MSK: No joint abnormalities noted. Ext: No significant edema appreciated. No varicosities. Lab Results   Component Value Date    WBC 4.8 07/20/2020    HGB 12.2 07/20/2020    HCT 35.7 (L) 07/20/2020    MCV 93.8 07/20/2020     07/20/2020     Lab Results   Component Value Date    INR 0.94 07/13/2020    PROTIME 10.9 07/13/2020     Lab Results   Component Value Date    CREATININE 0.6 07/20/2020    BUN 21 (H) 07/20/2020     (L) 07/20/2020    K 4.1 07/20/2020     07/20/2020    CO2 24 07/20/2020     Lab Results   Component Value Date    ALT 15 07/11/2020    AST 22 07/11/2020    ALKPHOS 67 07/11/2020    BILITOT 0.3 07/11/2020       Most recent imaging studies revealed   EXAMINATION:    CT OF THE HEAD WITHOUT CONTRAST  7/11/2020 12:24 pm         TECHNIQUE:    CT of the head was performed without the administration of intravenous    contrast. Dose modulation, iterative reconstruction, and/or weight based    adjustment of the mA/kV was utilized to reduce the radiation dose to as low    as reasonably achievable.         COMPARISON:    None.         HISTORY:    ORDERING SYSTEM PROVIDED HISTORY: weakness, difficulty walking > 24 hours    TECHNOLOGIST PROVIDED HISTORY:    If patient is on cardiac monitor and/or pulse ox, they may be taken off    cardiac monitor and pulse ox, left on O2 if currently on. All monitors    reattached when patient returns to room. Has a \"code stroke\" or \"stroke alert\" been called? ->No    Reason for exam:->weakness, difficulty walking > 24 hours    Reason for Exam: weakness, difficulty walking > 24 hours    Acuity: Acute    Type of Exam: Initial         FINDINGS:    BRAIN/VENTRICLES: There is no acute intracranial hemorrhage, mass effect or    midline shift.  No abnormal extra-axial fluid collection.  The gray-white    differentiation is maintained without evidence of an acute infarct.  There is    no evidence of hydrocephalus.         ORBITS: The visualized portion of the orbits demonstrate no acute abnormality.         SINUSES: The visualized paranasal sinuses and mastoid air cells demonstrate    no acute abnormality.         SOFT TISSUES/SKULL:  No acute abnormality of the visualized skull or soft    tissues.              Impression    No acute intracranial abnormality.            The above laboratory data have been reviewed.      The above imaging data have been reviewed.      The above medical testing have been reviewed. Body mass index is 28.12 kg/m². Barriers to Discharge: weakness, endurance, safety, ADLs, neuropathy    POST ADMISSION PHYSICIAN EVALUATION  The patient has agreed to being admitted to our comprehensive inpatient  rehabilitation facility consisting of at least 180 minutes of therapy a day,  5 out of 7 days a week. The patient/family has a good understanding of our discharge process. The  patient has potential to make improvement and is in need of at least two of  the following multidisciplinary therapies including but not limited to  physical, occupational, respiratory, and speech, nutritional services, wound care, and prosthetics and orthotics. Given the patients complex condition  and risk of further medical complications, rehabilitation services cannot be  safely provided at a lower level of care such as a skilled nursing facility. I have compared the patients medical and functional status at the time of the  preadmission screening and the same on this date, and there are no significant changes except as documented below in the assessment and plan.     By signing this document, I acknowledge that I have personally performed a  full physical examination on this patient within 24 hours of admission to  this inpatient rehabilitation facility and have determined the patient to be  able to tolerate the above course of treatment at an intensive level for a  reasonable period of time. I will be completing a detailed individualized  Plan of Care for this patient by day four of the patients stay based upon the  Preadmission Screen, this Post-Admission Evaluation, and the therapy  evaluations. Assessment and Plan:  Guillian Barré syndrome: s/p IVIG. PT/OT. Symptomatic treatment. Consider gabapentin. Paroxysmal SVT: lopressor    Hyponatremia: monitor    HTN: norvasc 10, losartan 100, lopressor 25    HLD: Lipitor 10    Depression: prestiq per home regimen    Vit D deficiency: supplement    Bowels: Per protocol  Bladder: Per protocol   Sleep: Trazodone provided prn. Pain: tylenol, naproxen, ultram PRN   DVT PPx: Lovenox   ELOS: 10-14    Padmini Stout MD 7/20/2020, 9:21 AM     * This document was created using dictation software. While all precautions were taken to ensure accuracy, errors may have occurred. Please disregard any typographical errors.

## 2020-07-20 NOTE — PLAN OF CARE
Problem: Nutrition  Goal: Optimal nutrition therapy  Outcome: Ongoing     Nutrition Problem #1: No nutrition diagnosis at this time  Intervention: Food and/or Nutrient Delivery: Continue Current Diet  Nutritional Goals: Consume >50% of meals

## 2020-07-20 NOTE — PROGRESS NOTES
Admission Period/Goal QM Codes    QM Admit Code Goal Code   Eating 5 6   Oral Hygiene 5 6   Toileting Hygiene 4 6   Shower/Bathing 3 6   UB Dressing 4 6   LB Dressing 3 6   Putting on/off Footwear 2 6   Rolling Left and Right 4 6   Sit To Lying 4 6   Lying to Sitting on Bedside 4 6   Sit to Stand 4 6   Chair/Bed to Chair Transfer 4 6   Toilet Transfers 4 6   Car Transfers 4 6   Walk 10 Feet 4 6   Walk 50 Feet with Two Turns 4 6   Walk 150 Feet 88 6   Walk 10 Feet on Uneven Surfaces 4 6   1 Step (Curb) 3 6   4 Steps 88 6   12 Steps 80 6   Picking up Object from Floor 88 6   Wheel 50 Feet with 2 Turns 9 -   Type     Wheel 150 Feet 9 -   Type         Following discussion at the [place] [id number], the above codes were determined to be the patient's usual performance at admission. OT:  Jan M. Dalbert Severance OTR/L [id number], 7/21/2020, 12:31 PM       PT:  Shane Abel, [address], 7/21/2020, [address] [place] [address]     RN: MINOR Eubanks 7/20/20 2534       :  Vibha Saucedo, [address] [place] [address], 7/21/2020 0083

## 2020-07-20 NOTE — PROGRESS NOTES
Physical Therapy    Facility/Department: [facility] ACUTE REHAB UNIT  Initial Assessment    NAME: Les Yan  : 1945  MRN: [id number]    Date of Service: 2020    Discharge Recommendations:  Continue to assess pending progress, Patient would benefit from continued therapy after discharge   PT Equipment Recommendations  Equipment Needed: Yes  Mobility Devices: Deleta Fried: Rolling  Other: pt currently needing RW for safe ambulation    Assessment   Body structures, Functions, Activity limitations: Decreased functional mobility ; Decreased ADL status; Decreased balance;Decreased strength;Decreased endurance  Assessment: Pt presenting with the above deficits with B LE weakness secondary to GBS. Pt lives alone and has minimal support thus will need to be independent with functional mobility for safe discharge home. Pt currently needed a RW for ambulation and safety cues for hand placement. Moderate assistance for navigate 2 steps due to LE weakness. Continued skilled PT to promote return to PLOF. Treatment Diagnosis: decreased functional mobility, impaired gait, decreased balance, weakness  Prognosis: Good  Decision Making: Low Complexity  PT Education: PT Role;Goals;Plan of Care;Transfer Training  Patient Education: verbalized understanding  Barriers to Learning: none  REQUIRES PT FOLLOW UP: Yes  Activity Tolerance  Activity Tolerance: Patient limited by endurance; Patient limited by fatigue       Patient Diagnosis(es): There were no encounter diagnoses. has a past medical history of Anxiety, Colon polyp, Headache(784.0), Hyperlipidemia, Hypertension, and Osteoarthritis. has a past surgical history that includes Colonoscopy (8/10).     Restrictions  Restrictions/Precautions  Restrictions/Precautions: Fall Risk  Required Braces or Orthoses?: No  Position Activity Restriction  Other position/activity restrictions: : Les Yan is a 75 y.o. female presents to the ER with a complaint of bilateral extremity weakness. States that she awoke yesterday and was having difficulty walking, states that she fell down one step and sat on her bottom. She had difficulty walking throughout the day. This morning she was able to navigate the steps but fell while walking across the front room to get her coffee. Per neurology: CSF protein was high consistent with a diagnosis of Guillian Barré syndrome. Completed 5 rounds of IVIG therapy. .  Vision/Hearing  Vision: Impaired  Vision Exceptions: Wears glasses for reading  Hearing: Within functional limits     Subjective  General  Chart Reviewed: Yes  Patient assessed for rehabilitation services?: Yes  Referring Practitioner: Dr. Shelby Myers  Diagnosis: GBS  General Comment  Comments: Patient supine in bed. Subjective  Subjective: Agreed to therapy. Pain Screening  Patient Currently in Pain: Denies  Vital Signs  Patient Currently in Pain: Denies       Orientation  Orientation  Overall Orientation Status: Within Functional Limits  Social/Functional History  Social/Functional History  Lives With: Alone  Type of Home: House  Home Layout: Bed/Bath upstairs, Multi-level  Home Access: Stairs to enter with rails  Entrance Stairs - Number of Steps: 3-4 front no rail, garage 3-5 with railing  Entrance Stairs - Rails: Right  Bathroom Shower/Tub: Tub/Shower unit  Bathroom Toilet: Standard  Bathroom Equipment: Hand-held shower  Home Equipment: Crutches  ADL Assistance: Independent  Homemaking Assistance: Independent  Homemaking Responsibilities: Yes  Ambulation Assistance: Independent  Transfer Assistance: Independent  Active : Yes  Mode of Transportation: [facility]  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 No falls reported.   Cognition        Objective     Observation/Palpation  Posture: Good    PROM RLE (degrees)  RLE PROM: WFL  AROM RLE (degrees)  RLE General AROM: limited by weakness  PROM LLE (degrees)  LLE PROM: WFL  AROM LLE (degrees)  LLE General AROM: limited by weakness  Strength RLE  R Hip Flexion: 3/5  R Knee Flexion: 3+/5  R Knee Extension: 4+/5  R Ankle Dorsiflexion: 2/5  R Ankle Plantar flexion: 3-/5  Strength LLE  L Hip Flexion: 3/5  L Knee Flexion: 3+/5  L Knee Extension: 4+/5  L Ankle Dorsiflexion: 2/5  L Ankle Plantar Flexion: 3-/5     Sensation  Overall Sensation Status: Impaired  Light Touch: Partial deficits in the RLE;Partial deficits in the LLE(some numbness reported but able to feel light touch)  Bed mobility  Rolling to Left: Stand by assistance  Rolling to Right: Stand by assistance  Supine to Sit: Stand by assistance  Sit to Supine: Stand by assistance  Scooting: Stand by assistance  Comment: performed from flat bed without railing  Transfers  Sit to Stand: Contact guard assistance(vc for hand placement)  Stand to sit: Contact guard assistance(vc for hand placement)  Bed to Chair: Contact guard assistance  Stand Pivot Transfers: Contact guard assistance(with RW)  Car Transfer: Stand by assistance(vc for sitting before moving legs into car)  Comment: Pt requires frequent vc for hand placement during transfers. Ambulation  Ambulation?: Yes  Ambulation 1  Surface: level tile  Device: Rolling Walker  Assistance: Contact guard assistance  Quality of Gait: NBOS, decreased heel strike B, decreased foot clearance/step height and length  Gait Deviations: Slow Katy;Decreased step length;Decreased step height  Distance: 90 ft and 25 ft  Comments: Included directional changes.   Ambulation 2  Surface - 2: carpet  Device 2: Rolling Walker  Assistance 2: Contact guard assistance  Quality of Gait 2: same quality as above  Gait Deviations: Slow Katy;Decreased step length;Decreased step height  Distance: 15 ft  Stairs/Curb  Stairs?: Yes  Stairs  # Steps : 2  Stairs Height: 6\"  Rails: Bilateral  Curbs: (not assessed due to safety concerns)  Assistance: Moderate assistance  Comment: step-to pattern leading with L on ascent, heavy reliance on UEs and moderate assistance due to inability to support self through LEs     Balance  Sitting - Static: Good  Sitting - Dynamic: Good  Standing - Static: Fair  Standing - Dynamic: Fair;-  Comments: Pt unable to safely  item from floor level with support of RW and PT assistance. 2nd session: pt in bed at arrival, agreed to therapy, denied pain. Supine to sit with HOB elevated mod I.  STS with SBA, vc for hand placement. Ambulated 123ft with RW (same quality as above) with CGA. Alt toe taps on 4inch step with B UE support 2x5 B (difficulty with lifting RLE compared to LLE). Aeromat standing wt shifts with RW support: AP and ML wt shifts to engage ankle muscles/strategies for balance x 20 each, CGA. Seated heel slides with min A for full knee flexion range x 20 B, ankle DF within range and resisted PF x 12 B. SPT with RW wc to/from stepper with CGA. Seated stepper with BUE/LEs for strengthening x 3 minutes (small range movement only). Pt left in chair, alarm on and needs in reach. Plan   Plan  Times per week: 5 of 7 days per week for 90 minutes per day  Current Treatment Recommendations: Strengthening, Balance Training, Functional Mobility Training, Transfer Training, Gait Training, Stair training, Endurance Training, Neuromuscular Re-education  Safety Devices  Type of devices:  All fall risk precautions in place, Call light within reach, Chair alarm in place, Nurse notified, Left in chair  Restraints  Initially in place: No      Goals  Short term goals  Time Frame for Short term goals: to be met in 14-17 days  Short term goal 1: pt able to perform bed mobility modified independently  Short term goal 2: pt able to perform transfers modified independently  Short term goal 3: pt able to ambulate with LRAD 150 ft modified independently  Short term goal 4: pt able to navigate 12 steps with handrailing and modified independently  Short term goal 5: pt able to perform car transfer modified independently  Patient Goals   Patient goals : to regain leg strength       Therapy Time   Individual Concurrent Group Co-treatment   Time In 830        Time Out 930         Minutes 60         Timed Code Treatment Minutes: [address] [place] Time:   Individual Concurrent Group Co-treatment   Time In [address]         Minutes 32           Timed Code Treatment Minutes:  77    Total Treatment Minutes:  [address], [address]  Juliet Merritt

## 2020-07-21 PROCEDURE — 6360000002 HC RX W HCPCS: Performed by: PHYSICAL MEDICINE & REHABILITATION

## 2020-07-21 PROCEDURE — 6370000000 HC RX 637 (ALT 250 FOR IP): Performed by: PHYSICAL MEDICINE & REHABILITATION

## 2020-07-21 PROCEDURE — 97535 SELF CARE MNGMENT TRAINING: CPT

## 2020-07-21 PROCEDURE — 97116 GAIT TRAINING THERAPY: CPT

## 2020-07-21 PROCEDURE — 97110 THERAPEUTIC EXERCISES: CPT

## 2020-07-21 PROCEDURE — 97112 NEUROMUSCULAR REEDUCATION: CPT

## 2020-07-21 PROCEDURE — 94760 N-INVAS EAR/PLS OXIMETRY 1: CPT

## 2020-07-21 PROCEDURE — 97530 THERAPEUTIC ACTIVITIES: CPT

## 2020-07-21 PROCEDURE — 1280000000 HC REHAB R&B

## 2020-07-21 RX ADMIN — Medication 2000 UNITS: at 08:06

## 2020-07-21 RX ADMIN — OYSTER SHELL CALCIUM WITH VITAMIN D 1 TABLET: 500; 200 TABLET, FILM COATED ORAL at 19:37

## 2020-07-21 RX ADMIN — OYSTER SHELL CALCIUM WITH VITAMIN D 1 TABLET: 500; 200 TABLET, FILM COATED ORAL at 08:05

## 2020-07-21 RX ADMIN — METOPROLOL TARTRATE 25 MG: 25 TABLET, FILM COATED ORAL at 08:05

## 2020-07-21 RX ADMIN — DESVENLAFAXINE SUCCINATE 50 MG: 50 TABLET, EXTENDED RELEASE ORAL at 08:06

## 2020-07-21 RX ADMIN — LOSARTAN POTASSIUM 100 MG: 100 TABLET, FILM COATED ORAL at 08:05

## 2020-07-21 RX ADMIN — AMLODIPINE BESYLATE 10 MG: 5 TABLET ORAL at 08:05

## 2020-07-21 RX ADMIN — ENOXAPARIN SODIUM 40 MG: 40 INJECTION SUBCUTANEOUS at 08:05

## 2020-07-21 RX ADMIN — METOPROLOL TARTRATE 25 MG: 25 TABLET, FILM COATED ORAL at 19:38

## 2020-07-21 RX ADMIN — PANTOPRAZOLE SODIUM 40 MG: 40 TABLET, DELAYED RELEASE ORAL at 06:12

## 2020-07-21 RX ADMIN — MULTIPLE VITAMINS W/ MINERALS TAB 1 TABLET: TAB at 08:05

## 2020-07-21 RX ADMIN — POLYETHYLENE GLYCOL 3350 17 G: 17 POWDER, FOR SOLUTION ORAL at 14:55

## 2020-07-21 NOTE — PLAN OF CARE
Problem: Skin Integrity:  Goal: Will show no infection signs and symptoms  Description: Will show no infection signs and symptoms  7/20/2020 2320 by NASIR Hicks  Outcome: Ongoing     Problem: Falls - Risk of:  Goal: Will remain free from falls  Description: Will remain free from falls  7/20/2020 2320 by Fabiola RN  Outcome: Ongoing     Problem: Safety:  Goal: Free from accidental physical injury  Description: Free from accidental physical injury  7/20/2020 2320 by NASIR Hicks  Outcome: Ongoing     Problem: Daily Care:  Goal: Daily care needs are met  Description: Daily care needs are met  7/20/2020 2320 by NASIR Hicks  Outcome: Ongoing     Problem: Pain:  Goal: Patient's pain/discomfort is manageable  Description: Patient's pain/discomfort is manageable  7/20/2020 2320 by NASIR Hicks  Outcome: Ongoing     Problem: Nutrition  Goal: Optimal nutrition therapy  7/20/2020 2320 by NASIR Hicks  Outcome: Ongoing

## 2020-07-21 NOTE — PROGRESS NOTES
Occupational Therapy  Facility/Department: [facility] ACUTE REHAB UNIT  Daily Treatment Note  NAME: Steffi Beavers  : 1945  MRN: [id number]    Date of Service: 2020    Discharge Recommendations:  Home with assist PRN S Level 3  OT Equipment Recommendations  Equipment Needed: Yes  Mobility Devices: Walker    Assessment   Performance deficits / Impairments: Decreased functional mobility ; Decreased ADL status; Decreased endurance;Decreased high-level IADLs;Decreased strength;Decreased balance  Assessment: Pt is well below her baseline level of occupational function, based on the above deficits associated with PSVT and new GBS dx. Pt would benefit from continued skilled OT services to address these deficits. Treatment Diagnosis: Decreased ADL/IADL status, endurance, strength and balance associated with PSVT and GBS dx  Prognosis: Good  History: Pt 74 yo, lives alone, no local family to assist, tri-level, BR/BR upstairs, independent ADLs, volunteers, 1 slide to floor PTA, no other falls. Patient Education: Pt verbalized and demonstrated understanding. Needs reinforcement of transfer technique  REQUIRES OT FOLLOW UP: Yes  Activity Tolerance  Activity Tolerance: Patient limited by fatigue  Activity Tolerance: Limited by weakness in BLE and decreased endurance  Safety Devices  Safety Devices in place: Yes  Type of devices: Call light within reach; Patient at risk for falls; Bed alarm in place; Left in chair         Patient Diagnosis(es):  ISABELLA     has a past medical history of Anxiety, Colon polyp, Headache(784.0), Hyperlipidemia, Hypertension, and Osteoarthritis. has a past surgical history that includes Colonoscopy (8/10).     Restrictions  Restrictions/Precautions  Restrictions/Precautions: Fall Risk  Required Braces or Orthoses?: No  Position Activity Restriction  Other position/activity restrictions: : Steffi Beavers is a 75 y.o. female presents to the ER with a complaint of bilateral extremity weakness. States that she awoke yesterday and was having difficulty walking, states that she fell down one step and sat on her bottom. She had difficulty walking throughout the day. This morning she was able to navigate the steps but fell while walking across the front room to get her coffee. Per neurology: CSF protein was high consistent with a diagnosis of Guillian Barré syndrome. Completed 5 rounds of IVIG therapy. .  Subjective   General  Chart Reviewed: Yes  Patient assessed for rehabilitation services?: Yes  Response to previous treatment: Patient with no complaints from previous session  Family / Caregiver Present: No  Referring Practitioner: Abdi Stephenson MD, for dc planning  Diagnosis: PSVT, GBS dx  Subjective  Subjective: Pt supine in bed upon arrival, agreeable to OT session. Pt reported no pain and having a good nights sleep. Objective    ADL  Grooming: Setup;Stand by assistance   UE Dressing: Supervision;Setup  LE Dressing: Minimal assistance(required Joellen for threading underwear and donning socks.)  Toileting: Stand by assistance  Additional Comments: Pt transferred supine to sit and sit to stand with SBA. Ambulated to toilet with RW and CGA. Pt completed face washing and oral care standing at [facility] with SBA-completed remaining grooming seated EOB (lotion, deodorant, and wipes). Pt more independent with LB dressing while seated EOB, compared to shower chair. Ambulated ~100 ft to ADL room. Pt trialed tub transfer bench-Joellen for bringing leg over tub. Pt verbalized liking the bench and wanting to get one for her house. Pt educated on modifying her tub shower glass doors to accomodate the bench. Pt seated in wc with legs elevated, chair alarm on and call button to her left.          Balance  Sitting Balance: Supervision  Standing Balance: Contact guard assistance  Standing Balance  Time: ~5 minutes  Activity: standing for completing pericare and self care at [facility]  Functional Mobility  Functional - Mobility Device: Rolling Walker  Activity: To/from bathroom  Assist Level: Contact guard assistance  Functional Mobility Comments: unsteady d/t LE weakness and numbness. Verbal cues given for hand placements during transfers  Toilet Transfers  Equipment Used: Standard toilet  Toilet Transfer: Contact guard assistance  Toilet Transfers Comments: Ambulated to toilet with CGA     Transfers  Sit to stand: Stand by assistance  Stand to sit: Stand by assistance        Second Session:  Pt seated in wc upon arrival, agreeable to therapy. Pt reported no pain. Pt ambulated to toilet with RW and CGA and vc to keep walker close to body. Pt reported she felt her arms shaking when walking with walker and requested to work on UB strength. Pt ambulated ~115 ft to dining rogers. Pt completed UB exercises with 1 lb wts seated in --upward punches x10, forward punches x10, bicep curls x10. Flex bar exercises--wrist supination x10, wrist pronation x10, wrist flexion x10, wrist extension x10, radial deviation x10. Pt completed 3 sit to stand transfers in dining room to practice transfers to/from different surfaces and navigating with walker. Pt verbalized understanding. Pt would benefit from reinforcement for hand placement. Pt seated in  with chair alarm on and call button to her left.        Plan   Plan  Times per week: 90 minutes 5x/week  Times per day: Daily  Current Treatment Recommendations: Safety Education & Training, Self-Care / ADL, Endurance Training, Functional Mobility Training, Equipment Evaluation, Education, & procurement, Patient/Caregiver Education & Training, Strengthening       Goals  Short term goals  Short term goal 1: Mod I for functional transfers-not met, progressing  Short term goal 2: Mod I for functional mobility for ADL-not met, progressing  Short term goal 3: Mod I for UB ADLs-not met, progressing  Short term goal 4: Mod I for LB ADLs-not met, progressing  Short term goal 5: Mod I for IADLs-not met, progressing  Long term goals  Time Frame for Long term goals : STG=LTG       Therapy Time   Individual Concurrent Group Co-treatment   Time In 0830         Time Out 0915         Minutes 45         Timed Code Treatment Minutes: 39 Minutes       Second Session Therapy Time:   Individual Concurrent Group Co-treatment   Time In 8436         Time Out 1330         Minutes 45           Timed Code Treatment Minutes:  45 minutes     Total Treatment Minutes:  90 minutes     EDGAR Rockwell, OT   Supervised/directed by   Neville Lizarraga.  Adelia LINN/L [id number], 7/21/2020, 3:36 PM

## 2020-07-21 NOTE — PROGRESS NOTES
Andrea Davies  7/21/2020  [id number]    Chief Complaint: GBS (Guillain Cordell syndrome) ([facility])    Subjective:   No overnight events. Feels her strength is improving daily. Sensation remains limited in distal BLE. Requesting to have Lovenox shots discontinued. ROS: No CP, SOB, dyspnea    Objective:  Patient Vitals for the past 24 hrs:   BP Temp Temp src Pulse Resp SpO2 Weight   07/21/20 0800 (!) 179/77 98.1 °F (36.7 °C) Oral 62 16 97 % --   07/21/20 0445 -- -- -- -- -- -- 147 lb 12.8 oz (67 kg)   07/20/20 2016 (!) 144/60 98.2 °F (36.8 °C) Oral 72 18 92 % --   07/20/20 0957 (!) 149/70 98 °F (36.7 °C) Oral 79 16 -- --     Gen: No distress, pleasant. Resting in bed  HEENT: Normocephalic, atraumatic. CV: Regular rate and rhythm. No MRG   Resp: No respiratory distress. CTAB   Abd: Soft, nontender   Ext: No edema. Neuro: Alert, oriented, appropriately interactive. BLE 4/5 HF 5/5 KE 3/5 DF/PF. Sensation decreased to light touch distal to mid-tibia bilaterally. Laboratory data: Available via EMR. Therapy progress:  PT  Position Activity Restriction  Other position/activity restrictions: 7/11: Andrea Davies is a 75 y.o. female presents to the ER with a complaint of bilateral extremity weakness. States that she awoke yesterday and was having difficulty walking, states that she fell down one step and sat on her bottom. She had difficulty walking throughout the day. This morning she was able to navigate the steps but fell while walking across the front room to get her coffee. Per neurology: CSF protein was high consistent with a diagnosis of Guillian Barré syndrome. Completed 5 rounds of IVIG therapy.  .  Objective     Sit to Stand: Contact guard assistance(vc for hand placement)  Stand to sit: Contact guard assistance(vc for hand placement)  Bed to Chair: Contact guard assistance  Device: Rolling Walker  Assistance: Contact guard assistance  Distance: 90 ft and 25 ft  OT  PT Equipment Recommendations  Equipment Needed: Yes  Mobility Devices: Nevelyn Single: Rolling  Other: pt currently needing RW for safe ambulation  Equipment Used: Standard toilet  Toilet Transfers Comments: Ambulated to toilet with CGA  Assessment        SLP                Body mass index is 27.93 kg/m². Assessment:  Patient Active Problem List   Diagnosis    Hypertension    Hyperlipidemia    Osteopenia    Hx of eczema    Chronic idiopathic urticaria    Moderate mitral regurgitation by prior echocardiogram    Prediabetes    BMI 27.0-27.9,adult    Major depressive disorder with single episode, in full remission ([facility])    PSVT (paroxysmal supraventricular tachycardia) ([facility])    Weakness    Frequent falls    SVT (supraventricular tachycardia) ([facility])    GBS (Guillain [place] syndrome) ([facility])       Plan:   Guillian Barré syndrome: s/p IVIG. PT/OT. Symptomatic treatment. Consider gabapentin.     Paroxysmal SVT: lopressor     Hyponatremia: monitor     HTN: norvasc 10, losartan 100, lopressor 25     HLD: Lipitor 10     Depression: prestiq per home regimen     Vit D deficiency: supplement     Bowels: Per protocol  Bladder: Per protocol   Sleep: Trazodone provided prn. Pain: tylenol, naproxen, ultram PRN   DVT PPx: Lovenox     Doris Murray MD 7/21/2020, 9:08 AM    * This document was created using dictation software. While all precautions were taken to ensure accuracy, errors may have occurred. Please disregard any typographical errors.

## 2020-07-21 NOTE — PLAN OF CARE
Problem: Falls - Risk of:  Goal: Will remain free from falls  Description: Will remain free from falls  7/21/2020 1038 by Ralph Conley RN  Outcome: Ongoing  Note: Pt remains free from falls. Safety precautions in place. Bed in lowest position, bed/chair wheels locked, call light with in reach, bed/chair alarm on, fall risk wrist band on, SAFE outside of doorway. Will continue to monitor.

## 2020-07-21 NOTE — PATIENT CARE CONFERENCE
FIMS:       [place] Fall Risk Score:65  Wounds/Incisions/Ulcers: none  Medication Review: with patient  Pain: denies  Consultations/Labs/X-rays:CBC       BMP                .

## 2020-07-21 NOTE — PROGRESS NOTES
Physical Therapy  Facility/Department: [facility] ACUTE REHAB UNIT  Daily Treatment Note  NAME: Gustavo Castanon  : 1945  MRN: [id number]    Date of Service: 2020    Discharge Recommendations:  Continue to assess pending progress, Patient would benefit from continued therapy after discharge   PT Equipment Recommendations  Equipment Needed: Yes  Stephan [place]: Rolling  Other: pt currently needing RW for safe ambulation    Assessment   Body structures, Functions, Activity limitations: Decreased functional mobility ; Decreased ADL status; Decreased balance;Decreased strength;Decreased endurance  Assessment: Pt progressing with functional mobility walking longer distances and needing less assistance. More strength noted with fwd step ups onto 4inch block. Continued skilled PT to promote return to PLOF. Treatment Diagnosis: decreased functional mobility, impaired gait, decreased balance, weakness  Prognosis: Good  PT Education: PT Role;Goals;Plan of Care;Transfer Training  Patient Education: verbalized understanding  Barriers to Learning: none  REQUIRES PT FOLLOW UP: Yes  Activity Tolerance  Activity Tolerance: Patient Tolerated treatment well     Patient Diagnosis(es): There were no encounter diagnoses. has a past medical history of Anxiety, Colon polyp, Headache(784.0), Hyperlipidemia, Hypertension, and Osteoarthritis. has a past surgical history that includes Colonoscopy (8/10). Restrictions  Restrictions/Precautions  Restrictions/Precautions: Fall Risk  Required Braces or Orthoses?: No  Position Activity Restriction  Other position/activity restrictions: : Gustavo Castanon is a 75 y.o. female presents to the ER with a complaint of bilateral extremity weakness. States that she awoke yesterday and was having difficulty walking, states that she fell down one step and sat on her bottom. She had difficulty walking throughout the day.   This morning she was able to navigate the steps but fell while

## 2020-07-22 PROCEDURE — 97535 SELF CARE MNGMENT TRAINING: CPT

## 2020-07-22 PROCEDURE — 97530 THERAPEUTIC ACTIVITIES: CPT

## 2020-07-22 PROCEDURE — 97110 THERAPEUTIC EXERCISES: CPT

## 2020-07-22 PROCEDURE — 97116 GAIT TRAINING THERAPY: CPT

## 2020-07-22 PROCEDURE — 93228 REMOTE 30 DAY ECG REV/REPORT: CPT | Performed by: INTERNAL MEDICINE

## 2020-07-22 PROCEDURE — 6370000000 HC RX 637 (ALT 250 FOR IP): Performed by: PHYSICAL MEDICINE & REHABILITATION

## 2020-07-22 PROCEDURE — 1280000000 HC REHAB R&B

## 2020-07-22 PROCEDURE — 97112 NEUROMUSCULAR REEDUCATION: CPT

## 2020-07-22 RX ADMIN — LOSARTAN POTASSIUM 100 MG: 100 TABLET, FILM COATED ORAL at 09:29

## 2020-07-22 RX ADMIN — AMLODIPINE BESYLATE 10 MG: 5 TABLET ORAL at 09:29

## 2020-07-22 RX ADMIN — DESVENLAFAXINE SUCCINATE 50 MG: 50 TABLET, EXTENDED RELEASE ORAL at 09:32

## 2020-07-22 RX ADMIN — OYSTER SHELL CALCIUM WITH VITAMIN D 1 TABLET: 500; 200 TABLET, FILM COATED ORAL at 20:39

## 2020-07-22 RX ADMIN — METOPROLOL TARTRATE 25 MG: 25 TABLET, FILM COATED ORAL at 10:57

## 2020-07-22 RX ADMIN — MULTIPLE VITAMINS W/ MINERALS TAB 1 TABLET: TAB at 09:29

## 2020-07-22 RX ADMIN — POLYETHYLENE GLYCOL 3350 17 G: 17 POWDER, FOR SOLUTION ORAL at 09:29

## 2020-07-22 RX ADMIN — ATORVASTATIN CALCIUM 10 MG: 10 TABLET, FILM COATED ORAL at 09:29

## 2020-07-22 RX ADMIN — PANTOPRAZOLE SODIUM 40 MG: 40 TABLET, DELAYED RELEASE ORAL at 05:06

## 2020-07-22 RX ADMIN — OYSTER SHELL CALCIUM WITH VITAMIN D 1 TABLET: 500; 200 TABLET, FILM COATED ORAL at 09:29

## 2020-07-22 RX ADMIN — Medication 2000 UNITS: at 09:29

## 2020-07-22 RX ADMIN — DOCUSATE SODIUM 50MG AND SENNOSIDES 8.6MG 2 TABLET: 8.6; 5 TABLET, FILM COATED ORAL at 09:29

## 2020-07-22 RX ADMIN — METOPROLOL TARTRATE 25 MG: 25 TABLET, FILM COATED ORAL at 20:39

## 2020-07-22 ASSESSMENT — PAIN SCALES - GENERAL
PAINLEVEL_OUTOF10: 0

## 2020-07-22 ASSESSMENT — PAIN DESCRIPTION - PAIN TYPE: TYPE: ACUTE PAIN

## 2020-07-22 NOTE — PROGRESS NOTES
Occupational Therapy  Facility/Department: [facility] ACUTE REHAB UNIT  Daily Treatment Note  NAME: Huber Mendieta  : 1945  MRN: [id number]    Date of Service: 2020    Discharge Recommendations:  Home with assist PRN S Level 3   OT Equipment Recommendations  Equipment Needed: Yes  Mobility Devices: Walker       Assessment   Performance deficits / Impairments: Decreased functional mobility ; Decreased ADL status; Decreased endurance;Decreased high-level IADLs;Decreased strength;Decreased balance  Assessment: Pt is well below her baseline level of occupational function, based on the above deficits associated with PSVT and new GBS dx. Pt would benefit from continued skilled OT services to address these deficits. Treatment Diagnosis: Decreased ADL/IADL status, endurance, strength and balance associated with PSVT and GBS dx  Prognosis: Good  History: Pt 74 yo, lives alone, no local family to assist, tri-level, BR/BR upstairs, independent ADLs, volunteers, 1 slide to floor PTA, no other falls. OT Education: OT Role;Plan of Care;Transfer Training;Equipment;Home Exercise Program  Patient Education: Pt verbalized and demonstrated understanding. Needs reinforcement of transfer technique  REQUIRES OT FOLLOW UP: Yes  Activity Tolerance  Activity Tolerance: Patient limited by fatigue  Activity Tolerance: Limited by weakness in BLE and decreased endurance  Safety Devices  Safety Devices in place: Yes  Type of devices: Call light within reach; Patient at risk for falls; Bed alarm in place; Left in chair         Patient Diagnosis(es): GBS     has a past medical history of Anxiety, Colon polyp, Headache(784.0), Hyperlipidemia, Hypertension, and Osteoarthritis. has a past surgical history that includes Colonoscopy (8/10).     Restrictions  Restrictions/Precautions  Restrictions/Precautions: Fall risk   Required Braces or Orthoses?: No  Position Activity Restriction  Other position/activity restrictions: : [facility] standing for completing self care at sink and during kitchen task  Functional Mobility  Functional - Mobility Device: Rolling Walker  Activity: To/from bathroom; To/From therapy gym  Assist Level: Contact guard assistance  Functional Mobility Comments: unsteady d/t LE weakness and numbness. Verbal cues given for hand placements during transfers  Toilet Transfers  Equipment Used: Standard toilet  Toilet Transfer: Contact guard assistance  Toilet Transfers Comments: Ambulated to toilet with CGA     Transfers  Sit to stand: Stand by assistance  Stand to sit: Stand by assistance  Transfer Comments: Pt more abrupt during stand to sit transfers (to toilet, wc, and chair) Verbal cues for pacing and hand placement         Second Session:  Pt seated in wc upon arrival, agreeable to shower for OT session. Ambulated to toilet with RW and CGA. Toileted with supervision. Pt doffed UB and LB clothing seated in shower chair and standing with use of grab bar and SBA. UB bathing: supervision, setup  LB bathing: Joellen for reaching feet  UB dressing: setup, supervision  LB dressing: modA threading underwear and pants and donning socks, Joellen for pulling up pants with one hand while holding grab bar with other  Pt's activity tolerance improving, LB dressing still limited by LE weakness. Pt completed grooming seated in wc. Pt ambulated ~115 feet to dining room to complete UB exercises with 3 lb weight while seated in chair. R forward punches x10, L forward punches x10, R bicep curls x10, L bicep curls x10, R upward punches x10, L upward punches x10, and 10 alternating overhead side bend stretches (with no weight). Pt seated in wc with chair alarm on, call button to left and all other needs met.             Plan   Plan  Times per week: 90 minutes 5x/week  Times per day: Daily  Current Treatment Recommendations: Safety Education & Training, Self-Care / ADL, Endurance Training, Functional Mobility Training, Equipment Evaluation, Education, & procurement, Patient/Caregiver Education & Training, Strengthening    Goals  Short term goals  Short term goal 1: Mod I for functional transfers-not met, progressing  Short term goal 2: Mod I for functional mobility for ADL-not met, progressing  Short term goal 3: Mod I for UB ADLs-not met, progressing  Short term goal 4: Mod I for LB ADLs-not met, progressing  Short term goal 5: Mod I for IADLs-not met, progressing  Long term goals  Time Frame for Long term goals : STG=LTG       Therapy Time   Individual Concurrent Group Co-treatment   Time In 0930         Time Out 1000         Minutes 30         Timed Code Treatment Minutes: 30 Minutes      Second Session Therapy Time:   Individual Concurrent Group Co-treatment   Time In 1330         Time Out 1430         Minutes 60           Timed Code Treatment Minutes:  60 minutes    Total Treatment Minutes:  90 minutes     EDGAR Hodge OT   Supervised/directed Valerio Leung OTR/L [id number], 7/22/2020, 2:55 PM

## 2020-07-22 NOTE — PATIENT CARE CONFERENCE
[facility]  Inpatient Rehabilitation  Weekly Team Conference Note    Patient Name: Scarlette Pallas        MRN: [id number]    : 1945  (68 y.o.)  Gender: female   Referring Practitioner: Dr. Mata Mejia  Diagnosis: GBS    The team conference for this patient was held on 20 at 11:00am by:  Nohelia Sparks MD    CASE MANAGEMENT:  Assessment: Patient lives at home alone. Patient does not receive home care. Patient has crutches at home already. Patient has advance directives in place. PSYCHOLOGY:  Assessment:     PHYSICAL THERAPY:    Bed Mobility:   Scooting: Stand by assistance    Transfers:  Sit to Stand: Stand by assistance  Stand to sit: Stand by assistance  Bed to Chair: Contact guard assistance  Comment: 1st session: see functional mobility details above. Use of bathroom twice during session with SBA while standing to manage LB dressing and intermittent use of 1 UE on grab bar or RW for support. SBA at sink for washing hands. Safety cues when transitioning commode to sink because pt reaches for sink instead of keeping walker with her. Fwd step up onto 4inch step stool with RW support x 5 B and min A (retro step down with decreased eccentric control). Ballet bar: lateral side steps 8 ft x 2 lengths B with tc at hips/gluts. Seated stepper x 5 minutes with BUE/LEs for strengthening. Additional 120 ft of ambulation back to room with RW. Left in chair alarm on and needs in reach. 2nd session:Pt in wc at arrival. Requesting to use bathroom. No pain reported. SBA while in bathroom for toilet transfer, pericare, and LB dressing. Pt ambulate 123 ft with RW to gym with CGA to SBA. STS from elevated chair without UE support x 10 reps and min A for 1st rep, CGA for others. Aeromat standing activities: alt arm raise x 10 B, cross body reach x 10 B, marching in place 3x30\", mini squats x 10 (like UE support of 1 UE as needed on RW).  Pt retuned to room ambulating 118 ft with RW and returned to bed with supervision. Bed alarm on and needs in reach. Ambulation 1  Surface: level tile  Device: Rolling Walker  Assistance: Stand by assistance, Contact guard assistance  Quality of Gait: NBOS, decreased heel strike B, decreased foot clearance/step height and length  Gait Deviations: Slow Katy, Decreased step length, Decreased step height  Distance: 85 ft, 77 ft, and 121 ft  Comments: brief seated rest breaks needed, straight pathway    Stairs  # Steps : 2  Stairs Height: 6\"  Rails: Bilateral  Curbs: (not assessed due to safety concerns)  Assistance:  Moderate assistance  Comment: step-to pattern leading with L on ascent, heavy reliance on UEs and moderate assistance due to inability to support self through LEs    QM:  Roll Left and Right  Assistance Needed: Independent  CARE Score: 6  Discharge Goal: Independent  Sit to Lying  Assistance Needed: Independent  CARE Score: 6  Discharge Goal: Independent  Lying to Sitting on Side of Bed  Assistance Needed: Independent  CARE Score: 6  Discharge Goal: Independent  Sit to Stand  Assistance Needed: Independent  Comment: steady used   CARE Score: 6  Discharge Goal: Independent  Chair/Bed-to-Chair Transfer  Assistance Needed: Supervision or touching assistance  CARE Score: 4  Discharge Goal: Independent  Car Transfer  Assistance Needed: Supervision or touching assistance  CARE Score: 4  Discharge Goal: Independent  Walk 10 Feet  Assistance Needed: Supervision or touching assistance  CARE Score: 4  Discharge Goal: Independent  Walk 50 Feet with Two Turns  Assistance Needed: Supervision or touching assistance  CARE Score: 4  Discharge Goal: Independent  Walk 150 Feet  Reason if not Attempted: Not attempted due to medical condition or safety concerns  CARE Score: 88  Discharge Goal: Independent  Walking 10 Feet on Uneven Surfaces  Assistance Needed: Supervision or touching assistance  CARE Score: 4  Discharge Goal: Independent  1 Step (Curb)  Assistance Needed: Partial/moderate assistance  CARE Score: 3  Discharge Goal: Independent  4 Steps  Reason if not Attempted: Not attempted due to medical condition or safety concerns  CARE Score: 88  Discharge Goal: Independent  12 Steps  Reason if not Attempted: Not attempted due to medical condition or safety concerns  CARE Score: 88  Discharge Goal: Independent  Picking Up Object  Reason if not Attempted: Not attempted due to medical condition or safety concerns  CARE Score: 88  Discharge Goal: Independent       SPEECH THERAPY:    Diet Level:DIET GENERAL; No Added Salt (3-4 GM)    Assessment:         OCCUPATIONAL THERAPY:    ADL:   ADL  Grooming: Stand by assistance(standing at sink-oral care and face washing)  UE Bathing: Supervision, Setup  LE Bathing: Minimal assistance  UE Dressing: Supervision, Setup  LE Dressing: Minimal assistance(required Joellen for threading underwear and donning socks.)  Toileting: Stand by assistance(use of L grab bar when completing pericare and donning pants)  Additional Comments: Pt sit to stand from wc with SBA. Ambulated to toilet with RW and CGA. Pt voided bowel and urine. Pt completed face washing and oral care standing at sink with SBA. Pt ambulated ~115 feet to dining room-requiring seated rest break before participating in functional kitchen task. Pt demonstrated good dynamic balance and increased UE strength when retreiving 3lb and 2 lb weights from varying heights (microwave, cabinets, oven, fridge). Pt completed activity twice, requiring one seated rest break. Min verbal cues for navigating walker and hand placement on counter for increased safety. Toilet Transfers: Toilet Transfers  Equipment Used: Standard toilet  Toilet Transfer: Contact guard assistance  Toilet Transfers Comments: Ambulated to toilet with CGA    Tub/ShowerTransfers:     Shower Transfers  Shower - Transfer From: Markus Ramires - Transfer Type: To and From  Shower - Transfer To:  Shower seat with back  Shower - Technique: Ambulating  Shower Transfers: Contact Guard    QM:  Eating  Assistance Needed: Independent  CARE Score: 6  Discharge Goal: Independent  Oral Hygiene  Assistance Needed: Setup or clean-up assistance  CARE Score: 5  Discharge Goal: Independent  Toileting Hygiene  Assistance Needed: Independent  CARE Score: 6  Discharge Goal: Independent  Toilet Transfer  Assistance Needed: Supervision or touching assistance  Comment: steady x 1  CARE Score: 4  Discharge Goal: Independent  Shower/Bathe Self  Assistance Needed: Partial/moderate assistance  Comment: min A LB bathing  CARE Score: 3  Discharge Goal: Independent  Upper Body Dressing  Assistance Needed: Supervision or touching assistance  CARE Score: 4  Discharge Goal: Independent  Lower Body Dressing  Assistance Needed: Supervision or touching assistance  CARE Score: 4  Discharge Goal: Independent  Putting On/Taking Off Footwear  Assistance Needed: Substantial/maximal assistance  CARE Score: 2  Discharge Goal: Independent    NUTRITION:  Weight: 146 lb (66.2 kg) / Body mass index is 27.59 kg/m². Diet Order: KAYY  Supplements: n/a  PO intake good, consuming 75% or greater at each meal.  Please see nutrition note for details.     NURSING:  FIMS:       Ferguson Fall Risk Score: 65  Wounds/Incisions/Ulcers: none  Medication Review: Reviewed daily with patient  Pain:Denies  Consultations/Labs/X-rays:Last Na+ 133          Risk for Readmission: 9%  Critical Items: If High Risk, consider the following recommendations:Patient not at high risk    Patient/Family Education provided by team:    Discharge Plan   Estimated Length of Stay:12 days  Destination: home health  Pass:No  Services at Discharge: HHOT/PT s3  Equipment at Discharge: tub transfer bench, RW currently but will continue to assess  Factors facilitating achievement of predicted outcomes: PLOF  Barriers to the achievement of predicted outcomes:lives alone/limited support   Patient Goals:to regain leg strength, Rehab Team Members in attendance for Team Conference:  Benjy Birmingham, MSW, LSW  Rakesh Magallon, RD, LD    Opp, [place]. D, Neuropsychologist    Figueroa Tee OTR/IDRIS Blackwell PT, DPT    Mikael Waters RN, BSN    Michael Morton, [address] [place] [address],     I approve the established interdisciplinary plan of care as documented within the [facility] of [facility].     Jud Limon MD

## 2020-07-22 NOTE — PROGRESS NOTES
Tita Pitts  7/22/2020  [id number]    Chief Complaint: GBS (Guillain Silex syndrome) ([facility])    Subjective:   No overnight events. Feels her strength is improving daily. Dysesthesias improving in BLE per her report. Making good progress in therapy daily    ROS: No CP, SOB, dyspnea    Objective:  Patient Vitals for the past 24 hrs:   BP Temp Temp src Pulse Resp SpO2 Weight   07/22/20 0611 -- -- -- -- -- -- 146 lb (66.2 kg)   07/21/20 1930 133/62 97.8 °F (36.6 °C) Oral 65 16 96 % --     Gen: No distress, pleasant. Resting in bed  HEENT: Normocephalic, atraumatic. CV: Regular rate and rhythm. No MRG   Resp: No respiratory distress. CTAB   Abd: Soft, nontender   Ext: No edema. Neuro: Alert, oriented, appropriately interactive. BLE 4+/5 HF 5/5 KE 4-/5 DF/PF. Sensation decreased to light touch distal to mid-tibia bilaterally. Laboratory data: Available via EMR. Therapy progress:  PT  Position Activity Restriction  Other position/activity restrictions: 7/11: Tita Pitts is a 75 y.o. female presents to the ER with a complaint of bilateral extremity weakness. States that she awoke yesterday and was having difficulty walking, states that she fell down one step and sat on her bottom. She had difficulty walking throughout the day. This morning she was able to navigate the steps but fell while walking across the front room to get her coffee. Per neurology: CSF protein was high consistent with a diagnosis of Guillian Barré syndrome. Completed 5 rounds of IVIG therapy.  .  Objective     Sit to Stand: Stand by assistance  Stand to sit: Stand by assistance  Bed to Chair: Contact guard assistance  Device: Rolling Walker  Assistance: Stand by assistance, Contact guard assistance  Distance: 85 ft, 77 ft, and 121 ft  OT  PT Equipment Recommendations  Equipment Needed: Yes  Mobility Devices: Wesley Kirk: Ai  Other: pt currently needing RW for safe ambulation  Equipment Used: Standard toilet  Toilet

## 2020-07-22 NOTE — PROGRESS NOTES
Physical Therapy  Facility/Department: [facility] ACUTE REHAB UNIT  Daily Treatment Note  NAME: Andrea Davies  : 1945  MRN: [id number]    Date of Service: 2020    Discharge Recommendations:  HOME HEALTH CARE: LEVEL 3 SAFETY     - Initial home health evaluation to occur within 24-48 hours, in patient home   - Therapy evaluations in home within 24-48 hours of discharge; including DME and home safety   - Frontload therapy 5 days, then 3x a week   - Therapy to evaluate if patient has [address] needs for personal care   -  evaluation within 24-48 hours, includes evaluation of resources and insurance to determine [place], [place], LTC, and Medicaid options   Continue to assess pending progress, Patient would benefit from continued therapy after discharge   PT Equipment Recommendations  Equipment Needed: Yes  Walker: Rolling  Other: pt currently needing RW for safe ambulation    Assessment   Body structures, Functions, Activity limitations: Decreased functional mobility ; Decreased ADL status; Decreased balance;Decreased strength;Decreased endurance  Assessment: Pt progressing with functional mobility walking longer distances and needing less assistance. More strength and endurance noted with stair climbing. Continued skilled PT to promote return to PLOF. Treatment Diagnosis: decreased functional mobility, impaired gait, decreased balance, weakness  Prognosis: Good  Decision Making: Low Complexity  PT Education: PT Role;Goals;Plan of Care;Transfer Training  Patient Education: verbalized understanding  Barriers to Learning: none  REQUIRES PT FOLLOW UP: Yes  Activity Tolerance  Activity Tolerance: Patient Tolerated treatment well     Patient Diagnosis(es): There were no encounter diagnoses. has a past medical history of Anxiety, Colon polyp, Headache(784.0), Hyperlipidemia, Hypertension, and Osteoarthritis.    has a past surgical history that includes Colonoscopy (8/10). Restrictions  Restrictions/Precautions  Restrictions/Precautions: Fall Risk  Required Braces or Orthoses?: No  Position Activity Restriction  Other position/activity restrictions: 7/11: Lencho Chandler is a 75 y.o. female presents to the ER with a complaint of bilateral extremity weakness. States that she awoke yesterday and was having difficulty walking, states that she fell down one step and sat on her bottom. She had difficulty walking throughout the day. This morning she was able to navigate the steps but fell while walking across the front room to get her coffee. Per neurology: CSF protein was high consistent with a diagnosis of Guillian Barré syndrome. Completed 5 rounds of IVIG therapy. .  Subjective   General  Chart Reviewed: Yes  Response To Previous Treatment: Patient with no complaints from previous session. Family / Caregiver Present: No  Referring Practitioner: Dr. Amilcar Grier  Subjective  Subjective: Agreed to therapy. No new concerns. Denies pain. General Comment  Comments: sitting EOB with RN. Asked to use restroom          Orientation  Orientation  Overall Orientation Status: Within Normal Limits  Cognition      Objective      Transfers  Sit to Stand: Stand by assistance(3x)  Stand to sit: Stand by assistance(3x)  Comment: use of grab bar in bathroom; Ambulation  Ambulation?: Yes  More Ambulation?: Yes  Ambulation 1  Surface: level tile  Device: Rolling Walker  Assistance: Stand by assistance;Contact guard assistance  Quality of Gait: NBOS, decreased heel strike B, decreased foot clearance/step height and length  Gait Deviations: Slow Katy;Decreased step length;Decreased step height  Distance: 212' +12'+ 200'    Ascend/descend 4 steps B HR Min/Mod A requiring increased time utilizing a step to pattern.      Balance  Posture: Good  Sitting - Static: Good  Sitting - Dynamic: Good  Standing - Static: Fair;+  Standing - Dynamic: Fair;+  Comments: able to sit and stand SBA for donning of pants, brushing teeth, washing hands and face. Exercises  Knee Long Arc Quad: x10 B 3 sec holds  Ankle Pumps: x10 B  Comments: // bars: lunges 5x CGA, single leg stance CGA 10x no longer than 10 seconds         2nd session:  Pt sitting in wc at arrival. Denied pain. Agreed to therapy. STS with SBA. Ambulated 160 ft with RW and SBA (same quality as above). Hurst railing ambulation (single UE support) 25 ft x 4 lengths with CGA to SBA (tc at hips intermittently). Seated stepper x 6 minutes with BUE/LEs for strengthening. Repeat fwd 6 inch step ups 2x5 B with min A (retro step back) with B UE support. Ballet bar foam surface with BUE support: marching x 20 B, hip abd 2x10 B, AP rocks (unable to perform HR/TR yet) x 20. Pt ambulated 113 ft back to room with RW. In wc, alarm on and needs in reach. Goals  Short term goals  Time Frame for Short term goals: to be met in 14-17 days  Short term goal 1: pt able to perform bed mobility modified independently  Short term goal 2: pt able to perform transfers modified independently  Short term goal 3: pt able to ambulate with LRAD 150 ft modified independently  Short term goal 4: pt able to navigate 12 steps with handrailing and modified independently  Short term goal 5: pt able to perform car transfer modified independently  Patient Goals   Patient goals : to regain leg strength    Plan    Plan  Times per week: 5 of 7 days per week for 90 minutes per day  Times per day: Daily  Current Treatment Recommendations: Strengthening, Balance Training, Functional Mobility Training, Transfer Training, Gait Training, Stair training, Endurance Training, Neuromuscular Re-education  Safety Devices  Type of devices:  All fall risk precautions in place, Call light within reach, Chair alarm in place, Nurse notified, Left in chair  Restraints  Initially in place: No     Therapy Time   Individual Concurrent Group Co-treatment   Time In 0838         Time Out 0923         Minutes 45 Timed Code Treatment Minutes: 39 Minutes     Second Session Therapy Time:   Individual Concurrent Group Co-treatment   Time In 6783         Time Out 1202         Minutes 45           Timed Code Treatment Minutes:  90    Total Treatment Minutes:  [address] [place], [place] [address]  Ginette García PT, DPT [id number]  PT agrees with above note.      2nd session: Ginette García PT, DPT [id number]

## 2020-07-22 NOTE — PLAN OF CARE
Problem: Skin Integrity:  Goal: Will show no infection signs and symptoms  Description: Will show no infection signs and symptoms  Outcome: Ongoing  Note: No signs or symptoms of infection     Problem: Skin Integrity:  Goal: Absence of new skin breakdown  Description: Absence of new skin breakdown  Outcome: Ongoing  Note: No new skin breakdown identified. Problem: Falls - Risk of:  Goal: Will remain free from falls  Description: Will remain free from falls  Outcome: Ongoing  Note: Fall risk precautions in place, call light in reach, bed in lowest position, 2/2 bed rails up, bed wheels locked, bed side table within reach, bed alarm on, will continue to monitor. Problem: Falls - Risk of:  Goal: Absence of physical injury  Description: Absence of physical injury  Outcome: Ongoing  Note: Pt is free of injury. No injury noted. Fall precautions in place. Call light within reach. Will monitor. Problem: Safety:  Goal: Free from accidental physical injury  Description: Free from accidental physical injury  Outcome: Ongoing  Note: No injury this shift. Problem: Pain:  Goal: Patient's pain/discomfort is manageable  Description: Patient's pain/discomfort is manageable  Outcome: Ongoing  Note: No complaints of pain this shift. Will continue to assess and monitor.

## 2020-07-23 PROCEDURE — 6370000000 HC RX 637 (ALT 250 FOR IP): Performed by: PHYSICAL MEDICINE & REHABILITATION

## 2020-07-23 PROCEDURE — 97530 THERAPEUTIC ACTIVITIES: CPT

## 2020-07-23 PROCEDURE — 97535 SELF CARE MNGMENT TRAINING: CPT

## 2020-07-23 PROCEDURE — 97110 THERAPEUTIC EXERCISES: CPT

## 2020-07-23 PROCEDURE — 97116 GAIT TRAINING THERAPY: CPT

## 2020-07-23 PROCEDURE — 1280000000 HC REHAB R&B

## 2020-07-23 RX ADMIN — NAPROXEN 500 MG: 250 TABLET ORAL at 21:32

## 2020-07-23 RX ADMIN — DESVENLAFAXINE SUCCINATE 50 MG: 50 TABLET, EXTENDED RELEASE ORAL at 09:34

## 2020-07-23 RX ADMIN — METOPROLOL TARTRATE 25 MG: 25 TABLET, FILM COATED ORAL at 21:32

## 2020-07-23 RX ADMIN — POLYETHYLENE GLYCOL 3350 17 G: 17 POWDER, FOR SOLUTION ORAL at 09:28

## 2020-07-23 RX ADMIN — Medication 2000 UNITS: at 09:28

## 2020-07-23 RX ADMIN — AMLODIPINE BESYLATE 10 MG: 5 TABLET ORAL at 09:27

## 2020-07-23 RX ADMIN — DOCUSATE SODIUM 50MG AND SENNOSIDES 8.6MG 2 TABLET: 8.6; 5 TABLET, FILM COATED ORAL at 09:27

## 2020-07-23 RX ADMIN — LOSARTAN POTASSIUM 100 MG: 100 TABLET, FILM COATED ORAL at 09:27

## 2020-07-23 RX ADMIN — PANTOPRAZOLE SODIUM 40 MG: 40 TABLET, DELAYED RELEASE ORAL at 06:07

## 2020-07-23 RX ADMIN — METOPROLOL TARTRATE 25 MG: 25 TABLET, FILM COATED ORAL at 09:27

## 2020-07-23 RX ADMIN — OYSTER SHELL CALCIUM WITH VITAMIN D 1 TABLET: 500; 200 TABLET, FILM COATED ORAL at 09:27

## 2020-07-23 RX ADMIN — OYSTER SHELL CALCIUM WITH VITAMIN D 1 TABLET: 500; 200 TABLET, FILM COATED ORAL at 21:32

## 2020-07-23 RX ADMIN — MULTIPLE VITAMINS W/ MINERALS TAB 1 TABLET: TAB at 09:27

## 2020-07-23 ASSESSMENT — PAIN SCALES - GENERAL
PAINLEVEL_OUTOF10: 0
PAINLEVEL_OUTOF10: 5
PAINLEVEL_OUTOF10: 5
PAINLEVEL_OUTOF10: 0
PAINLEVEL_OUTOF10: 5
PAINLEVEL_OUTOF10: 3

## 2020-07-23 ASSESSMENT — PAIN DESCRIPTION - PROGRESSION
CLINICAL_PROGRESSION: NOT CHANGED
CLINICAL_PROGRESSION: GRADUALLY WORSENING

## 2020-07-23 ASSESSMENT — PAIN DESCRIPTION - LOCATION
LOCATION: HIP;GROIN
LOCATION: HIP;GROIN

## 2020-07-23 ASSESSMENT — PAIN DESCRIPTION - FREQUENCY
FREQUENCY: INTERMITTENT
FREQUENCY: INTERMITTENT

## 2020-07-23 ASSESSMENT — PAIN DESCRIPTION - ONSET
ONSET: GRADUAL
ONSET: GRADUAL

## 2020-07-23 ASSESSMENT — PAIN DESCRIPTION - ORIENTATION
ORIENTATION: RIGHT
ORIENTATION: RIGHT

## 2020-07-23 ASSESSMENT — PAIN DESCRIPTION - PAIN TYPE
TYPE: ACUTE PAIN
TYPE: ACUTE PAIN

## 2020-07-23 NOTE — PLAN OF CARE
Problem: Skin Integrity:  Goal: Will show no infection signs and symptoms  Description: Will show no infection signs and symptoms  7/23/2020 1016 by Marilyn Hernandez RN  Outcome: Ongoing  Note: No signs or symptoms of infection     Problem: Skin Integrity:  Goal: Absence of new skin breakdown  Description: Absence of new skin breakdown  7/23/2020 1016 by Marilyn Hernandez RN  Outcome: Ongoing  Note: No new skin breakdown noted. Problem: Falls - Risk of:  Goal: Will remain free from falls  Description: Will remain free from falls  7/23/2020 1016 by Marilyn Hernandez RN  Outcome: Ongoing  Note: Fall risk precautions in place, call light in reach, bed in lowest position, 2/2 bed rails up, bed wheels locked, bed side table within reach, bed alarm on, will continue to monitor. Problem: Falls - Risk of:  Goal: Absence of physical injury  Description: Absence of physical injury  7/23/2020 1016 by Marilyn Hernandez RN  Outcome: Ongoing  Note: No injuries this shift. Problem: Pain:  Goal: Patient's pain/discomfort is manageable  Description: Patient's pain/discomfort is manageable  7/23/2020 1016 by Marilyn Hernandez RN  Outcome: Ongoing  Note: No complaints of pain this shift.

## 2020-07-23 NOTE — PLAN OF CARE
Problem: Skin Integrity:  Goal: Will show no infection signs and symptoms  Description: Will show no infection signs and symptoms  Outcome: Ongoing  Goal: Absence of new skin breakdown  Description: Absence of new skin breakdown  Outcome: Ongoing     Problem: Falls - Risk of:  Goal: Will remain free from falls  Description: Will remain free from falls  Outcome: Ongoing  Goal: Absence of physical injury  Description: Absence of physical injury  Outcome: Ongoing     Problem: Safety:  Goal: Free from accidental physical injury  Description: Free from accidental physical injury  Outcome: Ongoing  Goal: Free from intentional harm  Description: Free from intentional harm  Outcome: Ongoing     Problem: Daily Care:  Goal: Daily care needs are met  Description: Daily care needs are met  Outcome: Ongoing     Problem: Pain:  Goal: Patient's pain/discomfort is manageable  Description: Patient's pain/discomfort is manageable  Outcome: Ongoing     Problem: Discharge Planning:  Goal: Patients continuum of care needs are met  Description: Patients continuum of care needs are met  Outcome: Ongoing     Problem: Nutrition  Goal: Optimal nutrition therapy  Outcome: Ongoing

## 2020-07-23 NOTE — PROGRESS NOTES
Physical Therapy  Facility/Department: [facility] ACUTE REHAB UNIT  Daily Treatment Note  NAME: Millie Riedel  : 1945  MRN: [id number]    Date of Service: 2020    Discharge Recommendations:    HOME HEALTH CARE: LEVEL 3 SAFETY     - Initial home health evaluation to occur within 24-48 hours, in patient home   - Therapy evaluations in home within 24-48 hours of discharge; including DME and home safety   - Frontload therapy 5 days, then 3x a week   - Therapy to evaluate if patient has [address] needs for personal care   -  evaluation within 24-48 hours, includes evaluation of resources and insurance to determine [place], [place], LTC, and Medicaid options     Continue to assess pending progress, Patient would benefit from continued therapy after discharge      PT Equipment Recommendations  Equipment Needed: Yes  Walker: Rolling  Other: pt currently needing RW for safe ambulation    Assessment    Body structures, Functions, Activity limitations: Decreased functional mobility ; Decreased ADL status; Decreased balance;Decreased strength;Decreased endurance  Assessment: Pt progressing with functional mobility walking longer distances and needing less assistance. Pt requiring less seated rest breaks throughout session. Continued skilled PT to promote return to PLOF. Treatment Diagnosis: decreased functional mobility, impaired gait, decreased balance, weakness  Prognosis: Good  PT Education: PT Role;Goals;Plan of Care;Transfer Training  Patient Education: verbalized understanding of cues for safety and gait training  Activity Tolerance  Activity Tolerance: Patient Tolerated treatment well     Patient Diagnosis(es): There were no encounter diagnoses. has a past medical history of Anxiety, Colon polyp, Headache(784.0), Hyperlipidemia, Hypertension, and Osteoarthritis. has a past surgical history that includes Colonoscopy (8/10).     Restrictions  Restrictions/Precautions  Restrictions/Precautions: Fall Risk  Required Braces or Orthoses?: No  Upper Extremity Weight Bearing Restrictions  Other: [facility] heart monitor - new on 7/22/2020  Position Activity Restriction  Other position/activity restrictions: 7/11: Steffi Beavers is a 75 y.o. female presents to the ER with a complaint of bilateral extremity weakness. States that she awoke yesterday and was having difficulty walking, states that she fell down one step and sat on her bottom. She had difficulty walking throughout the day. This morning she was able to navigate the steps but fell while walking across the front room to get her coffee. Per neurology: CSF protein was high consistent with a diagnosis of Guillian Barré syndrome. Completed 5 rounds of IVIG therapy. .     Subjective   General  Chart Reviewed: Yes  Response To Previous Treatment: Patient with no complaints from previous session. Family / Caregiver Present: No  Referring Practitioner: Dr. Lisa Pinon  Subjective  Subjective: Agrees to PT. Reports that she pulled her R hip when untangling the covers in bed. Mild discomfort. General Comment  Comments: Pt seated up in wheelchair. Pt requests to use the restroom.           Orientation  Within normal limits           Objective   Bed mobility  Comment: Not assessed - pt seated in wheelchair upon arrival  Transfers  Sit to Stand: Stand by assistance(from w/c x 5 and commode x 1)  Stand to sit: Stand by assistance  Comment: min verbal cues for UE support and good eccentric control when sitting  Ambulation  Ambulation?: Yes  Ambulation 1  Surface: level tile  Device: Rolling Walker  Assistance: Stand by assistance  Quality of Gait: decreased B heel strike, decreased foot clearance, decreased speed  Gait Deviations: Slow [place];Decreased step length;Decreased step height  Distance: 15' + 145' + 215'  Comments: improved endurance this date  Stairs/Curb  Stairs?: No     Balance  Posture: Good  Sitting - Static: Good  Sitting - Dynamic: Good  Standing - Static: Good;-  Standing - Dynamic: Good;-  Exercises  Hamstring Sets: seated with green TB x 10 reps B  Hip Abduction: seated with green TB x 10 reps B  Ankle Pumps: x 10 B  Comments: ballet bars: sidestepping with B UE support x 10' each way; standing hip abd x 10 reps with cues for B LE position     2nd session: Pt seated in wheelchair. Agreeable to session. Reports that she has to use the restroom. Amb 8' with RW and SBA into restroom. Able to use grab bar to assist in transfer. SBA for transfer on/off commode with RW. Reports that her R hip is doing better. Amb 215' with RW demonstrating increased speed, increased B heel strike, improved B foot clearance, improved endurance. Performed stepper at seat position 2 x 6 min using B UE/LEs. Amb 145' with RW and SBA. Pt reports feeling more energy and endurance with gait. Goals  Short term goals  Time Frame for Short term goals: to be met in 14-17 days  Short term goal 1: pt able to perform bed mobility modified independently  Short term goal 2: pt able to perform transfers modified independently  Short term goal 3: pt able to ambulate with LRAD 150 ft modified independently  Short term goal 4: pt able to navigate 12 steps with handrailing and modified independently  Short term goal 5: pt able to perform car transfer modified independently  Patient Goals   Patient goals : to regain leg strength    Plan    Plan  Times per week: 5 of 7 days per week for 90 minutes per day  Times per day: Daily  Current Treatment Recommendations: Strengthening, Balance Training, Functional Mobility Training, Transfer Training, Gait Training, Stair training, Endurance Training, Neuromuscular Re-education  Safety Devices  Type of devices:  All fall risk precautions in place, Call light within reach, Chair alarm in place, Nurse notified, Left in chair, Gait belt  Restraints  Initially in place: No     Therapy Time   Individual Concurrent Group Co-treatment   Time In 1000 Time Out 1100         Minutes 60         Timed Code Treatment Minutes: 60 Minutes     Second Session Therapy Time:   Individual Concurrent Group Co-treatment   Time In 1130         Time Out 1200         Minutes 30           Timed Code Treatment Minutes:  60 + 30    Total Treatment Minutes:  [address] [address] [place], [place] [address]

## 2020-07-23 NOTE — PROGRESS NOTES
Occupational Therapy  Facility/Department: [facility] ACUTE REHAB UNIT  Daily Treatment Note  NAME: Cecily Musa  : 1945  MRN: [id number]    Date of Service: 2020    Discharge Recommendations:  Home with assist PRN, S Level 3  OT Equipment Recommendations  Equipment Needed: Yes  Mobility Devices: Walker  ADL Assistive Devices: (grab bars, tub transfer bench)    Assessment   Performance deficits / Impairments: Decreased functional mobility ; Decreased ADL status; Decreased endurance;Decreased high-level IADLs;Decreased strength;Decreased balance  Assessment: Pt is progressin in therapy well but still below her baseline level of occupational function, based on the above deficits associated with PSVT and new GBS dx. Pt would benefit from continued skilled OT services to address these deficits. Treatment Diagnosis: Decreased ADL/IADL status, endurance, strength and balance associated with PSVT and GBS dx  Prognosis: Good  History: Pt 76 yo, lives alone, no local family to assist, tri-level, BR/BR upstairs, independent ADLs, volunteers, 1 slide to floor PTA, no other falls. OT Education: OT Role;Plan of Care;Transfer Training;Equipment;Home Exercise Program  Patient Education: Pt verbalized and demonstrated understanding. REQUIRES OT FOLLOW UP: Yes  Activity Tolerance  Activity Tolerance: Patient Tolerated treatment well  Safety Devices  Safety Devices in place: Yes  Type of devices: Call light within reach; Patient at risk for falls; Bed alarm in place; Left in chair         Patient Diagnosis(es): GBS      has a past medical history of Anxiety, Colon polyp, Headache(784.0), Hyperlipidemia, Hypertension, and Osteoarthritis. has a past surgical history that includes Colonoscopy (8/10).     Restrictions  Restrictions/Precautions  Restrictions/Precautions: Fall Risk  Required Braces or Orthoses?: No  Position Activity Restriction  Other position/activity restrictions: : Cecily Musa is a 75 y.o. female presents to the ER with a complaint of bilateral extremity weakness. States that she awoke yesterday and was having difficulty walking, states that she fell down one step and sat on her bottom. She had difficulty walking throughout the day. This morning she was able to navigate the steps but fell while walking across the front room to get her coffee. Per neurology: CSF protein was high consistent with a diagnosis of Guillian Barré syndrome. Completed 5 rounds of IVIG therapy. .  Subjective   General  Chart Reviewed: Yes  Patient assessed for rehabilitation services?: Yes  Response to previous treatment: Patient with no complaints from previous session  Family / Caregiver Present: No  Referring Practitioner: Geoffrey Beyer MD, for dc planning  Diagnosis: PSVT, GBS dx  Subjective  Subjective: Pt supine in bed upon arrival, agreeable to OT session. Initially reported no pain but after kicking blanket off her legs she reported some pain in R upper leg/hip. Later in session while walking reported some soreness in L knee. Pt very proud of improvements she is making in therapy. Observation:  Some edema present in ankles. Objective    ADL  Grooming: Stand by assistance  UE Dressing: Supervision;Setup  LE Dressing: Stand by assistance  Toileting: Stand by assistance  Additional Comments: Supine to sit-supervision, sit to stand CGA. Ambulated to toilet with RW and CGA. Grooming and oral care at sink SBA. Ambulated to couch to complete sponge bath and dressing. Required supervision and setup for UB dressing, SBA for LE dressing. Pt more sucessful with dressing when seated on couch compared to toilet or shower chair. Pt ambulated to closet to gather clothing CGA. Pt ambulated ~ 100 ft to ADL room to complete laundry task-CGA and setup-requiring seated rest break after walk before activity. Ambulated ~100 ft back to room for breakfast, seated in wc with chair alarm on and call button to left.         Toilet 0830         Minutes 60         Timed Code Treatment Minutes: 60 Minutes       Second Session Therapy Time:   Individual Concurrent Group Co-treatment   Time In 7446         Time Out 1315         Minutes 30           Timed Code Treatment Minutes:  30 minutes    Total Treatment Minutes:  60 minutes + 30 (90 total)    EDGAR Daniels OT   Supervised/directed by Daniele Blake.  Ray Ortiz OTR/IDRIS [id number], 7/23/2020, 1:58 PM

## 2020-07-23 NOTE — PROGRESS NOTES
Javan Brambila  7/23/2020  [id number]    Note left incomplete in error. Exam and medical management accurate as noted on the date below. Chief Complaint: GBS (Guillain [place] syndrome) ([facility] [address]    Subjective:   No overnight events. No current complaints. Dysesthesias continue to improve in BLE. Making good progress in therapy daily. ROS: No CP, SOB, dyspnea    Objective:  Patient Vitals for the past 24 hrs:   BP Temp Temp src Pulse Resp SpO2 Weight   07/23/20 0603 -- -- -- -- -- -- 146 lb 6.4 oz (66.4 kg)   07/22/20 2036 133/62 97.8 °F (36.6 °C) Oral 62 16 97 % --   07/22/20 0924 134/70 98.3 °F (36.8 °C) Oral 80 16 -- --     Gen: No distress, pleasant. Standing in therapy  HEENT: Normocephalic, atraumatic. CV: Regular rate and rhythm. No MRG   Resp: No respiratory distress. CTAB   Abd: Soft, nontender   Ext: No edema. Neuro: Alert, oriented, appropriately interactive. Laboratory data: Available via EMR. Therapy progress:  PT  Position Activity Restriction  Other position/activity restrictions: 7/11: Javan Brambila is a 75 y.o. female presents to the ER with a complaint of bilateral extremity weakness. States that she awoke yesterday and was having difficulty walking, states that she fell down one step and sat on her bottom. She had difficulty walking throughout the day. This morning she was able to navigate the steps but fell while walking across the front room to get her coffee. Per neurology: CSF protein was high consistent with a diagnosis of Guillian Barré syndrome. Completed 5 rounds of IVIG therapy.  .  Objective     Sit to Stand: Stand by assistance(3x)  Stand to sit: Stand by assistance(3x)  Bed to Chair: Contact guard assistance  Device: Rolling Walker  Assistance: Stand by assistance, Contact guard assistance  Distance: 212' +12'+ 200'  OT  PT Equipment Recommendations  Equipment Needed: Yes  Mobility Devices: Camillo Cooler: Rolling  Other: pt currently needing RW for safe ambulation  Equipment Used: Standard toilet  Toilet Transfers Comments: Ambulated to toilet with CGA  Assessment        SLP                Body mass index is 27.66 kg/m². Assessment:  Patient Active Problem List   Diagnosis    Hypertension    Hyperlipidemia    Osteopenia    Hx of eczema    Chronic idiopathic urticaria    Moderate mitral regurgitation by prior echocardiogram    Prediabetes    BMI 27.0-27.9,adult    Major depressive disorder with single episode, in full remission ([facility])    PSVT (paroxysmal supraventricular tachycardia) ([facility])    Weakness    Frequent falls    SVT (supraventricular tachycardia) ([facility])    GBS (Guillain [place] syndrome) ([facility])       Plan:   Guillian Barré syndrome: s/p IVIG. PT/OT. Symptomatic treatment. Consider gabapentin.     Paroxysmal SVT: lopressor     Hyponatremia: monitor     HTN: norvasc 10, losartan 100, lopressor 25     HLD: Lipitor 10     Depression: prestiq per home regimen     Vit D deficiency: supplement     Bowels: Per protocol  Bladder: Per protocol   Sleep: Trazodone provided prn. Pain: tylenol, naproxen, ultram PRN   DVT PPx: ambulating >250'    Team conference was held today on the patient and discussed directly with the patient utilizing their entire treatment team. Please see separate team note for details. Total treatment time for today's care >33 min. Linda Talamantes MD 7/23/2020, 8:42 AM    * This document was created using dictation software. While all precautions were taken to ensure accuracy, errors may have occurred. Please disregard any typographical errors.

## 2020-07-24 LAB
ANION GAP SERPL CALCULATED.3IONS-SCNC: 7 MMOL/L (ref 3–16)
BUN BLDV-MCNC: 20 MG/DL (ref 7–20)
CALCIUM SERPL-MCNC: 9.4 MG/DL (ref 8.3–10.6)
CHLORIDE BLD-SCNC: 101 MMOL/L (ref 99–110)
CO2: 27 MMOL/L (ref 21–32)
CREAT SERPL-MCNC: 0.7 MG/DL (ref 0.6–1.2)
GFR AFRICAN AMERICAN: >60
GFR NON-AFRICAN AMERICAN: >60
GLUCOSE BLD-MCNC: 91 MG/DL (ref 70–99)
HCT VFR BLD CALC: 33.9 % (ref 36–48)
HEMOGLOBIN: 11.4 G/DL (ref 12–16)
MCH RBC QN AUTO: 31.5 PG (ref 26–34)
MCHC RBC AUTO-ENTMCNC: 33.6 G/DL (ref 31–36)
MCV RBC AUTO: 93.7 FL (ref 80–100)
PDW BLD-RTO: 14.7 % (ref 12.4–15.4)
PLATELET # BLD: 244 K/UL (ref 135–450)
PMV BLD AUTO: 8.2 FL (ref 5–10.5)
POTASSIUM SERPL-SCNC: 4.3 MMOL/L (ref 3.5–5.1)
RBC # BLD: 3.62 M/UL (ref 4–5.2)
SODIUM BLD-SCNC: 135 MMOL/L (ref 136–145)
WBC # BLD: 5.3 K/UL (ref 4–11)

## 2020-07-24 PROCEDURE — 6370000000 HC RX 637 (ALT 250 FOR IP): Performed by: PHYSICAL MEDICINE & REHABILITATION

## 2020-07-24 PROCEDURE — 94760 N-INVAS EAR/PLS OXIMETRY 1: CPT

## 2020-07-24 PROCEDURE — 36415 COLL VENOUS BLD VENIPUNCTURE: CPT

## 2020-07-24 PROCEDURE — 97535 SELF CARE MNGMENT TRAINING: CPT

## 2020-07-24 PROCEDURE — 97116 GAIT TRAINING THERAPY: CPT

## 2020-07-24 PROCEDURE — 97110 THERAPEUTIC EXERCISES: CPT

## 2020-07-24 PROCEDURE — 1280000000 HC REHAB R&B

## 2020-07-24 PROCEDURE — 97530 THERAPEUTIC ACTIVITIES: CPT

## 2020-07-24 PROCEDURE — 85027 COMPLETE CBC AUTOMATED: CPT

## 2020-07-24 PROCEDURE — 80048 BASIC METABOLIC PNL TOTAL CA: CPT

## 2020-07-24 RX ADMIN — AMLODIPINE BESYLATE 10 MG: 5 TABLET ORAL at 08:11

## 2020-07-24 RX ADMIN — OYSTER SHELL CALCIUM WITH VITAMIN D 1 TABLET: 500; 200 TABLET, FILM COATED ORAL at 08:11

## 2020-07-24 RX ADMIN — METOPROLOL TARTRATE 25 MG: 25 TABLET, FILM COATED ORAL at 08:11

## 2020-07-24 RX ADMIN — LOSARTAN POTASSIUM 100 MG: 100 TABLET, FILM COATED ORAL at 08:11

## 2020-07-24 RX ADMIN — OYSTER SHELL CALCIUM WITH VITAMIN D 1 TABLET: 500; 200 TABLET, FILM COATED ORAL at 22:46

## 2020-07-24 RX ADMIN — Medication 2000 UNITS: at 08:11

## 2020-07-24 RX ADMIN — METOPROLOL TARTRATE 25 MG: 25 TABLET, FILM COATED ORAL at 22:46

## 2020-07-24 RX ADMIN — PANTOPRAZOLE SODIUM 40 MG: 40 TABLET, DELAYED RELEASE ORAL at 06:27

## 2020-07-24 RX ADMIN — MULTIPLE VITAMINS W/ MINERALS TAB 1 TABLET: TAB at 08:11

## 2020-07-24 RX ADMIN — POLYETHYLENE GLYCOL 3350 17 G: 17 POWDER, FOR SOLUTION ORAL at 08:11

## 2020-07-24 RX ADMIN — ATORVASTATIN CALCIUM 10 MG: 10 TABLET, FILM COATED ORAL at 08:11

## 2020-07-24 RX ADMIN — DESVENLAFAXINE SUCCINATE 50 MG: 50 TABLET, EXTENDED RELEASE ORAL at 08:17

## 2020-07-24 ASSESSMENT — PAIN SCALES - GENERAL
PAINLEVEL_OUTOF10: 0

## 2020-07-24 NOTE — PROGRESS NOTES
Recommendations  Equipment Needed: Yes  Mobility Devices: Wileen Daylin: Rolling  Other: pt currently needing RW for safe ambulation  Equipment Used: Standard toilet  Toilet Transfers Comments: Ambulated to toilet with CGA  Assessment        SLP                Body mass index is 27.91 kg/m². Assessment:  Patient Active Problem List   Diagnosis    Hypertension    Hyperlipidemia    Osteopenia    Hx of eczema    Chronic idiopathic urticaria    Moderate mitral regurgitation by prior echocardiogram    Prediabetes    BMI 27.0-27.9,adult    Major depressive disorder with single episode, in full remission ([facility])    PSVT (paroxysmal supraventricular tachycardia) ([facility])    Weakness    Frequent falls    SVT (supraventricular tachycardia) ([facility])    GBS (Guillain [place] syndrome) ([facility])       Plan:   Guillian Barré syndrome: s/p IVIG. PT/OT.      Paroxysmal SVT: lopressor     Hyponatremia: monitor     HTN: norvasc 10, losartan 100, lopressor 25     HLD: Lipitor 10     Depression: prestiq per home regimen     Vit D deficiency: supplement     Bowels: Per protocol  Bladder: Per protocol   Sleep: Trazodone provided prn. Pain: tylenol, naproxen, ultram PRN   DVT PPx: ambulating >250'    Torrey Gay MD 7/24/2020, 8:16 AM    * This document was created using dictation software. While all precautions were taken to ensure accuracy, errors may have occurred. Please disregard any typographical errors.

## 2020-07-24 NOTE — PLAN OF CARE
Problem: Skin Integrity:  Goal: Will show no infection signs and symptoms  Description: Will show no infection signs and symptoms  7/24/2020 1056 by Liliana Hudson RN  Outcome: Ongoing  Goal: Absence of new skin breakdown  Description: Absence of new skin breakdown  7/24/2020 1056 by Liliana Hudson RN  Outcome: Ongoing     Problem: Falls - Risk of:  Goal: Will remain free from falls  Description: Will remain free from falls  7/24/2020 1056 by Liliana Hudson RN  Outcome: Ongoing  Goal: Absence of physical injury  Description: Absence of physical injury  7/24/2020 1056 by Liliana Hudson RN  Outcome: Ongoing  Problem: Safety:  Goal: Free from accidental physical injury  Description: Free from accidental physical injury  7/24/2020 1056 by Liliana Hudson RN  Outcome: Ongoing  Goal: Free from intentional harm  Description: Free from intentional harm  7/24/2020 1056 by Liliana Hudson RN  Outcome: Ongoing    Problem: Daily Care:  Goal: Daily care needs are met  Description: Daily care needs are met  7/24/2020 1056 by Liliana Hudson RN  Outcome: Ongoing    Problem: Pain:  Goal: Patient's pain/discomfort is manageable  Description: Patient's pain/discomfort is manageable  7/24/2020 1056 by Liliana Hudson RN  Outcome: Ongoing  Goal: Pain level will decrease  Description: Pain level will decrease  7/24/2020 1056 by Liliana Hudson RN  Outcome: Ongoing

## 2020-07-24 NOTE — PROGRESS NOTES
Occupational Therapy  Facility/Department: [facility] ACUTE REHAB UNIT  Daily Treatment Note  NAME: Teresa Funes  : 1945  MRN: [id number]    Date of Service: 2020    Discharge Recommendations:  Home with assist PRN S Level 3  OT Equipment Recommendations  Equipment Needed: Yes  ADL Assistive Devices: (Tub transfer bench, grab bars, hand held shower)    Assessment   Performance deficits / Impairments: Decreased functional mobility ; Decreased ADL status; Decreased endurance;Decreased high-level IADLs;Decreased strength;Decreased balance  Assessment: Pt is progressing in therapy well but still below her baseline level of occupational function, based on the above deficits associated with PSVT and new GBS dx. Pt would benefit from continued skilled OT services to address these deficits. Treatment Diagnosis: Decreased ADL/IADL status, endurance, strength and balance associated with PSVT and GBS dx  Prognosis: Good  OT Education: OT Role;Plan of Care;Transfer Training;Equipment;Home Exercise Program  Patient Education: Pt verbalized and demonstrated understanding. REQUIRES OT FOLLOW UP: Yes  Activity Tolerance  Activity Tolerance: Patient Tolerated treatment well  Safety Devices  Safety Devices in place: Yes  Type of devices: Call light within reach; Patient at risk for falls; Left in chair;Chair alarm in place         Patient Diagnosis(es): GBS      has a past medical history of Anxiety, Colon polyp, Headache(784.0), Hyperlipidemia, Hypertension, and Osteoarthritis. has a past surgical history that includes Colonoscopy (8/10). Restrictions  Restrictions/Precautions  Restrictions/Precautions: Fall Risk  Required Braces or Orthoses?: No  Upper Extremity Weight Bearing Restrictions  Other: [facility]OT heart monitor - new on 2020  Position Activity Restriction  Other position/activity restrictions: : Teresa Funes is a 75 y.o. female presents to the ER with a complaint of bilateral extremity weakness. States that she awoke yesterday and was having difficulty walking, states that she fell down one step and sat on her bottom. She had difficulty walking throughout the day. This morning she was able to navigate the steps but fell while walking across the front room to get her coffee. Per neurology: CSF protein was high consistent with a diagnosis of Guillian Barré syndrome. Completed 5 rounds of IVIG therapy. .  Subjective   General  Chart Reviewed: Yes  Patient assessed for rehabilitation services?: Yes  Response to previous treatment: Patient with no complaints from previous session  Family / Caregiver Present: No  Referring Practitioner: Romeo Montes MD, for dc planning  Diagnosis: PSVT, GBS dx  Subjective  Subjective: Pt seated in wc upon arrival eating breakfast, agreeable to OT session. Reported pain in R hip went away but 3 in R groin area. Objective    ADL  Grooming: Stand by assistance  UE Dressing: Supervision;Setup  LE Dressing: Supervision;Setup(seated on couch for am session.)  Toileting: Stand by assistance  Additional Comments: Ambulated to toilet with SBA-voided urine. Pt completed dressing and grooming seated on couch. Pt completed IADL task (re-arranging and taking care of flower arrangement) while standing at sink-tolerated ~10 minutes of dynamic standing. Pt demonstrated improved balance, strength, and navigation of walker around different surfaces.         Standing Balance  Time: ~10-12  Activity: standing for completing self care at sink and during IADL task  Functional Mobility  Functional - Mobility Device: Rolling Walker  Activity: To/from bathroom  Assist Level: Stand by assistance  Toilet Transfers  Equipment Used: Standard toilet  Toilet Transfer: Supervision  Toilet Transfers Comments: Ambulated to toilet with SBA     Transfers  Sit to stand: Stand by assistance  Stand to sit: Stand by assistance  Transfer Comments: Pt more consistent with hand placement when sitting and standing. Second Session:  Pt seated in wc upon arrival, agreeable to therapy. Reports no pain and decrease in LE numbness. Ambulated to toilet with RW and SBA-voided urine and bowel. UB bathing setup and supervision-LB bathing setup, SBA, and increased time to complete. UB dressing setup and supervision-LB dressing setup and SBA. Min A for donning socks while seated on shower chair due to decreased knee extension and hip flexion. Completed grooming seated on couch with set up. Pt ambulated ~120 ft to ADL room to trial tub transfer bench with RW and CGA. Pt mod I with using TTB-no issues bringing LE over tub. Pt ambulated back to room. Pt left seated in wc, chair alarm on, call button to left and all needs met. Pt expressed concerned for not having therapy over the weekend-pt encouraged to ask nursing to take her on walks and to complete her exercises in her room- pt verbalized understanding.             Plan   Plan  Times per week: 90 minutes 5x/week  Times per day: Daily  Current Treatment Recommendations: Safety Education & Training, Self-Care / ADL, Endurance Training, Functional Mobility Training, Equipment Evaluation, Education, & procurement, Patient/Caregiver Education & Training, Strengthening    Goals  Short term goals  Time Frame for Short term goals: Discharge  Short term goal 1: Mod I for functional transfers-not met, progressing  Short term goal 2: Mod I for functional mobility for ADL-not met, progressing  Short term goal 3: Mod I for UB ADLs-not met, progressing  Short term goal 4: Mod I for LB ADLs-not met, progressing  Short term goal 5: Mod I for IADLs-not met, progressing  Long term goals  Time Frame for Long term goals : STG=LTG       Therapy Time   Individual Concurrent Group Co-treatment   Time In 0830         Time Out 0900         Minutes 30         Timed Code Treatment Minutes: 30 Minutes     Second Session Therapy Time:   Individual Concurrent Group Co-treatment   Time In 1330 Time Out 1430         Minutes 60           Timed Code Treatment Minutes:  60 minutes    Total Treatment Minutes:  90 minutes       Malathi Phillips, EDGAR Siddiqui, OT   Valerio Rabago OTR/L [id number], 7/24/2020, 3:02 PM

## 2020-07-24 NOTE — PROGRESS NOTES
Physical Therapy  Facility/Department: [facility] ACUTE REHAB UNIT  Daily Treatment Note  NAME: Allan Arita  : 1945  MRN: [id number]    Date of Service: 2020    Discharge Recommendations:  Continue to assess pending progress, Patient would benefit from continued therapy after discharge   PT Equipment Recommendations  Equipment Needed: Yes  Mobility Devices: Kelsy Motto: Rolling  Other: pt currently needing RW for safe ambulation    Assessment   Body structures, Functions, Activity limitations: Decreased functional mobility ; Decreased ADL status; Decreased balance;Decreased strength;Decreased endurance  Assessment: Pt progressing with functional mobility walking longer distances and needing less assistance. Pt requiring less seated rest breaks throughout session. Continued skilled PT to promote return to PLOF. Treatment Diagnosis: decreased functional mobility, impaired gait, decreased balance, weakness  Prognosis: Good  Decision Making: Low Complexity  PT Education: PT Role;Goals;Plan of Care;Transfer Training  Patient Education: verbalized understanding of cues for safety and gait training  Barriers to Learning: none  REQUIRES PT FOLLOW UP: Yes     Patient Diagnosis(es): There were no encounter diagnoses. has a past medical history of Anxiety, Colon polyp, Headache(784.0), Hyperlipidemia, Hypertension, and Osteoarthritis. has a past surgical history that includes Colonoscopy (8/10). Restrictions  Restrictions/Precautions  Restrictions/Precautions: Fall Risk  Required Braces or Orthoses?: No  Upper Extremity Weight Bearing Restrictions  Other: [facility]OT heart monitor - new on 2020  Position Activity Restriction  Other position/activity restrictions: : Allan Arita is a 75 y.o. female presents to the ER with a complaint of bilateral extremity weakness. States that she awoke yesterday and was having difficulty walking, states that she fell down one step and sat on her bottom.   She had difficulty walking throughout the day. This morning she was able to navigate the steps but fell while walking across the front room to get her coffee. Per neurology: CSF protein was high consistent with a diagnosis of Guillian Barré syndrome. Completed 5 rounds of IVIG therapy. .  Subjective   General  Chart Reviewed: Yes  Response To Previous Treatment: Patient with no complaints from previous session. Family / Caregiver Present: No  Referring Practitioner: Dr. Nikko Mtz  Subjective  Subjective: Agrees to PT. Reports pain in her L hip flexor of 3/10. Meds addressed. .  General Comment  Comments: Pt seated up in wheelchair. Orientation  Orientation  Overall Orientation Status: Within Normal Limits  Cognition      Objective      Transfers  Sit to Stand: (3x)  Stand to sit: (3x)  Ambulation  Ambulation?: Yes  Ambulation 1  Surface: level tile  Device: Rolling Walker  Assistance: Stand by assistance  Quality of Gait: decreased B heel strike, decreased foot clearance, decreased speed  Gait Deviations: Slow Cynthia;Decreased step length;Decreased step height  Distance: 145' + 145'  Comments: improved endurance this date     Balance  Posture: Good  Sitting - Static: Good  Sitting - Dynamic: Good  Standing - Static: Good;-(with RW)  Standing - Dynamic: Good;-(with RW)  Comments: no LOB  Exercises  Comments: Stepper: 6 min, level 1.5         Comment: Stretches 3 x30's: B Glut, HS, Piriformis, Hip Fllexors(NDT)        2nd session:  PT sitting in chair upon arrival. Denies pain. Agreeable to PT. Sit<>stand SBA 4x. Ambulates with ' +  106' CGA/SBA. Improved cynthia. 14 steps (8 4\", 6 6\") with B HR CGA. //bars SBA: 10 lateral squat steps; 10 marching steps (forward and backward). 3 seated rest breaks. Call light and needs in reach. Alarm on.    Goals  Short term goals  Time Frame for Short term goals: to be met in 14-17 days  Short term goal 1: pt able to perform bed mobility modified independently  Short term goal 2: pt able to perform transfers modified independently  Short term goal 3: pt able to ambulate with LRAD 150 ft modified independently  Short term goal 4: pt able to navigate 12 steps with handrailing and modified independently  Short term goal 5: pt able to perform car transfer modified independently  Patient Goals   Patient goals : to regain leg strength    Plan    Plan  Times per week: 5 of 7 days per week for 90 minutes per day  Times per day: Daily  Current Treatment Recommendations: Strengthening, Balance Training, Functional Mobility Training, Transfer Training, Gait Training, Stair training, Endurance Training, Neuromuscular Re-education  Safety Devices  Type of devices:  All fall risk precautions in place, Call light within reach, Chair alarm in place, Nurse notified, Left in chair, Gait belt  Restraints  Initially in place: No     Therapy Time   Individual Concurrent Group Co-treatment   Time In 0920         Time Out 1005         Minutes 45               Second Session Therapy Time:   Individual Concurrent Group Co-treatment   Time In 1120         Time Out 1205         Minutes 45           Timed Code Treatment Minutes:  45 + 45    Total Treatment Minutes:  [address] [facility], [address] [facility]

## 2020-07-24 NOTE — CARE COORDINATION
Met with pt per her request to discuss discharge options of going home alone w/ HHC and COA services. Discussed life alert bracelet, pt appears interested and will think about obtaining it. All questions answered and support provided.  Shadia Borrero, MSW, LSW

## 2020-07-25 PROCEDURE — 6370000000 HC RX 637 (ALT 250 FOR IP): Performed by: PHYSICAL MEDICINE & REHABILITATION

## 2020-07-25 PROCEDURE — 1280000000 HC REHAB R&B

## 2020-07-25 RX ADMIN — AMLODIPINE BESYLATE 10 MG: 5 TABLET ORAL at 08:04

## 2020-07-25 RX ADMIN — DESVENLAFAXINE SUCCINATE 50 MG: 50 TABLET, EXTENDED RELEASE ORAL at 08:05

## 2020-07-25 RX ADMIN — TRAMADOL HYDROCHLORIDE 50 MG: 50 TABLET, FILM COATED ORAL at 21:28

## 2020-07-25 RX ADMIN — PANTOPRAZOLE SODIUM 40 MG: 40 TABLET, DELAYED RELEASE ORAL at 06:12

## 2020-07-25 RX ADMIN — LOSARTAN POTASSIUM 100 MG: 100 TABLET, FILM COATED ORAL at 08:04

## 2020-07-25 RX ADMIN — Medication 2000 UNITS: at 08:04

## 2020-07-25 RX ADMIN — OYSTER SHELL CALCIUM WITH VITAMIN D 1 TABLET: 500; 200 TABLET, FILM COATED ORAL at 21:08

## 2020-07-25 RX ADMIN — ACETAMINOPHEN 650 MG: 325 TABLET, FILM COATED ORAL at 08:04

## 2020-07-25 RX ADMIN — MULTIPLE VITAMINS W/ MINERALS TAB 1 TABLET: TAB at 08:04

## 2020-07-25 RX ADMIN — METOPROLOL TARTRATE 25 MG: 25 TABLET, FILM COATED ORAL at 08:04

## 2020-07-25 RX ADMIN — METOPROLOL TARTRATE 25 MG: 25 TABLET, FILM COATED ORAL at 21:08

## 2020-07-25 RX ADMIN — OYSTER SHELL CALCIUM WITH VITAMIN D 1 TABLET: 500; 200 TABLET, FILM COATED ORAL at 08:04

## 2020-07-25 RX ADMIN — POLYETHYLENE GLYCOL 3350 17 G: 17 POWDER, FOR SOLUTION ORAL at 08:04

## 2020-07-25 ASSESSMENT — PAIN SCALES - GENERAL
PAINLEVEL_OUTOF10: 5
PAINLEVEL_OUTOF10: 5
PAINLEVEL_OUTOF10: 0

## 2020-07-25 NOTE — PLAN OF CARE
DW order added. Took tylenol for leg pain. Had small bm. Walked lap around the unit. Gave handout on Guillain [place] syndrome     Problem: Skin Integrity:  Goal: Will show no infection signs and symptoms  Description: Will show no infection signs and symptoms  Outcome: Ongoing  Goal: Absence of new skin breakdown  Description: Absence of new skin breakdown  Outcome: Ongoing     Problem: Falls - Risk of:  Goal: Will remain free from falls  Description: Will remain free from falls  Outcome: Ongoing  Goal: Absence of physical injury  Description: Absence of physical injury  Outcome: Ongoing     Problem: Safety:  Goal: Free from accidental physical injury  Description: Free from accidental physical injury  Outcome: Ongoing  Goal: Free from intentional harm  Description: Free from intentional harm  Outcome: Ongoing     Problem: Daily Care:  Goal: Daily care needs are met  Description: Daily care needs are met  Outcome: Ongoing     Problem: Pain:  Description: Pain management should include both nonpharmacologic and pharmacologic interventions.   Goal: Patient's pain/discomfort is manageable  Description: Patient's pain/discomfort is manageable  Outcome: Ongoing  Goal: Pain level will decrease  Description: Pain level will decrease  Outcome: Ongoing  Goal: Control of acute pain  Description: Control of acute pain  Outcome: Ongoing  Goal: Control of chronic pain  Description: Control of chronic pain  Outcome: Ongoing     Problem: Discharge Planning:  Goal: Patients continuum of care needs are met  Description: Patients continuum of care needs are met  Outcome: Ongoing     Problem: Nutrition  Goal: Optimal nutrition therapy  Outcome: Ongoing

## 2020-07-25 NOTE — PROGRESS NOTES
Pastor Diaz  7/25/2020  [id number]    Chief Complaint: GBS (Guillain [place] syndrome) ([facility])    Subjective:   No overnight events. No current complaints. Significant progress in therapy yesterday with ambulation. Progressing very well. ROS: No CP, SOB, dyspnea    Objective:  Patient Vitals for the past 24 hrs:   BP Temp Temp src Pulse Resp SpO2 Weight   07/25/20 0815 (!) 141/60 98.4 °F (36.9 °C) Oral 71 18 98 % --   07/25/20 0500 -- -- -- -- -- -- 147 lb 4.8 oz (66.8 kg)   07/24/20 2245 137/68 98.8 °F (37.1 °C) Oral 72 16 95 % --   07/24/20 1017 -- -- -- -- 16 96 % --     Gen: No distress, pleasant. Resting in bed  HEENT: Normocephalic, atraumatic. CV: Regular rate and rhythm. No MRG   Resp: No respiratory distress. CTAB   Abd: Soft, nontender   Ext: No edema. Neuro: Alert, oriented, appropriately interactive. BLE 4+/5 HF 5/5 KE 4/5 DF/PF. Sensation decreased to light touch distal to mid-tibia bilaterally. Laboratory data: Available via EMR. Therapy progress:  PT  Upper Extremity Weight Bearing Restrictions  Other: [facility]OT heart monitor - new on 7/22/2020  Position Activity Restriction  Other position/activity restrictions: 7/11: Pastor Diaz is a 75 y.o. female presents to the ER with a complaint of bilateral extremity weakness. States that she awoke yesterday and was having difficulty walking, states that she fell down one step and sat on her bottom. She had difficulty walking throughout the day. This morning she was able to navigate the steps but fell while walking across the front room to get her coffee. Per neurology: CSF protein was high consistent with a diagnosis of Guillian Barré syndrome. Completed 5 rounds of IVIG therapy.  .  Objective     Sit to Stand: (3x)  Stand to sit: (3x)  Bed to Chair: Contact guard assistance  Device: Rolling Walker  Assistance: Stand by assistance  Distance: 145' + 145'  OT  PT Equipment Recommendations  Equipment Needed: Yes  Mobility Devices: Donn Profit: Rolling  Other: pt currently needing RW for safe ambulation  Equipment Used: Standard toilet  Toilet Transfers Comments: Ambulated to toilet with SBA  Assessment        SLP                Body mass index is 27.83 kg/m². Assessment:  Patient Active Problem List   Diagnosis    Hypertension    Hyperlipidemia    Osteopenia    Hx of eczema    Chronic idiopathic urticaria    Moderate mitral regurgitation by prior echocardiogram    Prediabetes    BMI 27.0-27.9,adult    Major depressive disorder with single episode, in full [facility]    PSVT (paroxysmal supraventricular tachycardia) ([facility])    Weakness    Frequent falls    SVT (supraventricular tachycardia) ([facility])    GBS (Guillain [place] syndrome) ([facility])       Plan:   Guillian Barré syndrome: s/p IVIG. PT/OT.      Paroxysmal SVT: lopressor     Hyponatremia: monitor     HTN: norvasc 10, losartan 100, lopressor 25     HLD: Lipitor 10     Depression: prestiq per home regimen     Vit D deficiency: supplement     Bowels: Per protocol  Bladder: Per protocol   Sleep: Trazodone provided prn. Pain: tylenol, naproxen, ultram PRN   DVT PPx: ambulating >250'    Torrey Gay MD 7/25/2020, 8:52 AM    * This document was created using dictation software. While all precautions were taken to ensure accuracy, errors may have occurred. Please disregard any typographical errors.

## 2020-07-25 NOTE — PLAN OF CARE
Problem: Skin Integrity:  Goal: Absence of new skin breakdown  Description: Absence of new skin breakdown  Outcome: Ongoing     Problem: Falls - Risk of:  Goal: Will remain free from falls  Description: Will remain free from falls  Outcome: Ongoing

## 2020-07-26 PROCEDURE — 6370000000 HC RX 637 (ALT 250 FOR IP): Performed by: PHYSICAL MEDICINE & REHABILITATION

## 2020-07-26 PROCEDURE — 1280000000 HC REHAB R&B

## 2020-07-26 RX ORDER — CETIRIZINE HYDROCHLORIDE 10 MG/1
10 TABLET ORAL NIGHTLY
Status: DISCONTINUED | OUTPATIENT
Start: 2020-07-26 | End: 2020-08-04 | Stop reason: HOSPADM

## 2020-07-26 RX ADMIN — CETIRIZINE HYDROCHLORIDE 10 MG: 10 TABLET, FILM COATED ORAL at 20:58

## 2020-07-26 RX ADMIN — PANTOPRAZOLE SODIUM 40 MG: 40 TABLET, DELAYED RELEASE ORAL at 05:26

## 2020-07-26 RX ADMIN — OYSTER SHELL CALCIUM WITH VITAMIN D 1 TABLET: 500; 200 TABLET, FILM COATED ORAL at 20:58

## 2020-07-26 RX ADMIN — ATORVASTATIN CALCIUM 10 MG: 10 TABLET, FILM COATED ORAL at 07:25

## 2020-07-26 RX ADMIN — DESVENLAFAXINE SUCCINATE 50 MG: 50 TABLET, EXTENDED RELEASE ORAL at 07:25

## 2020-07-26 RX ADMIN — POLYETHYLENE GLYCOL 3350 17 G: 17 POWDER, FOR SOLUTION ORAL at 07:23

## 2020-07-26 RX ADMIN — OYSTER SHELL CALCIUM WITH VITAMIN D 1 TABLET: 500; 200 TABLET, FILM COATED ORAL at 07:25

## 2020-07-26 RX ADMIN — METOPROLOL TARTRATE 25 MG: 25 TABLET, FILM COATED ORAL at 20:58

## 2020-07-26 RX ADMIN — AMLODIPINE BESYLATE 10 MG: 5 TABLET ORAL at 07:25

## 2020-07-26 RX ADMIN — METOPROLOL TARTRATE 25 MG: 25 TABLET, FILM COATED ORAL at 07:25

## 2020-07-26 RX ADMIN — Medication 2000 UNITS: at 07:25

## 2020-07-26 RX ADMIN — TRAMADOL HYDROCHLORIDE 50 MG: 50 TABLET, FILM COATED ORAL at 07:25

## 2020-07-26 RX ADMIN — LOSARTAN POTASSIUM 100 MG: 100 TABLET, FILM COATED ORAL at 07:25

## 2020-07-26 RX ADMIN — MULTIPLE VITAMINS W/ MINERALS TAB 1 TABLET: TAB at 07:25

## 2020-07-26 ASSESSMENT — PAIN SCALES - GENERAL: PAINLEVEL_OUTOF10: 7

## 2020-07-26 NOTE — PROGRESS NOTES
Lencho Chandler  7/26/2020  [id number]    Chief Complaint: GBS (Guillain [place] syndrome) ([facility])    Subjective:   No overnight events. Reporting her allergies are flaring. Normally takes flonase and allegra at home. ROS: No CP, SOB, dyspnea    Objective:  Patient Vitals for the past 24 hrs:   BP Temp Temp src Pulse Resp SpO2 Weight   07/26/20 0708 -- -- -- -- -- 98 % --   07/26/20 0705 (!) 150/77 98.1 °F (36.7 °C) Oral 70 18 94 % --   07/26/20 0600 -- -- -- -- -- -- 147 lb 4.8 oz (66.8 kg)   07/25/20 2100 (!) 154/71 97.4 °F (36.3 °C) Oral 76 18 95 % --     Gen: No distress, pleasant. Resting in WC  HEENT: Normocephalic, atraumatic. CV: Regular rate and rhythm. No MRG   Resp: No respiratory distress. CTAB   Abd: Soft, nontender   Ext: No edema. Neuro: Alert, oriented, appropriately interactive. BLE 4+/5 HF 5/5 KE 4/5 DF/PF. Sensation decreased to light touch distal to mid-tibia bilaterally. Laboratory data: Available via EMR. Therapy progress:  PT  Upper Extremity Weight Bearing Restrictions  Other: MCOT heart monitor - new on 7/22/2020  Position Activity Restriction  Other position/activity restrictions: 7/11: Lencho Chandler is a 75 y.o. female presents to the ER with a complaint of bilateral extremity weakness. States that she awoke yesterday and was having difficulty walking, states that she fell down one step and sat on her bottom. She had difficulty walking throughout the day. This morning she was able to navigate the steps but fell while walking across the front room to get her coffee. Per neurology: CSF protein was high consistent with a diagnosis of Guillian Barré syndrome. Completed 5 rounds of IVIG therapy.  .  Objective     Sit to Stand: (3x)  Stand to sit: (3x)  Bed to Chair: Contact guard assistance  Device: Rolling Walker  Assistance: Stand by assistance  Distance: 145' + 145'  OT  PT Equipment Recommendations  Equipment Needed: Yes  Mobility Devices: [place]: Rolling  Other: pt currently needing RW for safe ambulation  Equipment Used: Standard toilet  Toilet Transfers Comments: Ambulated to toilet with SBA  Assessment        SLP                Body mass index is 27.83 kg/m². Assessment:  Patient Active Problem List   Diagnosis    Hypertension    Hyperlipidemia    Osteopenia    Hx of eczema    Chronic idiopathic urticaria    Moderate mitral regurgitation by prior echocardiogram    Prediabetes    BMI 27.0-27.9,adult    Major depressive disorder with single episode, in full remission ([facility])    PSVT (paroxysmal supraventricular tachycardia) ([facility])    Weakness    Frequent falls    SVT (supraventricular tachycardia) ([facility])    GBS (Guillain [place] syndrome) ([facility])       Plan:   Guillian Barré syndrome: s/p IVIG. PT/OT.      Paroxysmal SVT: lopressor     Hyponatremia: monitor     HTN: norvasc 10, losartan 100, lopressor 25     HLD: Lipitor 10     Depression: prestiq per home regimen     Vit D deficiency: supplement    Sinusitis: - start zyrtec     Bowels: Per protocol  Bladder: Per protocol   Sleep: Trazodone provided prn. Pain: tylenol, naproxen, ultram PRN   DVT PPx: ambulating >250'    Torrey Gay MD 7/26/2020, 11:09 AM    * This document was created using dictation software. While all precautions were taken to ensure accuracy, errors may have occurred. Please disregard any typographical errors.

## 2020-07-26 NOTE — PLAN OF CARE
Problem: Falls - Risk of:  Goal: Will remain free from falls  Description: Will remain free from falls  7/26/2020 0047 by Norbert Su RN  Outcome: Ongoing     Problem: Safety:  Goal: Free from accidental physical injury  Description: Free from accidental physical injury  7/26/2020 0047 by Norbert Su RN  Outcome: Ongoing     Problem: Daily Care:  Goal: Daily care needs are met  Description: Daily care needs are met  7/26/2020 0047 by Norbert Su RN  Outcome: Ongoing

## 2020-07-26 NOTE — PLAN OF CARE
Took tylenol & ultram for leg pain. Walked around the unit for exercise     Problem: Skin Integrity:  Goal: Will show no infection signs and symptoms  Description: Will show no infection signs and symptoms  Outcome: Ongoing  Goal: Absence of new skin breakdown  Description: Absence of new skin breakdown  Outcome: Ongoing     Problem: Falls - Risk of:  Goal: Will remain free from falls  Description: Will remain free from falls  Outcome: Ongoing  Goal: Absence of physical injury  Description: Absence of physical injury  Outcome: Ongoing     Problem: Safety:  Goal: Free from accidental physical injury  Description: Free from accidental physical injury  Outcome: Ongoing  Goal: Free from intentional harm  Description: Free from intentional harm  Outcome: Ongoing     Problem: Daily Care:  Goal: Daily care needs are met  Description: Daily care needs are met  Outcome: Ongoing     Problem: Pain:  Description: Pain management should include both nonpharmacologic and pharmacologic interventions.   Goal: Patient's pain/discomfort is manageable  Description: Patient's pain/discomfort is manageable  Outcome: Ongoing  Goal: Pain level will decrease  Description: Pain level will decrease  Outcome: Ongoing  Goal: Control of acute pain  Description: Control of acute pain  Outcome: Ongoing  Goal: Control of chronic pain  Description: Control of chronic pain  Outcome: Ongoing     Problem: Discharge Planning:  Goal: Patients continuum of care needs are met  Description: Patients continuum of care needs are met  Outcome: Ongoing     Problem: Nutrition  Goal: Optimal nutrition therapy  Outcome: Ongoing

## 2020-07-27 LAB
ANION GAP SERPL CALCULATED.3IONS-SCNC: 10 MMOL/L (ref 3–16)
BUN BLDV-MCNC: 18 MG/DL (ref 7–20)
CALCIUM SERPL-MCNC: 9.6 MG/DL (ref 8.3–10.6)
CHLORIDE BLD-SCNC: 99 MMOL/L (ref 99–110)
CO2: 26 MMOL/L (ref 21–32)
CREAT SERPL-MCNC: 0.7 MG/DL (ref 0.6–1.2)
GFR AFRICAN AMERICAN: >60
GFR NON-AFRICAN AMERICAN: >60
GLUCOSE BLD-MCNC: 97 MG/DL (ref 70–99)
HCT VFR BLD CALC: 33.4 % (ref 36–48)
HEMOGLOBIN: 11.4 G/DL (ref 12–16)
MCH RBC QN AUTO: 31.8 PG (ref 26–34)
MCHC RBC AUTO-ENTMCNC: 34 G/DL (ref 31–36)
MCV RBC AUTO: 93.4 FL (ref 80–100)
PDW BLD-RTO: 14.5 % (ref 12.4–15.4)
PLATELET # BLD: 258 K/UL (ref 135–450)
PMV BLD AUTO: 8.2 FL (ref 5–10.5)
POTASSIUM SERPL-SCNC: 4.2 MMOL/L (ref 3.5–5.1)
RBC # BLD: 3.57 M/UL (ref 4–5.2)
SODIUM BLD-SCNC: 135 MMOL/L (ref 136–145)
WBC # BLD: 7.9 K/UL (ref 4–11)

## 2020-07-27 PROCEDURE — 97530 THERAPEUTIC ACTIVITIES: CPT

## 2020-07-27 PROCEDURE — 36415 COLL VENOUS BLD VENIPUNCTURE: CPT

## 2020-07-27 PROCEDURE — 6370000000 HC RX 637 (ALT 250 FOR IP): Performed by: PHYSICAL MEDICINE & REHABILITATION

## 2020-07-27 PROCEDURE — 97535 SELF CARE MNGMENT TRAINING: CPT

## 2020-07-27 PROCEDURE — 1280000000 HC REHAB R&B

## 2020-07-27 PROCEDURE — 80048 BASIC METABOLIC PNL TOTAL CA: CPT

## 2020-07-27 PROCEDURE — 97110 THERAPEUTIC EXERCISES: CPT

## 2020-07-27 PROCEDURE — 85027 COMPLETE CBC AUTOMATED: CPT

## 2020-07-27 RX ADMIN — TRAMADOL HYDROCHLORIDE 50 MG: 50 TABLET, FILM COATED ORAL at 08:17

## 2020-07-27 RX ADMIN — MULTIPLE VITAMINS W/ MINERALS TAB 1 TABLET: TAB at 08:17

## 2020-07-27 RX ADMIN — NAPROXEN 500 MG: 250 TABLET ORAL at 15:08

## 2020-07-27 RX ADMIN — AMLODIPINE BESYLATE 10 MG: 5 TABLET ORAL at 08:18

## 2020-07-27 RX ADMIN — METOPROLOL TARTRATE 25 MG: 25 TABLET, FILM COATED ORAL at 08:17

## 2020-07-27 RX ADMIN — METOPROLOL TARTRATE 25 MG: 25 TABLET, FILM COATED ORAL at 21:38

## 2020-07-27 RX ADMIN — CETIRIZINE HYDROCHLORIDE 10 MG: 10 TABLET, FILM COATED ORAL at 21:39

## 2020-07-27 RX ADMIN — DESVENLAFAXINE SUCCINATE 50 MG: 50 TABLET, EXTENDED RELEASE ORAL at 08:18

## 2020-07-27 RX ADMIN — OYSTER SHELL CALCIUM WITH VITAMIN D 1 TABLET: 500; 200 TABLET, FILM COATED ORAL at 08:17

## 2020-07-27 RX ADMIN — OYSTER SHELL CALCIUM WITH VITAMIN D 1 TABLET: 500; 200 TABLET, FILM COATED ORAL at 21:39

## 2020-07-27 RX ADMIN — Medication 2000 UNITS: at 08:17

## 2020-07-27 RX ADMIN — PANTOPRAZOLE SODIUM 40 MG: 40 TABLET, DELAYED RELEASE ORAL at 05:19

## 2020-07-27 RX ADMIN — POLYETHYLENE GLYCOL 3350 17 G: 17 POWDER, FOR SOLUTION ORAL at 08:18

## 2020-07-27 RX ADMIN — LOSARTAN POTASSIUM 100 MG: 100 TABLET, FILM COATED ORAL at 08:17

## 2020-07-27 RX ADMIN — TRAMADOL HYDROCHLORIDE 50 MG: 50 TABLET, FILM COATED ORAL at 15:07

## 2020-07-27 ASSESSMENT — PAIN SCALES - GENERAL
PAINLEVEL_OUTOF10: 6
PAINLEVEL_OUTOF10: 0
PAINLEVEL_OUTOF10: 6
PAINLEVEL_OUTOF10: 4
PAINLEVEL_OUTOF10: 1

## 2020-07-27 ASSESSMENT — PAIN DESCRIPTION - LOCATION
LOCATION: BACK
LOCATION: BACK;HIP

## 2020-07-27 ASSESSMENT — PAIN DESCRIPTION - PAIN TYPE
TYPE: ACUTE PAIN
TYPE: ACUTE PAIN

## 2020-07-27 ASSESSMENT — PAIN DESCRIPTION - FREQUENCY: FREQUENCY: INTERMITTENT

## 2020-07-27 NOTE — PROGRESS NOTES
Physical Therapy  Facility/Department: [facility] ACUTE REHAB UNIT  Daily Treatment Note  NAME: Allan Arita  : 1945  MRN: [id number]    Date of Service: 2020    Discharge Recommendations:  Patient would benefit from continued therapy after discharge, Home with Home health PT, S Level 3   PT Equipment Recommendations  Equipment Needed: Yes  Mobility Devices: Kelsy Motto: Rolling  Other: pt currently needing RW for safe ambulation    Assessment   Body structures, Functions, Activity limitations: Decreased functional mobility ; Decreased ADL status; Decreased balance;Decreased strength;Decreased endurance  Assessment: Acute onset of back pain limiting mobility this session. Slightly relieved with gentle stretching, biofreeze, and heat incorporated into sessions this date. Treatment Diagnosis: decreased functional mobility, impaired gait, decreased balance, weakness  Prognosis: Good  PT Education: PT Role;Goals;Plan of Care;Transfer Training;Home Exercise Program  Patient Education: verbalized understanding of cues for safety and gait training  Barriers to Learning: none  REQUIRES PT FOLLOW UP: Yes  Activity Tolerance  Activity Tolerance: Patient limited by pain     Patient Diagnosis(es): There were no encounter diagnoses. has a past medical history of Anxiety, Colon polyp, Headache(784.0), Hyperlipidemia, Hypertension, and Osteoarthritis. has a past surgical history that includes Colonoscopy (8/10). Restrictions  Restrictions/Precautions  Restrictions/Precautions: Fall Risk  Required Braces or Orthoses?: No  Upper Extremity Weight Bearing Restrictions  Other: MCOT heart monitor - new on 2020  Position Activity Restriction  Other position/activity restrictions: : Allan Arita is a 75 y.o. female presents to the ER with a complaint of bilateral extremity weakness.   States that she awoke yesterday and was having difficulty walking, states that she fell down one step and sat on her Manual stretching for B HS, piriformis, and hip ER [id number]. Biofreeze applied to lower back with STM (L2-5 region). Pt left in chair, alarm on and needs in reach. 2nd session: Pt lying in bed at arrival.  Pain a little better 5/10. Nsg aware of pain level and pt asking for medication at end of session. Supine to sit with min A.  STS SBA. Ambulated 10 ft to bathroom with RW SBA. Supervision in bathroom. Pt also used bathroom at end of session with same level of assist.   Ambulated 59 ft x 2 reps with RW SBA. Moist heat applied to low back with pt supine in hooklying position x 15 minutes while performing core exercises. Supine therex included add ball sq x 10, TrA x 10, heel slides x 10 B, single knee fallouts x 10B, alt arm raise x 10 B. Seated large red ball fwd and diagonal rolls for LB stretching. Pt returned to bed with min A. Alarm on and needs in reach. Requested k-pad from nsg. Goals  Short term goals  Time Frame for Short term goals: to be met in 14-17 days  Short term goal 1: pt able to perform bed mobility modified independently  Short term goal 2: pt able to perform transfers modified independently  Short term goal 3: pt able to ambulate with LRAD 150 ft modified independently  Short term goal 4: pt able to navigate 12 steps with handrailing and modified independently  Short term goal 5: pt able to perform car transfer modified independently  Patient Goals   Patient goals : to regain leg strength    Plan    Plan  Times per week: 5 of 7 days per week for 90 minutes per day  Times per day: Daily  Current Treatment Recommendations: Strengthening, Balance Training, Functional Mobility Training, Transfer Training, Gait Training, Stair training, Endurance Training, Neuromuscular Re-education  Safety Devices  Type of devices:  All fall risk precautions in place, Call light within reach, Chair alarm in place, Nurse notified, Left in chair, Gait belt  Restraints  Initially in place: No

## 2020-07-27 NOTE — PROGRESS NOTES
Trula Landau  7/27/2020  [id number]    Chief Complaint: GBS (Guillain [place] syndrome) ([facility])    Subjective:   No overnight events. No current complaints. Labs reviewed. ROS: No CP, SOB, dyspnea    Objective:  Patient Vitals for the past 24 hrs:   BP Temp Temp src Pulse Resp SpO2 Weight   07/27/20 0808 (!) 153/78 98.3 °F (36.8 °C) Oral 72 16 95 % --   07/27/20 0600 -- -- -- -- -- -- 147 lb 9.6 oz (67 kg)   07/26/20 2045 (!) 148/68 99.6 °F (37.6 °C) Oral 85 18 94 % --     Gen: No distress, pleasant. Resting in WC  HEENT: Normocephalic, atraumatic. CV: Regular rate and rhythm. No MRG   Resp: No respiratory distress. CTAB   Abd: Soft, nontender   Ext: No edema. Neuro: Alert, oriented, appropriately interactive. BLE 4+/5 HF 5/5 KE 4/5 DF/PF. Sensation decreased to light touch distal to mid-tibia bilaterally. Laboratory data: Available via EMR. Therapy progress:  PT  Upper Extremity Weight Bearing Restrictions  Other: MCOT heart monitor - new on 7/22/2020  Position Activity Restriction  Other position/activity restrictions: 7/11: Trula Landau is a 75 y.o. female presents to the ER with a complaint of bilateral extremity weakness. States that she awoke yesterday and was having difficulty walking, states that she fell down one step and sat on her bottom. She had difficulty walking throughout the day. This morning she was able to navigate the steps but fell while walking across the front room to get her coffee. Per neurology: CSF protein was high consistent with a diagnosis of Guillian Barré syndrome. Completed 5 rounds of IVIG therapy.  .  Objective     Sit to Stand: (3x)  Stand to sit: (3x)  Bed to Chair: Contact guard assistance  Device: Rolling Walker  Assistance: Stand by assistance  Distance: 145' + 145'  OT  PT Equipment Recommendations  Equipment Needed: Yes  Mobility Devices: Madi Suero: Rolling  Other: pt currently needing RW for safe ambulation  Equipment Used: Standard toilet  Toilet Transfers Comments: Ambulated to toilet with SBA  Assessment        SLP                Body mass index is 27.89 kg/m². Assessment:  Patient Active Problem List   Diagnosis    Hypertension    Hyperlipidemia    Osteopenia    Hx of eczema    Chronic idiopathic urticaria    Moderate mitral regurgitation by prior echocardiogram    Prediabetes    BMI 27.0-27.9,adult    Major depressive disorder with single episode, in full remission ([facility])    PSVT (paroxysmal supraventricular tachycardia) ([facility])    Weakness    Frequent falls    SVT (supraventricular tachycardia) ([facility])    GBS (Guillain [place] syndrome) ([facility])       Plan:   Guillian Barré syndrome: s/p IVIG. PT/OT.      Paroxysmal SVT: lopressor     Hyponatremia: monitor     HTN: norvasc 10, losartan 100, lopressor 25     HLD: Lipitor 10     Depression: prestiq per home regimen     Vit D deficiency: supplement    Sinusitis: started zyrtec and nasal spray     Bowels: Per protocol  Bladder: Per protocol   Sleep: Trazodone provided prn. Pain: tylenol, naproxen, ultram PRN   DVT PPx: ambulating >250'    Torrey Gay MD 7/27/2020, 9:08 AM    * This document was created using dictation software. While all precautions were taken to ensure accuracy, errors may have occurred. Please disregard any typographical errors.

## 2020-07-27 NOTE — PLAN OF CARE
Problem: Falls - Risk of:  Goal: Will remain free from falls  Description: Will remain free from falls  7/27/2020 0024 by Norbert Su RN  Outcome: Ongoing     Problem: Falls - Risk of:  Goal: Absence of physical injury  Description: Absence of physical injury  7/27/2020 0024 by Norbert Su RN  Outcome: Ongoing

## 2020-07-27 NOTE — PLAN OF CARE
heart monitor pad changed today (every 5 days). Took ultram for L back / leg pain when offered     Problem: Skin Integrity:  Goal: Will show no infection signs and symptoms  Description: Will show no infection signs and symptoms  Outcome: Ongoing  Goal: Absence of new skin breakdown  Description: Absence of new skin breakdown  Outcome: Ongoing     Problem: Falls - Risk of:  Goal: Will remain free from falls  Description: Will remain free from falls  Outcome: Ongoing  Goal: Absence of physical injury  Description: Absence of physical injury  Outcome: Ongoing     Problem: Safety:  Goal: Free from accidental physical injury  Description: Free from accidental physical injury  Outcome: Ongoing  Goal: Free from intentional harm  Description: Free from intentional harm  Outcome: Ongoing     Problem: Daily Care:  Goal: Daily care needs are met  Description: Daily care needs are met  Outcome: Ongoing     Problem: Pain:  Description: Pain management should include both nonpharmacologic and pharmacologic interventions.   Goal: Patient's pain/discomfort is manageable  Description: Patient's pain/discomfort is manageable  Outcome: Ongoing  Goal: Pain level will decrease  Description: Pain level will decrease  Outcome: Ongoing  Goal: Control of acute pain  Description: Control of acute pain  Outcome: Ongoing  Goal: Control of chronic pain  Description: Control of chronic pain  Outcome: Ongoing     Problem: Discharge Planning:  Goal: Patients continuum of care needs are met  Description: Patients continuum of care needs are met  Outcome: Ongoing     Problem: Nutrition  Goal: Optimal nutrition therapy  Outcome: Ongoing

## 2020-07-27 NOTE — PROGRESS NOTES
Pt seated in wc with chair alarm on, call button to side, and all needs met. Plan   Plan  Times per week: 90 minutes 5x/week  Times per day: Daily  Current Treatment Recommendations: Safety Education & Training, Self-Care / ADL, Endurance Training, Functional Mobility Training, Equipment Evaluation, Education, & procurement, Patient/Caregiver Education & Training, Strengthening       Goals  Short term goals  Time Frame for Short term goals: Discharge  Short term goal 1: Mod I for functional transfers-not met, progressing  Short term goal 2: Mod I for functional mobility for ADL-not met, progressing  Short term goal 3: Mod I for UB ADLs-not met, progressing  Short term goal 4: Mod I for LB ADLs-not met, progressing  Short term goal 5: Mod I for IADLs-not met, progressing  Long term goals  Time Frame for Long term goals : STG=LTG       Therapy Time   Individual Concurrent Group Co-treatment   Time In 0930         Time Out 1015         Minutes 45         Second Session Therapy Time:   Individual Concurrent Group Co-treatment   Time In 1245         Time Out 1330         Minutes 45             Total Treatment Minutes:  45 + 45     Timed Code Treatment Minutes: 39 Minutes + 4650 EDGAR Aviles OT   Supervised/directed by Steve De.  Michel Garduno OTR/IDRIS [id number], 7/27/2020, 3:55 PM

## 2020-07-27 NOTE — PROGRESS NOTES
Nutrition Assessment     Type and Reason for Visit: Reassess    Nutrition Recommendations/Plan: No new recommendations at this time. Nutrition Assessment:  Pt remains low risk. Pt reports eating very well. Wt stable. Will continue to monitor for changes. Pt asked for ways to eat to reduce her feeling swollen. Reported she is already on a no salt added diet. Pt also inquired about foods that effect choleterol and egg consumption. Verbally explained to avoid red meat and stick to only 4 egg yolk per week.     Malnutrition Assessment:  Malnutrition Status: No malnutrition    Nutrition Related Findings: +1 RLE edema; +1 LLE edema; constipation      Current Nutrition Therapies:    DIET GENERAL; No Added Salt (3-4 GM)    Anthropometric Measures:  · Height: 5' 1\" (154.9 cm)  · Current Body Wt: 148 lb (67.1 kg)   · BMI: 28    Nutrition Diagnosis:   No nutrition diagnosis at this time    Nutrition Interventions:   Food and/or Nutrient Delivery:  Continue Current Diet  Nutrition Education/Counseling:  Education completed   Coordination of Nutrition Care:  No recommendation at this time    Goals:  Consume >50% of meals       Nutrition Monitoring and Evaluation:   Food/Nutrient Intake Outcomes:  Food and Nutrient Intake  Physical Signs/Symptoms Outcomes:  Biochemical Data, Weight     Discharge Planning:    No discharge needs at this time     Electronically signed by Melody Spencer RD, LD on 7/27/20 at 11:29 AM EDT    Contact: [phone]

## 2020-07-28 PROCEDURE — 97535 SELF CARE MNGMENT TRAINING: CPT

## 2020-07-28 PROCEDURE — 1280000000 HC REHAB R&B

## 2020-07-28 PROCEDURE — 6370000000 HC RX 637 (ALT 250 FOR IP): Performed by: PHYSICAL MEDICINE & REHABILITATION

## 2020-07-28 PROCEDURE — 97116 GAIT TRAINING THERAPY: CPT

## 2020-07-28 PROCEDURE — 94760 N-INVAS EAR/PLS OXIMETRY 1: CPT

## 2020-07-28 PROCEDURE — 97530 THERAPEUTIC ACTIVITIES: CPT

## 2020-07-28 PROCEDURE — 97110 THERAPEUTIC EXERCISES: CPT

## 2020-07-28 RX ADMIN — METOPROLOL TARTRATE 25 MG: 25 TABLET, FILM COATED ORAL at 09:17

## 2020-07-28 RX ADMIN — DESVENLAFAXINE SUCCINATE 50 MG: 50 TABLET, EXTENDED RELEASE ORAL at 12:25

## 2020-07-28 RX ADMIN — METOPROLOL TARTRATE 25 MG: 25 TABLET, FILM COATED ORAL at 20:04

## 2020-07-28 RX ADMIN — NAPROXEN 500 MG: 250 TABLET ORAL at 09:29

## 2020-07-28 RX ADMIN — OYSTER SHELL CALCIUM WITH VITAMIN D 1 TABLET: 500; 200 TABLET, FILM COATED ORAL at 20:04

## 2020-07-28 RX ADMIN — LOSARTAN POTASSIUM 100 MG: 100 TABLET, FILM COATED ORAL at 09:20

## 2020-07-28 RX ADMIN — MULTIPLE VITAMINS W/ MINERALS TAB 1 TABLET: TAB at 09:19

## 2020-07-28 RX ADMIN — Medication 2000 UNITS: at 09:20

## 2020-07-28 RX ADMIN — OYSTER SHELL CALCIUM WITH VITAMIN D 1 TABLET: 500; 200 TABLET, FILM COATED ORAL at 09:20

## 2020-07-28 RX ADMIN — POLYETHYLENE GLYCOL 3350 17 G: 17 POWDER, FOR SOLUTION ORAL at 09:20

## 2020-07-28 RX ADMIN — AMLODIPINE BESYLATE 10 MG: 5 TABLET ORAL at 09:19

## 2020-07-28 RX ADMIN — CETIRIZINE HYDROCHLORIDE 10 MG: 10 TABLET, FILM COATED ORAL at 20:04

## 2020-07-28 RX ADMIN — ATORVASTATIN CALCIUM 10 MG: 10 TABLET, FILM COATED ORAL at 09:19

## 2020-07-28 RX ADMIN — PANTOPRAZOLE SODIUM 40 MG: 40 TABLET, DELAYED RELEASE ORAL at 05:37

## 2020-07-28 ASSESSMENT — PAIN SCALES - GENERAL
PAINLEVEL_OUTOF10: 0

## 2020-07-28 ASSESSMENT — PAIN DESCRIPTION - PROGRESSION
CLINICAL_PROGRESSION: NOT CHANGED

## 2020-07-28 NOTE — PROGRESS NOTES
Occupational Therapy  Facility/Department: [facility] ACUTE REHAB UNIT  Daily Treatment Note  NAME: Gustavo Castanon  : 1945  MRN: [id number]    Date of Service: 2020    Discharge Recommendations:  Home with assist PRN, S Level 3  OT Equipment Recommendations  Equipment Needed: Yes  ADL Assistive Devices: Tub transfer bench, HH shower, grab bars    Assessment   Performance deficits / Impairments: Decreased functional mobility ; Decreased ADL status; Decreased endurance;Decreased high-level IADLs;Decreased strength;Decreased balance  Assessment: Pt is progressing in therapy well but still below her baseline level of occupational function, based on the above deficits associated with PSVT and new GBS dx. Pt would benefit from continued skilled OT services to address these deficits. Treatment Diagnosis: Decreased ADL/IADL status, endurance, strength and balance associated with PSVT and GBS dx  Prognosis: Good  OT Education: OT Role;Plan of Care;Transfer Training;Equipment;Home Exercise Program  Patient Education: Pt verbalized and demonstrated understanding. REQUIRES OT FOLLOW UP: Yes  Activity Tolerance  Activity Tolerance: Patient Tolerated treatment well  Safety Devices  Safety Devices in place: Yes  Type of devices: Call light within reach; Patient at risk for falls; Left in chair;Chair alarm in place         Patient Diagnosis(es): GBS      has a past medical history of Anxiety, Colon polyp, Headache(784.0), Hyperlipidemia, Hypertension, and Osteoarthritis. has a past surgical history that includes Colonoscopy (8/10). Restrictions  Restrictions/Precautions  Restrictions/Precautions: Fall Risk  Required Braces or Orthoses?: No  Upper Extremity Weight Bearing Restrictions  Other: MCOT heart monitor - new on 2020  Position Activity Restriction  Other position/activity restrictions: : Gustavo Castanon is a 75 y.o. female presents to the ER with a complaint of bilateral extremity weakness.   States that she awoke yesterday and was having difficulty walking, states that she fell down one step and sat on her bottom. She had difficulty walking throughout the day. This morning she was able to navigate the steps but fell while walking across the front room to get her coffee. Per neurology: CSF protein was high consistent with a diagnosis of Guillian Barré syndrome. Completed 5 rounds of IVIG therapy. .  Subjective   General  Chart Reviewed: Yes  Patient assessed for rehabilitation services?: Yes  Response to previous treatment: Patient with no complaints from previous session  Family / Caregiver Present: No  Referring Practitioner: Carmencita Hernandez MD, for dc planning  Diagnosis: PSVT, GBS dx  Subjective  Subjective: Pt seated in wc upon arrival-agreeable to OT session. Reported she was feeling much better than yesterday and had no pain. Objective    ADL  Grooming: Stand by assistance  Toileting: Supervision  Additional Comments:   Ambulated to toilet with RW and SBA. Toileted with RW, grab bars, and supervision. Pt completed grooming and oral care standing at sink with RW and SBA. Pt ambulated ~115 feet to dining room with RW and SBA, requiring no rest breaks. Pt had much less tension in UB while using RW. Pt completed ~20 minute meal prep activity with SBA and RW, requiring one rest break. Pt demonstrated safe navigation of the walker in the kitchen while gathering items/completing activity, improved dynamic balance with no LOB, and good functional use of UE when reaching for items and preparing muffins. Pt ambulated ~115 ft with RW and SBA to room.  No difficulty with sequencing or problem solving during cooking tasks other than orientation to unfamiliar environment         Balance  Sitting Balance: Supervision  Standing Balance: Stand by assistance  Standing Balance  Time: ~10 mins x2  Activity: Meal prep task in kitchen  Comment: use of RW and ktichen counter for stabilization  Functional Mobility  Functional - Mobility Device: Rolling Walker  Activity: To/From therapy gym  Assist Level: Stand by assistance  Toilet Transfers  Equipment Used: Standard toilet  Toilet Transfer: Supervision  Toilet Transfers Comments: Ambulated to toilet with SBA and RW  Bed mobility  Sit to Supine: Stand by assistance  Transfers  Sit to stand: Stand by assistance  Stand to sit: Stand by assistance            Second Session:  Pt seated in wc upon arrival, agreeable to therapy. Pt ambulated to toilet with RW and supervision. Pt ambulated ~115 ft with RW and SBA to kitchen. Pt completed ~10 minute dish washing task while standing at the sink with supervision. Pt ambulated around the dining room with the shopping cart and supervision collecting food items. Pt demonstrated increased activity tolerance and dynamic standing balance. Discussed with the pt different options for grocery shopping such as online delivery or having a neighbor help out. Pt verbalized understanding. Pt demonstrated functional use of UE while putting items away in the closet-requiring min verbal cues to position herself closer to the shelves. Ambulated ~115 ft back to her room. Biofreeze applied to pt's lower back while seated EOB. Theraband exercises reviewed with pt while seated EOB- R bicep curls x5, L bicep curls x5, R forward punches x5, and L forward punches x5. Pt verbalized understanding and demonstrated competence. Pt left supine in bed with bed alarm on and call button to left.                   Plan   Plan  Times per week: 90 minutes 5x/week  Times per day: Daily  Current Treatment Recommendations: Safety Education & Training, Self-Care / ADL, Endurance Training, Functional Mobility Training, Equipment Evaluation, Education, & procurement, Patient/Caregiver Education & Training, Strengthening    Goals  Short term goals  Time Frame for Short term goals: Discharge  Short term goal 1: Mod I for functional transfers-not met, progressing  Short term goal 2: Mod I for functional mobility for ADL-not met, progressing  Short term goal 3: Mod I for UB ADLs-not met, progressing  Short term goal 4: Mod I for LB ADLs-not met, progressing  Short term goal 5: Mod I for IADLs-not met, progressing  Long term goals  Time Frame for Long term goals : STG=LTG       Therapy Time   Individual Concurrent Group Co-treatment   Time In 0930         Time Out 1020         Minutes 50         Timed Code Treatment Minutes: 50 Minutes     Second Session Therapy Time:   Individual Concurrent Group Co-treatment   Time In 2434         Time Out 1430         Minutes 45           Timed Code Treatment Minutes:  45 minutes     Total Treatment Minutes:  50 + 45 minutes     EDGAR Conroy OT   Supervision/direction Jan M. Dalbert Severance OTR/L [id number], 7/28/2020, 3:43 PM

## 2020-07-28 NOTE — PLAN OF CARE
Problem: Skin Integrity:  Goal: Will show no infection signs and symptoms  Description: Will show no infection signs and symptoms  7/27/2020 2016 by Cathy Monsivais RN  Outcome: Ongoing     Problem: Skin Integrity:  Goal: Absence of new skin breakdown  Description: Absence of new skin breakdown  7/27/2020 2016 by Cathy Monsivais RN  Outcome: Ongoing     Problem: Falls - Risk of:  Goal: Will remain free from falls  Description: Will remain free from falls  7/27/2020 2016 by Cathy Monsivais RN  Outcome: Ongoing     Problem: Falls - Risk of:  Goal: Absence of physical injury  Description: Absence of physical injury  7/27/2020 2016 by Cathy Monsivais RN  Outcome: Ongoing     Problem: Safety:  Goal: Free from accidental physical injury  Description: Free from accidental physical injury  7/27/2020 2016 by Cathy Monsivais RN  Outcome: Ongoing     Problem: Safety:  Goal: Free from intentional harm  Description: Free from intentional harm  7/27/2020 2016 by Cathy Monsivais RN  Outcome: Ongoing     Problem: Daily Care:  Goal: Daily care needs are met  Description: Daily care needs are met  7/27/2020 2016 by Cathy Monsivais RN  Outcome: Ongoing     Problem: Pain:  Goal: Patient's pain/discomfort is manageable  Description: Patient's pain/discomfort is manageable  7/27/2020 2016 by Cathy Monsivais RN  Outcome: Ongoing     Problem: Pain:  Goal: Pain level will decrease  Description: Pain level will decrease  7/27/2020 2016 by Cathy Monsivais RN  Outcome: Ongoing     Problem: Pain:  Goal: Control of acute pain  Description: Control of acute pain  7/27/2020 2016 by Cathy Monsivais RN  Outcome: Ongoing     Problem: Pain:  Goal: Control of chronic pain  Description: Control of chronic pain  7/27/2020 2016 by Cathy Monsivais RN  Outcome: Ongoing     Problem: Discharge Planning:  Goal: Patients continuum of care needs are met  Description: Patients continuum of care needs are met  7/27/2020 2016 by Cathy Monsivais RN  Outcome: Ongoing     Problem: Nutrition  Goal: Optimal nutrition therapy  7/27/2020 2016 by Elsa Diana RN  Outcome: Ongoing

## 2020-07-28 NOTE — PROGRESS NOTES
Physical Therapy  Facility/Department: [facility] ACUTE REHAB UNIT  Daily Treatment Note  NAME: Kristi Gonzalez  : 1945  MRN: [id number]    Date of Service: 2020    Discharge Recommendations:  Patient would benefit from continued therapy after discharge, Home with Home health PT, S Level 3   PT Equipment Recommendations  Equipment Needed: Yes  Mobility Devices: Edger Sprawls: Rolling  Other: pt currently needing RW for safe ambulation    Assessment   Body structures, Functions, Activity limitations: Decreased functional mobility ; Decreased ADL status; Decreased balance;Decreased strength;Decreased endurance  Assessment: Significant progress and improvement from yesterday regarding overall mobility, decreased pain and decreased assistance. Significant stretching and gait performed this morning. Treatment Diagnosis: decreased functional mobility, impaired gait, decreased balance, weakness  Prognosis: Good  Decision Making: Low Complexity  PT Education: PT Role;Goals;Plan of Care;Transfer Training;Home Exercise Program  Patient Education: verbalized understanding of cues for safety and gait training  Barriers to Learning: none  REQUIRES PT FOLLOW UP: Yes  Activity Tolerance  Activity Tolerance: Patient Tolerated treatment well     Patient Diagnosis(es): Guillian [place] Syndrome. has a past medical history of Anxiety, Colon polyp, Headache(784.0), Hyperlipidemia, Hypertension, and Osteoarthritis. has a past surgical history that includes Colonoscopy (8/10). Restrictions  Restrictions/Precautions  Restrictions/Precautions: Fall Risk  Required Braces or Orthoses?: No  Upper Extremity Weight Bearing Restrictions  Other: MCOT heart monitor - on 2020  Position Activity Restriction  Other position/activity restrictions: : Kristi Gonzalez is a 75 y.o. female presents to the ER with a complaint of bilateral extremity weakness.   States that she awoke yesterday and was having difficulty walking, states that she fell down one step and sat on her bottom. She had difficulty walking throughout the day. This morning she was able to navigate the steps but fell while walking across the front room to get her coffee. Per neurology: CSF protein was high consistent with a diagnosis of Guillian Barré syndrome. Completed 5 rounds of IVIG therapy. .  Subjective   General  Chart Reviewed: Yes  Response To Previous Treatment: Patient with no complaints from previous session. Family / Caregiver Present: No  Referring Practitioner: Dr. Edin Rodriguez  Subjective  Subjective: Reports that back pain is not that bad this morning. Agreeable to work. Enjoyed stretches from last session. General Comment  Comments: seated in wheelchair at arrival. Denies pain.           Orientation  Orientation  Overall Orientation Status: Within Normal Limits  Cognition      Objective   Bed mobility  Rolling to Left: Stand by assistance  Rolling to Right: Stand by assistance  Supine to Sit: Stand by assistance  Sit to Supine: Stand by assistance  Scooting: Stand by assistance  Transfers  Sit to Stand: Stand by assistance(3x)  Stand to sit: Stand by assistance(3x)  Ambulation  Ambulation?: Yes  Ambulation 1  Surface: level tile  Device: Rolling Walker  Assistance: Stand by assistance  Quality of Gait: slightly guarded  Gait Deviations: Slow [place];Decreased step length;Decreased step height  Distance: 160' + 380'  Stairs/Curb  Stairs?: Yes  Stairs  # Steps : 9  Stairs Height: 6\"  Rails: Left ascending  Device: No Device  Assistance: Stand by assistance  Comment: step-to pattern leading with L on ascent, heavy reliance on UEs and moderate assistance due to inability to support self through LEs     Balance  Posture: Good  Sitting - Static: Good  Sitting - Dynamic: Good  Standing - Static: Good  Standing - Dynamic: Good;-  Exercises  Comments: Stepper: 7 min, Level 1.5         Comment: Mat Activity supine: Thoracic spine stretch 3x 30 sec, femoral pec stretch on half roll 2x 30 sec, glute stretch 3x 30 sec, heelslides x10 B, TA Iso. Seated  HS stretch 2 x30 sec      2nd session: Pt supine in bed upon arrival. Pt agreeable to PT, denied pain. Pt requesting to use restroom prior to therapy. Pt ambulating short distance into restroom and completing toileting with mod I. Pt then ambulating >500 ft in rogers with CGA quickly progressing to SBA. Pt taking seated rest break in gym chair. Pt reporting no AD use prior to hospitalization. Pt completing static stance on solid surface followed by static stance on Aerex foam mat. Pt tolerating well x1 minute bouts without UE support or assist from PT. PT then trialing cane with PT. Pt ambulating 25 ft in gym with cane and CGA from PT, demonstrating difficulty with coordinating cane with steps. Pt then completing 4\" and 6\" steps with 1 HR and cane, cues for sequencing. Pt then ambulating 125 ft with cane back to room with CGA, improved coordination of cane with steps. Pt left seated in w/c with all needs in reach and lunch tray in place. Goals  Short term goals  Time Frame for Short term goals: to be met in 14-17 days  Short term goal 1: pt able to perform bed mobility modified independently  Short term goal 2: pt able to perform transfers modified independently  Short term goal 3: pt able to ambulate with LRAD 150 ft modified independently  Short term goal 4: pt able to navigate 12 steps with handrailing and modified independently  Short term goal 5: pt able to perform car transfer modified independently  Patient Goals   Patient goals : to regain leg strength    Plan    Plan  Times per week: 5 of 7 days per week for 90 minutes per day  Times per day: Daily  Current Treatment Recommendations: Strengthening, Balance Training, Functional Mobility Training, Transfer Training, Gait Training, Stair training, Endurance Training, Neuromuscular Re-education  Safety Devices  Type of devices:  All fall risk precautions in place, Call light within reach, Chair alarm in place, Nurse notified, Left in chair, Gait belt  Restraints  Initially in place: No     Therapy Time   Individual Concurrent Group Co-treatment   Time In 0730         Time Out 0830         Minutes [address], [address]  I agree with the above note and have addressed this patient's goals.   Yvonne Escamilla, PT, DPT, [id number]    Second Session Therapy Time:   Individual Concurrent Group Co-treatment   Time In 1130         Time Out 1200         Minutes 30           Timed Code Treatment Minutes:  90    Total Treatment Minutes:  [address] [facility], [place], [place], [id number]

## 2020-07-28 NOTE — PROGRESS NOTES
Judah Gonzalez  7/28/2020  [id number]    Chief Complaint: GBS (Guillain [place] syndrome) ([facility])    Subjective:   No overnight events. No current complaints. Back limiting therapy yesterday. Much improved today. ROS: No CP, SOB, dyspnea    Objective:  Patient Vitals for the past 24 hrs:   BP Temp Temp src Pulse Resp SpO2 Weight   07/28/20 0845 (!) 145/69 96.4 °F (35.8 °C) Oral 77 20 96 % --   07/28/20 0500 -- -- -- -- -- -- 150 lb 4.8 oz (68.2 kg)   07/27/20 2135 123/63 97.5 °F (36.4 °C) Oral 61 18 93 % --     Gen: No distress, pleasant. Ambulating in rogers with PT  HEENT: Normocephalic, atraumatic. CV: Regular rate and rhythm. No MRG   Resp: No respiratory distress. CTAB   Abd: Soft, nontender   Ext: No edema. Neuro: Alert, oriented, appropriately interactive. Laboratory data: Available via EMR. Therapy progress:  PT  Upper Extremity Weight Bearing Restrictions  Other: MCOT heart monitor - new on 7/22/2020  Position Activity Restriction  Other position/activity restrictions: 7/11: Judah Gonzalez is a 75 y.o. female presents to the ER with a complaint of bilateral extremity weakness. States that she awoke yesterday and was having difficulty walking, states that she fell down one step and sat on her bottom. She had difficulty walking throughout the day. This morning she was able to navigate the steps but fell while walking across the front room to get her coffee. Per neurology: CSF protein was high consistent with a diagnosis of Guillian Barré syndrome. Completed 5 rounds of IVIG therapy.  .  Objective     Sit to Stand: Stand by assistance(increased time due to pain)  Stand to sit: Stand by assistance  Bed to Chair: Contact guard assistance  Device: Rolling Walker  Assistance: Stand by assistance  Distance: 59 ft x 2  OT  PT Equipment Recommendations  Equipment Needed: Yes  Mobility Devices: Nevelyn Single: Rolling  Other: pt currently needing RW for safe ambulation  Equipment Used: Standard toilet  Toilet Transfers Comments: Ambulated to toilet with SBA  Assessment        SLP                Body mass index is 28.4 kg/m². Assessment:  Patient Active Problem List   Diagnosis    Hypertension    Hyperlipidemia    Osteopenia    Hx of eczema    Chronic idiopathic urticaria    Moderate mitral regurgitation by prior echocardiogram    Prediabetes    BMI 27.0-27.9,adult    Major depressive disorder with single episode, in full remission ([facility])    PSVT (paroxysmal supraventricular tachycardia) ([facility])    Weakness    Frequent falls    SVT (supraventricular tachycardia) ([facility])    GBS (Guillain [place] syndrome) ([facility])       Plan:   Guillian Barré syndrome: s/p IVIG. PT/OT.      Paroxysmal SVT: lopressor     Hyponatremia: monitor     HTN: norvasc 10, losartan 100, lopressor 25     HLD: Lipitor 10     Depression: prestiq per home regimen     Vit D deficiency: supplement    Sinusitis: started zyrtec and nasal spray     Bowels: Per protocol  Bladder: Per protocol   Sleep: Trazodone provided prn. Pain: tylenol, naproxen, ultram PRN   DVT PPx: ambulating >250'    Torrey Gay MD 7/28/2020, 9:12 AM    * This document was created using dictation software. While all precautions were taken to ensure accuracy, errors may have occurred. Please disregard any typographical errors.

## 2020-07-29 PROCEDURE — 6370000000 HC RX 637 (ALT 250 FOR IP): Performed by: PHYSICAL MEDICINE & REHABILITATION

## 2020-07-29 PROCEDURE — 97116 GAIT TRAINING THERAPY: CPT

## 2020-07-29 PROCEDURE — 97535 SELF CARE MNGMENT TRAINING: CPT

## 2020-07-29 PROCEDURE — 97530 THERAPEUTIC ACTIVITIES: CPT

## 2020-07-29 PROCEDURE — 1280000000 HC REHAB R&B

## 2020-07-29 PROCEDURE — 97110 THERAPEUTIC EXERCISES: CPT

## 2020-07-29 RX ORDER — DIAPER,BRIEF,INFANT-TODD,DISP
EACH MISCELLANEOUS PRN
Status: DISCONTINUED | OUTPATIENT
Start: 2020-07-29 | End: 2020-08-04 | Stop reason: HOSPADM

## 2020-07-29 RX ADMIN — OYSTER SHELL CALCIUM WITH VITAMIN D 1 TABLET: 500; 200 TABLET, FILM COATED ORAL at 08:20

## 2020-07-29 RX ADMIN — AMLODIPINE BESYLATE 10 MG: 5 TABLET ORAL at 08:20

## 2020-07-29 RX ADMIN — DESVENLAFAXINE SUCCINATE 50 MG: 50 TABLET, EXTENDED RELEASE ORAL at 08:21

## 2020-07-29 RX ADMIN — METOPROLOL TARTRATE 25 MG: 25 TABLET, FILM COATED ORAL at 21:20

## 2020-07-29 RX ADMIN — MULTIPLE VITAMINS W/ MINERALS TAB 1 TABLET: TAB at 08:20

## 2020-07-29 RX ADMIN — CETIRIZINE HYDROCHLORIDE 10 MG: 10 TABLET, FILM COATED ORAL at 21:20

## 2020-07-29 RX ADMIN — METOPROLOL TARTRATE 25 MG: 25 TABLET, FILM COATED ORAL at 08:20

## 2020-07-29 RX ADMIN — LOSARTAN POTASSIUM 100 MG: 100 TABLET, FILM COATED ORAL at 08:20

## 2020-07-29 RX ADMIN — HYDROCORTISONE: 1 OINTMENT TOPICAL at 14:58

## 2020-07-29 RX ADMIN — POLYETHYLENE GLYCOL 3350 17 G: 17 POWDER, FOR SOLUTION ORAL at 08:20

## 2020-07-29 RX ADMIN — HYDROCORTISONE: 1 OINTMENT TOPICAL at 21:21

## 2020-07-29 RX ADMIN — PANTOPRAZOLE SODIUM 40 MG: 40 TABLET, DELAYED RELEASE ORAL at 06:13

## 2020-07-29 RX ADMIN — OYSTER SHELL CALCIUM WITH VITAMIN D 1 TABLET: 500; 200 TABLET, FILM COATED ORAL at 21:21

## 2020-07-29 ASSESSMENT — PAIN SCALES - GENERAL: PAINLEVEL_OUTOF10: 0

## 2020-07-29 NOTE — PROGRESS NOTES
Gustavo Castanon  7/29/2020  [id number]    Chief Complaint: GBS (Guillain [place] syndrome) ([facility] [address]    Subjective:   No overnight events. No current complaints. Progressing very well. ROS: No CP, SOB, dyspnea    Objective:  Patient Vitals for the past 24 hrs:   BP Temp Temp src Pulse Resp SpO2   07/29/20 0752 (!) 162/71 98 °F (36.7 °C) Oral 67 20 --   07/28/20 1958 135/63 98.6 °F (37 °C) Oral 64 18 95 %     Gen: No distress, pleasant. Resting in bed  HEENT: Normocephalic, atraumatic. CV: Regular rate and rhythm. No MRG   Resp: No respiratory distress. CTAB   Abd: Soft, nontender, nondistended  Ext: No edema. Neuro: Alert, oriented, appropriately interactive. Laboratory data: Available via EMR. Therapy progress:  PT  Upper Extremity Weight Bearing Restrictions  Other: MCOT heart monitor - on 7/28/2020  Position Activity Restriction  Other position/activity restrictions: 7/11: Gustavo Castanon is a 75 y.o. female presents to the ER with a complaint of bilateral extremity weakness. States that she awoke yesterday and was having difficulty walking, states that she fell down one step and sat on her bottom. She had difficulty walking throughout the day. This morning she was able to navigate the steps but fell while walking across the front room to get her coffee. Per neurology: CSF protein was high consistent with a diagnosis of Guillian Barré syndrome. Completed 5 rounds of IVIG therapy.  .  Objective     Sit to Stand: Supervision  Stand to sit: Supervision  Bed to Chair: Contact guard assistance  Device: Rolling Walker  Assistance: Stand by assistance  Distance: 160' + 380'  OT  PT Equipment Recommendations  Equipment Needed: Yes  Mobility Devices: Tawny Simmer: Rolling  Other: pt currently needing RW for safe ambulation  Equipment Used: Standard toilet  Toilet Transfers Comments: Ambulated to toilet with SBA and RW  Assessment        SLP                Body mass index is 28.4 kg/m². Assessment:  Patient Active Problem List   Diagnosis    Hypertension    Hyperlipidemia    Osteopenia    Hx of eczema    Chronic idiopathic urticaria    Moderate mitral regurgitation by prior echocardiogram    Prediabetes    BMI 27.0-27.9,adult    Major depressive disorder with single episode, in full remission ([facility])    PSVT (paroxysmal supraventricular tachycardia) ([facility])    Weakness    Frequent falls    SVT (supraventricular tachycardia) ([facility])    GBS (Guillain [place] syndrome) ([facility])       Plan:   Guillian Barré syndrome: s/p IVIG. PT/OT.      Paroxysmal SVT: lopressor     Hyponatremia: monitor     HTN: norvasc 10, losartan 100, lopressor 25     HLD: Lipitor 10     Depression: prestiq per home regimen     Vit D deficiency: supplement    Sinusitis: started zyrtec and nasal spray     Bowels: Per protocol  Bladder: Per protocol   Sleep: Trazodone provided prn. Pain: tylenol, naproxen, ultram PRN   DVT PPx: ambulating >250'    Torrey Gay MD 7/29/2020, 8:53 AM    * This document was created using dictation software. While all precautions were taken to ensure accuracy, errors may have occurred. Please disregard any typographical errors.

## 2020-07-29 NOTE — PROGRESS NOTES
Biotel cardiac monitor chest sensor removed from pt to charge along with the phone placed on charge, pt educated on removing the device and charging q 1-2 days and how to look on the phone to see how much battery life is left.  Pt verbalized understanding

## 2020-07-29 NOTE — PROGRESS NOTES
Occupational Therapy  Facility/Department: [facility] ACUTE REHAB UNIT  Daily Treatment Note  NAME: Pastor Diaz  : 1945  MRN: [id number]    Date of Service: 2020    Discharge Recommendations:  Home with assist PRN, S Level 3  OT Equipment Recommendations  Equipment Needed: Yes  ADL Assistive Devices: (tub transfer bench, HH shower, grab bars)    Assessment   Performance deficits / Impairments: Decreased functional mobility ; Decreased ADL status; Decreased endurance;Decreased high-level IADLs;Decreased strength;Decreased balance  Assessment: Pt is progressing in therapy well but still below her baseline level of occupational function, based on the above deficits associated with PSVT and new GBS dx. Pt would benefit from continued skilled OT services to address these deficits. Treatment Diagnosis: Decreased ADL/IADL status, endurance, strength and balance associated with PSVT and GBS dx  Prognosis: Good  OT Education: OT Role;Plan of Care;Transfer Training;Equipment;Home Exercise Program  Patient Education: Pt verbalized and demonstrated understanding. REQUIRES OT FOLLOW UP: Yes  Activity Tolerance  Activity Tolerance: Patient Tolerated treatment well  Safety Devices  Safety Devices in place: Yes  Type of devices: Call light within reach; Patient at risk for falls; Left in chair;Chair alarm in place         Patient Diagnosis(es): GBS     has a past medical history of Anxiety, Colon polyp, Headache(784.0), Hyperlipidemia, Hypertension, and Osteoarthritis. has a past surgical history that includes Colonoscopy (8/10). Restrictions  Restrictions/Precautions  Restrictions/Precautions: Fall Risk  Required Braces or Orthoses?: No  Upper Extremity Weight Bearing Restrictions  Other: MCOT heart monitor - on 2020  Position Activity Restriction  Other position/activity restrictions: : Pastor Diaz is a 75 y.o. female presents to the ER with a complaint of bilateral extremity weakness.   States that she awoke yesterday and was having difficulty walking, states that she fell down one step and sat on her bottom. She had difficulty walking throughout the day. This morning she was able to navigate the steps but fell while walking across the front room to get her coffee. Per neurology: CSF protein was high consistent with a diagnosis of Guillian Barré syndrome. Completed 5 rounds of IVIG therapy. .  Subjective   General  Chart Reviewed: Yes  Patient assessed for rehabilitation services?: Yes  Response to previous treatment: Patient with no complaints from previous session  Family / Caregiver Present: No  Referring Practitioner: Deb Randolph MD, for dc planning  Diagnosis: PSVT, GBS dx  Subjective  Subjective: Pt seated in wc upon arrival-agreeable to shower. Reported no pain, still some numbness bottom of feet. Objective    ADL  Grooming: Supervision(standing at sink with RW)  UE Bathing: Supervision;Setup  LE Bathing: Minimal assistance(for feet)  UE Dressing: Supervision;Setup  LE Dressing: Setup;Stand by assistance  Toileting: Supervision  Additional Comments: Ambulated around room to gather clothes with RW and SBA. Toileted with RW, grab bars and supervision. UB and LB bathing seated on shower chair with supervision-min A for washing feet. UB dressing completed seated on shower chair. LB dressing completed seated on couch. Pt ambulated ~100 ft to therapy gym with RW and supervision to raised mat to simulated getting in and out of bed. Pt completed task with supervision. Pt verbalized she would like to talk to the  about acquring help when she goes home because her family is worried. Standing Balance  Time: ~3-5 mins x3  Activity: IADL tasks (arranging flowers, loading washing machine), TherEx standing at ballet bars  Comment: use of RW or sink for stabilization  Functional Mobility  Functional - Mobility Device: Rolling Walker  Activity: To/From therapy gym; To/from bathroom  Assist Level: Stand by assistance  Toilet Transfers  Equipment Used: Standard toilet  Toilet Transfer: Supervision  Toilet Transfers Comments: Ambulated to toilet with SBA and RW  Bed mobility  Sit to Supine: Supervision  Transfers  Sit to stand: Supervision  Stand to sit: Supervision            Second Session:  Pt laying in bed upon arrival-agreeable to therapy, denies pain. Pt transferred supine to sit, sit to stand with supervision. Pt ambulated with RW and supervision to open the blinds in her room-demonstrating good navigation of her walker. Pt toileted and completed oral care with supervision and RW. Pt stood at sink with RW and supervision to re-arrange flowers. Pt ambulated ~100 ft to ADL room for IADL task with RW and supervision. Pt demonstrated improved dynamic standing balance while loading laundry into washer. Pt required minimal cueing for sequencing of task. After one seated rest break, pt completed UB exercises with blue theraband while standing at ballet bars. Activity focused on improving confidence of standing balance without the walker while strengthening UB-rows x10, tricep kickbacks x10, R horizontal elbow flexion x10, L horizontal elbow flexion x10. Pt seated on zuly disc on mat to complete UB exercises to challenge core muscles- R/L horizontal shoulder flexion x10, R/L shoulder press x10. Pt ambulated ~160 ft with RW and supervision to room. Pt left in bed with alarm on, call button to left and all needs met.           Plan   Plan  Times per week: 90 minutes 5x/week  Times per day: Daily  Current Treatment Recommendations: Safety Education & Training, Self-Care / ADL, Endurance Training, Functional Mobility Training, Equipment Evaluation, Education, & procurement, Patient/Caregiver Education & Training, Strengthening       Goals  Short term goals  Time Frame for Short term goals: Discharge  Short term goal 1: Mod I for functional transfers-not met, progressing  Short term goal 2: Mod I for functional mobility for ADL-not met, progressing  Short term goal 3: Mod I for UB ADLs-not met, progressing  Short term goal 4: Mod I for LB ADLs-not met, progressing  Short term goal 5: Mod I for IADLs-not met, progressing  Long term goals  Time Frame for Long term goals : STG=LTG       Therapy Time   Individual Concurrent Group Co-treatment   Time In 0915         Time Out 1000         Minutes 45         Timed Code Treatment Minutes: 39 Minutes     Second Session Therapy Time:   Individual Concurrent Group Co-treatment   Time In 1350         Time Out 1435         Minutes 45         Timed Code Treatment Minutes:  45 minutes     Total Treatment Minutes:  45 + 45 minutes     Michaell El Dara, OTS  Valerio Munford Round, OT   Supervised/directed by Basim Aldridge.  Felix Pollack OTR/L [id number], 7/29/2020, 3:19 PM

## 2020-07-29 NOTE — PROGRESS NOTES
Physical Therapy  Facility/Department: [facility] ACUTE REHAB UNIT  Daily Treatment Note  NAME: Naomi Rucker  : 1945  MRN: [id number]    Date of Service: 2020    Discharge Recommendations:  Patient would benefit from continued therapy after discharge, Home with Home health PT, S Level 3   PT Equipment Recommendations  Equipment Needed: Yes  Mobility Devices: Vahid Ronde: Rolling  Other: pt currently needing RW for safe ambulation    Assessment   Body structures, Functions, Activity limitations: Decreased functional mobility ; Decreased ADL status; Decreased balance;Decreased strength;Decreased endurance  Assessment: Continued progress and improvement regarding overall mobility, decreased pain and decreased assistance. Treatment Diagnosis: decreased functional mobility, impaired gait, decreased balance, weakness  Prognosis: Good  Decision Making: Low Complexity  PT Education: PT Role;Goals;Plan of Care;Transfer Training;Home Exercise Program  Patient Education: verbalized understanding of cues for safety and gait training  Barriers to Learning: none  REQUIRES PT FOLLOW UP: Yes  Activity Tolerance  Activity Tolerance: Patient Tolerated treatment well     Patient Diagnosis(es): GBS. has a past medical history of Anxiety, Colon polyp, Headache(784.0), Hyperlipidemia, Hypertension, and Osteoarthritis. has a past surgical history that includes Colonoscopy (8/10). Restrictions  Restrictions/Precautions  Restrictions/Precautions: Fall Risk  Required Braces or Orthoses?: No  Upper Extremity Weight Bearing Restrictions  Other: MCOT heart monitor - on 2020  Position Activity Restriction  Other position/activity restrictions: : Naomi Rucker is a 75 y.o. female presents to the ER with a complaint of bilateral extremity weakness. States that she awoke yesterday and was having difficulty walking, states that she fell down one step and sat on her bottom.   She had difficulty walking throughout the day. This morning she was able to navigate the steps but fell while walking across the front room to get her coffee. Per neurology: CSF protein was high consistent with a diagnosis of Guillian Barré syndrome. Completed 5 rounds of IVIG therapy. .  Subjective   General  Chart Reviewed: Yes  Response To Previous Treatment: Patient with no complaints from previous session. Family / Caregiver Present: No  Referring Practitioner: Dr. Genie Eduardo  Subjective  Subjective: Denies pain this morning. Agreeable to PT. General Comment  Comments: seated in w/c upon arrival.          Orientation  Orientation  Overall Orientation Status: Within Normal Limits  Cognition      Objective      Transfers  Sit to Stand: Supervision  Stand to sit: Supervision  Ambulation  Ambulation?: Yes  Ambulation 1  Surface: level tile  Device: Rolling Walker  Assistance: Stand by assistance  Quality of Gait: slightly guarded  Gait Deviations: Slow [place];Decreased step length;Decreased step height  Distance: 121' + 270'  Comments: no LOB. Balance  Posture: Good  Sitting - Static: Good  Sitting - Dynamic: Good  Standing - Static: Good  Standing - Dynamic: Good;-  Comments: no LOB; Stood for hand washing and clothing management SBA. Sat for carola-care Supervision            Comment: ballet bar: lateral steps 4 x8; Stepper 6 min level 2         Second Session:  Pt seated in w/c upon arrival. Denies pain. \"My ankle is swollen and I would like to get my shoes on, but I can't. \" Pt completing multiple reps of sit<>stand from varying surface heights Supervision. Pt completed toileting, pericare and hand hygiene x2 at beginning and end of session Blaire. Pt amb with ' + 200'x2, + multiple short distances in gym SBA. VC's for increased SHERIF as pt displays scissoring gait at times. Pt able to navigate up/down 6, 6 inch steps Supervision. Pt ascend/descend with diagonal pattern and BUEs on HR.  Standing at bar, pt completed the following for increased LE strength and balance: hip ext, abd, flex 2x10B, marches 2x20B, B calf raises 2x10; SBA for balance as pt with 1 LOB d/t letting go of bar requiring Joellen to recover, tandem stance for balance 3x20 seconds each Joellen. Seated on edge of mat table, pt completed HS stretch 3x30 secs each and SB rollout fwd/L/R for LS stretching, and 2x5 sit<>stand without UE support for improved LE strength. Left pt seated in w/c. Chair alarm. Call light. All needs met. Goals  Short term goals  Time Frame for Short term goals: to be met in 14-17 days  Short term goal 1: pt able to perform bed mobility modified independently  Short term goal 2: pt able to perform transfers modified independently  Short term goal 3: pt able to ambulate with LRAD 150 ft modified independently  Short term goal 4: pt able to navigate 12 steps with handrailing and modified independently  Short term goal 5: pt able to perform car transfer modified independently  Patient Goals   Patient goals : to regain leg strength    Plan    Plan  Times per week: 5 of 7 days per week for 90 minutes per day  Times per day: Daily  Current Treatment Recommendations: Strengthening, Balance Training, Functional Mobility Training, Transfer Training, Gait Training, Stair training, Endurance Training, Neuromuscular Re-education  Safety Devices  Type of devices: All fall risk precautions in place, Call light within reach, Chair alarm in place, Nurse notified, Left in chair, Gait belt  Restraints  Initially in place: No     Therapy Time   Individual Concurrent Group Co-treatment   Time In 0835         Time Out 0905         Minutes 30         Timed Code Treatment Minutes: 30 Minutes    Second Session Therapy Time:   Individual Concurrent Group Co-treatment   Time In 1040         Time Out 1140         Minutes 60           Timed Code Treatment Minutes:  30 + 60    Total Treatment Minutes:  90 minutes    [place], [place] [address]  VICTORIANO provided treatment for second session only.    Roderick Rebolledo Ramu, PTA [id number]  Miguelito Felder PT, DPT [id number]  PT reviewed and agrees with above note.

## 2020-07-29 NOTE — DISCHARGE INSTR - COC
Continuity of Care Form    Patient Name: Mojgan Alfaro   :    MRN:  [id number]    Admit date:  2020  Discharge date:  2020    Code Status Order: Full Code   Advance Directives:   Advance Care Flowsheet Documentation     Date/Time Healthcare Directive Type of Healthcare Directive Copy in [address] Agent's Name Healthcare Agent's Phone Number    [phone]  Yes, patient has an advance directive for healthcare treatment  Durable power of  for health care  No, copy requested from family  --  --  --          Admitting Physician:  Brittny Parham MD  PCP: Abdulaziz Menon MD    Discharging Nurse: Brianna Bunn RN BSN   [address] Unit/Room#: [id number]-[id number]/0213-64  Discharging Unit Phone Number: [phone]    Emergency Contact:   Extended Emergency Contact Information  Primary Emergency Contact: [address] [place] [address] Phone: [phone]  Work Phone: [phone]  Mobile Phone: [phone]  Relation: Brother/Sister  Secondary Emergency Contact: Brian Steinbergkory Phone: [phone]  Mobile Phone: [phone]  Relation: Other    Past Surgical History:  Past Surgical History:   Procedure Laterality Date    COLONOSCOPY  8/10       Immunization History:   Immunization History   Administered Date(s) Administered    Influenza Vaccine, unspecified formulation 10/06/2016    Influenza Virus Vaccine 12/10/2011    Influenza, High Dose (Fluzone 72 yrs and older) 10/02/2012, 2013, 2014, 2015, 10/31/2017, 2018    Influenza, Triv, inactivated, subunit, adjuvanted, IM (Fluad 65 yrs and older) 10/16/2019    Pneumococcal Conjugate 13-valent (Avncivh01) 2015    Pneumococcal Polysaccharide (Euuivoikb29) 2010    Td, unspecified formulation 10/01/1999    Tdap (Boostrix, Adacel) 2013    Zoster Live (Zostavax) 2010       Active Problems:  Patient Active Problem List   Diagnosis Code    Hypertension I10    Hyperlipidemia E78.5    Osteopenia M85.80    Hx of eczema Z87.2    Chronic idiopathic urticaria L50.1    Moderate mitral regurgitation by prior echocardiogram I34.0    Prediabetes R73.03    BMI 27.0-27.9,adult Z68.27    Major depressive disorder with single episode, in full remission ([facility]) F32.5    PSVT (paroxysmal supraventricular tachycardia) ([facility]) I47.1    Weakness R53.1    Frequent falls R29.6    SVT (supraventricular tachycardia) ([facility]) I47.1    GBS (Guillain [place] syndrome) ([facility] [address] G61.0       Isolation/Infection:   Isolation          No Isolation        Patient Infection Status     Infection Onset Added Last Indicated Last Indicated By Review Planned Expiration Resolved Resolved By    None active    Resolved    COVID-19 Rule Out 07/13/20 07/13/20 07/13/20 COVID-19 (Ordered)   07/14/20 Rule-Out Test Resulted          Nurse Assessment:  Last Vital Signs: BP (!) 162/71   Pulse 67   Temp 98 °F (36.7 °C) (Oral)   Resp 20   Ht 5' 1\" (1.549 m)   Wt 150 lb 4.8 oz (68.2 kg)   SpO2 95%   BMI 28.40 kg/m²     Last documented pain score (0-10 scale): Pain Level: 0  Last Weight:   Wt Readings from Last 1 Encounters:   07/28/20 150 lb 4.8 oz (68.2 kg)     Mental Status:  oriented and alert    IV Access:  - None    Nursing Mobility/ADLs:  Walking   Assisted  Transfer  Assisted  Bathing  Assisted  Dressing  Assisted  Toileting  Assisted  Feeding  Assisted  Med Admin  Assisted  Med Delivery   whole    Wound Care Documentation and Therapy:        Elimination:  Continence:   · Bowel: Yes  · Bladder: Yes  Urinary Catheter: None   Colostomy/Ileostomy/Ileal Conduit: No       Date of Last BM: 08/04/2020    Intake/Output Summary (Last 24 hours) at 7/29/2020 1338  Last data filed at 7/29/2020 1258  Gross per 24 hour   Intake 680 ml   Output --   Net 680 ml     I/O last 3 completed shifts: In: 12 [P.O.:960]  Out: -     Safety Concerns:      At Risk for Falls    Impairments/Disabilities: None    Nutrition Therapy:  Current Nutrition Therapy:   - Oral Diet:  General    Routes of Feeding: None  Liquids: Thin Liquids  Daily Fluid Restriction: no  Last Modified Barium Swallow with Video (Video Swallowing Test): not done    Treatments at the Time of Hospital Discharge:   Respiratory Treatments: AS DIRECTED  Oxygen Therapy:  is not on home oxygen therapy.   Ventilator:    - No ventilator support    Rehab Therapies: Physical Therapy, Occupational Therapy and Nursing  Weight Bearing Status/Restrictions: No weight bearing restirctions  Other Medical Equipment (for information only, NOT a DME order):  walker  Other Treatments: HOME HEALTH CARE: LEVEL 3 [address] to establish plan of care for patient over 60 day period   [address] Initial home SN evaluation visit to occur within 24-48 hours for:   medication management   VS and clinical assessment   S&S chronic disease exacerbation education + when to contact MD / NP   care coordination   Medication Reconciliation during 1st SN visit     PT/OT/Speech    Evaluations in home within 24-48 hours of discharge to include DME and home safety   [facility] Frontload therapy 5 days, then 3x a week    OT to evaluate if patient has [address] needs for personal care       evaluation within 24-48 hours to evaluate resources [employer] for potential AL, [place], LTC, and Medicaid options       Palliative Care referral within 5 days of hospital discharge  PCP Visit scheduled within 3 - 7 days of hospital discharge      Telehealth-Homecare Vitals(If patient is agreeable and meets guidelines)      Patient's personal belongings (please select all that are sent with patient):  None    RN SIGNATURE:  Electronically signed by Matthew Garcia RN on 8/4/20 at 7:24 AM EDT    CASE MANAGEMENT/SOCIAL WORK SECTION    Inpatient Status Date: ***    Readmission Risk Assessment Score:  Readmission Risk              Risk of Unplanned Readmission: 10           Discharging to Facility/ Agency   Name: [facility] will call for Appointment  Phone: [phone]  Fax: [phone]    Dialysis Facility (if applicable)   · Name:  · Address:  · Dialysis Schedule:  · Phone:  · Fax:    / signature: {Esignature:[id number]}    PHYSICIAN SECTION    Prognosis: Good    Condition at Discharge: Stable    Rehab Potential (if transferring to Rehab): Good    Recommended Labs or Other Treatments After Discharge: Home RN/PT/OT, follow up with PCP, follow up with Dr. Ibeth Hernandez or Dr. Renuka Pan    Physician Certification: I certify the above information and transfer of Gustavo Castanon  is necessary for the continuing treatment of the diagnosis listed and that she requires Home Care for less 30 days.      Update Admission H&P: No change in H&P    PHYSICIAN SIGNATURE:  Electronically signed by Boaz Martines MD on 8/3/20 at 10:48 AM EDT

## 2020-07-29 NOTE — PATIENT CARE CONFERENCE
Dressing: Supervision, Setup  LE Dressing: Setup, Minimal assistance(assistance threading underwear and pants due to pain in lower back when bending)  Toileting: Supervision  Additional Comments: Ambulated to toilet with RW and SBA. Toileted with RW, grab bars, and supervision. Pt completed grooming and oral care standing at sink with RW and SBA. Pt ambulated ~115 feet to dining room with RW and SBA, requiring no rest breaks. Pt had much less tension in UB while using RW. Pt completed ~20 meal prep activity with SBA and RW, requiring one rest break. Pt demonstrated safe navigation of the walker in the kitchen while gathering items/completing activity, improved dynamic balance with no LOB, and good functional use of UE when reaching for items and preparing muffins. Pt ambulated ~115 ft with RW and SBA to room. Toilet Transfers: Toilet Transfers  Equipment Used: Standard toilet  Toilet Transfer: Supervision  Toilet Transfers Comments: Ambulated to toilet with SBA and RW    Tub/ShowerTransfers:     Shower Transfers  Shower - Transfer From: [facility] - Transfer Type: To and From  Shower - Transfer To:  Shower seat with back  Shower - Technique: Ambulating  Shower Transfers: Stand by assistance    QM:  Eating  Assistance Needed: Independent  CARE Score: 6  Discharge Goal: Independent  Oral Hygiene  Assistance Needed: Independent  CARE Score: 6  Discharge Goal: Independent  Toileting Hygiene  Assistance Needed: Independent  CARE Score: 6  Discharge Goal: Independent  Toilet Transfer  Assistance Needed: Setup or clean-up assistance  Comment: steady x 1  CARE Score: 5  Discharge Goal: Independent  Shower/Bathe Self  Assistance Needed: Partial/moderate assistance  Comment: min A LB bathing (feet only)  CARE Score: 3  Discharge Goal: Independent  Upper Body Dressing  Assistance Needed: Setup or clean-up assistance  CARE Score: 5  Discharge Goal: Independent  Lower Body Dressing  Assistance Needed: Supervision or

## 2020-07-29 NOTE — CARE COORDINATION
Met with pt per her request d/t being concerned about going home, pt reports she feels most uncomfortable on the stairs. Pt reports she did not get up much over the weekend, encouraged pt to ask staff to walk with her and keep moving. Pt does not have a home care preference and is agreeable to [facility], called Trever Elizondo to refer.  Judy Alvarado, MSW, LSW

## 2020-07-30 LAB
ANION GAP SERPL CALCULATED.3IONS-SCNC: 12 MMOL/L (ref 3–16)
BUN BLDV-MCNC: 21 MG/DL (ref 7–20)
CALCIUM SERPL-MCNC: 9.8 MG/DL (ref 8.3–10.6)
CHLORIDE BLD-SCNC: 102 MMOL/L (ref 99–110)
CO2: 21 MMOL/L (ref 21–32)
CREAT SERPL-MCNC: 0.6 MG/DL (ref 0.6–1.2)
GFR AFRICAN AMERICAN: >60
GFR NON-AFRICAN AMERICAN: >60
GLUCOSE BLD-MCNC: 91 MG/DL (ref 70–99)
HCT VFR BLD CALC: 34.1 % (ref 36–48)
HEMOGLOBIN: 11.6 G/DL (ref 12–16)
MCH RBC QN AUTO: 32.3 PG (ref 26–34)
MCHC RBC AUTO-ENTMCNC: 33.9 G/DL (ref 31–36)
MCV RBC AUTO: 95.2 FL (ref 80–100)
PDW BLD-RTO: 14.3 % (ref 12.4–15.4)
PLATELET # BLD: 260 K/UL (ref 135–450)
PMV BLD AUTO: 8.2 FL (ref 5–10.5)
POTASSIUM SERPL-SCNC: 4.7 MMOL/L (ref 3.5–5.1)
RBC # BLD: 3.58 M/UL (ref 4–5.2)
SODIUM BLD-SCNC: 135 MMOL/L (ref 136–145)
WBC # BLD: 5.3 K/UL (ref 4–11)

## 2020-07-30 PROCEDURE — 6370000000 HC RX 637 (ALT 250 FOR IP): Performed by: PHYSICAL MEDICINE & REHABILITATION

## 2020-07-30 PROCEDURE — 97530 THERAPEUTIC ACTIVITIES: CPT

## 2020-07-30 PROCEDURE — 85027 COMPLETE CBC AUTOMATED: CPT

## 2020-07-30 PROCEDURE — 1280000000 HC REHAB R&B

## 2020-07-30 PROCEDURE — 80048 BASIC METABOLIC PNL TOTAL CA: CPT

## 2020-07-30 PROCEDURE — 97535 SELF CARE MNGMENT TRAINING: CPT

## 2020-07-30 PROCEDURE — 97110 THERAPEUTIC EXERCISES: CPT

## 2020-07-30 PROCEDURE — 97116 GAIT TRAINING THERAPY: CPT

## 2020-07-30 PROCEDURE — 36415 COLL VENOUS BLD VENIPUNCTURE: CPT

## 2020-07-30 RX ADMIN — HYDROCORTISONE: 1 OINTMENT TOPICAL at 06:51

## 2020-07-30 RX ADMIN — ATORVASTATIN CALCIUM 10 MG: 10 TABLET, FILM COATED ORAL at 09:20

## 2020-07-30 RX ADMIN — HYDROCORTISONE: 1 OINTMENT TOPICAL at 21:00

## 2020-07-30 RX ADMIN — POLYETHYLENE GLYCOL 3350 17 G: 17 POWDER, FOR SOLUTION ORAL at 09:21

## 2020-07-30 RX ADMIN — MULTIPLE VITAMINS W/ MINERALS TAB 1 TABLET: TAB at 09:20

## 2020-07-30 RX ADMIN — PANTOPRAZOLE SODIUM 40 MG: 40 TABLET, DELAYED RELEASE ORAL at 06:50

## 2020-07-30 RX ADMIN — DESVENLAFAXINE SUCCINATE 50 MG: 50 TABLET, EXTENDED RELEASE ORAL at 09:20

## 2020-07-30 RX ADMIN — LOSARTAN POTASSIUM 100 MG: 100 TABLET, FILM COATED ORAL at 09:20

## 2020-07-30 RX ADMIN — OYSTER SHELL CALCIUM WITH VITAMIN D 1 TABLET: 500; 200 TABLET, FILM COATED ORAL at 09:20

## 2020-07-30 RX ADMIN — METOPROLOL TARTRATE 25 MG: 25 TABLET, FILM COATED ORAL at 09:20

## 2020-07-30 RX ADMIN — METOPROLOL TARTRATE 25 MG: 25 TABLET, FILM COATED ORAL at 19:47

## 2020-07-30 RX ADMIN — OYSTER SHELL CALCIUM WITH VITAMIN D 1 TABLET: 500; 200 TABLET, FILM COATED ORAL at 19:47

## 2020-07-30 RX ADMIN — AMLODIPINE BESYLATE 10 MG: 5 TABLET ORAL at 09:20

## 2020-07-30 RX ADMIN — CETIRIZINE HYDROCHLORIDE 10 MG: 10 TABLET, FILM COATED ORAL at 19:47

## 2020-07-30 ASSESSMENT — PAIN SCALES - GENERAL
PAINLEVEL_OUTOF10: 0
PAINLEVEL_OUTOF10: 0

## 2020-07-30 NOTE — PROGRESS NOTES
Occupational Therapy  Facility/Department: [facility] ACUTE REHAB UNIT  Daily Treatment Note  NAME: Tita Pitts  : 1945  MRN: [id number]    Date of Service: 2020    Discharge Recommendations:  Home with assist PRN, S Level 3  OT Equipment Recommendations  Mobility Devices: Walker  ADL Assistive Devices: (tub transfer bench, HH shower, grab bars)    Assessment   Performance deficits / Impairments: Decreased functional mobility ; Decreased ADL status; Decreased endurance;Decreased high-level IADLs;Decreased strength;Decreased balance  Assessment: Pt is progressing in therapy well but still below her baseline level of occupational function, based on the above deficits associated with PSVT and new GBS dx. Pt would benefit from continued skilled OT services to address these deficits. Prognosis: Good  History: Pt 76 yo, lives alone, no local family to assist, tri-level, BR/BR upstairs, independent ADLs, volunteers, 1 slide to floor PTA, no other falls. OT Education: OT Role;Plan of Care;Transfer Training;Equipment;Home Exercise Program  Patient Education: Pt verbalized and demonstrated understanding. REQUIRES OT FOLLOW UP: Yes  Activity Tolerance  Activity Tolerance: Patient Tolerated treatment well  Safety Devices  Safety Devices in place: Yes  Type of devices: Call light within reach; Left in chair;Chair alarm in place         Patient Diagnosis(es): GBS     has a past medical history of Anxiety, Colon polyp, Headache(784.0), Hyperlipidemia, Hypertension, and Osteoarthritis. has a past surgical history that includes Colonoscopy (8/10).     Restrictions  Restrictions/Precautions  Restrictions/Precautions: Fall Risk  Required Braces or Orthoses?: No  Upper Extremity Weight Bearing Restrictions  Other: MCOT heart monitor - on 2020  Position Activity Restriction  Other position/activity restrictions: : Tita Pitts is a 75 y.o. female presents to the ER with a complaint of bilateral extremity weakness. States that she awoke yesterday and was having difficulty walking, states that she fell down one step and sat on her bottom. She had difficulty walking throughout the day. This morning she was able to navigate the steps but fell while walking across the front room to get her coffee. Per neurology: CSF protein was high consistent with a diagnosis of Guillian Barré syndrome. Completed 5 rounds of IVIG therapy. .  Subjective   General  Chart Reviewed: Yes  Patient assessed for rehabilitation services?: Yes  Response to previous treatment: Patient with no complaints from previous session  Family / Caregiver Present: No  Referring Practitioner: Geoffrey Beyer MD, for dc planning  Diagnosis: PSVT, GBS dx  Subjective  Subjective: Pt seated in wc upon arrival finishing breakfast-agreeable to therapy. Reported no pain but some soreness in L knee. Pt expressed she is excited to go home but slightly worried about having to rely on friends at the beginning. Objective    ADL  Grooming: Modified independent (standing at sink with RW)  UE Bathing: Setup(sponge bathing seated on couch)  LE Bathing: Setup  UE Dressing: Modified independent (seated on couch)  LE Dressing: Stand by assistance(min A for threading leggings-typically has no issues with larger pants)  Toileting: Modified independent   Additional Comments: Ambulated to toilet with RW and supervision. Toileted mod I with RW. Grooming completed at sink. Ambulated around room with RW and supervision to gather clothes. Pt educated on hanging clothes over walker or attaching a bag to transport items. Pt verbalized understanding. Sponge bath and dressing completed seated on couch. Pt completed light IADL task of wiping the table while standing with RW and supervision. Pt demonstrated good functional reach and dynamic balance.  Seated UB exercises completed in wc with theraband requring mulitple rest breaks- scapular retraction x10, R/L bicep curls x10, Equipment Evaluation, Education, & procurement, Patient/Caregiver Education & Training, Strengthening    Goals  Short term goals  Time Frame for Short term goals: Discharge  Short term goal 1: Mod I for functional transfers-met 7/30  Short term goal 2: Mod I for functional mobility for ADL-not met, progressing  Short term goal 3: Mod I for UB ADLs-not met, progressing  Short term goal 4: Mod I for LB ADLs-not met, progressing  Short term goal 5: Mod I for IADLs-not met, progressing  Long term goals  Time Frame for Long term goals : STG=LTG       Therapy Time   Individual Concurrent Group Co-treatment   Time In 0830         Time Out 0915         Minutes 45         Timed Code Treatment Minutes: 45 Minutes      Second Session Therapy Time:   Individual Concurrent Group Co-treatment   Time In 1320         Time Out 1405         Minutes 45           Timed Code Treatment Minutes:  45 minutes     Total Treatment Minutes:  45 + 45 minutes     EDGAR Marshall OT   Supervision/direction Valerio LINN/IDRIS [id number], 7/30/2020, 3:02 PM

## 2020-07-30 NOTE — PLAN OF CARE
Problem: Skin Integrity:  Goal: Will show no infection signs and symptoms  Description: Will show no infection signs and symptoms  Outcome: Ongoing     Problem: Skin Integrity:  Goal: Absence of new skin breakdown  Description: Absence of new skin breakdown  Outcome: Ongoing     Problem: Falls - Risk of:  Goal: Will remain free from falls  Description: Will remain free from falls  Outcome: Ongoing     Problem: Falls - Risk of:  Goal: Absence of physical injury  Description: Absence of physical injury  Outcome: Ongoing     Problem: Safety:  Goal: Free from accidental physical injury  Description: Free from accidental physical injury  Outcome: Ongoing     Problem: Safety:  Goal: Free from intentional harm  Description: Free from intentional harm  Outcome: Ongoing     Problem: Daily Care:  Goal: Daily care needs are met  Description: Daily care needs are met  Outcome: Ongoing     Problem: Pain:  Goal: Patient's pain/discomfort is manageable  Description: Patient's pain/discomfort is manageable  Outcome: Ongoing     Problem: Pain:  Goal: Pain level will decrease  Description: Pain level will decrease  Outcome: Ongoing     Problem: Pain:  Goal: Control of acute pain  Description: Control of acute pain  Outcome: Ongoing     Problem: Pain:  Goal: Control of chronic pain  Description: Control of chronic pain  Outcome: Ongoing     Problem: Discharge Planning:  Goal: Patients continuum of care needs are met  Description: Patients continuum of care needs are met  Outcome: Ongoing     Problem: Nutrition  Goal: Optimal nutrition therapy  Outcome: Ongoing

## 2020-07-30 NOTE — PROGRESS NOTES
Teresa Funes  7/30/2020  [id number]    Chief Complaint: GBS (Guillain [place] syndrome) ([facility])    Subjective:   No overnight events. No current complaints. Progressing very well. Labs reviewed. ROS: No CP, SOB, dyspnea    Objective:  Patient Vitals for the past 24 hrs:   BP Temp Temp src Pulse Resp SpO2   07/29/20 2110 (!) 150/68 98.1 °F (36.7 °C) Oral 72 18 97 %     Gen: No distress, pleasant. Resting in bed  HEENT: Normocephalic, atraumatic. CV: Regular rate and rhythm. No MRG   Resp: No respiratory distress. CTAB   Abd: Soft, nontender, nondistended  Ext: No edema. Neuro: Alert, oriented, appropriately interactive. Laboratory data: Available via EMR. Therapy progress:  PT  Upper Extremity Weight Bearing Restrictions  Other: MCOT heart monitor - on 7/28/2020  Position Activity Restriction  Other position/activity restrictions: 7/11: Teresa Funes is a 75 y.o. female presents to the ER with a complaint of bilateral extremity weakness. States that she awoke yesterday and was having difficulty walking, states that she fell down one step and sat on her bottom. She had difficulty walking throughout the day. This morning she was able to navigate the steps but fell while walking across the front room to get her coffee. Per neurology: CSF protein was high consistent with a diagnosis of Guillian Barré syndrome. Completed 5 rounds of IVIG therapy. .  Objective     Sit to Stand: Supervision  Stand to sit: Supervision  Bed to Chair: Contact guard assistance  Device: [address]: Stand by assistance  Distance: 121' + 270'  OT  PT Equipment Recommendations  Equipment Needed: Yes  Mobility Devices: Beverlyanne Po: Rolling  Other: pt currently needing RW for safe ambulation  Equipment Used: Standard toilet  Toilet Transfers Comments: Ambulated to toilet with SBA and RW  Assessment        SLP                Body mass index is 28.4 kg/m².     Assessment:  Patient Active Problem List

## 2020-07-30 NOTE — PLAN OF CARE
Problem: Skin Integrity:  Goal: Will show no infection signs and symptoms  Description: Will show no infection signs and symptoms  7/29/2020 2114 by Teresa Paredes RN  Outcome: Ongoing     Problem: Skin Integrity:  Goal: Absence of new skin breakdown  Description: Absence of new skin breakdown  7/29/2020 2114 by Teresa Paredes RN  Outcome: Ongoing     Problem: Falls - Risk of:  Goal: Will remain free from falls  Description: Will remain free from falls  7/29/2020 2114 by Teresa Paredes RN  Outcome: Ongoing     Problem: Falls - Risk of:  Goal: Absence of physical injury  Description: Absence of physical injury  7/29/2020 2114 by Teresa Paredes RN  Outcome: Ongoing     Problem: Safety:  Goal: Free from accidental physical injury  Description: Free from accidental physical injury  7/29/2020 2114 by Teresa Paredes RN  Outcome: Ongoing     Problem: Safety:  Goal: Free from intentional harm  Description: Free from intentional harm  7/29/2020 2114 by Teresa Paredes RN  Outcome: Ongoing     Problem: Daily Care:  Goal: Daily care needs are met  Description: Daily care needs are met  7/29/2020 2114 by Teresa Paredes RN  Outcome: Ongoing     Problem: Daily Care:  Goal: Daily care needs are met  Description: Daily care needs are met  7/29/2020 2114 by Teresa Paredes RN  Outcome: Ongoing     Problem: Pain:  Goal: Patient's pain/discomfort is manageable  Description: Patient's pain/discomfort is manageable  7/29/2020 2114 by Teresa Paredes RN  Outcome: Ongoing     Problem: Pain:  Goal: Pain level will decrease  Description: Pain level will decrease  7/29/2020 2114 by Teresa Paredes RN  Outcome: Ongoing     Problem: Pain:  Goal: Control of acute pain  Description: Control of acute pain  7/29/2020 2114 by Teresa Paredes RN  Outcome: Ongoing     Problem: Nutrition  Goal: Optimal nutrition therapy  7/29/2020 2114 by Teresa Paredes RN  Outcome: Ongoing     Problem: Discharge Planning:  Goal: Patients continuum of care needs are met  Description: Patients continuum of care needs are met  7/29/2020 2114 by Martina Dykes RN  Outcome: Ongoing     Problem: Pain:  Goal: Control of chronic pain  Description: Control of chronic pain  7/29/2020 2114 by Martina Dykes RN  Outcome: Ongoing

## 2020-07-30 NOTE — PROGRESS NOTES
Physical Therapy  Facility/Department: [facility] ACUTE REHAB UNIT  Daily Treatment Note  NAME: Steffi Beavers  : 1945  MRN: [id number]    Date of Service: 2020    Discharge Recommendations:  Patient would benefit from continued therapy after discharge, Home with Home health PT, S Level 3   PT Equipment Recommendations  Equipment Needed: Yes  Mobility Devices: Wileen Daylin: Rolling  Other: pt currently needing RW for safe ambulation    Assessment   Body structures, Functions, Activity limitations: Decreased functional mobility ; Decreased ADL status; Decreased balance;Decreased strength;Decreased endurance  Assessment: Pt progressing with RW and towards SPC. Fatigues with stairs and needs CGA currently. Continue to progress towards independence. Treatment Diagnosis: decreased functional mobility, impaired gait, decreased balance, weakness  Prognosis: Good  PT Education: PT Role;Goals;Plan of Care;Transfer Training;Home Exercise Program  Patient Education: verbalized understanding of cues for safety and gait training  Barriers to Learning: none  REQUIRES PT FOLLOW UP: Yes  Activity Tolerance  Activity Tolerance: Patient Tolerated treatment well     Patient Diagnosis(es): GBS. has a past medical history of Anxiety, Colon polyp, Headache(784.0), Hyperlipidemia, Hypertension, and Osteoarthritis. has a past surgical history that includes Colonoscopy (8/10). Restrictions  Restrictions/Precautions  Restrictions/Precautions: Fall Risk  Required Braces or Orthoses?: No  Upper Extremity Weight Bearing Restrictions  Other: MCOT heart monitor - on 2020  Position Activity Restriction  Other position/activity restrictions: : Steffi Beavers is a 75 y.o. female presents to the ER with a complaint of bilateral extremity weakness. States that she awoke yesterday and was having difficulty walking, states that she fell down one step and sat on her bottom. She had difficulty walking throughout the day. Minutes 45         Timed Code Treatment Minutes: [address] [place], [id number]     Second Session Therapy Time:   Individual Concurrent Group Co-treatment   Time In 1160         Time Out 2684         Minutes 45           Timed Code Treatment Minutes:  45 + 45    Total Treatment Minutes:  [address], [place] [address]  Michael Garsia PT, DPT [id number]  PT agrees with above note.

## 2020-07-31 PROCEDURE — 97110 THERAPEUTIC EXERCISES: CPT

## 2020-07-31 PROCEDURE — 97535 SELF CARE MNGMENT TRAINING: CPT

## 2020-07-31 PROCEDURE — 94760 N-INVAS EAR/PLS OXIMETRY 1: CPT

## 2020-07-31 PROCEDURE — 97116 GAIT TRAINING THERAPY: CPT

## 2020-07-31 PROCEDURE — 6370000000 HC RX 637 (ALT 250 FOR IP): Performed by: PHYSICAL MEDICINE & REHABILITATION

## 2020-07-31 PROCEDURE — 97530 THERAPEUTIC ACTIVITIES: CPT

## 2020-07-31 PROCEDURE — 1280000000 HC REHAB R&B

## 2020-07-31 RX ADMIN — POLYETHYLENE GLYCOL 3350 17 G: 17 POWDER, FOR SOLUTION ORAL at 08:20

## 2020-07-31 RX ADMIN — AMLODIPINE BESYLATE 10 MG: 5 TABLET ORAL at 08:20

## 2020-07-31 RX ADMIN — METOPROLOL TARTRATE 25 MG: 25 TABLET, FILM COATED ORAL at 08:20

## 2020-07-31 RX ADMIN — PANTOPRAZOLE SODIUM 40 MG: 40 TABLET, DELAYED RELEASE ORAL at 06:05

## 2020-07-31 RX ADMIN — OYSTER SHELL CALCIUM WITH VITAMIN D 1 TABLET: 500; 200 TABLET, FILM COATED ORAL at 08:20

## 2020-07-31 RX ADMIN — Medication 2000 UNITS: at 08:20

## 2020-07-31 RX ADMIN — METOPROLOL TARTRATE 25 MG: 25 TABLET, FILM COATED ORAL at 21:22

## 2020-07-31 RX ADMIN — DESVENLAFAXINE SUCCINATE 50 MG: 50 TABLET, EXTENDED RELEASE ORAL at 08:22

## 2020-07-31 RX ADMIN — MULTIPLE VITAMINS W/ MINERALS TAB 1 TABLET: TAB at 08:20

## 2020-07-31 RX ADMIN — LOSARTAN POTASSIUM 100 MG: 100 TABLET, FILM COATED ORAL at 08:20

## 2020-07-31 RX ADMIN — OYSTER SHELL CALCIUM WITH VITAMIN D 1 TABLET: 500; 200 TABLET, FILM COATED ORAL at 21:22

## 2020-07-31 RX ADMIN — CETIRIZINE HYDROCHLORIDE 10 MG: 10 TABLET, FILM COATED ORAL at 21:22

## 2020-07-31 ASSESSMENT — PAIN SCALES - GENERAL
PAINLEVEL_OUTOF10: 0

## 2020-07-31 NOTE — PROGRESS NOTES
ambulation  Equipment Used: Standard toilet  Toilet Transfers Comments: Ambulated to toilet with RW  Assessment        SLP                Body mass index is 28.4 kg/m². Assessment:  Patient Active Problem List   Diagnosis    Hypertension    Hyperlipidemia    Osteopenia    Hx of eczema    Chronic idiopathic urticaria    Moderate mitral regurgitation by prior echocardiogram    Prediabetes    BMI 27.0-27.9,adult    Major depressive disorder with single episode, in full remission ([facility])    PSVT (paroxysmal supraventricular tachycardia) ([facility])    Weakness    Frequent falls    SVT (supraventricular tachycardia) ([facility])    GBS (Guillain Ridgeley syndrome) ([facility])       Plan:   Guillian Barré syndrome: s/p IVIG. PT/OT.      Paroxysmal SVT: lopressor     Hyponatremia: monitor, stable     HTN: norvasc 10, losartan 100, lopressor 25     HLD: Lipitor 10     Depression: prestiq per home regimen     Vit D deficiency: supplement    Sinusitis: started zyrtec and nasal spray    Left ankle pain: RICE, no fracture suspected.     Bowels: Per protocol  Bladder: Per protocol   Sleep: Trazodone provided prn. Pain: tylenol, naproxen, ultram PRN   DVT PPx: ambulating >250'    Torrey Gay MD 7/31/2020, 8:22 AM    * This document was created using dictation software. While all precautions were taken to ensure accuracy, errors may have occurred. Please disregard any typographical errors.

## 2020-07-31 NOTE — PROGRESS NOTES
is a 75 y.o. female presents to the ER with a complaint of bilateral extremity weakness. States that she awoke yesterday and was having difficulty walking, states that she fell down one step and sat on her bottom. She had difficulty walking throughout the day. This morning she was able to navigate the steps but fell while walking across the front room to get her coffee. Per neurology: CSF protein was high consistent with a diagnosis of Guillian Barré syndrome. Completed 5 rounds of IVIG therapy. .  Subjective   General  Chart Reviewed: Yes  Patient assessed for rehabilitation services?: Yes  Response to previous treatment: Patient with no complaints from previous session  Family / Caregiver Present: No  Referring Practitioner: Jurgen Shaw MD, for dc planning  Diagnosis: PSVT, GBS dx  Subjective  Subjective: Pt seated in wc upon-agreeable to therapy. Reported no pain but some soreness in R shoulder. Pt seemed slightly anxious about getting ready to go home-reassurance provided. Objective    ADL  Grooming: Modified independent (standing at sink with RW)  UE Bathing: Setup;Supervision  LE Bathing: Setup;Supervision  UE Dressing: Minimal assistance;Supervision(for adjusting bra)  LE Dressing: Stand by assistance(use of stool for threading pants and socks)  Toileting: Modified independent   Additional Comments: Sit to stand supervision. Ambulated to closet with RW and supervision to gather clothes-demonstrated good safety awareness when reaching to drawers and balance while bending. Toileted mod I with RW. Pt bathed and dressed seated in shower chair with supervision and setup. Discussed with pt getting tub transfer bench-will provide more information on where to purchase one. Discussed with pt having a walker on main level and upstairs at her house to maximize her safety and independence.         Balance  Sitting Balance: Supervision  Standing Balance: Supervision  Standing Balance  Time: 1-2 mins x 2  Activity: ADL tasks  Comment: RW, grab bars or sink for stabilzation  Functional Mobility  Functional - Mobility Device: Rolling Walker  Activity: To/from bathroom; Retrieve items  Assist Level: Supervision  Toilet Transfers  Equipment Used: Standard toilet  Toilet Transfer: Modified independent  Toilet Transfers Comments: Ambulated to toilet with RW  Shower Transfers  Shower - Transfer From: Spike Crooks - Transfer Type: To and From  Shower - Transfer To: Shower seat with back  Shower - Technique: Ambulating  Shower Transfers: Supervision     Transfers  Sit to stand: Modified independent  Stand to sit: Modified independent         Second Session:  Pt seated in wc upon arrival-agreeable to session. Pt denies pain at this time. Pt ambulated to toilet with RW and supervision-voided bowel and urine. Pt donned socks and shoes seated on couch with setup and use of the foot stool. Pt ambulated ~80 ft to bed in therapy room to complete light IADL task of stripping/making the bed with SBA. Pt tolerated ~8 minutes of functional activity requiring one seated rest break. Pt demonstrated good dynamic balance and functional use of UE. Pt participated in UB exercises with blue theraband standing at the ballet bars with SBA- rows x10, R external rotation x10, L external rotation x10. Activity focused on improving pt's confidence with balance and improve UB strength. Pt participated in pilates exercises seated in unarmed chair with SBA- forward bends with arms extended x10, shoulder roll backs x10, twists with cactus arms x10, R/L side bends x10. Pt instructed to focus on core and exhaling on exertion. During session,  came to inform pt of gricelda walker that will be sent home with her. Pt verbalized understanding of having walker on both levels of her house. Pt ambulated ~150 ft to room with RW and SBA. Pt left seated in wc with chair alarm on, call button in reach and all needs met.          Plan   Plan  Times per week: 90 minutes 5x/week  Times per day: Daily  Current Treatment Recommendations: Safety Education & Training, Self-Care / ADL, Endurance Training, Functional Mobility Training, Equipment Evaluation, Education, & procurement, Patient/Caregiver Education & Training, Strengthening       Goals  Short term goals  Time Frame for Short term goals: Discharge  Short term goal 1: Mod I for functional transfers-met 7/30  Short term goal 2: Mod I for functional mobility for ADL-not met, progressing  Short term goal 3: Mod I for UB ADLs-not met, progressing  Short term goal 4: Mod I for LB ADLs-not met, progressing  Short term goal 5: Mod I for IADLs-not met, progressing  Long term goals  Time Frame for Long term goals : STG=LTG       Therapy Time   Individual Concurrent Group Co-treatment   Time In 0915         Time Out 1000         Minutes 45         Timed Code Treatment Minutes: 45 Minutes     Second Session Therapy Time:   Individual Concurrent Group Co-treatment   Time In 1115         Time Out 1200         Minutes 45           Timed Code Treatment Minutes:  45 minutes      Total Treatment Minutes:  45 + [id number] EDGAR Wilkes  Supervised and directed by [facility] OTR/L, [place] #[id number]

## 2020-07-31 NOTE — CARE COORDINATION
Spoke with Radha Lizarraga at [facility] and she reports pt is appropriate for [facility] and to refer pt there. Called [facility] Phone: [phone] and they would prefer pt call them when she gets home to enroll in services.  Placed on AVS. Darren Willis, MSW, LSW

## 2020-07-31 NOTE — PROGRESS NOTES
Physical Therapy  Facility/Department: [facility] ACUTE REHAB UNIT  Daily Treatment Note  NAME: Aracelis Woodard  : 1945  MRN: [id number]    Date of Service: 2020    Discharge Recommendations:  Patient would benefit from continued therapy after discharge, Home with Home health PT, S Level 3   PT Equipment Recommendations  Equipment Needed: Yes  Harjit Garcia: Rolling  Other: pt currently needing RW for safe ambulation - may need 2 d/t multiple levels at home    Assessment   Body structures, Functions, Activity limitations: Decreased functional mobility ; Decreased ADL status; Decreased balance;Decreased strength;Decreased endurance  Assessment: Patient more safe w/ RW and will likely need 2 at d/c; one for upstairs and one for downstairs, [facility] for stairs may also be beneficial.  Recommend continued ambulation/balance training to progress to using cane. Initial 24 hour assist at d/c is also recommended along with ongoing PT. Treatment Diagnosis: decreased functional mobility, impaired gait, decreased balance, weakness  Prognosis: Good  Decision Making: Low Complexity  PT Education: PT Role;Goals;Plan of Care;Transfer Training;Home Exercise Program  Patient Education: verbalized understanding of cues for safety and gait training  Barriers to Learning: none  REQUIRES PT FOLLOW UP: Yes  Activity Tolerance  Activity Tolerance: Patient Tolerated treatment well     Patient Diagnosis(es): There were no encounter diagnoses. has a past medical history of Anxiety, Colon polyp, Headache(784.0), Hyperlipidemia, Hypertension, and Osteoarthritis. has a past surgical history that includes Colonoscopy (8/10).     Restrictions  Restrictions/Precautions  Restrictions/Precautions: Fall Risk  Required Braces or Orthoses?: No  Upper Extremity Weight Bearing Restrictions  Other: MCOT heart monitor - on 2020  Position Activity Restriction  Other position/activity restrictions: : Aracelis Woodard is a 75 y.o. female presents to the ER with a complaint of bilateral extremity weakness. States that she awoke yesterday and was having difficulty walking, states that she fell down one step and sat on her bottom. She had difficulty walking throughout the day. This morning she was able to navigate the steps but fell while walking across the front room to get her coffee. Per neurology: CSF protein was high consistent with a diagnosis of Guillian Barré syndrome. Completed 5 rounds of IVIG therapy. .     Subjective   General  Chart Reviewed: Yes  Response To Previous Treatment: Patient with no complaints from previous session. Family / Caregiver Present: No  Referring Practitioner: Dr. Soheila Cunningham  Subjective  Subjective: \"I would like to try walking with a cane and practice the stairs. \"  General Comment  Comments: seated in w/c upon arrival.  Pain Screening  Patient Currently in Pain: Denies  Vital Signs  Patient Currently in Pain: Denies       Orientation  Orientation  Overall Orientation Status: Within Normal Limits     Objective   Bed mobility  Comment: Not observed. Patient sitting in w/c. Transfers  Sit to Stand: Supervision  Stand to sit: Supervision  Bed to Chair: Contact guard assistance  Stand Pivot Transfers: Contact guard assistance  Ambulation  Ambulation?: Yes  More Ambulation?: Yes  Ambulation 1  Surface: level tile  Device: Rolling Walker  Assistance: Supervision  Quality of Gait: steady, no LOB, supervision for safety  Gait Deviations: Slow Katy  Distance: 10', 10'  Comments: Patient ambulated to bathroom and back. Ambulation 2  Surface - 2: level tile  Device 2: Single point cane  Assistance 2: Minimal assistance  Quality of Gait 2: unsteady, LOB x1 requiring MIN assist to correct  Gait Deviations: Slow Katy;Decreased step length;Decreased step height  Distance: 433'  Comments: Much more safe w/ walker vs cane.   Stairs/Curb  Stairs?: Yes  Stairs  # Steps : 12  Stairs Height: 6\"  Rails: Left ascending  Device: Single pt cane  Assistance: Contact guard assistance;Minimal assistance  Comment: step-to pattern ascent with L leading, performed twice, first w/ LUE rail and cane, second laterally w/ BUE hand hold on L rail and not device, verbalized feeling more steady w/ BUE support on rail vs w/ cane. Balance  Posture: Good  Sitting - Static: Good  Sitting - Dynamic: Good  Standing - Static: Good  Standing - Dynamic: Good;-      AROM RLE (degrees)  RLE AROM: WNL  AROM LLE (degrees)  LLE AROM : WNL    PM session:  Patient sitting in w/c, ambulated 10' to bathroom and performed toilet transfer MOD I w/ rolling walker. She then switched to a cane and ambulate approximately 175' to stairs, negotiated up/down 12 steps w/ cane and left rail w/ CGA/SBA and BUE hand hold on left rail w/ CGA/SBA. She ambulated 125' w/ cane and CGA back to gym and performed NuStep x10 minutes w/ 1 minute rest break after 5 minutes before completing remaining 5'. She ambulated 125' w/ cane and CGA to car and performed car transfer w/ supervision, then ambulated 155' w/ cane and CGA back to room, left sitting in w/c w/ chair alarm on and call light in reach.     Goals  Short term goals  Time Frame for Short term goals: to be met in 14-17 days  Short term goal 1: pt able to perform bed mobility modified independently  Short term goal 2: pt able to perform transfers modified independently  Short term goal 3: pt able to ambulate with LRAD 150 ft modified independently  Short term goal 4: pt able to navigate 12 steps with handrailing and modified independently  Short term goal 5: pt able to perform car transfer modified independently  Patient Goals   Patient goals : to regain leg strength    Plan    Plan  Times per week: 5 of 7 days per week for 90 minutes per day  Times per day: Daily  Current Treatment Recommendations: Strengthening, Balance Training, Functional Mobility Training, Transfer Training, Gait Training, Stair training, Endurance Training, Neuromuscular Re-education  Safety Devices  Type of devices:  All fall risk precautions in place, Call light within reach, Chair alarm in place, Nurse notified, Left in chair, Gait belt  Restraints  Initially in place: No     Therapy Time   Individual Concurrent Group Co-treatment   Time In       1030   Time Out       1100   Minutes       30    Total treatment time:  30 minutes     Individual Concurrent Group Co-treatment   Time In       1330   Time Out       1430   Minutes       60    Total treatment time:  61 minutes    Cleveland Kramer, PT, DPT, ATC-R [id number]

## 2020-07-31 NOTE — PLAN OF CARE
Problem: Skin Integrity:  Goal: Will show no infection signs and symptoms  Description: Will show no infection signs and symptoms  7/31/2020 1027 by Tara Bailon RN  Outcome: Ongoing  7/30/2020 2326 by Ladan Kelly RN  Outcome: Ongoing  Goal: Absence of new skin breakdown  Description: Absence of new skin breakdown  7/31/2020 1027 by Tara Bailon RN  Outcome: Ongoing  7/30/2020 2326 by Ladan Kelly RN  Outcome: Ongoing     Problem: Falls - Risk of:  Goal: Will remain free from falls  Description: Will remain free from falls  7/31/2020 1027 by Tara Bailon RN  Outcome: Ongoing  7/30/2020 2326 by Ladan Kelly RN  Outcome: Ongoing  Goal: Absence of physical injury  Description: Absence of physical injury  7/31/2020 1027 by Tara Bailon RN  Outcome: Ongoing  7/30/2020 2326 by Ladan Kelly RN  Outcome: Ongoing     Problem: Safety:  Goal: Free from accidental physical injury  Description: Free from accidental physical injury  7/31/2020 1027 by Tara Bailon RN  Outcome: Ongoing  7/30/2020 2326 by Ladan Kelly RN  Outcome: Ongoing  Goal: Free from intentional harm  Description: Free from intentional harm  7/31/2020 1027 by Tara Bailon RN  Outcome: Ongoing  7/30/2020 2326 by Ladan Kelly RN  Outcome: Ongoing     Problem: Daily Care:  Goal: Daily care needs are met  Description: Daily care needs are met  7/31/2020 1027 by Tara Bailon RN  Outcome: Ongoing  7/30/2020 2326 by Ladan Kelly RN  Outcome: Ongoing     Problem: Pain:  Goal: Patient's pain/discomfort is manageable  Description: Patient's pain/discomfort is manageable  7/31/2020 1027 by Tara Bailon RN  Outcome: Ongoing  7/30/2020 2326 by Ladan Kelly RN  Outcome: Ongoing  Goal: Pain level will decrease  Description: Pain level will decrease  7/31/2020 1027 by Tara Bailon RN  Outcome: Ongoing  7/30/2020 2326 by Ladan Kelly RN  Outcome: Ongoing  Goal: Control of acute pain  Description: Control of acute pain  7/31/2020 1027 by Steven Tidwell RN  Outcome: Ongoing  7/30/2020 2326 by Bari Graves RN  Outcome: Ongoing  Goal: Control of chronic pain  Description: Control of chronic pain  7/31/2020 1027 by Steven Tidwell RN  Outcome: Ongoing  7/30/2020 2326 by Bari Graves RN  Outcome: Ongoing     Problem: Discharge Planning:  Goal: Patients continuum of care needs are met  Description: Patients continuum of care needs are met  7/31/2020 1027 by Steven Tidwell RN  Outcome: Ongoing  7/30/2020 2326 by Bari Graves RN  Outcome: Ongoing     Problem: Nutrition  Goal: Optimal nutrition therapy  7/31/2020 1027 by Steven Tidwell RN  Outcome: Ongoing  7/30/2020 2326 by Bari Graves RN  Outcome: Ongoing

## 2020-07-31 NOTE — CARE COORDINATION
Spoke w/ pt to discuss home care w/ [facility], [facility] referral (this worker faxed), and [facility] for a walker- pt received a 2nd walker already for 2nd floor at home. All questions answered and support provided.  Levy Henry, MSW, LSW

## 2020-07-31 NOTE — PLAN OF CARE
Had been declining miralax but says she will take tomorrow. States pain has resolved. Drank hot chocolate before bed    Problem: Skin Integrity:  Goal: Will show no infection signs and symptoms  Description: Will show no infection signs and symptoms  Outcome: Ongoing  Goal: Absence of new skin breakdown  Description: Absence of new skin breakdown  Outcome: Ongoing     Problem: Falls - Risk of:  Goal: Will remain free from falls  Description: Will remain free from falls  Outcome: Ongoing  Goal: Absence of physical injury  Description: Absence of physical injury  Outcome: Ongoing     Problem: Safety:  Goal: Free from accidental physical injury  Description: Free from accidental physical injury  Outcome: Ongoing  Goal: Free from intentional harm  Description: Free from intentional harm  Outcome: Ongoing     Problem: Daily Care:  Goal: Daily care needs are met  Description: Daily care needs are met  Outcome: Ongoing     Problem: Pain:  Description: Pain management should include both nonpharmacologic and pharmacologic interventions.   Goal: Patient's pain/discomfort is manageable  Description: Patient's pain/discomfort is manageable  Outcome: Ongoing  Goal: Pain level will decrease  Description: Pain level will decrease  Outcome: Ongoing  Goal: Control of acute pain  Description: Control of acute pain  Outcome: Ongoing  Goal: Control of chronic pain  Description: Control of chronic pain  Outcome: Ongoing     Problem: Discharge Planning:  Goal: Patients continuum of care needs are met  Description: Patients continuum of care needs are met  Outcome: Ongoing     Problem: Nutrition  Goal: Optimal nutrition therapy  Outcome: Ongoing

## 2020-08-01 PROCEDURE — 6370000000 HC RX 637 (ALT 250 FOR IP): Performed by: PHYSICAL MEDICINE & REHABILITATION

## 2020-08-01 PROCEDURE — 1280000000 HC REHAB R&B

## 2020-08-01 RX ADMIN — POLYETHYLENE GLYCOL 3350 17 G: 17 POWDER, FOR SOLUTION ORAL at 09:15

## 2020-08-01 RX ADMIN — MULTIPLE VITAMINS W/ MINERALS TAB 1 TABLET: TAB at 09:13

## 2020-08-01 RX ADMIN — METOPROLOL TARTRATE 25 MG: 25 TABLET, FILM COATED ORAL at 09:14

## 2020-08-01 RX ADMIN — PANTOPRAZOLE SODIUM 40 MG: 40 TABLET, DELAYED RELEASE ORAL at 05:53

## 2020-08-01 RX ADMIN — OYSTER SHELL CALCIUM WITH VITAMIN D 1 TABLET: 500; 200 TABLET, FILM COATED ORAL at 09:13

## 2020-08-01 RX ADMIN — CETIRIZINE HYDROCHLORIDE 10 MG: 10 TABLET, FILM COATED ORAL at 21:16

## 2020-08-01 RX ADMIN — METOPROLOL TARTRATE 25 MG: 25 TABLET, FILM COATED ORAL at 21:16

## 2020-08-01 RX ADMIN — DESVENLAFAXINE SUCCINATE 50 MG: 50 TABLET, EXTENDED RELEASE ORAL at 09:14

## 2020-08-01 RX ADMIN — OYSTER SHELL CALCIUM WITH VITAMIN D 1 TABLET: 500; 200 TABLET, FILM COATED ORAL at 21:16

## 2020-08-01 RX ADMIN — Medication 2000 UNITS: at 09:14

## 2020-08-01 RX ADMIN — AMLODIPINE BESYLATE 10 MG: 5 TABLET ORAL at 09:13

## 2020-08-01 RX ADMIN — ATORVASTATIN CALCIUM 10 MG: 10 TABLET, FILM COATED ORAL at 09:14

## 2020-08-01 RX ADMIN — LOSARTAN POTASSIUM 100 MG: 100 TABLET, FILM COATED ORAL at 09:13

## 2020-08-01 ASSESSMENT — PAIN SCALES - GENERAL
PAINLEVEL_OUTOF10: 0

## 2020-08-01 NOTE — PLAN OF CARE
Problem: Skin Integrity:  Goal: Will show no infection signs and symptoms  Description: Will show no infection signs and symptoms  Outcome: Ongoing  Goal: Absence of new skin breakdown  Description: Absence of new skin breakdown  Outcome: Ongoing     Problem: Falls - Risk of:  Goal: Will remain free from falls  Description: Will remain free from falls  Outcome: Ongoing  Goal: Absence of physical injury  Description: Absence of physical injury  Outcome: Ongoing     Problem: Safety:  Goal: Free from accidental physical injury  Description: Free from accidental physical injury  Outcome: Ongoing  Goal: Free from intentional harm  Description: Free from intentional harm  Outcome: Ongoing     Problem: Daily Care:  Goal: Daily care needs are met  Description: Daily care needs are met  Outcome: Ongoing     Problem: Pain:  Goal: Patient's pain/discomfort is manageable  Description: Patient's pain/discomfort is manageable  Outcome: Ongoing  Goal: Pain level will decrease  Description: Pain level will decrease  Outcome: Ongoing  Goal: Control of acute pain  Description: Control of acute pain  Outcome: Ongoing  Goal: Control of chronic pain  Description: Control of chronic pain  Outcome: Ongoing     Problem: Discharge Planning:  Goal: Patients continuum of care needs are met  Description: Patients continuum of care needs are met  Outcome: Ongoing     Problem: Nutrition  Goal: Optimal nutrition therapy  Outcome: Ongoing

## 2020-08-01 NOTE — PLAN OF CARE
Problem: Skin Integrity:  Goal: Will show no infection signs and symptoms  Description: Will show no infection signs and symptoms  8/1/2020 1055 by Jada Patel RN  Outcome: Ongoing     Problem: Skin Integrity:  Goal: Absence of new skin breakdown  Description: Absence of new skin breakdown  8/1/2020 1055 by Jada Patel RN  Outcome: Ongoing     Problem: Falls - Risk of:  Goal: Will remain free from falls  Description: Will remain free from falls  8/1/2020 1055 by Jada Patel RN  Outcome: Ongoing     Problem: Falls - Risk of:  Goal: Absence of physical injury  Description: Absence of physical injury  8/1/2020 1055 by Jada Patel RN  Outcome: Ongoing     Problem: Safety:  Goal: Free from accidental physical injury  Description: Free from accidental physical injury  8/1/2020 1055 by Jada Patel RN  Outcome: Ongoing     Problem: Safety:  Goal: Free from intentional harm  Description: Free from intentional harm  8/1/2020 1055 by Jada Patel RN  Outcome: Ongoing     Problem: Daily Care:  Goal: Daily care needs are met  Description: Daily care needs are met  8/1/2020 1055 by Jada Patel RN  Outcome: Ongoing     Problem: Pain:  Goal: Patient's pain/discomfort is manageable  Description: Patient's pain/discomfort is manageable  8/1/2020 1055 by Jada Patel RN  Outcome: Ongoing     Problem: Pain:  Goal: Pain level will decrease  Description: Pain level will decrease  8/1/2020 1055 by Jada Patel RN  Outcome: Ongoing     Problem: Pain:  Goal: Control of acute pain  Description: Control of acute pain  8/1/2020 1055 by Jada Patel RN  Outcome: Ongoing     Problem: Pain:  Goal: Control of chronic pain  Description: Control of chronic pain  8/1/2020 1055 by Jada Patel RN  Outcome: Ongoing     Problem: Discharge Planning:  Goal: Patients continuum of care needs are met  Description: Patients continuum of care needs are met  8/1/2020

## 2020-08-02 PROCEDURE — 6370000000 HC RX 637 (ALT 250 FOR IP): Performed by: PHYSICAL MEDICINE & REHABILITATION

## 2020-08-02 PROCEDURE — 1280000000 HC REHAB R&B

## 2020-08-02 RX ADMIN — MULTIPLE VITAMINS W/ MINERALS TAB 1 TABLET: TAB at 09:19

## 2020-08-02 RX ADMIN — OYSTER SHELL CALCIUM WITH VITAMIN D 1 TABLET: 500; 200 TABLET, FILM COATED ORAL at 21:23

## 2020-08-02 RX ADMIN — AMLODIPINE BESYLATE 10 MG: 5 TABLET ORAL at 09:19

## 2020-08-02 RX ADMIN — DESVENLAFAXINE SUCCINATE 50 MG: 50 TABLET, EXTENDED RELEASE ORAL at 09:19

## 2020-08-02 RX ADMIN — OYSTER SHELL CALCIUM WITH VITAMIN D 1 TABLET: 500; 200 TABLET, FILM COATED ORAL at 09:19

## 2020-08-02 RX ADMIN — METOPROLOL TARTRATE 25 MG: 25 TABLET, FILM COATED ORAL at 21:23

## 2020-08-02 RX ADMIN — METOPROLOL TARTRATE 25 MG: 25 TABLET, FILM COATED ORAL at 09:19

## 2020-08-02 RX ADMIN — LOSARTAN POTASSIUM 100 MG: 100 TABLET, FILM COATED ORAL at 09:19

## 2020-08-02 RX ADMIN — CETIRIZINE HYDROCHLORIDE 10 MG: 10 TABLET, FILM COATED ORAL at 21:23

## 2020-08-02 RX ADMIN — Medication 2000 UNITS: at 09:19

## 2020-08-02 RX ADMIN — PANTOPRAZOLE SODIUM 40 MG: 40 TABLET, DELAYED RELEASE ORAL at 05:25

## 2020-08-02 ASSESSMENT — PAIN SCALES - GENERAL
PAINLEVEL_OUTOF10: 0
PAINLEVEL_OUTOF10: 0

## 2020-08-02 NOTE — PLAN OF CARE
Problem: Skin Integrity:  Goal: Will show no infection signs and symptoms  Description: Will show no infection signs and symptoms  8/2/2020 0045 by Sona Parson RN  Outcome: Ongoing     Problem: Skin Integrity:  Goal: Absence of new skin breakdown  Description: Absence of new skin breakdown  8/2/2020 0045 by Sona Parson RN  Outcome: Ongoing     Problem: Falls - Risk of:  Goal: Will remain free from falls  Description: Will remain free from falls  8/2/2020 0045 by Sona Parson RN  Outcome: Ongoing     Problem: Falls - Risk of:  Goal: Absence of physical injury  Description: Absence of physical injury  8/2/2020 0045 by Sona Parson RN  Outcome: Ongoing     Problem: Safety:  Goal: Free from accidental physical injury  Description: Free from accidental physical injury  8/2/2020 0045 by Sona Parson RN  Outcome: Ongoing     Problem: Safety:  Goal: Free from intentional harm  Description: Free from intentional harm  8/2/2020 0045 by Sona Parson RN  Outcome: Ongoing     Problem: Safety:  Goal: Free from accidental physical injury  Description: Free from accidental physical injury  8/2/2020 0045 by Sona Parson RN  Outcome: Ongoing     Problem: Safety:  Goal: Free from intentional harm  Description: Free from intentional harm  8/2/2020 0045 by Sona Parson RN  Outcome: Ongoing     Problem: Daily Care:  Goal: Daily care needs are met  Description: Daily care needs are met  8/2/2020 0045 by Sona Parson RN  Outcome: Ongoing     Problem: Pain:  Goal: Patient's pain/discomfort is manageable  Description: Patient's pain/discomfort is manageable  8/2/2020 0045 by Sona Parson RN  Outcome: Ongoing     Problem: Pain:  Goal: Pain level will decrease  Description: Pain level will decrease  8/2/2020 0045 by Sona Parson RN  Outcome: Ongoing     Problem: Pain:  Goal: Control of acute pain  Description: Control of acute pain  8/2/2020 0045 by Sona Parson RN  Outcome: Ongoing Problem: Pain:  Goal: Control of chronic pain  Description: Control of chronic pain  8/2/2020 0045 by Ralph Martinez RN  Outcome: Ongoing     Problem: Discharge Planning:  Goal: Patients continuum of care needs are met  Description: Patients continuum of care needs are met  8/2/2020 0045 by Ralph Martinez RN  Outcome: Ongoing     Problem: Nutrition  Goal: Optimal nutrition therapy  8/2/2020 0045 by Ralph Martinez RN  Outcome: Ongoing

## 2020-08-02 NOTE — PROGRESS NOTES
Patient event monitor dressing changed, she was ambulated in rogers by RN ,and complete linen change done for her Electronically signed by Chanda Benjamin RN on 8/2/2020 at 4:00 PM

## 2020-08-02 NOTE — PROGRESS NOTES
No acute changes this shift.  Shift assessment completed, VS stable, no distress noted, call light in reach- VS stable, no distress noted, call light in reach Electronically signed by Anthony Ariza RN on 8/2/2020 at 5:26 PM

## 2020-08-02 NOTE — PLAN OF CARE
Problem: Skin Integrity:  Goal: Will show no infection signs and symptoms  Description: Will show no infection signs and symptoms  8/2/2020 0950 by Bing Mon RN  Outcome: Ongoing     Problem: Skin Integrity:  Goal: Absence of new skin breakdown  Description: Absence of new skin breakdown  8/2/2020 0950 by iBng Mon RN  Outcome: Ongoing     Problem: Falls - Risk of:  Goal: Will remain free from falls  Description: Will remain free from falls  8/2/2020 0950 by Bing Mon RN  Outcome: Ongoing     Problem: Falls - Risk of:  Goal: Absence of physical injury  Description: Absence of physical injury  8/2/2020 0950 by Bing Mon RN  Outcome: Ongoing     Problem: Safety:  Goal: Free from accidental physical injury  Description: Free from accidental physical injury  8/2/2020 0950 by Bing Mon RN  Outcome: Ongoing     Problem: Safety:  Goal: Free from intentional harm  Description: Free from intentional harm  8/2/2020 0950 by Bing Mon RN  Outcome: Ongoing     Problem: Daily Care:  Goal: Daily care needs are met  Description: Daily care needs are met  8/2/2020 0950 by Bing Mon RN  Outcome: Ongoing     Problem: Pain:  Goal: Patient's pain/discomfort is manageable  Description: Patient's pain/discomfort is manageable  8/2/2020 0950 by Bing Mon RN  Outcome: Ongoing     Problem: Pain:  Goal: Pain level will decrease  Description: Pain level will decrease  8/2/2020 0950 by Bing Mon RN  Outcome: Ongoing     Problem: Pain:  Goal: Control of acute pain  Description: Control of acute pain  8/2/2020 0950 by Bing Mon RN  Outcome: Ongoing     Problem: Pain:  Goal: Control of chronic pain  Description: Control of chronic pain  8/2/2020 0950 by Bing Mon RN  Outcome: Ongoing     Problem: Discharge Planning:  Goal: Patients continuum of care needs are met  Description: Patients continuum of care needs are met  8/2/2020 7553 by Milly Bobo RN  Outcome: Ongoing     Problem: Nutrition  Goal: Optimal nutrition therapy  8/2/2020 0950 by Milly Bobo RN  Outcome: Ongoing

## 2020-08-03 LAB
ANION GAP SERPL CALCULATED.3IONS-SCNC: 12 MMOL/L (ref 3–16)
BUN BLDV-MCNC: 19 MG/DL (ref 7–20)
CALCIUM SERPL-MCNC: 9.9 MG/DL (ref 8.3–10.6)
CHLORIDE BLD-SCNC: 100 MMOL/L (ref 99–110)
CO2: 21 MMOL/L (ref 21–32)
CREAT SERPL-MCNC: 0.6 MG/DL (ref 0.6–1.2)
GFR AFRICAN AMERICAN: >60
GFR NON-AFRICAN AMERICAN: >60
GLUCOSE BLD-MCNC: 91 MG/DL (ref 70–99)
HCT VFR BLD CALC: 35.4 % (ref 36–48)
HEMOGLOBIN: 11.9 G/DL (ref 12–16)
MCH RBC QN AUTO: 32.1 PG (ref 26–34)
MCHC RBC AUTO-ENTMCNC: 33.6 G/DL (ref 31–36)
MCV RBC AUTO: 95.6 FL (ref 80–100)
PDW BLD-RTO: 14.1 % (ref 12.4–15.4)
PLATELET # BLD: 377 K/UL (ref 135–450)
PMV BLD AUTO: 6.9 FL (ref 5–10.5)
POTASSIUM SERPL-SCNC: 4.2 MMOL/L (ref 3.5–5.1)
RBC # BLD: 3.7 M/UL (ref 4–5.2)
SODIUM BLD-SCNC: 133 MMOL/L (ref 136–145)
WBC # BLD: 5.4 K/UL (ref 4–11)

## 2020-08-03 PROCEDURE — 97530 THERAPEUTIC ACTIVITIES: CPT

## 2020-08-03 PROCEDURE — 97110 THERAPEUTIC EXERCISES: CPT

## 2020-08-03 PROCEDURE — 6370000000 HC RX 637 (ALT 250 FOR IP): Performed by: PHYSICAL MEDICINE & REHABILITATION

## 2020-08-03 PROCEDURE — 97116 GAIT TRAINING THERAPY: CPT

## 2020-08-03 PROCEDURE — 80048 BASIC METABOLIC PNL TOTAL CA: CPT

## 2020-08-03 PROCEDURE — 85027 COMPLETE CBC AUTOMATED: CPT

## 2020-08-03 PROCEDURE — 97112 NEUROMUSCULAR REEDUCATION: CPT

## 2020-08-03 PROCEDURE — 1280000000 HC REHAB R&B

## 2020-08-03 PROCEDURE — 36415 COLL VENOUS BLD VENIPUNCTURE: CPT

## 2020-08-03 PROCEDURE — 97535 SELF CARE MNGMENT TRAINING: CPT

## 2020-08-03 RX ORDER — TRAMADOL HYDROCHLORIDE 50 MG/1
50 TABLET ORAL EVERY 6 HOURS PRN
Qty: 28 TABLET | Refills: 0 | Status: SHIPPED | OUTPATIENT
Start: 2020-08-03 | End: 2020-08-10

## 2020-08-03 RX ORDER — CETIRIZINE HYDROCHLORIDE 10 MG/1
10 TABLET ORAL NIGHTLY
Qty: 30 TABLET | Refills: 0 | Status: SHIPPED | OUTPATIENT
Start: 2020-08-03

## 2020-08-03 RX ORDER — AMLODIPINE BESYLATE 10 MG/1
10 TABLET ORAL DAILY
Qty: 30 TABLET | Refills: 3 | Status: SHIPPED | OUTPATIENT
Start: 2020-08-04 | End: 2020-09-18

## 2020-08-03 RX ADMIN — Medication 2000 UNITS: at 07:37

## 2020-08-03 RX ADMIN — PANTOPRAZOLE SODIUM 40 MG: 40 TABLET, DELAYED RELEASE ORAL at 05:37

## 2020-08-03 RX ADMIN — MULTIPLE VITAMINS W/ MINERALS TAB 1 TABLET: TAB at 07:37

## 2020-08-03 RX ADMIN — ATORVASTATIN CALCIUM 10 MG: 10 TABLET, FILM COATED ORAL at 07:37

## 2020-08-03 RX ADMIN — NAPROXEN 500 MG: 250 TABLET ORAL at 19:32

## 2020-08-03 RX ADMIN — METOPROLOL TARTRATE 25 MG: 25 TABLET, FILM COATED ORAL at 07:37

## 2020-08-03 RX ADMIN — DESVENLAFAXINE SUCCINATE 50 MG: 50 TABLET, EXTENDED RELEASE ORAL at 07:41

## 2020-08-03 RX ADMIN — OYSTER SHELL CALCIUM WITH VITAMIN D 1 TABLET: 500; 200 TABLET, FILM COATED ORAL at 19:31

## 2020-08-03 RX ADMIN — CETIRIZINE HYDROCHLORIDE 10 MG: 10 TABLET, FILM COATED ORAL at 19:32

## 2020-08-03 RX ADMIN — POLYETHYLENE GLYCOL 3350 17 G: 17 POWDER, FOR SOLUTION ORAL at 08:33

## 2020-08-03 RX ADMIN — AMLODIPINE BESYLATE 10 MG: 5 TABLET ORAL at 07:37

## 2020-08-03 RX ADMIN — METOPROLOL TARTRATE 25 MG: 25 TABLET, FILM COATED ORAL at 19:32

## 2020-08-03 RX ADMIN — LOSARTAN POTASSIUM 100 MG: 100 TABLET, FILM COATED ORAL at 07:37

## 2020-08-03 RX ADMIN — OYSTER SHELL CALCIUM WITH VITAMIN D 1 TABLET: 500; 200 TABLET, FILM COATED ORAL at 07:37

## 2020-08-03 ASSESSMENT — PAIN DESCRIPTION - LOCATION: LOCATION: BACK

## 2020-08-03 ASSESSMENT — PAIN SCALES - GENERAL
PAINLEVEL_OUTOF10: 0
PAINLEVEL_OUTOF10: 4
PAINLEVEL_OUTOF10: 2

## 2020-08-03 ASSESSMENT — PAIN DESCRIPTION - PROGRESSION: CLINICAL_PROGRESSION: NOT CHANGED

## 2020-08-03 ASSESSMENT — PAIN DESCRIPTION - DESCRIPTORS: DESCRIPTORS: SORE

## 2020-08-03 ASSESSMENT — PAIN DESCRIPTION - ONSET: ONSET: ON-GOING

## 2020-08-03 ASSESSMENT — PAIN DESCRIPTION - PAIN TYPE: TYPE: ACUTE PAIN

## 2020-08-03 ASSESSMENT — PAIN DESCRIPTION - FREQUENCY: FREQUENCY: INTERMITTENT

## 2020-08-03 NOTE — PROGRESS NOTES
Kane Fine  8/3/2020  [id number]    Chief Complaint: GBS (Guillain Arroyo syndrome) ([facility] [address]    Subjective:   No overnight events. No new c/o. Therapy going well. Planning on dc tomorrow. ROS: No CP, SOB, dyspnea    Objective:  Patient Vitals for the past 24 hrs:   BP Temp Temp src Pulse Resp SpO2   08/03/20 0731 (!) 147/72 98.3 °F (36.8 °C) Oral 66 17 98 %   08/02/20 2037 (!) 153/65 98 °F (36.7 °C) Oral 63 18 96 %     Gen: No distress, pleasant. Resting in bed  HEENT: Normocephalic, atraumatic. CV: Regular rate and rhythm. No MRG   Resp: No respiratory distress. CTAB   Abd: Soft, nontender, nondistended  Ext: No edema. Mild left lateral ankle effusion without significant TTP or pain with ROM  Neuro: Alert, oriented, appropriately interactive. Laboratory data: Available via EMR. Therapy progress:  PT  Upper Extremity Weight Bearing Restrictions  Other: MCOT heart monitor - on 7/28/2020  Position Activity Restriction  Other position/activity restrictions: 7/11: Kane Fine is a 75 y.o. female presents to the ER with a complaint of bilateral extremity weakness. States that she awoke yesterday and was having difficulty walking, states that she fell down one step and sat on her bottom. She had difficulty walking throughout the day. This morning she was able to navigate the steps but fell while walking across the front room to get her coffee. Per neurology: CSF protein was high consistent with a diagnosis of Guillian Barré syndrome. Completed 5 rounds of IVIG therapy.  .  Objective     Sit to Stand: Supervision  Stand to sit: Supervision  Bed to Chair: Contact guard assistance  Device: [address]: Supervision  Distance: 430' + 113'  OT  PT Equipment Recommendations  Equipment Needed: Yes  Mobility Devices: Jose Lockbourne: Rolling  Other: pt currently needing RW for safe ambulation - may need 2 d/t multiple levels at home  Equipment Used: Standard toilet  Toilet Transfers Comments: Ambulated to toilet with RW  Assessment        SLP                Body mass index is 28.4 kg/m². Assessment:  Patient Active Problem List   Diagnosis    Hypertension    Hyperlipidemia    Osteopenia    Hx of eczema    Chronic idiopathic urticaria    Moderate mitral regurgitation by prior echocardiogram    Prediabetes    BMI 27.0-27.9,adult    Major depressive disorder with single episode, in full remission ([facility])    PSVT (paroxysmal supraventricular tachycardia) ([facility])    Weakness    Frequent falls    SVT (supraventricular tachycardia) ([facility])    GBS (Guillain [place] syndrome) ([facility])       Plan:   Guillian Barré syndrome: s/p IVIG. PT/OT.      Paroxysmal SVT: lopressor     Hyponatremia: monitor, stable     HTN: norvasc 10, losartan 100, lopressor 25     HLD: Lipitor 10     Depression: prestiq per home regimen     Vit D deficiency: supplement    Sinusitis: started zyrtec and nasal spray    Left ankle pain: RICE, no fracture suspected.     Bowels: Per protocol  Bladder: Per protocol   Sleep: Trazodone provided prn. Pain: tylenol, naproxen, ultram PRN   DVT PPx: ambulating >250'    Rod Pan MD 8/3/2020, 1:47 PM

## 2020-08-03 NOTE — CARE COORDINATION
Met w/pt to discuss d/c plans for home tomorrow. Answered pt questions regarding COA and home care process. Pt understands [facility] and [facility] to call and schedule times to see pt once dc'd. Pt had no further questions/concerns at this time.   Electronically signed by ANATOLY Mix on 8/3/2020 at 3:58 PM

## 2020-08-03 NOTE — PROGRESS NOTES
Occupational Therapy  Facility/Department: [facility] ACUTE REHAB UNIT  Daily Treatment Note/Discharge Summary  NAME: Tammy Rivera  : 1945  MRN: [id number]    Date of Service: 8/3/2020    Discharge Recommendations:  Home with assist PRN, S Level 3, Home with Home health OT  OT Equipment Recommendations  Equipment Needed: Yes  Mobility Devices: Walker  ADL Assistive Devices: Tub transfer bench, grab bars. Pt has HH shower and shower chair     Assessment   Performance deficits / Impairments: Decreased functional mobility ; Decreased ADL status; Decreased endurance;Decreased high-level IADLs;Decreased strength;Decreased balance  Assessment: Pt is progressing in therapy well but still below her baseline level of occupational function, based on the above deficits associated with PSVT and new GBS dx. Pt would benefit from continued skilled OT services to address these deficits. Prognosis: Good  OT Education: OT Role;Plan of Care;Transfer Training;Equipment;Home Exercise Program  Patient Education: Pt verbalized and demonstrated understanding. REQUIRES OT FOLLOW UP: Yes  Activity Tolerance  Activity Tolerance: Patient Tolerated treatment well  Safety Devices  Type of devices: Call light within reach; Left in chair;Chair alarm in place         Patient Diagnosis(es): ISABELLA     has a past medical history of Anxiety, Colon polyp, Headache(784.0), Hyperlipidemia, Hypertension, and Osteoarthritis. has a past surgical history that includes Colonoscopy (8/10). Restrictions  Restrictions/Precautions  Restrictions/Precautions: Fall Risk  Required Braces or Orthoses?: No  Upper Extremity Weight Bearing Restrictions  Other: MCOT heart monitor - on 2020  Position Activity Restriction  Other position/activity restrictions: : Tammy Rivera is a 75 y.o. female presents to the ER with a complaint of bilateral extremity weakness.   States that she awoke yesterday and was having difficulty walking, states that she fell down one step and sat on her bottom. She had difficulty walking throughout the day. This morning she was able to navigate the steps but fell while walking across the front room to get her coffee. Per neurology: CSF protein was high consistent with a diagnosis of Guillian Barré syndrome. Completed 5 rounds of IVIG therapy. .  Subjective   General  Chart Reviewed: Yes  Patient assessed for rehabilitation services?: Yes  Response to previous treatment: Patient with no complaints from previous session  Family / Caregiver Present: No  Referring Practitioner: Nikko Wheat MD, for dc planning  Diagnosis: PSVT, GBS dx  Subjective  Subjective: Pt seated in wc upon arrival-agreeable to therapy, requested saving shower for afternoon session due to dc tomorrow. Reported no pain. Still seemed anxious about going home, especially with Dr. and  gone this week-reassurance provided. Pt expressed concern with being able to apply heart monitor herself-nursing notified. Objective    ADL  Grooming: Modified independent (standing at sink with RW)  UE Bathing: Modified independent (seated on shower chair)   LE Bathing: Modified independent (seated on shower chair)  UE Dressing: Modified independent (seated on shower chair)  LE Dressing: Modified independent (seated on couch with RW in front)  Toileting: Modified independent (grab bars and RW in front)  Additional Comments:   Education provided on the Driving Evalaution and Training program offered at [employer] verbalized understanding of needing a referral from her PCP when she is ready to get back to driving. Information provided on grocery delivery options-pt verbalized understanding but expressed she would most likely rely on neighbors. Information provided on purchasing tub transfer bench-pt verbalized understanding. Pt ambulated around room with RW and supervision to gather clothing for afternoon shower. Pt tolieted mod I-with RW and grab bars. Completed grooming and oral care at sink mod I with RW. Pt mod I doffing pajama pants seated on couch and donning shorts. Sensor for heart monitor was found to not be connected-nursing notified. Pt expressed concern for being able to apply monitor herself-reassurance provided that nursing would educate her when they came to fix sensor. Pt participated in seated UB exercies with 2 lb weights- upward punches x10, forward punches x10, bicep curls x10, tricep extensions x10. Balance  Sitting Balance: Modified independent   Standing Balance: Modified independent (with RW in reach)  Standing Balance  Time: ~3 mins x2  Activity: ADL tasks  Comment: RW, grab bars or sink for stabilzation  Functional Mobility  Functional - Mobility Device: Rolling Walker  Activity: To/from bathroom; Retrieve items  Assist Level: Supervision  Toilet Transfers  Equipment Used: Standard toilet  Toilet Transfer: Modified independent  Toilet Transfers Comments: Ambulated to toilet with RW     Transfers  Sit to stand: Modified independent  Stand to sit: Modified independent       Second Session:  Pt seated in wc upon arrival-on phone with friends-agreeable to therapy. Pt reported her friend has a walker she can use and needs to contact \"Xander\" to let him know she does not need another one. Pt requested to practice transfer to tub shower before dc tomorrow. Pt ambulated ~115 ft with RW and SBA to ADL room. Pt demonstrated safe transfer with use of both the tub transfer bench and shower chair. Pt required SBA and cueing for correct positioning and use of grabs. Pt expressed she would use shower chair because of glass doors and already owning a shower chair. Pt verbalized understanding of needing to sit for shower to decrease risk of falls. Pt ambulated back to room with RW and SBA. Toileted mod I. Bathed and dressed mod I-grab bars and shower chair. Pt completed grooming standing at sink with RW.  Pt left in wc with chair alarm on, call button

## 2020-08-03 NOTE — PLAN OF CARE
Problem: Skin Integrity:  Goal: Will show no infection signs and symptoms  Description: Will show no infection signs and symptoms  Outcome: Ongoing     Problem: Skin Integrity:  Goal: Absence of new skin breakdown  Description: Absence of new skin breakdown  Outcome: Ongoing     Problem: Falls - Risk of:  Goal: Will remain free from falls  Description: Will remain free from falls  8/3/2020 0958 by Claudetta Blake, RN  Outcome: Ongoing  8/3/2020 0004 by Kerrie Cameron  Outcome: Ongoing     Problem: Daily Care:  Goal: Daily care needs are met  Description: Daily care needs are met  Outcome: Ongoing     Problem: Discharge Planning:  Goal: Patients continuum of care needs are met  Description: Patients continuum of care needs are met  Outcome: Ongoing

## 2020-08-03 NOTE — PROGRESS NOTES
[id number] home care referral. Spoke with pt  re: home care plan of care/services. Agreeable. Demographic's verified. Aware of discharge with home care 8.4.20. Home care orders faxed.

## 2020-08-03 NOTE — PROGRESS NOTES
Physical Therapy  Facility/Department: [facility] ACUTE REHAB UNIT  Daily Treatment Note/Discharge Summary  NAME: Judah Gonzalez  : 1945  MRN: [id number]    Date of Service: 8/3/2020    Discharge Recommendations:  Patient would benefit from continued therapy after discharge, Home with Home health PT, S Level 3   PT Equipment Recommendations  Equipment Needed: Yes  Mobility Devices: Nevelyn Single: Rolling  Other: pt currently needing RW for safe ambulation - may need 2 d/t multiple levels at home    Assessment   Body structures, Functions, Activity limitations: Decreased functional mobility ; Decreased ADL status; Decreased balance;Decreased strength;Decreased endurance  Assessment: Patient displays normal gait pattern when ambulating. Patient to benefit from skilled physical therapy to improve dynamic balance and LE strength. Treatment Diagnosis: decreased functional mobility, impaired gait, decreased balance, weakness  Prognosis: Good  PT Education: PT Role;Goals;Plan of Care;Transfer Training;Home Exercise Program  Patient Education: verbalized understanding of cues for safety and gait training  Barriers to Learning: none  REQUIRES PT FOLLOW UP: Yes  Activity Tolerance  Activity Tolerance: Patient Tolerated treatment well     Patient Diagnosis(es): The encounter diagnosis was GBS (Guillain [place] syndrome) ([facility] [address] has a past medical history of Anxiety, Colon polyp, Headache(784.0), Hyperlipidemia, Hypertension, and Osteoarthritis. has a past surgical history that includes Colonoscopy (8/10). Restrictions  Restrictions/Precautions  Restrictions/Precautions: Fall Risk  Required Braces or Orthoses?: No  Upper Extremity Weight Bearing Restrictions  Other: MCOT heart monitor - on 2020  Position Activity Restriction  Other position/activity restrictions: : Judah Gonzalez is a 75 y.o. female presents to the ER with a complaint of bilateral extremity weakness.   States that she awoke yesterday and was having difficulty walking, states that she fell down one step and sat on her bottom. She had difficulty walking throughout the day. This morning she was able to navigate the steps but fell while walking across the front room to get her coffee. Per neurology: CSF protein was high consistent with a diagnosis of Guillian Barré syndrome. Completed 5 rounds of IVIG therapy. .  Subjective   General  Chart Reviewed: Yes  Response To Previous Treatment: Patient with no complaints from previous session. Family / Caregiver Present: No  General Comment  Comments: Seated in w/c upon arrival  Pain Screening  Patient Currently in Pain: No  Pain Assessment  Clinical Progression: Not changed  Vital Signs  Patient Currently in Pain: No       Orientation     Cognition      Objective      Transfers  Sit to Stand: Modified independent  Stand to sit: Modified independent  Ambulation  Ambulation?: Yes  More Ambulation?: Yes  Ambulation 1  Surface: level tile  Device: Rolling Walker  Assistance: Modified independent   Quality of Gait: steady, no LOB, supervision for safety  Gait Deviations: None  Distance: 430' + 113'  Ambulation 2  Surface - 2: carpet(ambulated short distance across carpet to  object from floor)  Quality of Gait 2: Same mechanics as documented above  Distance:  10'  Stairs/Curb  Stairs?: Yes  Stairs  # Steps : 14  Stairs Height: 6\"+4\"  Rails: Left ascending  Device: No Device  Assistance: Stand by assist  Comment: Step-to pattern ascent with L leading, w/ LUE rail and no device. Balance  Posture: Good  Sitting - Static: Good  Sitting - Dynamic: Good  Standing - Static: Good  Standing - Dynamic: Good;-  Comments: no LOB  Other exercises  Other exercises?: Yes  Other exercises 1: Alternating toe taps at 4\" steps.  X 15 reps with min A and CGA  Other exercises 2: Sit to stand with foam mat X10 CGA  Other exercises 3: Standing marches X15 Mod A on airex pad  Other exercises 4: Stepper: 8 min, level 1 Comment: Patient educated about neuro regrowth and proper shoes for amb and exercises at home. Patient educated about safety during HEP and possible exercises. 2nd session:  Pt seated in wc at arrival.  STS mod I. Pt used bathroom and managed all needs for toileting without assistance. Pt donned shoes independent at EOB. Ambulated >500 ft with RW on level and unlevel (outdoor) terrain navigating ramps mod I.  Pt performed bed mobility from flat (but elevated bed to mimic bed height) independently. While supine reviewed bridging and knee fallouts. Pt then ambulated >270 ft with [facility] and supervision. Car transfer performed independently. Curb step with RW performed mod I. Pt returned to room, needs in reach. Goals  Short term goals  Time Frame for Short term goals: to be met in 14-17 days  Short term goal 1: pt able to perform bed mobility modified independently   Short term goal 2: pt able to perform transfers modified independently - met  Short term goal 3: pt able to ambulate with LRAD 150 ft modified independently - met  Short term goal 4: pt able to navigate 12 steps with handrailing and modified independently - not met   Short term goal 5: pt able to perform car transfer modified independently  Patient Goals   Patient goals : to regain leg strength    Plan    Plan  Times per week: 5 of 7 days per week for 90 minutes per day  Times per day: Daily  Current Treatment Recommendations: Strengthening, Balance Training, Functional Mobility Training, Transfer Training, Gait Training, Stair training, Endurance Training, Neuromuscular Re-education  Safety Devices  Type of devices:  All fall risk precautions in place, Call light within reach, Chair alarm in place, Left in chair  Restraints  Initially in place: No     Therapy Time   Individual Concurrent Group Co-treatment   Time In 0945         Time Out 1030         Minutes 45         Timed Code Treatment Minutes: 45 Minutes     Second Session Therapy Time: Individual Concurrent Group Co-treatment   Time In 1115         Time Out 1200         Minutes 45           Timed Code Treatment Minutes:  90    Total Treatment Minutes: [address] [place], [place] [address]    2nd session: Maranda Castillo PT, DPT [id number]

## 2020-08-03 NOTE — CARE COORDINATION
Vicente/Franklin received a referral to this patient for a rolling walker. Pt spoke with OT and stated that she has an additional walker available to her and she would not require a new walker.     Electronically signed by Valentin oHlden on 8/3/2020 at 2:12 PM

## 2020-08-04 VITALS
HEART RATE: 66 BPM | DIASTOLIC BLOOD PRESSURE: 71 MMHG | HEIGHT: 61 IN | OXYGEN SATURATION: 97 % | TEMPERATURE: 97.9 F | SYSTOLIC BLOOD PRESSURE: 153 MMHG | RESPIRATION RATE: 17 BRPM | WEIGHT: 150.3 LBS | BODY MASS INDEX: 28.37 KG/M2

## 2020-08-04 PROCEDURE — 6370000000 HC RX 637 (ALT 250 FOR IP): Performed by: PHYSICAL MEDICINE & REHABILITATION

## 2020-08-04 RX ADMIN — MULTIPLE VITAMINS W/ MINERALS TAB 1 TABLET: TAB at 08:38

## 2020-08-04 RX ADMIN — LOSARTAN POTASSIUM 100 MG: 100 TABLET, FILM COATED ORAL at 08:38

## 2020-08-04 RX ADMIN — METOPROLOL TARTRATE 25 MG: 25 TABLET, FILM COATED ORAL at 08:38

## 2020-08-04 RX ADMIN — PANTOPRAZOLE SODIUM 40 MG: 40 TABLET, DELAYED RELEASE ORAL at 05:51

## 2020-08-04 RX ADMIN — DESVENLAFAXINE SUCCINATE 50 MG: 50 TABLET, EXTENDED RELEASE ORAL at 08:38

## 2020-08-04 RX ADMIN — OYSTER SHELL CALCIUM WITH VITAMIN D 1 TABLET: 500; 200 TABLET, FILM COATED ORAL at 08:38

## 2020-08-04 RX ADMIN — AMLODIPINE BESYLATE 10 MG: 5 TABLET ORAL at 08:38

## 2020-08-04 RX ADMIN — Medication 2000 UNITS: at 08:38

## 2020-08-04 ASSESSMENT — PAIN SCALES - GENERAL
PAINLEVEL_OUTOF10: 0
PAINLEVEL_OUTOF10: 0

## 2020-08-04 NOTE — PLAN OF CARE
Took naproxen at bedtime & had biofreeze rubbed on back. Discussed d/c tomorrow & reviewed information contained in care plans (completed all). Brought up that she felt increased numbness & tingling in feet today & was going to bring it up w/ the Dr. Before d/c tomorrow. Reviewed interventions to prevent from falling, & benefit of friends coming to visit & check up on her.  Reprinted & provided w/ handout on Toma Cortez - discussed

## 2020-08-04 NOTE — PROGRESS NOTES
Patient received discharge instructions along with a list of medications, why they take the particular medication and when it is due the next time. Patient verbalized understanding of discharge instructions. Patient awaiting transport for discharge to [facility].

## 2020-08-04 NOTE — PROGRESS NOTES
CLINICAL PHARMACY NOTE: MEDS TO [address] [place] [address] Drive Select Patient?: No  Total # of Prescriptions Filled: 1   The following medications were delivered to the patient:  · Tramadol 50mg  Total # of Interventions Completed: 0  Time Spent (min): 15    Additional Documentation:    Delivered to Patient  Kamille Dennis CPhT

## 2020-08-04 NOTE — DISCHARGE SUMMARY
Physical Medicine & Rehabilitation  Discharge Summary     Patient Identification:  Steffi Beavers  : 1945  Admit date: 2020  Discharge date:  2020   Attending provider: Julio C Whitten MD        Primary care provider: Rene Gan MD     Discharge Diagnoses:   Patient Active Problem List   Diagnosis    Hypertension    Hyperlipidemia    Osteopenia    Hx of eczema    Chronic idiopathic urticaria    Moderate mitral regurgitation by prior echocardiogram    Prediabetes    BMI 27.0-27.9,adult    Major depressive disorder with single episode, in full remission ([facility] [address]    PSVT (paroxysmal supraventricular tachycardia) (HCC)    Weakness    Frequent falls    SVT (supraventricular tachycardia) (HCC)    GBS (Guillain [place] syndrome) ([facility] [address]       Discharge Functional Status:    Physical therapy:  Bed Mobility: Scooting: Stand by assistance  Transfers: Sit to Stand: Supervision  Stand to sit: Supervision  Bed to Chair: Contact guard assistance  Comment: Patient educated about neuro regrowth and proper shoes for amb and exercises at home. Patient educated about safety during HEP and possible exercises. , Ambulation 1  Surface: level tile  Device: Rolling Walker  Assistance: Supervision  Quality of Gait: steady, no LOB, supervision for safety  Gait Deviations: None  Distance: 430' + 113'  Comments: Patient ambulated to bathroom and back., Stairs  # Steps : 12  Stairs Height: 6\"  Rails: Left ascending  Curbs: (not assessed due to safety concerns)  Device: No Device  Assistance: Contact guard assistance  Comment: Step-to pattern ascent with L leading, w/ LUE rail and no device. Mobility:  , PT Equipment Recommendations  Equipment Needed: Yes  Mobility Devices: Domo Mao: Rolling  Other: pt currently needing RW for safe ambulation - may need 2 d/t multiple levels at home, Assessment: Patient displays normal gait pattern when ambulating.  Patient to benefit from skilled physical therapy to improve dynamic balance and LE strength. Occupational therapy:  ,  , Assessment: Pt is progressing in therapy well but still below her baseline level of occupational function, based on the above deficits associated with PSVT and new GBS dx. Pt would benefit from continued skilled OT services to address these deficits. Speech therapy:       Inpatient Rehabilitation Course:   Lisa Myles is a 75 y.o. female admitted to inpatient rehabilitation on 7/19/2020. The patient participated in an aggressive multidisciplinary inpatient rehabilitation program involving 3 hours of therapy per day, at least 5 days per week. Prior to rehab she completed a course of IVIG for Guillain [place] syndrome. Her rehab course was uncomplicated.      Discharge Exam:  Constitutional: Pleasant, no distress  Head: Normocephalic  Eyes: Conjunctiva noninjected  Pulm: CTA bilat  CV: No murmurs noted  Abd: Soft, nontender  Ext: No edema  Neuro: Alert, fully oriented, appropriate   MSK: No joint abnormalities noted     Significant Diagnostics:   Lab Results   Component Value Date    CREATININE 0.6 08/03/2020    BUN 19 08/03/2020     (L) 08/03/2020    K 4.2 08/03/2020     08/03/2020    CO2 21 08/03/2020       Lab Results   Component Value Date    WBC 5.4 08/03/2020    HGB 11.9 (L) 08/03/2020    HCT 35.4 (L) 08/03/2020    MCV 95.6 08/03/2020     08/03/2020       Disposition:  Home in stable condition    Follow-up:  See after visit summary from hospitalization    Discharge Medications:     Medication List      START taking these medications    cetirizine 10 MG tablet  Commonly known as:  ZYRTEC  Take 1 tablet by mouth nightly  Notes to patient:  Uses: opens airways, decreases allergy symptoms  Side effects: drowsiness, dry mouth     metoprolol tartrate 25 MG tablet  Commonly known as:  LOPRESSOR  Take 1 tablet by mouth 2 times daily  Notes to patient:  USE: to treat high blood pressure  SIDE EFFECTS: light headed, faint feeling traMADol 50 MG tablet  Commonly known as:  ULTRAM  Take 1 tablet by mouth every 6 hours as needed for Pain for up to 7 days. Notes to patient:  Uses: Pain reliever   Side effects: Dry mouth, itching, sweating, feeling dizzy or tired        CHANGE how you take these medications    amLODIPine 10 MG tablet  Commonly known as:  NORVASC  Take 1 tablet by mouth daily  What changed:    · medication strength  · how much to take  · how to take this  · when to take this  · additional instructions  Notes to patient:  Uses: treats high blood pressure  Side effects: Feeling dizzy, sleepy, or tired. Upset stomach        CONTINUE taking these medications    atorvastatin 10 MG tablet  Commonly known as:  Lipitor  Take 1 tablet by mouth every other day  Notes to patient:  Uses: Lowers bad cholesterol  Side effects: Joint pain, diarrhea, upset stomach     Calcium + D 600-200 MG-UNIT Tabs  Generic drug:  calcium carbonate-vitamin D  Notes to patient:  Uses: supplement  Side effects: constipation     clobetasol 0.05 % cream  Commonly known as:  TEMOVATE  Notes to patient:  Uses: Treats skin irritations  Side effects: Burning or stinging, dry skin     desvenlafaxine succinate 50 MG Tb24 extended release tablet  Commonly known as:  PRISTIQ  Take 1 tablet by mouth daily  Notes to patient:  Uses: Antidepressant  Side effects: Feeling dizzy, sleepy, tired or weak     econazole nitrate 1 % cream  Notes to patient:  Uses: Treats fungal infections of the skin  Side effects: Irritation of the skin where cream is used      losartan-hydrochlorothiazide 100-25 MG per tablet  Commonly known as:  Hyzaar  Take 1 tablet by mouth daily  Notes to patient:  Uses: treats high blood pressure, decreases stroke risk  Side effects: Dizziness, back pain, common cold symptoms     miconazole 2 % cream  Commonly known as:  MICOTIN  Apply topically 2 times daily.   Notes to patient:  Uses: Treats fungal infections  Side effects: Skin irritation at site of use     mometasone 50 MCG/ACT nasal spray  Commonly known as:  Nasonex  2 sprays by Nasal route daily. Notes to patient:  Uses: Prevents allergy symptoms  Side effects: Sores in nose, whistling sound when you breath      naproxen 250 MG tablet  Commonly known as:  NAPROSYN  Notes to patient:  Uses: Decreases pain, swelling, or fever  Side effects: stomach pain or heartburn, constipation     ocuvite Tabs oral tablet  Notes to patient:  Uses: supplement for good health  Side effects: Upset stomach, change in stool color     Vitamin D3 50 MCG (2000 UT) Caps  Notes to patient:  Uses: Treats vitamin D deficiency   Side effects: upset stomach, constipation            Where to Get Your Medications      These medications were sent to [address], [address] [place] [address] [phone]  [place] [address], [address] [address]    Phone:  [phone]   · amLODIPine 10 MG tablet  · cetirizine 10 MG tablet  · metoprolol tartrate 25 MG tablet     You can get these medications from any pharmacy    Bring a paper prescription for each of these medications  · traMADol 50 MG tablet         I spent over 35 minutes on this discharge encounter between counseling, coordination of care, and medication reconciliation.   To comply with [id number] [facility] bylaw R.II.[id number]: Discharge order placed in advance to facilitate discharge planning with rehab team.     Nirav Perez MD

## 2020-08-04 NOTE — PLAN OF CARE
Problem: Skin Integrity:  Goal: Will show no infection signs and symptoms  Description: Will show no infection signs and symptoms  8/4/2020 0057 by Lowanda Canavan, RN  Outcome: Completed  Goal: Absence of new skin breakdown  Description: Absence of new skin breakdown  8/4/2020 0057 by Lowanda Canavan, RN  Outcome: Completed     Problem: Falls - Risk of:  Goal: Will remain free from falls  Description: Will remain free from falls  8/4/2020 0057 by Lowanda Canavan, RN  Outcome: Completed  Goal: Absence of physical injury  Description: Absence of physical injury  8/4/2020 0057 by Lowanda Canavan, RN  Outcome: Completed     Problem: Safety:  Goal: Free from accidental physical injury  Description: Free from accidental physical injury  8/4/2020 0057 by Lowanda Canavan, RN  Outcome: Completed  Goal: Free from intentional harm  Description: Free from intentional harm  8/4/2020 0057 by Lowanda Canavan, RN  Outcome: Completed     Problem: Daily Care:  Goal: Daily care needs are met  Description: Daily care needs are met  8/4/2020 0057 by Lowanda Canavan, RN  Outcome: Completed     Problem: Pain:  Description: Pain management should include both nonpharmacologic and pharmacologic interventions.   Goal: Patient's pain/discomfort is manageable  Description: Patient's pain/discomfort is manageable  8/4/2020 0057 by Lowanda Canavan, RN  Outcome: Completed  Goal: Pain level will decrease  Description: Pain level will decrease  8/4/2020 0057 by Lowanda Canavan, RN  Outcome: Completed  Goal: Control of acute pain  Description: Control of acute pain  8/4/2020 0057 by Lowanda Canavan, RN  Outcome: Completed  Goal: Control of chronic pain  Description: Control of chronic pain  8/4/2020 0057 by Lowanda Canavan, RN  Outcome: Completed     Problem: Discharge Planning:  Goal: Patients continuum of care needs are met  Description: Patients continuum of care needs are met  8/4/2020 0057 by Lowanda Canavan, RN  Outcome: Completed     Problem: Nutrition  Goal: Optimal nutrition therapy  8/4/2020 0057 by Carlo Sepulveda RN  Outcome: Completed

## 2020-08-05 ENCOUNTER — TELEPHONE (OUTPATIENT)
Dept: FAMILY MEDICINE CLINIC | Age: 75
End: 2020-08-05

## 2020-08-05 NOTE — TELEPHONE ENCOUNTER
I called patient to schedule a TCM. She was unable to schedule at the time of the call. She will call office back. Please schedule a transitional care appointment and transfer call to MA/ RN for TCM questions.

## 2020-08-05 NOTE — CARE COORDINATION
Aware of pt's dc order for today. Pt dc'd to home w/[facility] and a walker provided by [facility]. Pt declined ins paying for 2nd walker, as she would have had to pay a copay. All dc needs met pet CM.   Electronically signed by ANATOLY Rendon on 8/5/2020 at 8:35 AM

## 2020-08-06 ENCOUNTER — TELEPHONE (OUTPATIENT)
Dept: FAMILY MEDICINE CLINIC | Age: 75
End: 2020-08-06

## 2020-08-06 NOTE — TELEPHONE ENCOUNTER
[facility] Transitions Initial Follow Up Call    Outreach made within 2 business days of discharge: Yes    Patient: Mojgan Alfaro Patient : 1945   MRN: [id number]  Reason for Admission: There are no discharge diagnoses documented for the most recent discharge. Discharge Date: 20       Spoke with: Nurse    Discharge department/facility: [facility]    TCM Interactive Patient Contact:  Was patient able to fill all prescriptions: Yes  Was patient instructed to bring all medications to the follow-up visit: Yes  Is patient taking all medications as directed in the discharge summary?  Yes  Does patient understand their discharge instructions: Yes  Does patient have questions or concerns that need addressed prior to 7-14 day follow up office visit: no    Scheduled appointment with PCP within 7-14 days    Follow Up  Future Appointments   Date Time Provider Jose Pal   8/10/2020  1:30 PM Abdulaziz Menon MD [facility]   2020 10:30 AM KIMBERLY Clay CNP  Cardio [facility]       Derrick Brand MA

## 2020-08-06 NOTE — TELEPHONE ENCOUNTER
Patient has scheduled a TCM appointment for Monday but no one was available to ask her the questions. Please give her a call.

## 2020-08-06 NOTE — TELEPHONE ENCOUNTER
Nanette Gan with [employer] needs a verbal for home care. Also, patient did not have the correct dose of Amlodipine at home. She did not  the 10 mg so Nanette Gan instructed her to double up on the 5 mg that she has. She also did not  her Motoprolol so she has not been taking that medication. Patient called her neighbor while Nanette Gan was there and asked if she could pick it up for her.

## 2020-08-07 ENCOUNTER — TELEPHONE (OUTPATIENT)
Dept: FAMILY MEDICINE CLINIC | Age: 75
End: 2020-08-07

## 2020-08-10 ENCOUNTER — OFFICE VISIT (OUTPATIENT)
Dept: FAMILY MEDICINE CLINIC | Age: 75
End: 2020-08-10
Payer: MEDICARE

## 2020-08-10 ENCOUNTER — TELEPHONE (OUTPATIENT)
Dept: FAMILY MEDICINE CLINIC | Age: 75
End: 2020-08-10

## 2020-08-10 VITALS
WEIGHT: 144 LBS | TEMPERATURE: 97.3 F | HEART RATE: 72 BPM | OXYGEN SATURATION: 95 % | HEIGHT: 61 IN | DIASTOLIC BLOOD PRESSURE: 76 MMHG | BODY MASS INDEX: 27.19 KG/M2 | SYSTOLIC BLOOD PRESSURE: 114 MMHG

## 2020-08-10 PROCEDURE — 99495 TRANSJ CARE MGMT MOD F2F 14D: CPT | Performed by: FAMILY MEDICINE

## 2020-08-10 PROCEDURE — 1111F DSCHRG MED/CURRENT MED MERGE: CPT | Performed by: FAMILY MEDICINE

## 2020-08-10 NOTE — PROGRESS NOTES
Post-Discharge Transitional Care Management Services or Hospital Follow Up      Les Yan   YOB: 1945    Date of Office Visit:  8/10/2020  Date of Hospital Admission: 7/19/20  Date of Hospital Discharge: 8/4/20  Risk of hospital readmission (high >=14%.  Medium >=10%) :Readmission Risk Score: 11      Care management risk score Rising risk (score 2-5) and Complex Care (Scores >=6): 0     Non face to face  following discharge, date last encounter closed (first attempt may have been earlier): 8/6/2020  1:40 PM    Call initiated 2 business days of discharge: Yes     Doing well post rehab with her Shola Jones, using a walker but feels stronger each day  Getting Therapy at home    Patient Active Problem List   Diagnosis    Hypertension    Hyperlipidemia    Osteopenia    Hx of eczema    Chronic idiopathic urticaria    Moderate mitral regurgitation by prior echocardiogram    Prediabetes    BMI 27.0-27.9,adult    Major depressive disorder with single episode, in full remission ([facility] [address]    PSVT (paroxysmal supraventricular tachycardia) [facility]    Weakness    Frequent falls    SVT (supraventricular tachycardia) (HCC)    GBS (Guillain [place] syndrome) ([facility])       Allergies   Allergen Reactions    Hydroxybenzoate     Prednisolone Acetate     Tixocortol Pivalate     Neosporin [Neomycin-Polymyxin-Gramicidin] Rash     Allergic rash     Ace Inhibitors      Cough, itchy throat    Methylchloroisothiazolinone     Shingrix [Zoster Vac Recomb Adjuvanted]      GBS       Medications listed as ordered at the time of discharge from hospital   [facility], [address] [facility] Medication Instructions JV:    Printed on:08/10/20 9606   Medication Information                      amLODIPine (NORVASC) 10 MG tablet  Take 1 tablet by mouth daily             atorvastatin (LIPITOR) 10 MG tablet  Take 1 tablet by mouth every other day             cetirizine (ZYRTEC) 10 MG tablet  Take 1 tablet by mouth nightly Cholecalciferol (VITAMIN D3) 2000 UNITS CAPS  Take 2,000 Units by mouth daily. clobetasol (TEMOVATE) 0.05 % cream  Apply to rash or itching skin on body 3 to 4 times daily as directed             desvenlafaxine succinate (PRISTIQ) 50 MG TB24 extended release tablet  Take 1 tablet by mouth daily             losartan-hydrochlorothiazide (HYZAAR) 100-25 MG per tablet  Take 1 tablet by mouth daily             metoprolol tartrate (LOPRESSOR) 25 MG tablet  Take 1 tablet by mouth 2 times daily             miconazole (MICOTIN) 2 % cream  Apply topically 2 times daily. mometasone (NASONEX) 50 MCG/ACT nasal spray  2 sprays by Nasal route daily. Multiple Vitamins-Minerals (OCUVITE) TABS tablet  Take 1 tablet by mouth daily. naproxen (NAPROSYN) 250 MG tablet  Take 500 mg by mouth 2 times daily as needed. traMADol (ULTRAM) 50 MG tablet  Take 1 tablet by mouth every 6 hours as needed for Pain for up to 7 days. Medications marked \"taking\" at this time  Outpatient Medications Marked as Taking for the 8/10/20 encounter (Office Visit) with Sina Khan MD   Medication Sig Dispense Refill    traMADol (ULTRAM) 50 MG tablet Take 1 tablet by mouth every 6 hours as needed for Pain for up to 7 days. 28 tablet 0    cetirizine (ZYRTEC) 10 MG tablet Take 1 tablet by mouth nightly 30 tablet 0    metoprolol tartrate (LOPRESSOR) 25 MG tablet Take 1 tablet by mouth 2 times daily 60 tablet 3    amLODIPine (NORVASC) 10 MG tablet Take 1 tablet by mouth daily 30 tablet 3    atorvastatin (LIPITOR) 10 MG tablet Take 1 tablet by mouth every other day 60 tablet 3    losartan-hydrochlorothiazide (HYZAAR) 100-25 MG per tablet Take 1 tablet by mouth daily 90 tablet 3    desvenlafaxine succinate (PRISTIQ) 50 MG TB24 extended release tablet Take 1 tablet by mouth daily 90 tablet 3    miconazole (MICOTIN) 2 % cream Apply topically 2 times daily.  60 g 0    clobetasol (TEMOVATE) 0.05 % cream Apply to rash or itching skin on body 3 to 4 times daily as directed      mometasone (NASONEX) 50 MCG/ACT nasal spray 2 sprays by Nasal route daily. 1 Inhaler 0    Cholecalciferol (VITAMIN D3) 2000 UNITS CAPS Take 2,000 Units by mouth daily.  naproxen (NAPROSYN) 250 MG tablet Take 500 mg by mouth 2 times daily as needed.  Multiple Vitamins-Minerals (OCUVITE) TABS tablet Take 1 tablet by mouth daily. Medications patient taking as of now reconciled against medications ordered at time of hospital discharge: Yes    Chief Complaint   Patient presents with   [address] from Hospital       History of Present illness - Follow up of Hospital diagnosis(es): reviewed    Inpatient course: Discharge summary reviewed- see chart. Interval history/Current status: improving    A comprehensive review of systems was negative except for what was noted in the HPI. Vitals:    08/10/20 1334   BP: 114/76   Pulse: 72   Temp: 97.3 °F (36.3 °C)   SpO2: 95%   Weight: 144 lb (65.3 kg)   Height: 5' 1\" (1.549 m)     Body mass index is 27.21 kg/m².    Wt Readings from Last 3 Encounters:   08/10/20 144 lb (65.3 kg)   07/28/20 150 lb 4.8 oz (68.2 kg)   07/19/20 148 lb 9.6 oz (67.4 kg)     BP Readings from Last 3 Encounters:   08/10/20 114/76   08/04/20 (!) 153/71   07/19/20 (!) 154/84        Physical Exam:  General Appearance: alert and oriented to person, place and time, well developed and well- nourished, in no acute distress, using walker without difficulty, improving each day  Skin: warm and dry, no rash or erythema  Head: normocephalic and atraumatic  Eyes: pupils equal, round, and reactive to light, extraocular eye movements intact, conjunctivae normal  ENT: tympanic membrane, external ear and ear canal normal bilaterally, nose without deformity, nasal mucosa and turbinates normal without polyps  Neck: supple and non-tender without mass, no thyromegaly or thyroid nodules, no cervical lymphadenopathy  Pulmonary/Chest: clear to auscultation bilaterally- no wheezes, rales or rhonchi, normal air movement, no respiratory distress  Cardiovascular: normal rate, regular rhythm, normal S1 and S2, soft aortic murmur murmurs, rubs, clicks, or gallops, distal pulses intact, no carotid bruits  Abdomen: soft, non-tender, non-distended, normal bowel sounds, no masses or organomegaly  Extremities: no cyanosis, clubbing or edema  Musculoskeletal: normal range of motion, no joint swelling, deformity or tenderness  Leg weakness is improving every day, walking with walker  Neurologic: reflexes normal and symmetric, proved strength in her legs each day,  coordination and speech normal    Assessment/Plan:  1. Hospital discharge follow-up  Doing well, continue PT OT at home  - [address] MED LIST    2. GBS (Guillain [place] syndrome) ([facility] [address]  She will not take a second Shingrix vaccine  Is to check with rehab and neurology about getting flu vaccine this fall    3. Moderate mitral regurgitation by prior echocardiogram  Stable, follow-up    4.  SVT (supraventricular tachycardia) ([facility])  Stable, monitor on, has follow-up with cardiology        Medical Decision Making: moderate complexity

## 2020-08-20 PROBLEM — I47.1 PSVT (PAROXYSMAL SUPRAVENTRICULAR TACHYCARDIA) (HCC): Status: RESOLVED | Noted: 2020-07-11 | Resolved: 2020-08-20

## 2020-08-20 PROBLEM — R53.1 WEAKNESS: Status: RESOLVED | Noted: 2020-07-11 | Resolved: 2020-08-20

## 2020-08-20 PROBLEM — I47.10 PSVT (PAROXYSMAL SUPRAVENTRICULAR TACHYCARDIA): Status: RESOLVED | Noted: 2020-07-11 | Resolved: 2020-08-20

## 2020-08-20 NOTE — PROGRESS NOTES
[facility]   Electrophysiology  Alexi Lipscomb, APRN-CNP  Attending EP: Dr. Bozena Treviño    Date: 9/18/2020  I had the privilege of visiting Sofia in the office. Chief Complaint:   Chief Complaint   Patient presents with    Tachycardia     No complaints     Follow-Up from Hospital     2-3 Months      History of Present Illness: History obtained from patient and medical record. Sofia is 75 y.o. female with a past medical history of HTN, HLD, and SVT. In July of 2020, she presented to the hospital with leg weakness. She had received the shingles vaccine and found to have GBS. She was given IVIG. During the hospitalization, she had narrow complex tachycardia, pt asymptomatic.     -Interval history: Today, Sofia is being seen for follow up. She is in sinus rhythm on EKG today. Her event monitor showed no recurrent tachycardias or SVT. She feels well overall and is recovering from GBS. Her biggest complaint is leg swelling which she states started since starting amlodipine. Her BP is stable at home, but elevated here. It improved to 134/64 after rest. We discussed stopping the amlodipine to see if her leg swelling improves off the medication and need for an alternative medication if her BP goes up. Her cholesterol is well controlled on atorvastatin. She continues to regain her strength and balance after being diagnosed with GBS. Denies having chest pain, palpitations, shortness of breath, orthopnea/PND, cough, or dizziness at the time of this visit. With regard to medication therapy the patient has been compliant with prescribed regimen. They have tolerated therapy to date. Allergies:   Allergies   Allergen Reactions    Hydroxybenzoate     Prednisolone Acetate     Tixocortol Pivalate     Neosporin [Neomycin-Polymyxin-Gramicidin] Rash     Allergic rash     Ace Inhibitors      Cough, itchy throat    Methylchloroisothiazolinone     Shingrix [Zoster Vac Recomb Adjuvanted]      GBS     Home Medications:  Prior to Visit Medications    Medication Sig Taking? Authorizing Provider   metoprolol tartrate (LOPRESSOR) 25 MG tablet Take 1 tablet by mouth 2 times daily Yes Jill Dominguez MD   desvenlafaxine succinate (PRISTIQ) 50 MG TB24 extended release tablet Take 1 tablet by mouth daily Yes Jill Dominguez MD   cetirizine (ZYRTEC) 10 MG tablet Take 1 tablet by mouth nightly Yes Merary Maddox MD   atorvastatin (LIPITOR) 10 MG tablet Take 1 tablet by mouth every other day Yes Jill Dominguez MD   losartan-hydrochlorothiazide (HYZAAR) 100-25 MG per tablet Take 1 tablet by mouth daily Yes Jill Dominguez MD   miconazole (MICOTIN) 2 % cream Apply topically 2 times daily. Yes Jill Dominguez MD   clobetasol (TEMOVATE) 0.05 % cream Apply to rash or itching skin on body 3 to 4 times daily as directed Yes Historical Provider, MD   mometasone (NASONEX) 50 MCG/ACT nasal spray 2 sprays by Nasal route daily. Yes Jill Dominguez MD   Cholecalciferol (VITAMIN D3) 2000 UNITS CAPS Take 2,000 Units by mouth daily. Yes Historical Provider, MD   naproxen (NAPROSYN) 250 MG tablet Take 500 mg by mouth 2 times daily as needed. Yes Historical Provider, MD   Multiple Vitamins-Minerals (OCUVITE) TABS tablet Take 1 tablet by mouth daily. Yes Historical Provider, MD      Past Medical History:  Past Medical History:   Diagnosis Date    Anxiety     Colon polyp     Headache(784.0)     Hyperlipidemia     Hypertension     Osteoarthritis      Past Surgical History:    has a past surgical history that includes Colonoscopy (8/10). Social History:  Reviewed. reports that she has never smoked. She has never used smokeless tobacco. She reports current alcohol use. She reports that she does not use drugs. Family History:  Reviewed. family history includes Cancer in her sister; Heart Disease in her father; Kidney Disease in her brother, mother, and sister.      Review of System:  · Constitutional: Negative for fever, night sweats, chills, weight changes, or weakness  · Skin: Negative for rash, dry skin, pruritus, bruising, bleeding, blood clots, or changes in skin pigment  · HEENT: Negative for vision changes, ringing in the ears, sore throat, dysphagia, or swollen lymph nodes  · Respiratory: Reviewed in HPI  · Cardiovascular: Reviewed in HPI  · Gastrointestinal: Negative for abdominal pain, N/V/D, constipation, or black/tarry stools  · Genito-Urinary: Negative for dysuria, incontinence, urgency, or hematuria  · Musculoskeletal: Positive for leg swelling. Negative for joint swelling, muscle pain, or injuries  · Neurological/Psych: Negative for confusion, seizures, dizziness, headaches, balance issues or TIA-like symptoms. No anxiety, depression, or insomnia    Physical Examination:  Vitals:    09/18/20 1056   BP: 134/64   Pulse: 60   Resp:       Wt Readings from Last 3 Encounters:   09/18/20 148 lb (67.1 kg)   08/10/20 144 lb (65.3 kg)   07/28/20 150 lb 4.8 oz (68.2 kg)     Constitutional: Cooperative and in no apparent distress, and appears well nourished  Skin: Warm and pink; no pallor, cyanosis, bruising, or clubbing  HEENT: Symmetric and normocephalic. PERRL, EOM intact. Conjunctiva pink with clear sclera. Mucus membranes pink and moist. Teeth intact. Thyroid smooth without nodules or goiter  Respiratory: Respirations symmetric and unlabored. Lungs clear to auscultation bilaterally, no wheezing, rhonchi, or crackles  Cardiovascular:  Regular rate and rhythm. S1/S2 present without murmurs, rubs, or gallops. Peripheral pulses 2+, capillary refill < 3 seconds. No elevation of JVP. Trace BLE edema  Gastrointestinal: Abdomen soft and round. Bowel sounds normoactive in all quadrants without tenderness or masses. Musculoskeletal: Bilateral upper and lower extremity strength 5/5 with full ROM. Uses cane  Neurological/Psych: Awake and orientated to person, place and time. Calm affect, appropriate mood. event monitor  - Continue medical management with metoprolol 25 mg BID     - If recurrent episodes despite adequate medical therapy. Recommend EPS/ RFA. The risks, benefits and alternatives of the ablation procedure were discussed with the patient. 2. HTN  - Controlled: Goal <130/80  - Ok to stop amlodipine due to leg swelling  - Encouraged to monitor and log BP readings at home, then bring log to next visit   ~ Call office if consistently >140  - Discussed importance of low sodium diet, weight control and exercise    3. Hyperlipidemia  - Controlled. Goal: LDL <100   ~ LDL 86 (6/20)  - On atorvastatin 10 mg QD  - Discussed diet, weight loss, and exercise needs  - Followed per PCP    4. Type 2 DM  - Stable: Goal A1C <7   ~ A1C 5.3 (7/20)  - Continue with current medications   - Followed per PCP    5. GBS   - Continue therapy    Plan:  1. Stop amlodipine  2. Continue to monitor blood pressure. May need alternate medication if BP consistently >140    F/U: Follow-up with EP in 6 months  -[facility] at [phone] with any questions    Diet & Exercise:   The patient is counseled to follow a low salt diet to assure blood pressure remains controlled for cardiovascular risk factor modification   The patient is counseled to avoid excess caffeine, and energy drinks as this may exacerbated ectopy and arrhythmia   The patient is counseled to lose weight to control cardiovascular risk factors   Exercise program discussed: To improve overall cardiovascular health, the patient is instructed to increase cardiovascular related activities with a goal of 150 min/week of moderate level activity or 10,000 steps per day. Encouraged to perform as much activity as tolerated    Quality Metrics  1. Tobacco Cessation Counseling: N/A  2. Retake of BP if >140/90: Yes  3. Documentation to PCP: Note sent to PCP office visit  4. CAD patient on anti-platelet: N/A   5.   CAD patient on STATIN therapy: N/A   6.

## 2020-08-26 ENCOUNTER — TELEPHONE (OUTPATIENT)
Dept: FAMILY MEDICINE CLINIC | Age: 75
End: 2020-08-26

## 2020-08-26 RX ORDER — DESVENLAFAXINE 50 MG/1
50 TABLET, EXTENDED RELEASE ORAL DAILY
Qty: 90 TABLET | Refills: 3 | Status: SHIPPED | OUTPATIENT
Start: 2020-08-26 | End: 2021-09-15

## 2020-08-26 NOTE — TELEPHONE ENCOUNTER
Pt is requesting physician order for an adjustable walking cane. Pt is asking for order to be sent Cecile Prior.   I advise order may need to be sent to [id number] [facility]

## 2020-08-26 NOTE — TELEPHONE ENCOUNTER
Medication:   Requested Prescriptions     Pending Prescriptions Disp Refills    desvenlafaxine succinate (PRISTIQ) 50 MG TB24 extended release tablet 90 tablet 3     Sig: Take 1 tablet by mouth daily      Metoprolol alrady filled #60 3rf  Last Filled:  6/5/17 #90 3rf    Patient Phone Number: [phone] (home)     Last appt: 8/10/2020   Next appt: 8/26/2020    Last OARRS: No flowsheet data found.

## 2020-08-26 NOTE — TELEPHONE ENCOUNTER
Medication and Quantity requested:     desvenlafaxine succinate (PRISTIQ) 50 MG TB24 extended release tablet   #90    metoprolol tartrate (LOPRESSOR) 25 MG tablet   #30     Last Visit  8/10/20    Pharmacy and phone number updated in EPIC:  Yes, [employer]

## 2020-08-26 NOTE — TELEPHONE ENCOUNTER
Called patient and I informed her that [facility] does not carry the adjustable cane but she can order it from a medical supply store. She states that she is going to call insurance company and see if she can order one through them and have it delivered. Patient is going to call back tonight.

## 2020-08-26 NOTE — TELEPHONE ENCOUNTER
Called and spoke with Anil Mercado about the adjustable cane that patient is asking for. Pharmacy states that they do not carry them and they will need to be ordered through a medical supply company.

## 2020-08-27 ENCOUNTER — TELEPHONE (OUTPATIENT)
Dept: FAMILY MEDICINE CLINIC | Age: 75
End: 2020-08-27

## 2020-08-27 NOTE — TELEPHONE ENCOUNTER
----- Message from Toño Robertson sent at 8/27/2020 11:19 AM EDT -----  Subject: Message to Provider    QUESTIONS  Information for Provider? Patient needs the script for the cane to go to   [phone] [facility] attn Denice any questions call patient back    standard adjustable one   ---------------------------------------------------------------------------  --------------  CALL BACK INFO  What is the best way for the office to contact you? OK to leave message on   voicemail  Preferred Call Back Phone Number? [phone]  ---------------------------------------------------------------------------  --------------  SCRIPT ANSWERS  Relationship to Patient?  Self

## 2020-08-31 NOTE — TELEPHONE ENCOUNTER
Tried to order cane under DME did not see the correct cane listed. Patient is asking for a standard adjustable cane.   Please write script and I will fax

## 2020-09-01 ENCOUNTER — TELEPHONE (OUTPATIENT)
Dept: FAMILY MEDICINE CLINIC | Age: 75
End: 2020-09-01

## 2020-09-18 ENCOUNTER — OFFICE VISIT (OUTPATIENT)
Dept: CARDIOLOGY CLINIC | Age: 75
End: 2020-09-18
Payer: MEDICARE

## 2020-09-18 VITALS
WEIGHT: 148 LBS | HEIGHT: 61 IN | BODY MASS INDEX: 27.94 KG/M2 | RESPIRATION RATE: 18 BRPM | HEART RATE: 60 BPM | SYSTOLIC BLOOD PRESSURE: 134 MMHG | DIASTOLIC BLOOD PRESSURE: 64 MMHG

## 2020-09-18 PROCEDURE — 1036F TOBACCO NON-USER: CPT | Performed by: NURSE PRACTITIONER

## 2020-09-18 PROCEDURE — 3017F COLORECTAL CA SCREEN DOC REV: CPT | Performed by: NURSE PRACTITIONER

## 2020-09-18 PROCEDURE — 4040F PNEUMOC VAC/ADMIN/RCVD: CPT | Performed by: NURSE PRACTITIONER

## 2020-09-18 PROCEDURE — G8399 PT W/DXA RESULTS DOCUMENT: HCPCS | Performed by: NURSE PRACTITIONER

## 2020-09-18 PROCEDURE — 99214 OFFICE O/P EST MOD 30 MIN: CPT | Performed by: NURSE PRACTITIONER

## 2020-09-18 PROCEDURE — 93000 ELECTROCARDIOGRAM COMPLETE: CPT | Performed by: NURSE PRACTITIONER

## 2020-09-18 PROCEDURE — 1111F DSCHRG MED/CURRENT MED MERGE: CPT | Performed by: NURSE PRACTITIONER

## 2020-09-18 PROCEDURE — 1123F ACP DISCUSS/DSCN MKR DOCD: CPT | Performed by: NURSE PRACTITIONER

## 2020-09-18 PROCEDURE — G8417 CALC BMI ABV UP PARAM F/U: HCPCS | Performed by: NURSE PRACTITIONER

## 2020-09-18 PROCEDURE — G8427 DOCREV CUR MEDS BY ELIG CLIN: HCPCS | Performed by: NURSE PRACTITIONER

## 2020-09-18 PROCEDURE — 1090F PRES/ABSN URINE INCON ASSESS: CPT | Performed by: NURSE PRACTITIONER

## 2020-09-18 NOTE — PATIENT INSTRUCTIONS
1. Stop amlodipine  2. Continue to monitor blood pressure.  May need alternate medication if BP consistently >140

## 2020-09-21 ENCOUNTER — TELEPHONE (OUTPATIENT)
Dept: FAMILY MEDICINE CLINIC | Age: 75
End: 2020-09-21

## 2020-09-21 NOTE — LETTER
[address]  Phone: [phone]  Fax: [phone]    Clark Melvin MD        September 21, 2020     Patient: Lencho Chandler   YOB: 1945   Date of Visit: 9/21/2020       To Whom It May Concern: It is my medical opinion that Lencho Chandler requires a disability parking placard for the following reasons:  She cannot walk 200 feet without stopping to rest.  Duration of need: 5 years    If you have any questions or concerns, please don't hesitate to call.     Sincerely,        Clark Melvin MD

## 2020-09-21 NOTE — TELEPHONE ENCOUNTER
Alonso signed    If she is done well with flu vaccine in the past... she can get it at her pharmacy in October    Take half a dose of amlodipine monitor blood pressure and let Dr. Eulalio Baer know what blood pressure is running

## 2020-09-21 NOTE — TELEPHONE ENCOUNTER
----- Message from Jet Farrell sent at 9/21/2020  3:23 PM EDT -----  Subject: Message to Provider    QUESTIONS  Information for Provider? PT was in ER in july   wants to know if she can get disability sticker for car. PT saw Dr Aric Moreno and he suggested wants to stop amlodipile wants dr Miri Taylor input  ---------------------------------------------------------------------------  --------------  Matias GREGORIO  What is the best way for the office to contact you? OK to leave message on   voicemail  Preferred Call Back Phone Number? [phone]  ---------------------------------------------------------------------------  --------------  SCRIPT ANSWERS  Relationship to Patient?  Self

## 2020-09-21 NOTE — TELEPHONE ENCOUNTER
Please write handicap letter for this patient    Leg weakness difficult to walk more than 200 feet    Would cut her amlodipine dose in half and not stop it  Monitor blood pressure at home and report to Dr. Luke Severance

## 2020-09-22 ENCOUNTER — TELEPHONE (OUTPATIENT)
Dept: FAMILY MEDICINE CLINIC | Age: 75
End: 2020-09-22

## 2020-09-22 NOTE — TELEPHONE ENCOUNTER
Called Pt   Ok to wait for  colonoscopy 2021  Flu vaccine October   Avoid 2nd shingrix , GBS from 1st shingrix

## 2020-09-22 NOTE — TELEPHONE ENCOUNTER
Called the [facility] and spoke with Helga Pereyra about the handicapped placard. I explained that Dr. Jay Jay Ambriz was not in the office but it was okay if a different doctor signed letter. Dr. Kandi Severe signed the letter and I faxed it over to the [address] to Helga Pereyra.

## 2020-09-25 ENCOUNTER — TELEPHONE (OUTPATIENT)
Dept: ADMINISTRATIVE | Age: 75
End: 2020-09-25

## 2020-09-29 ENCOUNTER — HOSPITAL ENCOUNTER (OUTPATIENT)
Dept: PHYSICAL THERAPY | Age: 75
Setting detail: THERAPIES SERIES
Discharge: HOME | End: 2020-09-29
Payer: MEDICARE

## 2020-09-29 PROCEDURE — 97110 THERAPEUTIC EXERCISES: CPT

## 2020-09-29 PROCEDURE — 97161 PT EVAL LOW COMPLEX 20 MIN: CPT

## 2020-09-29 NOTE — FLOWSHEET NOTE
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 Elizabeth Carreno  Plan of care signed (Y/N): []  Yes [x]  No     Date of Patient follow up with Physician:      Progress Report: []  Yes  [x]  No     Date Range for reporting period:  Beginnin20  Ending:    Progress report due (10 Rx/or 30 days whichever is less): visit #10 or     Recertification due (POC duration/ or 90 days whichever is less): visit #10    Visit # Insurance Allowable Auth required? Date Range    99 []  Yes  []  No      Latex Allergy:  [x]NO      []YES  Preferred Language for Healthcare:   [x]English       []other:    Functional Scale:        Date assessed:  TUG: raw score = /80; dysfunction = %      Pain level:  3/10     SUBJECTIVE:  See eval    OBJECTIVE: See eval      RESTRICTIONS/PRECAUTIONS:     Exercises/Interventions:     Therapeutic Exercises (35176) Resistance / level Sets/sec Reps Notes   See HEP                                                               Therapeutic Activities (48617)                                          Neuromuscular Re-ed (95732)                                                 Manual Intervention ([id number])                                                     Pt. Education:  -patient educated on diagnosis, prognosis and expectations for rehab  -all patient questions were answered    Home Exercise Program:  Access Code: [email or web address]   URL: [email or web address]   Date: 2020   Prepared by: Rayray Herrera reps - 2 sets - 1x daily - 7x weekly   Supine Bridge - 10 reps - 2 sets - 1x daily - 7x weekly   Hip Flexion Stretch - 10 reps - 2 sets - 1x daily - 7x weekly   Seated Piriformis Stretch - 10 reps - 2 sets - 1x daily - 7x weekly         Therapeutic Exercise and NMR EXR  [] (60369) Provided verbal/tactile cueing for activities related to strengthening, flexibility, endurance, ROM for improvements in  [] LE / Lumbar: LE, proximal hip, and core control with self care, mobility, lifting, ambulation. [] UE / Cervical: cervical, postural, scapular, scapulothoracic and UE control with self care, reaching, carrying, lifting, house/yardwork, driving, computer work.  [] (73081) Provided verbal/tactile cueing for activities related to improving balance, coordination, kinesthetic sense, posture, motor skill, proprioception to assist with   [] LE / lumbar: LE, proximal hip, and core control in self care, mobility, lifting, ambulation and eccentric single leg control. [] UE / cervical: cervical, scapular, scapulothoracic and UE control with self care, reaching, carrying, lifting, house/yardwork, driving, computer work.   [] (47519) Therapist is in constant attendance of 2 or more patients providing skilled therapy interventions, but not providing any significant amount of measurable one-on-one time to either patient, for improvements in  [] LE / lumbar: LE, proximal hip, and core control in self care, mobility, lifting, ambulation and eccentric single leg control. [] UE / cervical: cervical, scapular, scapulothoracic and UE control with self care, reaching, carrying, lifting, house/yardwork, driving, computer work.      NMR and Therapeutic Activities:    [] (19622 or 92822) Provided verbal/tactile cueing for activities related to improving balance, coordination, kinesthetic sense, posture, motor skill, proprioception and motor activation to allow for proper function of   [] LE: / Lumbar core, proximal hip and LE with self care and ADLs  [] UE / Cervical: cervical, postural, scapular, scapulothoracic and UE control with self care, carrying, lifting, driving, computer work.   [] (35693) Gait Re-education- Provided training and instruction to the patient for proper LE, core and proximal hip recruitment and positioning and eccentric body weight control with ambulation re-education including up and down stairs     Home Management Training / Self Care:  [] (32827) Provided self-care/home management training related to activities of daily living and compensatory training, and/or use of adaptive equipment for improvement with: ADLs and compensatory training, meal preparation, safety procedures and instruction in use of adaptive equipment, including bathing, grooming, dressing, personal hygiene, basic household cleaning and chores.      Home Exercise Program:    [x] (55713) Reviewed/Progressed HEP activities related to strengthening, flexibility, endurance, ROM of   [x] LE / Lumbar: core, proximal hip and LE for functional self-care, mobility, lifting and ambulation/stair navigation   [] UE / Cervical: cervical, postural, scapular, scapulothoracic and UE control with self care, reaching, carrying, lifting, house/yardwork, driving, computer work  [] (13543)Reviewed/Progressed HEP activities related to improving balance, coordination, kinesthetic sense, posture, motor skill, proprioception of   [] LE: core, proximal hip and LE for self care, mobility, lifting, and ambulation/stair navigation    [] UE / Cervical: cervical, postural,  scapular, scapulothoracic and UE control with self care, reaching, carrying, lifting, house/yardwork, driving, computer work    Manual Treatments:  PROM / STM / Oscillations-Mobs:  G-I, II, III, IV (PA's, Inf., Post.)  [] (92771) Provided manual therapy to mobilize LE, proximal hip and/or LS spine soft tissue/joints for the purpose of modulating pain, promoting relaxation,  increasing ROM, reducing/eliminating soft tissue swelling/inflammation/restriction, improving soft tissue extensibility and allowing for proper ROM for normal function with   [] LE / lumbar: self care, mobility, lifting and ambulation. [] UE / Cervical: self care, reaching, carrying, lifting, house/yardwork, driving, computer work. Modalities:  [] (06716) Vasopneumatic compression: Utilized vasopneumatic compression to decrease edema / swelling for the purpose of improving mobility and quad tone / recruitment which will allow for increased overall function including but not limited to self-care, transfers, ambulation, and ascending / descending stairs. Modalities:      Charges:  Timed Code Treatment Minutes: 10   Total Treatment Minutes: 31     [x] EVAL - LOW (55401)   [] EVAL - MOD (89233)  [] EVAL - HIGH (82601)  [] RE-EVAL (88657)  [x] [place](59165) x  1     [] Ionto  [] NMR (25589) x       [] Vaso  [] Manual (67590) x       [] Ultrasound  [] TA x        [] Mech Traction (17547)  [] Aquatic Therapy x     [] ES (un) (23407):   [] Home Management Training x  [] ES(attended) (54051)   [] Dry Needling 1-2 muscles (66031):  [] Dry Needling 3+ muscles (985056)  [] Group:      [] Other:     GOALS: Patient stated goal: walk without cane     Therapist goals for Patient:   Short Term Goals: To be achieved in: 2 weeks  1. Independent in HEP and progression per patient tolerance, in order to prevent re-injury. 2. Patient will have a decrease in pain to facilitate improvement in movement, function, and ADLs as indicated by Functional Deficits.     Long Term Goals: To be achieved in: 6 weeks  1. Disability index score of % or less for the NDI to assist with reaching prior level of function. 2. Patient will demonstrate increased AROM to [facility] to allow for proper joint functioning as indicated by patients Functional Deficits.    3. Patient will demonstrate an increase in postural awareness and control to allow for proper functional mobility as indicated by patients Functional Deficits. 4. Patient will demonstrate an increase in Strength to at least 4+/5 to allow for proper functional mobility as indicated by patients Functional Deficits. Overall Progression Towards Functional goals/ Treatment Progress Update:  [] Patient is progressing as expected towards functional goals listed. [] Progression is slowed due to complexities/Impairments listed. [] Progression has been slowed due to co-morbidities. [x] Plan just implemented, too soon to assess goals progression <30days   [] Goals require adjustment due to lack of progress  [] Patient is not progressing as expected and requires additional follow up with physician  [] Other    Persisting Functional Limitations/Impairments:  []Sleeping []Sitting               [x]Standing [x]Transfers        [x]Walking []Kneeling               [x]Stairs [x]Squatting / bending   [x]ADLs [x]Reaching  [x]Lifting  []Housework  [x]Driving []Job related tasks  []Sports/Recreation []Other:        ASSESSMENT:  See eval  Treatment/Activity Tolerance:  [x] Patient able to complete tx [] Patient limited by fatigue  [] Patient limited by pain  [] Patient limited by other medical complications  [] Other:     Prognosis: [x] Good [] Fair  [] Poor    Patient Requires Follow-up: [x] Yes  [] No    Plan for next treatment session:    PLAN: See eval. PT 2 x / week for 6 weeks. [] Continue per plan of care [] Alter current plan (see comments)  [x] Plan of care initiated [] Hold pending MD visit [] Discharge    Electronically signed by: Renee Orlando PT, DPT    Note: If patient does not return for scheduled/ recommended follow up visits, this note will serve as a discharge from care along with most recent update on progress.

## 2020-09-29 NOTE — FLOWSHEET NOTE
[address] [place] [address] Physical Therapy  [address] [place] [address] Drive  Phone: [phone]   Fax:     [phone]                                                       Physical Therapy Certification    Dear Referring Practitioner: Dr. Jina Hawthorne,    We had the pleasure of evaluating the following patient for physical therapy services at [address] [place] [address]. A summary of our findings can be found in the initial assessment below. This includes our plan of care. If you have any questions or concerns regarding these findings, please do not hesitate to contact me at the office phone number checked above. Thank you for the referral.       Physician Signature:_______________________________Date:__________________  By signing above (or electronic signature), therapists plan is approved by physician      Patient: Alissa Antonio   : 1945   MRN: [id number]  Referring Physician: Referring Practitioner: Dr. 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 She was walking in her neighborhood and her balance was off. Had a shingles vaccine on . Then, felt something grab her legs and started struggling to walk. Was in the hospital for 25 days. Looked at her heart. Went to [facility]. Weakness and tiredness are the biggest problem occasional pain in feet and knees.     Relevant Medical History:arthritis, HTN  Functional Outcome: raw score = ; dysfunction = %    Pain Scale:  3/10 knees/feet  Easing factors: Provocative factors:      Type: []Constant   []Intermittent  []Radiating []Localized []other:     Numbness/Tingling: in L foot    Occupation/School:  retired    Living Status/Prior Level of Function: Prior to this injury / incident, pt was independent with ADLs and IADLs, lives alone. Ambulated I with no assistive device. I with all ADL's and drives I . prior knee surgery for meniscus    OBJECTIVE:     Palpation: tenderness to touch on L LE    Functional Mobility/Transfers: difficulty with sit to stand, not using cane frequently    Inspection:     Posture: rounded shoulders, forward head     Bandages/Dressings/Incisions: N/A    Gait: (include devices/WB status): WNL     Dermatomes Normal Abnormal Comments   Top of head (C1) x     Posterior occipital region (C2) x     Side of neck (C3) x     Top of shoulder (C4) x     Lateral deltoid (C5) x     Tip of thumb (C6) x     Distal middle finger (C7) x     Distal fifth finger (C8) x     Medial forearm (T1) x     inguinal area (L1)  x     anterior mid-thigh (L2) x     distal ant thigh/med knee (L3) x     medial lower leg and foot (L4)  x    lateral lower leg and foot (L5)  x    posterior calf (S1) x     medial calcaneus (S2)  x             PROM AROM    L R L R   WNL's                                                                          Joint mobility:    [x]Normal    []Hypo   []Hyper    Strength (0-5) Left Right   Hip flex 4/5 4/5   ext 4/5 4/5   add 3+/5 3+/5   abd 3+/5 3+/5   Knee flex 4/5 4+/5   ext 4/5 4/5   DF 4/5 4/5   IA 4/5 4/5                                 Flexibility     SKTC  95 degrees 95 degrees   Piriformis  tight tight                                 [x] Patient history, allergies, meds reviewed. Medical chart reviewed. See intake form. Review Of Systems (ROS):  [x]Performed Review of systems (Integumentary, CardioPulmonary, Neurological) by intake and observation. Intake form has been scanned into medical record.  Patient has been instructed to contact their primary care physician regarding ROS issues if not already being addressed at this time. Co-morbidities/Complexities (which will affect course of rehabilitation):   []None           Arthritic conditions   []Rheumatoid arthritis (M05.9)  [x]Osteoarthritis (M19.91)   Cardiovascular conditions   [x]Hypertension (I10)  [x]Hyperlipidemia (E78.5)  []Angina pectoris (I20)  []Atherosclerosis (I70)   Musculoskeletal conditions   []Disc pathology   []Congenital spine pathologies   []Prior surgical intervention  []Osteoporosis (M81.8)  []Osteopenia (M85.8)   Endocrine conditions   []Hypothyroid (E03.9)  []Hyperthyroid Gastrointestinal conditions   []Constipation (L79.49)   Metabolic conditions   []Morbid obesity (E66.01)  []Diabetes type 1(E10.65) or 2 (E11.65)   []Neuropathy (G60.9)     Pulmonary conditions   []Asthma (J45)  []Coughing   []COPD (J44.9)   Psychological Disorders  []Anxiety (F41.9)  []Depression (F32.9)   []Other:   []Other:          Barriers to/and or personal factors that will affect rehab potential:              [x]Age  []Sex   []Smoker              []Motivation/Lack of Motivation                        [x]Co-Morbidities              []Cognitive Function, education/learning barriers              []Environmental, home barriers              []profession/work barriers  []past PT/medical experience  []other:  Justification:     Falls Risk Assessment (30 days): will b assessed at next visit as patient was 25 minutes late to appt  [] Falls Risk assessed and no intervention required.   [] Falls Risk assessed and Patient requires intervention due to being higher risk   TUG score (>12s at risk):     [] Falls education provided, including         ASSESSMENT:    Functional Impairments:     []Noted  joint hypomobility   []Noted  joint hypermobility   []Decreased functional ROM   []Abnormal reflexes/sensation/myotomal/dermatomal deficits   [x]Decreased strength and neuromuscular control    [x]Decreased functional strength   []other:      Functional Activity Limitations (from functional questionnaire and intake)   [x]Reduced ability to tolerate prolonged functional positions   [x]Reduced ability or difficulty with changes of positions or transfers between positions   [x]Reduced ability to maintain good posture and demonstrate good body mechanics with sitting, bending, and lifting   [x] Reduced ability or tolerance with driving and/or computer work   [x]Reduced ability to perform lifting, reaching, carrying tasks   []Reduced ability to concentrate   []Reduced ability to sleep    []Reduced ability to tolerate any impact through    [x]Reduced ability to ambulate prolonged functional periods/distances   []other:    Participation Restrictions   [x]Reduced participation in self care activities   [x]Reduced participation in home management activities   []Reduced participation in work activities   [x]Reduced participation in social activities. []Reduced participation in sport/recreational activities.     Classification/Subgrouping:   [x]signs/symptoms consistent with  Weakness and poor gait    Prognosis/Rehab Potential:      [x]Excellent   []Good    []Fair   []Poor    Tolerance of evaluation/treatment:    [x]Excellent   []Good    []Fair   []Poor    Physical Therapy Evaluation Complexity Justification  [x] A history of present problem with:  [] no personal factors and/or comorbidities that impact the plan of care;  [x]1-2 personal factors and/or comorbidities that impact the plan of care  []3 personal factors and/or comorbidities that impact the plan of care  [x] An examination of body systems using standardized tests and measures addressing any of the following: body structures and functions (impairments), activity limitations, and/or participation restrictions;:  [x] a total of 1-2 or more elements   [] a total of 3 or more elements   [] a total of 4 or more elements   [x] A clinical presentation with:  [x] stable and/or uncomplicated characteristics   [] evolving clinical presentation with changing characteristics  [] unstable and unpredictable characteristics;   [x] Clinical decision making of [x] low, [] moderate, [] high complexity using standardized patient assessment instrument and/or measurable assessment of functional outcome. [x] EVAL (LOW) 42627 (typically 20 minutes face-to-face)  [] EVAL (MOD) 93874 (typically 30 minutes face-to-face)  [] EVAL (HIGH) 04048 (typically 45 minutes face-to-face)  [] RE-EVAL     PLAN:   Frequency/Duration:  2 days per week for 6 Weeks:  Interventions:  [x]  Therapeutic exercise including: strength training, ROM, including postural re-education. [x]  NMR activation and proprioception, including postural re-education. [x]  Manual therapy as indicated to include: Dry Needling/IASTM, STM, PROM, Gr I-IV mobilizations, manipulation. [x] Modalities as needed that may include: thermal agents, E-stim, Biofeedback, US, iontophoresis as indicated  [x] Patient education on joint protection, postural re-education, activity modification, progression of HEP. HEP instruction: Written HEP instructions provided and reviewed  Access Code: [email or web address]   URL: [email or web address]   Date: 09/29/2020   Prepared by: Lalo Half     Exercises   Clamshell - 10 reps - 2 sets - 1x daily - 7x weekly   Supine Bridge - 10 reps - 2 sets - 1x daily - 7x weekly   Hip Flexion Stretch - 10 reps - 2 sets - 1x daily - 7x weekly   Seated Piriformis Stretch - 10 reps - 2 sets - 1x daily - 7x weekly     GOALS:  Patient stated goal:     Therapist goals for Patient:   Short Term Goals: To be achieved in: 2 weeks  1. Independent in HEP and progression per patient tolerance, in order to prevent re-injury. 2. Patient will have a decrease in pain to facilitate improvement in movement, function, and ADLs as indicated by Functional Deficits. Long Term Goals: To be achieved in: 6 weeks  1.  Disability index score of %

## 2020-10-06 ENCOUNTER — HOSPITAL ENCOUNTER (OUTPATIENT)
Dept: PHYSICAL THERAPY | Age: 75
Setting detail: THERAPIES SERIES
Discharge: HOME | End: 2020-10-06
Payer: MEDICARE

## 2020-10-06 PROCEDURE — 97112 NEUROMUSCULAR REEDUCATION: CPT

## 2020-10-06 PROCEDURE — 97110 THERAPEUTIC EXERCISES: CPT

## 2020-10-06 NOTE — FLOWSHEET NOTE
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 Guy Gold  Plan of care signed (Y/N): []? Yes   [x]? No      Date of Patient follow up with Physician:      Progress Report: []  Yes  [x]  No     Date Range for reporting period:  Beginnin20  Ending:    Progress report due (10 Rx/or 30 days whichever is less): visit #10 or     Recertification due (POC duration/ or 90 days whichever is less): visit #10    Visit # Insurance Allowable Auth required? Date Range   +  1+9 [x]  Yes  []  No -11/10/20     Latex Allergy:  [x]NO      []YES  Preferred Language for Healthcare:   [x]English       []other:    Functional Scale:        Date assessed: 10/6  TU.69    Pain level:  3/10     SUBJECTIVE:  States she feels pretty good and is trying to do all the ex at home.     OBJECTIVE: See eval      RESTRICTIONS/PRECAUTIONS:     Exercises/Interventions:     Therapeutic Exercises (45496) Resistance / level Sets/sec Reps Notes          Nu step   X 5 minutes    IB/HR  2 30'/ x10    Cables SLR x 3 1 pl  X 10    TG mini squats  2  X 10                                Therapeutic Activities (62377)       Step up 6' fwd/lat   X 10                                Neuromuscular Re-ed (28845)       AIREX marching  squat   tandem      2  X 10  X 10  X 20'                                       Manual Intervention (73236)                                                     Pt. Education:  -patient educated on diagnosis, prognosis and expectations for rehab  -all patient questions were answered    Home Exercise Program:  Access Code: [email or web address]   URL: [email or web address]   Date: 09/29/2020   Prepared by: Liane Mendoza     Exercises   Clamshell - 10 reps - 2 sets - 1x daily - 7x weekly   Supine Bridge - 10 reps - 2 sets - 1x daily - 7x weekly   Hip Flexion Stretch - 10 reps - 2 sets - 1x daily - 7x weekly   Seated Piriformis Stretch - 10 reps - 2 sets - 1x daily - 7x weekly         Therapeutic Exercise and NMR EXR  [] (24001) Provided verbal/tactile cueing for activities related to strengthening, flexibility, endurance, ROM for improvements in  [] LE / Lumbar: LE, proximal hip, and core control with self care, mobility, lifting, ambulation. [] UE / Cervical: cervical, postural, scapular, scapulothoracic and UE control with self care, reaching, carrying, lifting, house/yardwork, driving, computer work.  [] (21657) Provided verbal/tactile cueing for activities related to improving balance, coordination, kinesthetic sense, posture, motor skill, proprioception to assist with   [] LE / lumbar: LE, proximal hip, and core control in self care, mobility, lifting, ambulation and eccentric single leg control. [] UE / cervical: cervical, scapular, scapulothoracic and UE control with self care, reaching, carrying, lifting, house/yardwork, driving, computer work.   [] (11779) Therapist is in constant attendance of 2 or more patients providing skilled therapy interventions, but not providing any significant amount of measurable one-on-one time to either patient, for improvements in  [] LE / lumbar: LE, proximal hip, and core control in self care, mobility, lifting, ambulation and eccentric single leg control. [] UE / cervical: cervical, scapular, scapulothoracic and UE control with self care, reaching, carrying, lifting, house/yardwork, driving, computer work.      NMR and Therapeutic Activities:    [] (01210 or ) Provided verbal/tactile cueing for activities related to improving balance, coordination, kinesthetic sense, posture, motor skill, proprioception and motor activation to allow for proper function of   [] LE: / Lumbar core, proximal hip and LE with self care and ADLs  [] UE / Cervical: cervical, postural, scapular, scapulothoracic and UE control with self care, carrying, lifting, driving, computer work.   [] (94516) Gait Re-education- Provided training and instruction to the patient for proper LE, core and proximal hip recruitment and positioning and eccentric body weight control with ambulation re-education including up and down stairs     Home Management Training / Self Care:  [] (23083) Provided self-care/home management training related to activities of daily living and compensatory training, and/or use of adaptive equipment for improvement with: ADLs and compensatory training, meal preparation, safety procedures and instruction in use of adaptive equipment, including bathing, grooming, dressing, personal hygiene, basic household cleaning and chores.      Home Exercise Program:    [x] (53012) Reviewed/Progressed HEP activities related to strengthening, flexibility, endurance, ROM of   [x] LE / Lumbar: core, proximal hip and LE for functional self-care, mobility, lifting and ambulation/stair navigation   [] UE / Cervical: cervical, postural, scapular, scapulothoracic and UE control with self care, reaching, carrying, lifting, house/yardwork, driving, computer work  [] (26398)Reviewed/Progressed HEP activities related to improving balance, coordination, kinesthetic sense, posture, motor skill, proprioception of   [] LE: core, proximal hip and LE for self care, mobility, lifting, and ambulation/stair navigation    [] UE / Cervical: cervical, postural,  scapular, scapulothoracic and UE control with self care, reaching, carrying, lifting, house/yardwork, driving, computer work    Manual Treatments:  PROM / STM / Oscillations-Mobs:  G-I, II, III, IV (PA's, Inf., Post.)  [] (74438) Provided manual therapy to mobilize LE, proximal hip and/or LS spine soft tissue/joints for the purpose of modulating pain, promoting relaxation,  increasing ROM, reducing/eliminating soft tissue swelling/inflammation/restriction, improving soft tissue extensibility and allowing for proper ROM for normal function with   [] LE / lumbar: self care, mobility, lifting and ambulation. [] UE / Cervical: self care, reaching, carrying, lifting, house/yardwork, driving, computer work. Modalities:  [] (85695) Vasopneumatic compression: Utilized vasopneumatic compression to decrease edema / swelling for the purpose of improving mobility and quad tone / recruitment which will allow for increased overall function including but not limited to self-care, transfers, ambulation, and ascending / descending stairs. Modalities:      Charges:  Timed Code Treatment Minutes: 43   Total Treatment Minutes: 43     [] EVAL - LOW (82239)   [] EVAL - MOD (94338)  [] EVAL - HIGH (88122)  [] RE-EVAL (19453)  [x] VC(47635) x  2     [] Ionto  [x] NMR (25567) x  1     [] Vaso  [] Manual (82346) x       [] Ultrasound  [] TA x        [] Mech Traction (43005)  [] Aquatic Therapy x     [] ES (un) (84897):   [] Home Management Training x  [] ES(attended) (61615)   [] Dry Needling 1-2 muscles (10966):  [] Dry Needling 3+ muscles (841537)  [] Group:      [] Other:     GOALS: Patient stated goal: walk without cane     Therapist goals for Patient:   Short Term Goals: To be achieved in: 2 weeks  1. Independent in HEP and progression per patient tolerance, in order to prevent re-injury. 2. Patient will have a decrease in pain to facilitate improvement in movement, function, and ADLs as indicated by Functional Deficits.     Long Term Goals: To be achieved in: 6 weeks  1. Disability index score of % or less for the NDI to assist with reaching prior level of function.    2. Patient will demonstrate increased AROM to [facility] to allow for proper joint functioning as indicated by patients Functional Deficits. 3. Patient will demonstrate an increase in postural awareness and control to allow for proper functional mobility as indicated by patients Functional Deficits. 4. Patient will demonstrate an increase in Strength to at least 4+/5 to allow for proper functional mobility as indicated by patients Functional Deficits. Overall Progression Towards Functional goals/ Treatment Progress Update:  [] Patient is progressing as expected towards functional goals listed. [] Progression is slowed due to complexities/Impairments listed. [] Progression has been slowed due to co-morbidities. [x] Plan just implemented, too soon to assess goals progression <30days   [] Goals require adjustment due to lack of progress  [] Patient is not progressing as expected and requires additional follow up with physician  [] Other    Persisting Functional Limitations/Impairments:  []Sleeping []Sitting               [x]Standing [x]Transfers        [x]Walking []Kneeling               [x]Stairs [x]Squatting / bending   [x]ADLs [x]Reaching  [x]Lifting  []Housework  [x]Driving []Job related tasks  []Sports/Recreation []Other:        ASSESSMENT:  See eval  Treatment/Activity Tolerance:  [x] Patient able to complete tx [] Patient limited by fatigue  [] Patient limited by pain  [] Patient limited by other medical complications  [] Other:     Prognosis: [x] Good [] Fair  [] Poor    Patient Requires Follow-up: [x] Yes  [] No    Plan for next treatment session:    PLAN: See eval. PT 2 x / week for 6 weeks. [x] Continue per plan of care [] Alter current plan (see comments)  [] Plan of care initiated [] Hold pending MD visit [] Discharge    Electronically signed by: Nehemiah Cedeño PT, DPT    Note: If patient does not return for scheduled/ recommended follow up visits, this note will serve as a discharge from care along with most recent update on progress.

## 2020-10-07 ENCOUNTER — TELEPHONE (OUTPATIENT)
Dept: FAMILY MEDICINE CLINIC | Age: 75
End: 2020-10-07

## 2020-10-07 NOTE — TELEPHONE ENCOUNTER
As long as they are taking proper precautions  As long as she is wearing a mask they are wearing mask and checking temperatures etc.  Likely okay

## 2020-10-07 NOTE — TELEPHONE ENCOUNTER
----- Message from Leonor Herrera sent at 10/7/2020 11:47 AM EDT -----  Subject: Message to Provider    QUESTIONS  Information for Provider? Had lots of questions about next appointment and   application due to eyebrow tattooing.  ---------------------------------------------------------------------------  --------------  CALL BACK INFO  What is the best way for the office to contact you? OK to leave message on   voicemail  Preferred Call Back Phone Number? [phone]  ---------------------------------------------------------------------------  --------------  SCRIPT ANSWERS  Relationship to Patient?  Self

## 2020-10-07 NOTE — TELEPHONE ENCOUNTER
Pt is scheduled for AWV on 10/28/2020. Pt is due to get her eyebrow tattooing touch up done. Patient said that the place that is doing it is asking if it is safe for her to get it done due to her having had Guillain [place] syndrome.   Please advise

## 2020-10-07 NOTE — TELEPHONE ENCOUNTER
Patient states that she will need a letter from Dr. Ayana Mercer stating that it is ok for patient to have eyebrow tattoo touch up still after diagnosis GBS.

## 2020-10-08 ENCOUNTER — HOSPITAL ENCOUNTER (OUTPATIENT)
Dept: PHYSICAL THERAPY | Age: 75
Setting detail: THERAPIES SERIES
Discharge: HOME | End: 2020-10-08
Payer: MEDICARE

## 2020-10-08 PROCEDURE — 97112 NEUROMUSCULAR REEDUCATION: CPT

## 2020-10-08 PROCEDURE — 97110 THERAPEUTIC EXERCISES: CPT

## 2020-10-08 NOTE — FLOWSHEET NOTE
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 Luberta Libman  Plan of care signed (Y/N): []? Yes   [x]? No      Date of Patient follow up with Physician:      Progress Report: []  Yes  [x]  No     Date Range for reporting period:  Beginnin20  Ending:    Progress report due (10 Rx/or 30 days whichever is less): visit #10 or     Recertification due (POC duration/ or 90 days whichever is less): visit #10    Visit # Insurance Allowable Auth required? Date Range   1+  1+9 [x]  Yes  []  No -11/10/20     Latex Allergy:  [x]NO      []YES  Preferred Language for Healthcare:   [x]English       []other:    Functional Scale:        Date assessed: 10/6  TU.69    Pain level:  3/10     SUBJECTIVE:  States she had bad pain after the last visit because she went for a walk after it. She took some meds but still couldn't sleep because she did so much.      OBJECTIVE: See eval      RESTRICTIONS/PRECAUTIONS:     Exercises/Interventions:     Therapeutic Exercises (79061) Resistance / level Sets/sec Reps Notes          Nu step   X 5 minutes    IB/HR  2 30'/ x10    Cables SLR x 3 1 pl  X 10    TG mini squats  2  X 10    Walk in track    1/2 lap  With cane                        Therapeutic Activities (35753)       Step up 6' fwd/lat   X 10 2 hands assist                               Neuromuscular Re-ed (90529)       AIREX marching  squat   tandem      2  X 10  X 10  X 20'    Taping cones   X 10                                Manual Intervention ([phone])                                                     Pt. Education:  -patient educated on diagnosis, prognosis and expectations for rehab  -all patient questions were answered    Home Exercise Program:  Access Code: [email or web address]   URL: [email or web address]   Date: 09/29/2020   Prepared by: Vickie De Paz     Exercises   Clamshell - 10 reps - 2 sets - 1x daily - 7x weekly   Supine Bridge - 10 reps - 2 sets - 1x daily - 7x weekly   Hip Flexion Stretch - 10 reps - 2 sets - 1x daily - 7x weekly   Seated Piriformis Stretch - 10 reps - 2 sets - 1x daily - 7x weekly         Therapeutic Exercise and NMR EXR  [] (33820) Provided verbal/tactile cueing for activities related to strengthening, flexibility, endurance, ROM for improvements in  [] LE / Lumbar: LE, proximal hip, and core control with self care, mobility, lifting, ambulation. [] UE / Cervical: cervical, postural, scapular, scapulothoracic and UE control with self care, reaching, carrying, lifting, house/yardwork, driving, computer work.  [] (95425) Provided verbal/tactile cueing for activities related to improving balance, coordination, kinesthetic sense, posture, motor skill, proprioception to assist with   [] LE / lumbar: LE, proximal hip, and core control in self care, mobility, lifting, ambulation and eccentric single leg control. [] UE / cervical: cervical, scapular, scapulothoracic and UE control with self care, reaching, carrying, lifting, house/yardwork, driving, computer work.   [] (42527) Therapist is in constant attendance of 2 or more patients providing skilled therapy interventions, but not providing any significant amount of measurable one-on-one time to either patient, for improvements in  [] LE / lumbar: LE, proximal hip, and core control in self care, mobility, lifting, ambulation and eccentric single leg control.    [] UE / cervical: cervical, scapular, scapulothoracic and UE control with self care, reaching, carrying, lifting, house/yardwork, driving, computer work. NMR and Therapeutic Activities:    [] (50974 or 17294) Provided verbal/tactile cueing for activities related to improving balance, coordination, kinesthetic sense, posture, motor skill, proprioception and motor activation to allow for proper function of   [] LE: / Lumbar core, proximal hip and LE with self care and ADLs  [] UE / Cervical: cervical, postural, scapular, scapulothoracic and UE control with self care, carrying, lifting, driving, computer work.   [] (17841) Gait Re-education- Provided training and instruction to the patient for proper LE, core and proximal hip recruitment and positioning and eccentric body weight control with ambulation re-education including up and down stairs     Home Management Training / Self Care:  [] (96265) Provided self-care/home management training related to activities of daily living and compensatory training, and/or use of adaptive equipment for improvement with: ADLs and compensatory training, meal preparation, safety procedures and instruction in use of adaptive equipment, including bathing, grooming, dressing, personal hygiene, basic household cleaning and chores.      Home Exercise Program:    [x] (86093) Reviewed/Progressed HEP activities related to strengthening, flexibility, endurance, ROM of   [x] LE / Lumbar: core, proximal hip and LE for functional self-care, mobility, lifting and ambulation/stair navigation   [] UE / Cervical: cervical, postural, scapular, scapulothoracic and UE control with self care, reaching, carrying, lifting, house/yardwork, driving, computer work  [] (00880)Reviewed/Progressed HEP activities related to improving balance, coordination, kinesthetic sense, posture, motor skill, proprioception of   [] LE: core, proximal hip and LE for self care, mobility, lifting, and ambulation/stair navigation    [] UE / Cervical: cervical, postural,  scapular, scapulothoracic and UE control with self care, reaching, carrying, lifting, house/yardwork, driving, computer work    Manual Treatments:  PROM / STM / Oscillations-Mobs:  G-I, II, III, IV (PA's, Inf., Post.)  [] (78362) Provided manual therapy to mobilize LE, proximal hip and/or LS spine soft tissue/joints for the purpose of modulating pain, promoting relaxation,  increasing ROM, reducing/eliminating soft tissue swelling/inflammation/restriction, improving soft tissue extensibility and allowing for proper ROM for normal function with   [] LE / lumbar: self care, mobility, lifting and ambulation. [] UE / Cervical: self care, reaching, carrying, lifting, house/yardwork, driving, computer work. Modalities:  [] (98486) Vasopneumatic compression: Utilized vasopneumatic compression to decrease edema / swelling for the purpose of improving mobility and quad tone / recruitment which will allow for increased overall function including but not limited to self-care, transfers, ambulation, and ascending / descending stairs. Modalities:      Charges:  Timed Code Treatment Minutes: 45   Total Treatment Minutes: 45     [] EVAL - LOW (54331)   [] EVAL - MOD (08548)  [] EVAL - HIGH (54559)  [] RE-EVAL (59959)  [x] NV(72345) x  2     [] Ionto  [x] NMR (66988) x  1     [] Vaso  [] Manual (23610) x       [] Ultrasound  [] TA x        [] Mech Traction (46722)  [] Aquatic Therapy x     [] ES (un) (49930):   [] Home Management Training x  [] ES(attended) (42752)   [] Dry Needling 1-2 muscles (93174):  [] Dry Needling 3+ muscles (811071)  [] Group:      [] Other:     GOALS: Patient stated goal: walk without cane     Therapist goals for Patient:   Short Term Goals: To be achieved in: 2 weeks  1. Independent in HEP and progression per patient tolerance, in order to prevent re-injury. 2. Patient will have a decrease in pain to facilitate improvement in movement, function, and ADLs as indicated by Functional Deficits.     Long Term Goals: To be achieved in: 6 weeks  1. Disability index score of % or less for the NDI to assist with reaching prior level of function. 2. Patient will demonstrate increased AROM to [facility] to allow for proper joint functioning as indicated by patients Functional Deficits. 3. Patient will demonstrate an increase in postural awareness and control to allow for proper functional mobility as indicated by patients Functional Deficits. 4. Patient will demonstrate an increase in Strength to at least 4+/5 to allow for proper functional mobility as indicated by patients Functional Deficits. Overall Progression Towards Functional goals/ Treatment Progress Update:  [] Patient is progressing as expected towards functional goals listed. [] Progression is slowed due to complexities/Impairments listed. [] Progression has been slowed due to co-morbidities. [x] Plan just implemented, too soon to assess goals progression <30days   [] Goals require adjustment due to lack of progress  [] Patient is not progressing as expected and requires additional follow up with physician  [] Other    Persisting Functional Limitations/Impairments:  []Sleeping []Sitting               [x]Standing [x]Transfers        [x]Walking []Kneeling               [x]Stairs [x]Squatting / bending   [x]ADLs [x]Reaching  [x]Lifting  []Housework  [x]Driving []Job related tasks  []Sports/Recreation []Other:        ASSESSMENT:  See eval  Treatment/Activity Tolerance:  [x] Patient able to complete tx [] Patient limited by fatigue  [] Patient limited by pain  [] Patient limited by other medical complications  [] Other:     Prognosis: [x] Good [] Fair  [] Poor    Patient Requires Follow-up: [x] Yes  [] No    Plan for next treatment session:    PLAN: See eval. PT 2 x / week for 6 weeks.    [x] Continue per plan of care [] Alter current plan (see comments)  [] Plan of care initiated [] Hold pending MD visit [] Discharge    Electronically signed by: Juan Kruse PT, DPT    Note: If patient does not return for scheduled/ recommended follow up visits, this note will serve as a discharge from care along with most recent update on progress.

## 2020-10-13 ENCOUNTER — HOSPITAL ENCOUNTER (OUTPATIENT)
Dept: PHYSICAL THERAPY | Age: 75
Setting detail: THERAPIES SERIES
Discharge: HOME | End: 2020-10-13
Payer: MEDICARE

## 2020-10-13 NOTE — FLOWSHEET NOTE
Physical Therapy  Cancellation/No-show Note  Patient Name:  Belkys Amador  :  1945   Date:  10/13/2020  Cancelled visits to date: 1  No-shows to date: 0    Patient status for today's appointment patient:  [x]  Cancelled 10/13/2020  []  Rescheduled appointment  []  No-show     Reason given by patient:  []  Patient ill  [x]  Conflicting appointment  []  No transportation    []  Conflict with work  []  No reason given  []  Other:     Comments:      Phone call information:   []  Phone call made today to patient at _ time at number provided:      []  Patient answered, conversation as follows:    []  Patient did not answer, message left as follows:  [x]  Phone call not made today    Electronically signed by:  Monica Coats PT

## 2020-10-15 ENCOUNTER — HOSPITAL ENCOUNTER (OUTPATIENT)
Dept: PHYSICAL THERAPY | Age: 75
Setting detail: THERAPIES SERIES
Discharge: HOME | End: 2020-10-15
Payer: MEDICARE

## 2020-10-15 PROCEDURE — 97112 NEUROMUSCULAR REEDUCATION: CPT

## 2020-10-15 PROCEDURE — 97110 THERAPEUTIC EXERCISES: CPT

## 2020-10-15 NOTE — FLOWSHEET NOTE
B     TG mini squats  TG single leg squats   2   2 X 10  x10 B     Walk in track     With cane                        Therapeutic Activities (98491)       Step up 6' fwd/lat    2 hands assist                               Neuromuscular Re-ed (38183)       AIREX marching  squat   tandem     Taping cones     At ballet barre:  Tandem balance  SLS  NBOS with med ball rot   Tandem stance with head nods and turns     30 sec  10 sec  1  1   X 2 B  X 4 B   x10 B  x10 B each    No UE support, VCs for tight belly and glutes    Blue wedge balance DF/PF EO  \" EC  20 sec  10 sec x1 each way  x2 each way No UE support, CGA    Hip flexion from 4 inch step   1 x15 B No UE support, able to hold 1-2 sec          Manual Intervention (33576)                                                     Pt. Education:  -patient educated on diagnosis, prognosis and expectations for rehab  -all patient questions were answered    Home Exercise Program:  Access Code: [email or web address]   URL: [email or web address]   Date: 09/29/2020   Prepared by: Ramirez Romero     Exercises   Clamshell - 10 reps - 2 sets - 1x daily - 7x weekly   Supine Bridge - 10 reps - 2 sets - 1x daily - 7x weekly   Hip Flexion Stretch - 10 reps - 2 sets - 1x daily - 7x weekly   Seated Piriformis Stretch - 10 reps - 2 sets - 1x daily - 7x weekly         Therapeutic Exercise and NMR EXR  [x] (59486) Provided verbal/tactile cueing for activities related to strengthening, flexibility, endurance, ROM for improvements in  [x] LE / Lumbar: LE, proximal hip, and core control with self care, mobility, lifting, ambulation.   [] UE / Cervical: cervical, postural, scapular, scapulothoracic and UE control with self care, reaching, carrying, lifting, house/yardwork, driving, computer work.  [] (84432) Provided verbal/tactile cueing for activities related to improving balance, coordination, kinesthetic sense, posture, motor skill, proprioception to assist with   [] LE / lumbar: LE, proximal hip, and core control in self care, mobility, lifting, ambulation and eccentric single leg control. [] UE / cervical: cervical, scapular, scapulothoracic and UE control with self care, reaching, carrying, lifting, house/yardwork, driving, computer work.   [] (20286) Therapist is in constant attendance of 2 or more patients providing skilled therapy interventions, but not providing any significant amount of measurable one-on-one time to either patient, for improvements in  [] LE / lumbar: LE, proximal hip, and core control in self care, mobility, lifting, ambulation and eccentric single leg control. [] UE / cervical: cervical, scapular, scapulothoracic and UE control with self care, reaching, carrying, lifting, house/yardwork, driving, computer work. NMR and Therapeutic Activities:    [x] (18996 or 58609) Provided verbal/tactile cueing for activities related to improving balance, coordination, kinesthetic sense, posture, motor skill, proprioception and motor activation to allow for proper function of   [x] LE: / Lumbar core, proximal hip and LE with self care and ADLs  [] UE / Cervical: cervical, postural, scapular, scapulothoracic and UE control with self care, carrying, lifting, driving, computer work.   [] (61309) Gait Re-education- Provided training and instruction to the patient for proper LE, core and proximal hip recruitment and positioning and eccentric body weight control with ambulation re-education including up and down stairs     Home Management Training / Self Care:  [] (81554) Provided self-care/home management training related to activities of daily living and compensatory training, and/or use of adaptive equipment for improvement with: ADLs and compensatory training, meal preparation, safety procedures and instruction in use of adaptive equipment, including bathing, grooming, dressing, personal hygiene, basic household cleaning and chores.      Home Exercise Program:    [] (33183) Reviewed/Progressed HEP activities related to strengthening, flexibility, endurance, ROM of   [] LE / Lumbar: core, proximal hip and LE for functional self-care, mobility, lifting and ambulation/stair navigation   [] UE / Cervical: cervical, postural, scapular, scapulothoracic and UE control with self care, reaching, carrying, lifting, house/yardwork, driving, computer work  [] (95576)Reviewed/Progressed HEP activities related to improving balance, coordination, kinesthetic sense, posture, motor skill, proprioception of   [] LE: core, proximal hip and LE for self care, mobility, lifting, and ambulation/stair navigation    [] UE / Cervical: cervical, postural,  scapular, scapulothoracic and UE control with self care, reaching, carrying, lifting, house/yardwork, driving, computer work    Manual Treatments:  PROM / STM / Oscillations-Mobs:  G-I, II, III, IV (PA's, Inf., Post.)  [] (54019) Provided manual therapy to mobilize LE, proximal hip and/or LS spine soft tissue/joints for the purpose of modulating pain, promoting relaxation,  increasing ROM, reducing/eliminating soft tissue swelling/inflammation/restriction, improving soft tissue extensibility and allowing for proper ROM for normal function with   [] LE / lumbar: self care, mobility, lifting and ambulation. [] UE / Cervical: self care, reaching, carrying, lifting, house/yardwork, driving, computer work. Modalities:  [] (02526) Vasopneumatic compression: Utilized vasopneumatic compression to decrease edema / swelling for the purpose of improving mobility and quad tone / recruitment which will allow for increased overall function including but not limited to self-care, transfers, ambulation, and ascending / descending stairs.        Modalities:      Charges:  Timed Code Treatment Minutes: 40   Total Treatment Minutes: 40     [] EVAL - LOW (21251)   [] EVAL - MOD (78363)  [] EVAL - HIGH (35906)  [] RE-EVAL (35497)  [x] IY(48233) x  2     [] Ionto  [x] NMR (61951) x  1     [] Vaso  [] Manual (58982) x       [] Ultrasound  [] TA x        [] Mech Traction (86478)  [] Aquatic Therapy x     [] ES (un) (35991):   [] Home Management Training x  [] ES(attended) (70075)   [] Dry Needling 1-2 muscles (53121):  [] Dry Needling 3+ muscles (989005)  [] Group:      [] Other:     GOALS: Patient stated goal: walk without cane     Therapist goals for Patient:   Short Term Goals: To be achieved in: 2 weeks  1. Independent in HEP and progression per patient tolerance, in order to prevent re-injury. 2. Patient will have a decrease in pain to facilitate improvement in movement, function, and ADLs as indicated by Functional Deficits.     Long Term Goals: To be achieved in: 6 weeks  1. Disability index score of % or less for the NDI to assist with reaching prior level of function. 2. Patient will demonstrate increased AROM to [facility] to allow for proper joint functioning as indicated by patients Functional Deficits. 3. Patient will demonstrate an increase in postural awareness and control to allow for proper functional mobility as indicated by patients Functional Deficits. 4. Patient will demonstrate an increase in Strength to at least 4+/5 to allow for proper functional mobility as indicated by patients Functional Deficits. Overall Progression Towards Functional goals/ Treatment Progress Update:  [] Patient is progressing as expected towards functional goals listed. [] Progression is slowed due to complexities/Impairments listed. [] Progression has been slowed due to co-morbidities.   [x] Plan just implemented, too soon to assess goals progression <30days   [] Goals require adjustment due to lack of progress  [] Patient is not progressing as expected and requires additional follow up with physician  [] Other    Persisting Functional Limitations/Impairments:  []Sleeping []Sitting               [x]Standing [x]Transfers        [x]Walking []Kneeling               [x]Stairs [x]Squatting / bending   [x]ADLs [x]Reaching  [x]Lifting  []Housework  [x]Driving []Job related tasks  []Sports/Recreation []Other:        ASSESSMENT:  Weakness noted in glutes B, L>R. Trial dynamic balance at next visit as static strategies are sufficient. Continue to progress LE strength and endurance on altered surfaces. Treatment/Activity Tolerance:  [x] Patient able to complete tx [] Patient limited by fatigue  [] Patient limited by pain  [] Patient limited by other medical complications  [] Other:     Prognosis: [x] Good [] Fair  [] Poor    Patient Requires Follow-up: [x] Yes  [] No    Plan for next treatment session:    PLAN: See eval. PT 2 x / week for 6 weeks. [x] Continue per plan of care [] Alter current plan (see comments)  [] Plan of care initiated [] Hold pending MD visit [] Discharge    Electronically signed by: Nimco Em PT, DPT    Note: If patient does not return for scheduled/ recommended follow up visits, this note will serve as a discharge from care along with most recent update on progress.

## 2020-10-21 ENCOUNTER — HOSPITAL ENCOUNTER (OUTPATIENT)
Dept: PHYSICAL THERAPY | Age: 75
Setting detail: THERAPIES SERIES
Discharge: HOME | End: 2020-10-21
Payer: MEDICARE

## 2020-10-21 PROCEDURE — 97110 THERAPEUTIC EXERCISES: CPT

## 2020-10-21 PROCEDURE — 97112 NEUROMUSCULAR REEDUCATION: CPT

## 2020-10-21 NOTE — FLOWSHEET NOTE
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 Ector Soto  Plan of care signed (Y/N): []? Yes   [x]? No      Date of Patient follow up with Physician:      Progress Report: []  Yes  [x]  No     Date Range for reporting period:  Beginnin20  Ending:    Progress report due (10 Rx/or 30 days whichever is less): visit #10 or 10/00/90    Recertification due (POC duration/ or 90 days whichever is less): visit #10    Visit # Insurance Allowable Auth required? Date Range   +  1+9 [x]  Yes  []  No -11/10/20     Latex Allergy:  [x]NO      []YES  Preferred Language for Healthcare:   [x]English       []other:    Functional Scale:        Date assessed: 10/6  TU.69    Pain level:  0/10     SUBJECTIVE: Pt reports she is uncomfortable at the end of the day and her knees hurt.      OBJECTIVE:   10/15: pt 5 mins late; amb into clinic holding cane      RESTRICTIONS/PRECAUTIONS:     Exercises/Interventions:     Therapeutic Exercises (00548) Resistance / level Sets/sec Reps Notes          Nu step Level 4   X 6 minutes seat 7, arms 8    IB gastroc stretch   HR/TR  30 sec x2  X 10 each     Cables SLR x 3  standing marches  3 way hip   Ham curl    3# ankle weights  3# ankle weights  3# ankle weights    1  1  1 X 10 B  X 10 B  x10 B      Cues to straighten knee    TG mini squats  TG single leg squats   2   2 X 10  x10 B     Walk in track     With cane                        Therapeutic Activities (67662)       Step up 6' fwd/lat    2 hands assist Neuromuscular Re-ed (37716)       AIREX marching  squat   tandem     Taping cones     At ballet barre:  Tandem balance  SLS  NBOS with med ball rot   Tandem stance with head nods and turns     30 sec  10 sec  1  1   X 2 B  X 4 B   x10 B  x10 B each    No UE support, VCs for tight belly and glutes    Blue wedge balance DF/PF EO  \" EC  20 sec  10 sec x1 each way  x2 each way No UE support, CGA    Hip flexion from 4 inch step   1 x15 B No UE support, able to hold 1-2 sec          Manual Intervention (53813)                                                     Pt. Education:  -patient educated on diagnosis, prognosis and expectations for rehab  -all patient questions were answered    Home Exercise Program:  Access Code: [email or web address]   URL: [email or web address]   Date: 09/29/2020   Prepared by: Evgeny Salas     Exercises   Clamshell - 10 reps - 2 sets - 1x daily - 7x weekly   Supine Bridge - 10 reps - 2 sets - 1x daily - 7x weekly   Hip Flexion Stretch - 10 reps - 2 sets - 1x daily - 7x weekly   Seated Piriformis Stretch - 10 reps - 2 sets - 1x daily - 7x weekly         Therapeutic Exercise and NMR EXR  [x] (66216) Provided verbal/tactile cueing for activities related to strengthening, flexibility, endurance, ROM for improvements in  [x] LE / Lumbar: LE, proximal hip, and core control with self care, mobility, lifting, ambulation. [] UE / Cervical: cervical, postural, scapular, scapulothoracic and UE control with self care, reaching, carrying, lifting, house/yardwork, driving, computer work.  [] (51884) Provided verbal/tactile cueing for activities related to improving balance, coordination, kinesthetic sense, posture, motor skill, proprioception to assist with   [] LE / lumbar: LE, proximal hip, and core control in self care, mobility, lifting, ambulation and eccentric single leg control.    [] UE / cervical: cervical, scapular, scapulothoracic and UE control with self care, reaching, carrying, lifting, house/yardwork, driving, computer work.   [] (00576) Therapist is in constant attendance of 2 or more patients providing skilled therapy interventions, but not providing any significant amount of measurable one-on-one time to either patient, for improvements in  [] LE / lumbar: LE, proximal hip, and core control in self care, mobility, lifting, ambulation and eccentric single leg control. [] UE / cervical: cervical, scapular, scapulothoracic and UE control with self care, reaching, carrying, lifting, house/yardwork, driving, computer work. NMR and Therapeutic Activities:    [x] (70104 or 54977) Provided verbal/tactile cueing for activities related to improving balance, coordination, kinesthetic sense, posture, motor skill, proprioception and motor activation to allow for proper function of   [x] LE: / Lumbar core, proximal hip and LE with self care and ADLs  [] UE / Cervical: cervical, postural, scapular, scapulothoracic and UE control with self care, carrying, lifting, driving, computer work.   [] (45626) Gait Re-education- Provided training and instruction to the patient for proper LE, core and proximal hip recruitment and positioning and eccentric body weight control with ambulation re-education including up and down stairs     Home Management Training / Self Care:  [] (47017) Provided self-care/home management training related to activities of daily living and compensatory training, and/or use of adaptive equipment for improvement with: ADLs and compensatory training, meal preparation, safety procedures and instruction in use of adaptive equipment, including bathing, grooming, dressing, personal hygiene, basic household cleaning and chores.      Home Exercise Program:    [] (83050) Reviewed/Progressed HEP activities related to strengthening, flexibility, endurance, ROM of   [] LE / Lumbar: core, proximal hip and LE for functional self-care, mobility, lifting and ambulation/stair (44466)   [] Dry Needling 1-2 muscles (36617):  [] Dry Needling 3+ muscles (736830)  [] Group:      [] Other:     GOALS: Patient stated goal: walk without cane     Therapist goals for Patient:   Short Term Goals: To be achieved in: 2 weeks  1. Independent in HEP and progression per patient tolerance, in order to prevent re-injury. 2. Patient will have a decrease in pain to facilitate improvement in movement, function, and ADLs as indicated by Functional Deficits.     Long Term Goals: To be achieved in: 6 weeks  1. Disability index score of % or less for the NDI to assist with reaching prior level of function. 2. Patient will demonstrate increased AROM to [facility] to allow for proper joint functioning as indicated by patients Functional Deficits. 3. Patient will demonstrate an increase in postural awareness and control to allow for proper functional mobility as indicated by patients Functional Deficits. 4. Patient will demonstrate an increase in Strength to at least 4+/5 to allow for proper functional mobility as indicated by patients Functional Deficits. Overall Progression Towards Functional goals/ Treatment Progress Update:  [] Patient is progressing as expected towards functional goals listed. [] Progression is slowed due to complexities/Impairments listed. [] Progression has been slowed due to co-morbidities. [x] Plan just implemented, too soon to assess goals progression <30days   [] Goals require adjustment due to lack of progress  [] Patient is not progressing as expected and requires additional follow up with physician  [] Other    Persisting Functional Limitations/Impairments:  []Sleeping []Sitting               [x]Standing [x]Transfers        [x]Walking []Kneeling               [x]Stairs [x]Squatting / bending   [x]ADLs [x]Reaching  [x]Lifting  []Housework  [x]Driving []Job related tasks  []Sports/Recreation []Other:        ASSESSMENT:  Weakness noted in glutes B, L>R.  Trial dynamic balance at

## 2020-10-23 ENCOUNTER — HOSPITAL ENCOUNTER (OUTPATIENT)
Dept: PHYSICAL THERAPY | Age: 75
Setting detail: THERAPIES SERIES
Discharge: HOME | End: 2020-10-23
Payer: MEDICARE

## 2020-10-23 PROCEDURE — 97110 THERAPEUTIC EXERCISES: CPT

## 2020-10-23 PROCEDURE — 97112 NEUROMUSCULAR REEDUCATION: CPT

## 2020-10-23 NOTE — FLOWSHEET NOTE
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 Mary Meng  Plan of care signed (Y/N): []? Yes   [x]? No      Date of Patient follow up with Physician:       Progress Report: []  Yes  [x]  No     Date Range for reporting period:  Beginnin20  Ending:    Progress report due (10 Rx/or 30 days whichever is less): visit #10 or     Recertification due (POC duration/ or 90 days whichever is less): visit #10    Visit # Insurance Allowable Auth required? Date Range   +  1+9 [x]  Yes  []  No -11/10/20     Latex Allergy:  [x]NO      []YES  Preferred Language for Healthcare:   [x]English       []other:    Functional Scale:        Date assessed: 10/6  TU.69    Pain level:  0/10     SUBJECTIVE: Pt reports L knee and back occasionally cause pain and she continues to have cramping in her legs at night. Pt. Reports no pain currently. Pt. Myranda Kohli for 5 min prior to her appointment.      OBJECTIVE:   10/15: pt 5 mins late; amb into clinic holding cane      RESTRICTIONS/PRECAUTIONS:     Exercises/Interventions:     Therapeutic Exercises (65835) Resistance / level Sets/sec Reps Notes          Nu step Level 4   X 5 minutes seat 7, arms 8    IB gastroc stretch   HR/TR  30 sec x2  X 10 each     Cables SLR x 3  standing marches  3 way hip   Ham curl    3# ankle weights  3# ankle weights  3# ankle weights    1  1  1 X 10 B  X 10 B  x10 B      Cues to straighten knee    TG mini squats  TG single leg squats   2   2 X 10  x10 B     Walk in track With cane   [facility] level 1.5  2 x10 B                  Therapeutic Activities (45778)       Step up 6' fwd/lat    2 hands assist                               Neuromuscular Re-ed (83058)       AIREX marching  squat   tandem     Taping cones     At  bars:  Tandem balance  SLS  NBOS with med ball rot          On airex   30 sec  10 sec  1     X 2 B  X 4 B   x10 B     No UE support, VCs for tight belly and glutes    Blue wedge balance DF/PF EO  \" EC  20 sec  10 sec x1 each way  x2 each way No UE support, CGA    Hip flexion from 6 inch step   1 x15 B No UE support, able to hold 1-2 sec          Manual Intervention (29304)                                                     Pt. Education:  -patient educated on diagnosis, prognosis and expectations for rehab  -all patient questions were answered    Home Exercise Program:  Access Code: [email or web address]   URL: [email or web address]   Date: 09/29/2020   Prepared by: Danyell Adina     Exercises   Clamshell - 10 reps - 2 sets - 1x daily - 7x weekly   Supine Bridge - 10 reps - 2 sets - 1x daily - 7x weekly   Hip Flexion Stretch - 10 reps - 2 sets - 1x daily - 7x weekly   Seated Piriformis Stretch - 10 reps - 2 sets - 1x daily - 7x weekly         Therapeutic Exercise and NMR EXR  [x] (31727) Provided verbal/tactile cueing for activities related to strengthening, flexibility, endurance, ROM for improvements in  [x] LE / Lumbar: LE, proximal hip, and core control with self care, mobility, lifting, ambulation. [] UE / Cervical: cervical, postural, scapular, scapulothoracic and UE control with self care, reaching, carrying, lifting, house/yardwork, driving, computer work.  [] (98219) Provided verbal/tactile cueing for activities related to improving balance, coordination, kinesthetic sense, posture, motor skill, proprioception to assist with   [] LE / lumbar: LE, proximal hip, and core control in self care, mobility, lifting, ambulation and eccentric single leg control.    [] UE / cervical: cervical, scapular, scapulothoracic and UE control with self care, reaching, carrying, lifting, house/yardwork, driving, computer work.   [] (53559) Therapist is in constant attendance of 2 or more patients providing skilled therapy interventions, but not providing any significant amount of measurable one-on-one time to either patient, for improvements in  [] LE / lumbar: LE, proximal hip, and core control in self care, mobility, lifting, ambulation and eccentric single leg control. [] UE / cervical: cervical, scapular, scapulothoracic and UE control with self care, reaching, carrying, lifting, house/yardwork, driving, computer work. NMR and Therapeutic Activities:    [x] (02053 or 73513) Provided verbal/tactile cueing for activities related to improving balance, coordination, kinesthetic sense, posture, motor skill, proprioception and motor activation to allow for proper function of   [x] LE: / Lumbar core, proximal hip and LE with self care and ADLs  [] UE / Cervical: cervical, postural, scapular, scapulothoracic and UE control with self care, carrying, lifting, driving, computer work.   [] (56295) Gait Re-education- Provided training and instruction to the patient for proper LE, core and proximal hip recruitment and positioning and eccentric body weight control with ambulation re-education including up and down stairs     Home Management Training / Self Care:  [] (96013) Provided self-care/home management training related to activities of daily living and compensatory training, and/or use of adaptive equipment for improvement with: ADLs and compensatory training, meal preparation, safety procedures and instruction in use of adaptive equipment, including bathing, grooming, dressing, personal hygiene, basic household cleaning and chores.      Home Exercise Program:    [] (60020) Reviewed/Progressed HEP activities related to strengthening, flexibility, endurance, ROM of   [] LE / Lumbar: core, proximal hip and LE for functional self-care, mobility, lifting and ambulation/stair navigation   [] UE / Cervical: cervical, postural, scapular, scapulothoracic and UE control with self care, reaching, carrying, lifting, house/yardwork, driving, computer work  [] (16519)Reviewed/Progressed HEP activities related to improving balance, coordination, kinesthetic sense, posture, motor skill, proprioception of   [] LE: core, proximal hip and LE for self care, mobility, lifting, and ambulation/stair navigation    [] UE / Cervical: cervical, postural,  scapular, scapulothoracic and UE control with self care, reaching, carrying, lifting, house/yardwork, driving, computer work    Manual Treatments:  PROM / STM / Oscillations-Mobs:  G-I, II, III, IV (PA's, Inf., Post.)  [] (06172) Provided manual therapy to mobilize LE, proximal hip and/or LS spine soft tissue/joints for the purpose of modulating pain, promoting relaxation,  increasing ROM, reducing/eliminating soft tissue swelling/inflammation/restriction, improving soft tissue extensibility and allowing for proper ROM for normal function with   [] LE / lumbar: self care, mobility, lifting and ambulation. [] UE / Cervical: self care, reaching, carrying, lifting, house/yardwork, driving, computer work. Modalities:  [] (21736) Vasopneumatic compression: Utilized vasopneumatic compression to decrease edema / swelling for the purpose of improving mobility and quad tone / recruitment which will allow for increased overall function including but not limited to self-care, transfers, ambulation, and ascending / descending stairs.        Modalities:      Charges:  Timed Code Treatment Minutes: 42   Total Treatment Minutes: 42     [] EVAL - LOW (60482)   [] EVAL - MOD (44679)  [] EVAL - HIGH (78367)  [] RE-EVAL (50617)  [x] JV(39365) x  2     [] Ionto  [x] NMR (68086) x  1     [] Vaso  [] Manual (63199) x       [] Ultrasound  [] TA x        [] Mech Traction (48989)  [] Aquatic Therapy x     [] ES (un) (29065):   [] Home Management Training x  [] ES(attended) (37930)   [] Dry Needling 1-2 muscles (76078):  [] Dry Needling 3+ muscles (047930)  [] Group:      [] Other:     GOALS: Patient stated goal: walk without cane     Therapist goals for Patient:   Short Term Goals: To be achieved in: 2 weeks  1. Independent in HEP and progression per patient tolerance, in order to prevent re-injury. 2. Patient will have a decrease in pain to facilitate improvement in movement, function, and ADLs as indicated by Functional Deficits.     Long Term Goals: To be achieved in: 6 weeks  1. Disability index score of % or less for the NDI to assist with reaching prior level of function. 2. Patient will demonstrate increased AROM to [facility] to allow for proper joint functioning as indicated by patients Functional Deficits. 3. Patient will demonstrate an increase in postural awareness and control to allow for proper functional mobility as indicated by patients Functional Deficits. 4. Patient will demonstrate an increase in Strength to at least 4+/5 to allow for proper functional mobility as indicated by patients Functional Deficits. Overall Progression Towards Functional goals/ Treatment Progress Update:  [] Patient is progressing as expected towards functional goals listed. [] Progression is slowed due to complexities/Impairments listed. [] Progression has been slowed due to co-morbidities.   [x] Plan just implemented, too soon to assess goals progression <30days   [] Goals require adjustment due to lack of progress  [] Patient is not progressing as expected and requires additional follow up with physician  [] Other    Persisting Functional Limitations/Impairments:  []Sleeping []Sitting               [x]Standing [x]Transfers        [x]Walking []Kneeling               [x]Stairs [x]Squatting / bending   [x]ADLs [x]Reaching  [x]Lifting  []Housework  [x]Driving []Job related tasks  []Sports/Recreation []Other:        ASSESSMENT:  Patient tolerated treatment well, able to complete additions of abdominal exercises without increased pain. Patient able to complete balance challenging exercises within the parallel bars, with hip and ankle strategies developing. Patient would continue to benefit from skilled therapy in order to continue to progress core and lower extremity strength and balance in order to return to functional ambulation without a cane. Treatment/Activity Tolerance:  [x] Patient able to complete tx [] Patient limited by fatigue  [] Patient limited by pain  [] Patient limited by other medical complications  [] Other:     Prognosis: [x] Good [] Fair  [] Poor    Patient Requires Follow-up: [x] Yes  [] No    Plan for next treatment session:    PLAN: See toby. PT 2 x / week for 6 weeks. [x] Continue per plan of care [] Alter current plan (see comments)  [] Plan of care initiated [] Hold pending MD visit [] Discharge    Electronically signed by:  Crista Leventhal, SPT    Therapist was present, directed the patient's care, made skilled judgement, and was responsible for assessment and treatment of the patient. Court Mckeon PT, DPT    Note: If patient does not return for scheduled/ recommended follow up visits, this note will serve as a discharge from care along with most recent update on progress.

## 2020-10-26 ENCOUNTER — HOSPITAL ENCOUNTER (OUTPATIENT)
Dept: PHYSICAL THERAPY | Age: 75
Setting detail: THERAPIES SERIES
Discharge: HOME | End: 2020-10-26
Payer: MEDICARE

## 2020-10-26 PROCEDURE — 97110 THERAPEUTIC EXERCISES: CPT

## 2020-10-26 PROCEDURE — 97530 THERAPEUTIC ACTIVITIES: CPT

## 2020-10-26 PROCEDURE — 97112 NEUROMUSCULAR REEDUCATION: CPT

## 2020-10-26 NOTE — FLOWSHEET NOTE
fwd/lat    2 hands assist   Shuttle balance DLS  step up to SLS   X 2  X 10                         Neuromuscular Re-ed (99604)       AIREX marching  squat   tandem     Taping cones     At  bars:  Tandem balance  SLS  NBOS with med ball rot          On airex   30 sec  10 sec  1     X 2 B  X 4 B   x10 B     No UE support, VCs for tight belly and glutes    Blue wedge balance DF/PF EO  \" EC  20 sec  10 sec x1 each way  x2 each way No UE support, CGA    Hip flexion from 6 inch step   1 x15 B No UE support, able to hold 1-2 sec          Manual Intervention (42790)                                                     Pt. Education:  -patient educated on diagnosis, prognosis and expectations for rehab  -all patient questions were answered    Home Exercise Program:  Access Code: [email or web address]   URL: [email or web address]   Date: 09/29/2020   Prepared by: Rigo Pacheco     Exercises   Clamshell - 10 reps - 2 sets - 1x daily - 7x weekly   Supine Bridge - 10 reps - 2 sets - 1x daily - 7x weekly   Hip Flexion Stretch - 10 reps - 2 sets - 1x daily - 7x weekly   Seated Piriformis Stretch - 10 reps - 2 sets - 1x daily - 7x weekly         Therapeutic Exercise and NMR EXR  [x] (18587) Provided verbal/tactile cueing for activities related to strengthening, flexibility, endurance, ROM for improvements in  [x] LE / Lumbar: LE, proximal hip, and core control with self care, mobility, lifting, ambulation. [] UE / Cervical: cervical, postural, scapular, scapulothoracic and UE control with self care, reaching, carrying, lifting, house/yardwork, driving, computer work.  [] (11358) Provided verbal/tactile cueing for activities related to improving balance, coordination, kinesthetic sense, posture, motor skill, proprioception to assist with   [] LE / lumbar: LE, proximal hip, and core control in self care, mobility, lifting, ambulation and eccentric single leg control.    [] UE / cervical: cervical, scapular, scapulothoracic and UE control with self care, reaching, carrying, lifting, house/yardwork, driving, computer work.   [] (39411) Therapist is in constant attendance of 2 or more patients providing skilled therapy interventions, but not providing any significant amount of measurable one-on-one time to either patient, for improvements in  [] LE / lumbar: LE, proximal hip, and core control in self care, mobility, lifting, ambulation and eccentric single leg control. [] UE / cervical: cervical, scapular, scapulothoracic and UE control with self care, reaching, carrying, lifting, house/yardwork, driving, computer work. NMR and Therapeutic Activities:    [x] (19024 or 04488) Provided verbal/tactile cueing for activities related to improving balance, coordination, kinesthetic sense, posture, motor skill, proprioception and motor activation to allow for proper function of   [x] LE: / Lumbar core, proximal hip and LE with self care and ADLs  [] UE / Cervical: cervical, postural, scapular, scapulothoracic and UE control with self care, carrying, lifting, driving, computer work.   [] (35200) Gait Re-education- Provided training and instruction to the patient for proper LE, core and proximal hip recruitment and positioning and eccentric body weight control with ambulation re-education including up and down stairs     Home Management Training / Self Care:  [] (41882) Provided self-care/home management training related to activities of daily living and compensatory training, and/or use of adaptive equipment for improvement with: ADLs and compensatory training, meal preparation, safety procedures and instruction in use of adaptive equipment, including bathing, grooming, dressing, personal hygiene, basic household cleaning and chores.      Home Exercise Program:    [] (77728) Reviewed/Progressed HEP activities related to strengthening, flexibility, endurance, ROM of   [] LE / Lumbar: core, proximal hip and LE for functional self-care, mobility, lifting and ambulation/stair navigation   [] UE / Cervical: cervical, postural, scapular, scapulothoracic and UE control with self care, reaching, carrying, lifting, house/yardwork, driving, computer work  [] (63703)Reviewed/Progressed HEP activities related to improving balance, coordination, kinesthetic sense, posture, motor skill, proprioception of   [] LE: core, proximal hip and LE for self care, mobility, lifting, and ambulation/stair navigation    [] UE / Cervical: cervical, postural,  scapular, scapulothoracic and UE control with self care, reaching, carrying, lifting, house/yardwork, driving, computer work    Manual Treatments:  PROM / STM / Oscillations-Mobs:  G-I, II, III, IV (PA's, Inf., Post.)  [] (70388) Provided manual therapy to mobilize LE, proximal hip and/or LS spine soft tissue/joints for the purpose of modulating pain, promoting relaxation,  increasing ROM, reducing/eliminating soft tissue swelling/inflammation/restriction, improving soft tissue extensibility and allowing for proper ROM for normal function with   [] LE / lumbar: self care, mobility, lifting and ambulation. [] UE / Cervical: self care, reaching, carrying, lifting, house/yardwork, driving, computer work. Modalities:  [] (29384) Vasopneumatic compression: Utilized vasopneumatic compression to decrease edema / swelling for the purpose of improving mobility and quad tone / recruitment which will allow for increased overall function including but not limited to self-care, transfers, ambulation, and ascending / descending stairs.        Modalities:      Charges:  Timed Code Treatment Minutes: 43   Total Treatment Minutes: 43     [] EVAL - LOW (64152)   [] EVAL - MOD (16352)  [] EVAL - HIGH (71248)  [] RE-EVAL (50562)  [x] DV(69775) x  1     [] Ionto  [x] NMR (55538) x  1     [] Vaso  [] Manual (95834) x       [] Ultrasound  [x] TA x 1       [] Mech Traction (11769)  [] Aquatic Therapy x     [] ES (un) (01023):   [] Home Management Training x  [] ES(attended) (05850)   [] Dry Needling 1-2 muscles (39071):  [] Dry Needling 3+ muscles (701608)  [] Group:      [] Other:     GOALS: Patient stated goal: walk without cane     Therapist goals for Patient:   Short Term Goals: To be achieved in: 2 weeks  1. Independent in HEP and progression per patient tolerance, in order to prevent re-injury. 2. Patient will have a decrease in pain to facilitate improvement in movement, function, and ADLs as indicated by Functional Deficits.     Long Term Goals: To be achieved in: 6 weeks  1. Disability index score of % or less for the NDI to assist with reaching prior level of function. 2. Patient will demonstrate increased AROM to [facility] [facility] to allow for proper joint functioning as indicated by patients Functional Deficits. 3. Patient will demonstrate an increase in postural awareness and control to allow for proper functional mobility as indicated by patients Functional Deficits. 4. Patient will demonstrate an increase in Strength to at least 4+/5 to allow for proper functional mobility as indicated by patients Functional Deficits. Overall Progression Towards Functional goals/ Treatment Progress Update:  [] Patient is progressing as expected towards functional goals listed. [] Progression is slowed due to complexities/Impairments listed. [] Progression has been slowed due to co-morbidities.   [x] Plan just implemented, too soon to assess goals progression <30days   [] Goals require adjustment due to lack of progress  [] Patient is not progressing as expected and requires additional follow up with physician  [] Other    Persisting Functional Limitations/Impairments:  []Sleeping []Sitting               [x]Standing [x]Transfers        [x]Walking []Kneeling               [x]Stairs [x]Squatting / bending   [x]ADLs [x]Reaching  [x]Lifting  []Housework  [x]Driving []Job related tasks  []Sports/Recreation []Other:        ASSESSMENT:  Patient tolerated treatment well, able to complete additions of abdominal exercises without increased pain. Patient able to complete balance challenging exercises within the parallel bars, with hip and ankle strategies developing. Patient would continue to benefit from skilled therapy in order to continue to progress core and lower extremity strength and balance in order to return to functional ambulation without a cane. Treatment/Activity Tolerance:  [x] Patient able to complete tx [] Patient limited by fatigue  [] Patient limited by pain  [] Patient limited by other medical complications  [] Other:     Prognosis: [x] Good [] Fair  [] Poor    Patient Requires Follow-up: [x] Yes  [] No    Plan for next treatment session:    PLAN: See eval. PT 2 x / week for 6 weeks. [x] Continue per plan of care [] Alter current plan (see comments)  [] Plan of care initiated [] Hold pending MD visit [] Discharge    Electronically signed by:  HEIDI Austin    Therapist was present, directed the patient's care, made skilled judgement, and was responsible for assessment and treatment of the patient. Sunil Oconnell PT, DPT    Note: If patient does not return for scheduled/ recommended follow up visits, this note will serve as a discharge from care along with most recent update on progress.

## 2020-10-28 ENCOUNTER — OFFICE VISIT (OUTPATIENT)
Dept: FAMILY MEDICINE CLINIC | Age: 75
End: 2020-10-28
Payer: MEDICARE

## 2020-10-28 VITALS
DIASTOLIC BLOOD PRESSURE: 64 MMHG | HEIGHT: 61 IN | HEART RATE: 64 BPM | TEMPERATURE: 98 F | BODY MASS INDEX: 27.94 KG/M2 | OXYGEN SATURATION: 98 % | SYSTOLIC BLOOD PRESSURE: 116 MMHG | WEIGHT: 148 LBS

## 2020-10-28 DIAGNOSIS — D64.9 ANEMIA, UNSPECIFIED TYPE: ICD-10-CM

## 2020-10-28 DIAGNOSIS — I10 ESSENTIAL HYPERTENSION: ICD-10-CM

## 2020-10-28 LAB
A/G RATIO: 1.9 (ref 1.1–2.2)
ALBUMIN SERPL-MCNC: 4.6 G/DL (ref 3.4–5)
ALP BLD-CCNC: 82 U/L (ref 40–129)
ALT SERPL-CCNC: 13 U/L (ref 10–40)
ANION GAP SERPL CALCULATED.3IONS-SCNC: 9 MMOL/L (ref 3–16)
AST SERPL-CCNC: 20 U/L (ref 15–37)
BASOPHILS ABSOLUTE: 0.1 K/UL (ref 0–0.2)
BASOPHILS RELATIVE PERCENT: 0.9 %
BILIRUB SERPL-MCNC: 0.3 MG/DL (ref 0–1)
BUN BLDV-MCNC: 17 MG/DL (ref 7–20)
CALCIUM SERPL-MCNC: 10.1 MG/DL (ref 8.3–10.6)
CHLORIDE BLD-SCNC: 98 MMOL/L (ref 99–110)
CO2: 30 MMOL/L (ref 21–32)
CREAT SERPL-MCNC: 0.7 MG/DL (ref 0.6–1.2)
EOSINOPHILS ABSOLUTE: 0.1 K/UL (ref 0–0.6)
EOSINOPHILS RELATIVE PERCENT: 1 %
GFR AFRICAN AMERICAN: >60
GFR NON-AFRICAN AMERICAN: >60
GLOBULIN: 2.4 G/DL
GLUCOSE BLD-MCNC: 74 MG/DL (ref 70–99)
HCT VFR BLD CALC: 38 % (ref 36–48)
HEMOGLOBIN: 12.5 G/DL (ref 12–16)
LYMPHOCYTES ABSOLUTE: 1.8 K/UL (ref 1–5.1)
LYMPHOCYTES RELATIVE PERCENT: 25.8 %
MCH RBC QN AUTO: 31 PG (ref 26–34)
MCHC RBC AUTO-ENTMCNC: 32.9 G/DL (ref 31–36)
MCV RBC AUTO: 94.2 FL (ref 80–100)
MONOCYTES ABSOLUTE: 0.5 K/UL (ref 0–1.3)
MONOCYTES RELATIVE PERCENT: 7.4 %
NEUTROPHILS ABSOLUTE: 4.5 K/UL (ref 1.7–7.7)
NEUTROPHILS RELATIVE PERCENT: 64.9 %
PDW BLD-RTO: 14.9 % (ref 12.4–15.4)
PLATELET # BLD: 270 K/UL (ref 135–450)
PMV BLD AUTO: 9.1 FL (ref 5–10.5)
POTASSIUM SERPL-SCNC: 4.3 MMOL/L (ref 3.5–5.1)
RBC # BLD: 4.03 M/UL (ref 4–5.2)
SODIUM BLD-SCNC: 137 MMOL/L (ref 136–145)
TOTAL PROTEIN: 7 G/DL (ref 6.4–8.2)
WBC # BLD: 7 K/UL (ref 4–11)

## 2020-10-28 PROCEDURE — G8484 FLU IMMUNIZE NO ADMIN: HCPCS | Performed by: FAMILY MEDICINE

## 2020-10-28 PROCEDURE — G0439 PPPS, SUBSEQ VISIT: HCPCS | Performed by: FAMILY MEDICINE

## 2020-10-28 PROCEDURE — 1123F ACP DISCUSS/DSCN MKR DOCD: CPT | Performed by: FAMILY MEDICINE

## 2020-10-28 PROCEDURE — 4040F PNEUMOC VAC/ADMIN/RCVD: CPT | Performed by: FAMILY MEDICINE

## 2020-10-28 RX ORDER — AMLODIPINE BESYLATE 5 MG/1
5 TABLET ORAL DAILY
Qty: 90 TABLET | Refills: 3 | Status: SHIPPED | OUTPATIENT
Start: 2020-10-28 | End: 2021-10-20

## 2020-10-28 RX ORDER — LOSARTAN POTASSIUM AND HYDROCHLOROTHIAZIDE 12.5; 1 MG/1; MG/1
1 TABLET ORAL DAILY
Qty: 90 TABLET | Refills: 3 | Status: SHIPPED | OUTPATIENT
Start: 2020-10-28 | End: 2021-10-20

## 2020-10-28 ASSESSMENT — PATIENT HEALTH QUESTIONNAIRE - PHQ9
1. LITTLE INTEREST OR PLEASURE IN DOING THINGS: 1
SUM OF ALL RESPONSES TO PHQ QUESTIONS 1-9: 2
SUM OF ALL RESPONSES TO PHQ9 QUESTIONS 1 & 2: 2
SUM OF ALL RESPONSES TO PHQ QUESTIONS 1-9: 2
2. FEELING DOWN, DEPRESSED OR HOPELESS: 1
SUM OF ALL RESPONSES TO PHQ QUESTIONS 1-9: 2

## 2020-10-28 ASSESSMENT — LIFESTYLE VARIABLES: HOW OFTEN DO YOU HAVE A DRINK CONTAINING ALCOHOL: 0

## 2020-10-28 NOTE — PROGRESS NOTES
Medicare Annual Wellness Visit  Name: Gerald Ordonez Date: 10/28/2020   MRN: [id number] Sex: Female   Age: 67 y.o. Ethnicity: Non-/Non    : 1945 Race: Teresa Baker is here for Medicare AWV; Discuss Medications; and Referral - General (ENT)    Screenings for behavioral, psychosocial and functional/safety risks, and cognitive dysfunction are all negative except as indicated below. These results, as well as other patient data from the [address] form, are documented in Flowsheets linked to this Encounter. Overall doing much better after Gunoa Barré most likely secondary to Shingrix vaccine  Will elect not to give her flu shot today  In therapy and doing well    Asking for referral to audiology for decreased hearing  We will recheck labs today    Allergies   Allergen Reactions    Hydroxybenzoate     Prednisolone Acetate     Tixocortol Pivalate     Neosporin [Neomycin-Polymyxin-Gramicidin] Rash     Allergic rash     Ace Inhibitors      Cough, itchy throat    Methylchloroisothiazolinone     Shingrix [Zoster Vac Recomb Adjuvanted]      GBS         Prior to Visit Medications    Medication Sig Taking? Authorizing Provider   losartan-hydroCHLOROthiazide (HYZAAR) 100-12.5 MG per tablet Take 1 tablet by mouth daily Yes rEyn Meyer MD   amLODIPine (NORVASC) 5 MG tablet Take 1 tablet by mouth daily Yes Eryn Meyer MD   metoprolol tartrate (LOPRESSOR) 25 MG tablet Take 1 tablet by mouth 2 times daily Yes Eryn Meyer MD   desvenlafaxine succinate (PRISTIQ) 50 MG TB24 extended release tablet Take 1 tablet by mouth daily Yes Eryn Meyer MD   cetirizine (ZYRTEC) 10 MG tablet Take 1 tablet by mouth nightly Yes Mona Levin MD   atorvastatin (LIPITOR) 10 MG tablet Take 1 tablet by mouth every other day Yes Eryn Meyer MD   miconazole (MICOTIN) 2 % cream Apply topically 2 times daily.  Yes Eryn Meyer MD   clobetasol (TEMOVATE) 0.05 % cream eye exam within the past year?: Yes  Hearing/Vision Interventions:  · Hearing referral for decreased hearing    Personalized Preventive Plan   Current Health Maintenance Status  Immunization History   Administered Date(s) Administered    Influenza Vaccine, unspecified formulation 10/06/2016    Influenza Virus Vaccine 12/10/2011    Influenza, High Dose (Fluzone 65 yrs and older) 10/02/2012, 12/03/2013, 12/05/2014, 11/04/2015, 10/31/2017, 09/03/2018    Influenza, Triv, inactivated, subunit, adjuvanted, IM (Fluad 65 yrs and older) 10/16/2019    Pneumococcal Conjugate 13-valent (Szstdxo15) 05/26/2015    Pneumococcal Polysaccharide (Ctyrduxkb64) 07/14/2010    Td, unspecified formulation 10/01/1999    Tdap (Boostrix, Adacel) 12/03/2013    Zoster Live (Zostavax) 09/01/2010    Zoster Recombinant (Shingrix) 07/02/2020        Health Maintenance   Topic Date Due    Annual Wellness Visit (AWV)  06/19/2019    Flu vaccine (1) 09/01/2020    Lipid screen  06/18/2021    Potassium monitoring  08/03/2021    Creatinine monitoring  08/03/2021    DTaP/Tdap/Td vaccine (2 - Td) 12/03/2023    DEXA (modify frequency per FRAX score)  Completed    Pneumococcal 65+ years Vaccine  Completed    Hepatitis A vaccine  Aged Out    Hepatitis B vaccine  Aged Out    Hib vaccine  Aged Out    Meningococcal (ACWY) vaccine  Aged Out     Recommendations for Az[facility] Due: see orders and patient instructions/AVS.  .   Recommended screening schedule for the next 5-10 years is provided to the patient in written form: see Patient Instructions/AVS.    Loy was seen today for medicare awv, discuss medications and referral - general.    Diagnoses and all orders for this visit:    Medicare annual wellness visit, subsequent    Of the diet, stay active continue therapy    GBS (Guillain [place] syndrome) ([facility] [address]    Doing well in therapy, no flu vaccine today  Avoid Shingrix    Mixed hyperlipidemia    Stable continue statin    Essential hypertension  Stable continue medication  -     Comprehensive Metabolic Panel; Future  -     losartan-hydroCHLOROthiazide (HYZAAR) 100-12.5 MG per tablet; Take 1 tablet by mouth daily  -     amLODIPine (NORVASC) 5 MG tablet; Take 1 tablet by mouth daily    Major depressive disorder with single episode, in full remission ([facility] [address]    Stable, continue Pristiq    Moderate mitral regurgitation by prior echocardiogram    Last echo May 2019    Monitor    Anemia, unspecified type  Recheck labs  -     CBC Auto Differential; Future    Bilateral hearing loss, unspecified hearing loss type  Refer  -     [address] [place], [place], Audiology, [facility]

## 2020-10-28 NOTE — PATIENT INSTRUCTIONS
Personalized Preventive Plan for Abelino Moreno - 10/28/2020  Medicare offers a range of preventive health benefits. Some of the tests and screenings are paid in full while other may be subject to a deductible, co-insurance, and/or copay. Some of these benefits include a comprehensive review of your medical history including lifestyle, illnesses that may run in your family, and various assessments and screenings as appropriate. After reviewing your medical record and screening and assessments performed today your provider may have ordered immunizations, labs, imaging, and/or referrals for you. A list of these orders (if applicable) as well as your Preventive Care list are included within your After Visit Summary for your review. Other Preventive Recommendations:    · A preventive eye exam performed by an eye specialist is recommended every 1-2 years to screen for glaucoma; cataracts, macular degeneration, and other eye disorders. · A preventive dental visit is recommended every 6 months. · Try to get at least 150 minutes of exercise per week or 10,000 steps per day on a pedometer . · Order or download the FREE \"Exercise & Physical Activity: Your Everyday Guide\" from [employer]. Call 4-493.623.3566 or search [employer] online. · You need 0712-5494 mg of calcium and 6099-0193 IU of vitamin D per day. It is possible to meet your calcium requirement with diet alone, but a vitamin D supplement is usually necessary to meet this goal.  · When exposed to the sun, use a sunscreen that protects against both UVA and UVB radiation with an SPF of 30 or greater. Reapply every 2 to 3 hours or after sweating, drying off with a towel, or swimming. · Always wear a seat belt when traveling in a car. Always wear a helmet when riding a bicycle or motorcycle.

## 2020-10-29 ENCOUNTER — HOSPITAL ENCOUNTER (OUTPATIENT)
Dept: PHYSICAL THERAPY | Age: 75
Setting detail: THERAPIES SERIES
Discharge: HOME | End: 2020-10-29
Payer: MEDICARE

## 2020-10-29 PROCEDURE — 97112 NEUROMUSCULAR REEDUCATION: CPT

## 2020-10-29 PROCEDURE — 97530 THERAPEUTIC ACTIVITIES: CPT

## 2020-10-29 PROCEDURE — 97110 THERAPEUTIC EXERCISES: CPT

## 2020-10-29 NOTE — FLOWSHEET NOTE
[facility]  Phone: [phone]   Fax: [phone]    Physical Therapy Daily Treatment Note  Date:  10/29/2020    Patient Name:  Ean Heard    :    MRN: [id number]  Medical/Treatment Diagnosis Information:  · Diagnosis: G61.0 (ICD-10-CM) - Guillain-Agency syndrome  · Treatment Diagnosis: gait dysfunction, weakness  Insurance/Certification information:  PT Insurance Information: MOY DUBON [facility]  Physician Information:  Referring Practitioner: Dr. Deven Marrufo  Plan of care signed (Y/N): []? Yes   [x]? No      Date of Patient follow up with Physician:       Progress Report: []  Yes  [x]  No     Date Range for reporting period:  Beginnin20  Ending:    Progress report due (10 Rx/or 30 days whichever is less): visit #10 or     Recertification due (POC duration/ or 90 days whichever is less): visit #10    Visit # Insurance Allowable Auth required? Date Range   +  1+9 [x]  Yes  []  No -11/10/20     Latex Allergy:  [x]NO      []YES  Preferred Language for Healthcare:   [x]English       []other:    Functional Scale:        Date assessed: 10/6  TU.69    Pain level:  0/10     SUBJECTIVE: Pt reports her knee and ankle are bothering her today, maybe due to the weather.      OBJECTIVE:   10/15: pt 5 mins late; amb into clinic holding cane      RESTRICTIONS/PRECAUTIONS:     Exercises/Interventions:     Therapeutic Exercises (92888) Resistance / level Sets/sec Reps Notes          Nu step Level 4   X 5 minutes seat 7, arms 8    IB gastroc stretch   HR/TR  30 sec x2  X 10 each     Cables SLR x 3  standing marches  3 way hip   Ham curl    3# ankle weights  3# ankle weights  3# ankle weights    1  1  1 X 15 B  X 15 B  x15 B      Cues to straighten knee    TG mini squats  TG single leg squats   2   2 X 10  x10 B     Walk in track     With cane   [facility] level 1.5  2 x10 B                  Therapeutic Activities (69123)       Step up 6' fwd/lat   X 10 2 hands assist   Shuttle balance  Step up to DLS  step up to SLS   X 10  X 10                         Neuromuscular Re-ed (82440)       AIREX marching  squat   tandem     Taping cones     At  bars:  Tandem balance   SLS  NBOS with med ball rot      on airex    On airex   30 sec  10 sec  1     X 2 B  X 4 B   x10 B     No UE support, VCs for tight belly and glutes    Blue wedge balance DF/PF EO  \" EC  20 sec  10 sec x1 each way  x2 each way No UE support, CGA    Hip flexion from 6 inch step   1 x15 B No UE support, able to hold 1-2 sec          Manual Intervention (59400)                                                     Pt. Education:  -patient educated on diagnosis, prognosis and expectations for rehab  -all patient questions were answered    Home Exercise Program:  Access Code: [email or web address]   URL: [email or web address]   Date: 09/29/2020   Prepared by: Telma King     Exercises   Clamshell - 10 reps - 2 sets - 1x daily - 7x weekly   Supine Bridge - 10 reps - 2 sets - 1x daily - 7x weekly   Hip Flexion Stretch - 10 reps - 2 sets - 1x daily - 7x weekly   Seated Piriformis Stretch - 10 reps - 2 sets - 1x daily - 7x weekly         Therapeutic Exercise and NMR EXR  [x] (67355) Provided verbal/tactile cueing for activities related to strengthening, flexibility, endurance, ROM for improvements in  [x] LE / Lumbar: LE, proximal hip, and core control with self care, mobility, lifting, ambulation. [] UE / Cervical: cervical, postural, scapular, scapulothoracic and UE control with self care, reaching, carrying, lifting, house/yardwork, driving, computer work.  [] (07632) Provided verbal/tactile cueing for activities related to improving balance, coordination, kinesthetic sense, posture, motor skill, proprioception to assist with   [] LE / lumbar: LE, proximal hip, and core control in self care, mobility, lifting, ambulation and eccentric single leg control.    [] UE / cervical: cervical, scapular, scapulothoracic and UE control with self care, reaching, carrying, lifting, house/yardwork, driving, computer work.   [] (52289) Therapist is in constant attendance of 2 or more patients providing skilled therapy interventions, but not providing any significant amount of measurable one-on-one time to either patient, for improvements in  [] LE / lumbar: LE, proximal hip, and core control in self care, mobility, lifting, ambulation and eccentric single leg control. [] UE / cervical: cervical, scapular, scapulothoracic and UE control with self care, reaching, carrying, lifting, house/yardwork, driving, computer work. NMR and Therapeutic Activities:    [x] (29359 or 28735) Provided verbal/tactile cueing for activities related to improving balance, coordination, kinesthetic sense, posture, motor skill, proprioception and motor activation to allow for proper function of   [x] LE: / Lumbar core, proximal hip and LE with self care and ADLs  [] UE / Cervical: cervical, postural, scapular, scapulothoracic and UE control with self care, carrying, lifting, driving, computer work.   [] (53290) Gait Re-education- Provided training and instruction to the patient for proper LE, core and proximal hip recruitment and positioning and eccentric body weight control with ambulation re-education including up and down stairs     Home Management Training / Self Care:  [] (37404) Provided self-care/home management training related to activities of daily living and compensatory training, and/or use of adaptive equipment for improvement with: ADLs and compensatory training, meal preparation, safety procedures and instruction in use of adaptive equipment, including bathing, grooming, dressing, personal hygiene, basic household cleaning and chores.      Home Exercise Program:    [x] (72457) Reviewed/Progressed HEP activities related to strengthening, flexibility, endurance, ROM of   [] LE / Lumbar: core, proximal hip and LE for functional self-care, mobility, lifting and ambulation/stair navigation   [] UE / Cervical: cervical, postural, scapular, scapulothoracic and UE control with self care, reaching, carrying, lifting, house/yardwork, driving, computer work  [] (24746)Reviewed/Progressed HEP activities related to improving balance, coordination, kinesthetic sense, posture, motor skill, proprioception of   [] LE: core, proximal hip and LE for self care, mobility, lifting, and ambulation/stair navigation    [] UE / Cervical: cervical, postural,  scapular, scapulothoracic and UE control with self care, reaching, carrying, lifting, house/yardwork, driving, computer work    Manual Treatments:  PROM / STM / Oscillations-Mobs:  G-I, II, III, IV (PA's, Inf., Post.)  [] (47084) Provided manual therapy to mobilize LE, proximal hip and/or LS spine soft tissue/joints for the purpose of modulating pain, promoting relaxation,  increasing ROM, reducing/eliminating soft tissue swelling/inflammation/restriction, improving soft tissue extensibility and allowing for proper ROM for normal function with   [] LE / lumbar: self care, mobility, lifting and ambulation. [] UE / Cervical: self care, reaching, carrying, lifting, house/yardwork, driving, computer work. Modalities:  [] (61381) Vasopneumatic compression: Utilized vasopneumatic compression to decrease edema / swelling for the purpose of improving mobility and quad tone / recruitment which will allow for increased overall function including but not limited to self-care, transfers, ambulation, and ascending / descending stairs.        Modalities:      Charges:  Timed Code Treatment Minutes: 44   Total Treatment Minutes: 44     [] EVAL - LOW (12996)   [] EVAL - MOD (80990)  [] EVAL - HIGH (43632)  [] RE-EVAL (31100)  [x] WH(39671) x  1     [] Ionto  [x] NMR (96031) x  1     [] Vaso  [] Manual (03401) x       [] Ultrasound  [x] TA x 1       [] Mech Traction (40504)  [] Aquatic Therapy x     [] ES (un) (37208): [] Home Management Training x  [] ES(attended) (31614)   [] Dry Needling 1-2 muscles (45661):  [] Dry Needling 3+ muscles (567411)  [] Group:      [] Other:     GOALS: Patient stated goal: walk without cane     Therapist goals for Patient:   Short Term Goals: To be achieved in: 2 weeks  1. Independent in HEP and progression per patient tolerance, in order to prevent re-injury. 2. Patient will have a decrease in pain to facilitate improvement in movement, function, and ADLs as indicated by Functional Deficits.     Long Term Goals: To be achieved in: 6 weeks  1. Disability index score of % or less for the NDI to assist with reaching prior level of function. 2. Patient will demonstrate increased AROM to [facility]ARY[employer] [facility] to allow for proper joint functioning as indicated by patients Functional Deficits. 3. Patient will demonstrate an increase in postural awareness and control to allow for proper functional mobility as indicated by patients Functional Deficits. 4. Patient will demonstrate an increase in Strength to at least 4+/5 to allow for proper functional mobility as indicated by patients Functional Deficits. Overall Progression Towards Functional goals/ Treatment Progress Update:  [] Patient is progressing as expected towards functional goals listed. [] Progression is slowed due to complexities/Impairments listed. [] Progression has been slowed due to co-morbidities.   [x] Plan just implemented, too soon to assess goals progression <30days   [] Goals require adjustment due to lack of progress  [] Patient is not progressing as expected and requires additional follow up with physician  [] Other    Persisting Functional Limitations/Impairments:  []Sleeping []Sitting               [x]Standing [x]Transfers        [x]Walking []Kneeling               [x]Stairs [x]Squatting / bending   [x]ADLs [x]Reaching  [x]Lifting  []Housework  [x]Driving []Job related tasks  []Sports/Recreation []Other:        ASSESSMENT:  Patient tolerated treatment well, able to complete additions of abdominal exercises without increased pain. Patient able to complete balance challenging exercises within the parallel bars, with hip and ankle strategies developing. Patient would continue to benefit from skilled therapy in order to continue to progress core and lower extremity strength and balance in order to return to functional ambulation without a cane. Treatment/Activity Tolerance:  [x] Patient able to complete tx [] Patient limited by fatigue  [] Patient limited by pain  [] Patient limited by other medical complications  [] Other:     Prognosis: [x] Good [] Fair  [] Poor    Patient Requires Follow-up: [x] Yes  [] No    Plan for next treatment session:    PLAN: See eval. PT 2 x / week for 6 weeks. [x] Continue per plan of care [] Alter current plan (see comments)  [] Plan of care initiated [] Hold pending MD visit [] Discharge    Electronically signed by:  HEIDI Carlson    Therapist was present, directed the patient's care, made skilled judgement, and was responsible for assessment and treatment of the patient. Ann Champagne PT, DPT    Note: If patient does not return for scheduled/ recommended follow up visits, this note will serve as a discharge from care along with most recent update on progress.

## 2020-10-30 ENCOUNTER — TELEPHONE (OUTPATIENT)
Dept: FAMILY MEDICINE CLINIC | Age: 75
End: 2020-10-30

## 2020-10-30 NOTE — TELEPHONE ENCOUNTER
[employer] is calling to verify that Dr. Fuentes Beyer received the fax stating patient's Cologuard was abnormal.

## 2020-10-30 NOTE — TELEPHONE ENCOUNTER
Called patient and advised per Dr. Clinton Weber that cologuard is positive. Advised to call [address] [place] [address] Dr AARON and schedule screening colonoscopy. Patient agrees and will call today to schedule. All questions were answered at time of call. Patient will call back with any further questions after scheduling appointment.

## 2020-11-02 NOTE — TELEPHONE ENCOUNTER
I spoke with patient and answered her questions. She has appointment with Dr. Haroon Claros on 12/3/20. She was offered appointment with Dr. Cindy Sumner on 11/23/20 but she feels more comfortable with Dr. Haroon Claros. She is wanting Dr. Christianne Perez opinion with waiting until 12/3. Please advise.

## 2020-11-03 ENCOUNTER — HOSPITAL ENCOUNTER (OUTPATIENT)
Dept: PHYSICAL THERAPY | Age: 75
Setting detail: THERAPIES SERIES
Discharge: HOME | End: 2020-11-03
Payer: MEDICARE

## 2020-11-03 PROCEDURE — 97110 THERAPEUTIC EXERCISES: CPT

## 2020-11-03 PROCEDURE — 97530 THERAPEUTIC ACTIVITIES: CPT

## 2020-11-03 PROCEDURE — 97112 NEUROMUSCULAR REEDUCATION: CPT

## 2020-11-03 NOTE — FLOWSHEET NOTE
[address] [facility] Physical Therapy  Phone: [phone]   Fax: [phone]    Physical Therapy Daily Treatment Note  Date:  11/3/2020    Patient Name:  Author Krysta VIZCARRA:    MRN: [id number]  Medical/Treatment Diagnosis Information:  · Diagnosis: G61.0 (ICD-10-CM) - Guillain-Linden syndrome  · Treatment Diagnosis: gait dysfunction, weakness  Insurance/Certification information:  PT Insurance Information: [facility]  Physician Information:  Referring Practitioner: Dr. Luberta Libman  Plan of care signed (Y/N): []? Yes   [x]? No      Date of Patient follow up with Physician:       Progress Report: []  Yes  [x]  No     Date Range for reporting period:  Beginnin20  Ending:    Progress report due (10 Rx/or 30 days whichever is less): visit #10 or     Recertification due (POC duration/ or 90 days whichever is less): visit #10    Visit # Insurance Allowable Auth required? Date Range   +  1+9 [x]  Yes  []  No -11/10/20     Latex Allergy:  [x]NO      []YES  Preferred Language for Healthcare:   [x]English       []other:    Functional Scale:        Date assessed: 10/6  TU.69    Pain level:  0/10     SUBJECTIVE: Pt reports she feels like her L knee is still swollen. Didn't bring her cane today because she thinks she is walking pretty well. Still wants to get better on the steps and with squatting and picking up objects from the floor.      OBJECTIVE:   10/15: pt 5 mins late; amb into clinic holding cane      RESTRICTIONS/PRECAUTIONS:     Exercises/Interventions:     Therapeutic Exercises (27157) Resistance / level Sets/sec Reps Notes          Nu step  Step one    Level 1     X 5 min seat 7, arms 8   Seat 16, arms 5   IB gastroc stretch   HR/TR  30 sec x2  X 10 each     Cables SLR x 3  standing marches  3 way hip   Ham curl      Cues to straighten knee    TG mini squats  TG single leg squats   2   2 X 10  x10 B     Walk in track     With cane sahrmann level 1.5     TKE at cable column  2.5 pl 2 x10    Cook bridges   1 x10 B                                Therapeutic Activities (93948)       Step up 6' fwd/lat   2 hands assist   Shuttle balance  Step up to DLS  step up to SLS      Stairs: Forward step up   Forward step downs   6 inch  6 inch   1  1   x10 B  x10 B   1 UE support   1 UE support   Squat and reach for cone on step 18 inch  12 inch 2  1 x10  x10 Reaching as far forward as possible without falling                                       Neuromuscular Re-ed (98261)       AIREX marching  squat   tandem     Taping cones     At  bars:  Tandem balance   SLS  NBOS with med ball rot      on airex    On airex   No UE support, VCs for tight belly and glutes    Blue wedge balance DF/PF EO  \" EC  No UE support, CGA    Hip flexion from 6 inch step   No UE support, able to hold 1-2 sec   Biodex machine:  Random control  Maze control  Limits of stability    Level 11  Level 11  Level 11    x1  x1  x1    2 min, 85%  64%  53%   B UE support  B UE support   B UE support                                Manual Intervention (78590)                                                     Pt. Education:  -patient educated on diagnosis, prognosis and expectations for rehab  -all patient questions were answered    Home Exercise Program:  Access Code: [email or web address]   URL: [email or web address]   Date: 09/29/2020   Prepared by: Nehemiah Donnelly     Exercises   Clamshell - 10 reps - 2 sets - 1x daily - 7x weekly   Supine Bridge - 10 reps - 2 sets - 1x daily - 7x weekly   Hip Flexion Stretch - 10 reps - 2 sets - 1x daily - 7x weekly   Seated Piriformis Stretch - 10 reps - 2 sets - 1x daily - 7x weekly         Therapeutic Exercise and NMR EXR  [x] (09228) Provided verbal/tactile cueing for activities related to strengthening, flexibility, endurance, ROM for improvements in  [x] LE / Lumbar: LE, proximal hip, and core control with self care, mobility, lifting, ambulation.   [] UE / Cervical: cervical, postural, scapular, scapulothoracic and UE control with self care, reaching, carrying, lifting, house/yardwork, driving, computer work. [x] (44921) Provided verbal/tactile cueing for activities related to improving balance, coordination, kinesthetic sense, posture, motor skill, proprioception to assist with   [x] LE / lumbar: LE, proximal hip, and core control in self care, mobility, lifting, ambulation and eccentric single leg control. [] UE / cervical: cervical, scapular, scapulothoracic and UE control with self care, reaching, carrying, lifting, house/yardwork, driving, computer work.   [] (98435) Therapist is in constant attendance of 2 or more patients providing skilled therapy interventions, but not providing any significant amount of measurable one-on-one time to either patient, for improvements in  [] LE / lumbar: LE, proximal hip, and core control in self care, mobility, lifting, ambulation and eccentric single leg control. [] UE / cervical: cervical, scapular, scapulothoracic and UE control with self care, reaching, carrying, lifting, house/yardwork, driving, computer work.      NMR and Therapeutic Activities:    [x] (23941 or 03104) Provided verbal/tactile cueing for activities related to improving balance, coordination, kinesthetic sense, posture, motor skill, proprioception and motor activation to allow for proper function of   [x] LE: / Lumbar core, proximal hip and LE with self care and ADLs  [] UE / Cervical: cervical, postural, scapular, scapulothoracic and UE control with self care, carrying, lifting, driving, computer work.   [] (11001) Gait Re-education- Provided training and instruction to the patient for proper LE, core and proximal hip recruitment and positioning and eccentric body weight control with ambulation re-education including up and down stairs     Home Management Training / Self Care:  [] (62192) Provided self-care/home management training related to activities of daily living and compensatory training, and/or use of adaptive equipment for improvement with: ADLs and compensatory training, meal preparation, safety procedures and instruction in use of adaptive equipment, including bathing, grooming, dressing, personal hygiene, basic household cleaning and chores. Home Exercise Program:    [] (38399) Reviewed/Progressed HEP activities related to strengthening, flexibility, endurance, ROM of   [] LE / Lumbar: core, proximal hip and LE for functional self-care, mobility, lifting and ambulation/stair navigation   [] UE / Cervical: cervical, postural, scapular, scapulothoracic and UE control with self care, reaching, carrying, lifting, house/yardwork, driving, computer work  [] (33947)Reviewed/Progressed HEP activities related to improving balance, coordination, kinesthetic sense, posture, motor skill, proprioception of   [] LE: core, proximal hip and LE for self care, mobility, lifting, and ambulation/stair navigation    [] UE / Cervical: cervical, postural,  scapular, scapulothoracic and UE control with self care, reaching, carrying, lifting, house/yardwork, driving, computer work    Manual Treatments:  PROM / STM / Oscillations-Mobs:  G-I, II, III, IV (PA's, Inf., Post.)  [] (43646) Provided manual therapy to mobilize LE, proximal hip and/or LS spine soft tissue/joints for the purpose of modulating pain, promoting relaxation,  increasing ROM, reducing/eliminating soft tissue swelling/inflammation/restriction, improving soft tissue extensibility and allowing for proper ROM for normal function with   [] LE / lumbar: self care, mobility, lifting and ambulation. [] UE / Cervical: self care, reaching, carrying, lifting, house/yardwork, driving, computer work.      Modalities:  [] (76365) Vasopneumatic compression: Utilized vasopneumatic compression to decrease edema / swelling for the purpose of improving mobility and quad tone / recruitment which will allow for assess goals progression <30days   [] Goals require adjustment due to lack of progress  [] Patient is not progressing as expected and requires additional follow up with physician  [] Other    Persisting Functional Limitations/Impairments:  []Sleeping []Sitting               [x]Standing [x]Transfers        [x]Walking []Kneeling               [x]Stairs [x]Squatting / bending   [x]ADLs [x]Reaching  [x]Lifting  []Housework  [x]Driving []Job related tasks  []Sports/Recreation []Other:        ASSESSMENT: Patient moving with greater ease and confidence without AD. No LOB, however challenged with balance on dynamic surfaces. PN at next visit. Will need new cert. Treatment/Activity Tolerance:  [x] Patient able to complete tx [] Patient limited by fatigue  [] Patient limited by pain  [] Patient limited by other medical complications  [] Other:     Prognosis: [x] Good [] Fair  [] Poor    Patient Requires Follow-up: [x] Yes  [] No    Plan for next treatment session:    PLAN: See eval. PT 2 x / week for 6 weeks. [x] Continue per plan of care [] Alter current plan (see comments)  [] Plan of care initiated [] Hold pending MD visit [] Discharge    Electronically signed by:  Luis Gordon PT, DPT    Therapist was present, directed the patient's care, made skilled judgement, and was responsible for assessment and treatment of the patient. Luis Gordon PT, DPT    Note: If patient does not return for scheduled/ recommended follow up visits, this note will serve as a discharge from care along with most recent update on progress.

## 2020-11-05 ENCOUNTER — HOSPITAL ENCOUNTER (OUTPATIENT)
Dept: PHYSICAL THERAPY | Age: 75
Setting detail: THERAPIES SERIES
Discharge: HOME | End: 2020-11-05
Payer: MEDICARE

## 2020-11-05 PROCEDURE — 97110 THERAPEUTIC EXERCISES: CPT

## 2020-11-05 PROCEDURE — 97112 NEUROMUSCULAR REEDUCATION: CPT

## 2020-11-05 NOTE — PROGRESS NOTES
Physician:       Progress Report: []  Yes  [x]  No     Date Range for reporting period:  Beginnin20  Endin2020    Progress report due (10 Rx/or 30 days whichever is less): visit #10 or     Recertification due (POC duration/ or 90 days whichever is less): visit #10    Visit # Insurance Allowable Auth required? Date Range   1+  1+9 [x]  Yes  []  No -11/10/20     Latex Allergy:  [x]NO      []YES  Preferred Language for Healthcare:   [x]English       []other:    Functional Scale:          Date assessed:  TU.69                                               10/6/2020  TU/5/2020    Pain level:  0/10     SUBJECTIVE: Pt reports she has improved about 75% since starting therapy. Still having trouble with squatting, reaching into the back of cabinets, and she cannot climb stairs reciprocally confidently. Takes her cane on walks, but doesn't tot he grocery because she can use the cart. Is still tired after therapy sessions. Has been walking about 10-20 min everyday.      OBJECTIVE:   :  Strength (0-5) Left Right   Hip flex 4/5 4/5   ext 4+/5 4/5   add 4/5 4/5   abd 4+/5 4+/5   Knee flex 5/5 5/5   ext 5/5 5/5   DF 5/5 5/5   VA 5/5 5/5         10/15: pt 5 mins late; amb into clinic holding cane      RESTRICTIONS/PRECAUTIONS:     Exercises/Interventions:     Therapeutic Exercises (23706) Resistance / level Sets/sec Reps Notes          Nu step  Step one   Bike       Level 1       X 6 min  seat 7, arms 8   Seat 16, arms 5  Bike #1, seat 4    IB gastroc stretch   HR/TR  30 sec x2  X 10 each     Cables SLR x 3  standing marches  3 way hip   Ham curl      Cues to straighten knee    TG mini squats  TG single leg squats     Walk in track  With cane   sahrmann level 1.5    TKE at cable column     [employer] (41234)       Step up 6' fwd/lat 2 hands assist   Shuttle balance  Step up to DLS  step up to SLS    Stairs: Forward step up   Forward step downs   1 UE support   1 UE support   Squat and reach for cone on step Reaching as far forward as possible without falling                                       Neuromuscular Re-ed (76484)       AIREX marching  squat   tandem     Taping cones     At  bars:  Tandem balance   SLS  NBOS with med ball rot      on airex    On airex   No UE support, VCs for tight belly and glutes    Blue wedge balance DF/PF EO  \" EC  No UE support, CGA    Hip flexion from 6 inch step   No UE support, able to hold 1-2 sec   Biodex machine:  Random control  Maze control  Limits of stability    B UE support  B UE support   B UE support    LEE    x1 48/56                        Manual Intervention (17106)                                                     Pt. Education:  11/5: Reassessed goals, discussed progress made and areas remaining for improvement, discussed TUG and LEE scores with pt, encouraged her to continue with walking without AD when she feels safe      -patient educated on diagnosis, prognosis and expectations for rehab  -all patient questions were answered    Home Exercise Program:  Access Code: [email or web address]   URL: [employer]/   Date: 09/29/2020   Prepared by: Juan Kruse     Exercises   Clamshell - 10 reps - 2 sets - 1x daily - 7x weekly   Supine Bridge - 10 reps - 2 sets - 1x daily - 7x weekly   Hip Flexion Stretch - 10 reps - 2 sets - 1x daily - 7x weekly   Seated Piriformis Stretch - 10 reps - 2 sets - 1x daily - 7x weekly         Therapeutic Exercise and NMR EXR  [x] (62569) Provided verbal/tactile cueing for activities related to strengthening, flexibility, endurance, ROM for improvements in  [x] LE / Lumbar: LE, proximal hip, and core control with self care, mobility, lifting, ambulation.   [] UE / Cervical: cervical, postural, scapular, scapulothoracic and UE control with self care, reaching, carrying, lifting, house/yardwork, driving, computer work.  [] (72660) Provided verbal/tactile cueing for activities related to improving balance, coordination, kinesthetic sense, posture, motor skill, proprioception to assist with   [] LE / lumbar: LE, proximal hip, and core control in self care, mobility, lifting, ambulation and eccentric single leg control. [] UE / cervical: cervical, scapular, scapulothoracic and UE control with self care, reaching, carrying, lifting, house/yardwork, driving, computer work.   [] (23176) Therapist is in constant attendance of 2 or more patients providing skilled therapy interventions, but not providing any significant amount of measurable one-on-one time to either patient, for improvements in  [] LE / lumbar: LE, proximal hip, and core control in self care, mobility, lifting, ambulation and eccentric single leg control. [] UE / cervical: cervical, scapular, scapulothoracic and UE control with self care, reaching, carrying, lifting, house/yardwork, driving, computer work.      NMR and Therapeutic Activities:    [x] (87844 or 24477) Provided verbal/tactile cueing for activities related to improving balance, coordination, kinesthetic sense, posture, motor skill, proprioception and motor activation to allow for proper function of   [x] LE: / Lumbar core, proximal hip and LE with self care and ADLs  [] UE / Cervical: cervical, postural, scapular, scapulothoracic and UE control with self care, carrying, lifting, driving, computer work.   [] (74343) Gait Re-education- Provided training and instruction to the patient for proper LE, core and proximal hip recruitment and positioning and eccentric body weight control with ambulation re-education including up and down stairs     Home Management Training / Self Care:  [] (64463) Provided self-care/home management training related to activities of daily living and compensatory training, and/or use of adaptive equipment for improvement with: ADLs and compensatory training, meal preparation, safety procedures and instruction in use of adaptive equipment, including bathing, grooming, dressing, personal hygiene, basic household cleaning and chores. Home Exercise Program:    [] (37649) Reviewed/Progressed HEP activities related to strengthening, flexibility, endurance, ROM of   [] LE / Lumbar: core, proximal hip and LE for functional self-care, mobility, lifting and ambulation/stair navigation   [] UE / Cervical: cervical, postural, scapular, scapulothoracic and UE control with self care, reaching, carrying, lifting, house/yardwork, driving, computer work  [] (54153)Reviewed/Progressed HEP activities related to improving balance, coordination, kinesthetic sense, posture, motor skill, proprioception of   [] LE: core, proximal hip and LE for self care, mobility, lifting, and ambulation/stair navigation    [] UE / Cervical: cervical, postural,  scapular, scapulothoracic and UE control with self care, reaching, carrying, lifting, house/yardwork, driving, computer work    Manual Treatments:  PROM / STM / Oscillations-Mobs:  G-I, II, III, IV (PA's, Inf., Post.)  [] (35253) Provided manual therapy to mobilize LE, proximal hip and/or LS spine soft tissue/joints for the purpose of modulating pain, promoting relaxation,  increasing ROM, reducing/eliminating soft tissue swelling/inflammation/restriction, improving soft tissue extensibility and allowing for proper ROM for normal function with   [] LE / lumbar: self care, mobility, lifting and ambulation. [] UE / Cervical: self care, reaching, carrying, lifting, house/yardwork, driving, computer work. Modalities:  [] (43940) Vasopneumatic compression: Utilized vasopneumatic compression to decrease edema / swelling for the purpose of improving mobility and quad tone / recruitment which will allow for increased overall function including but not limited to self-care, transfers, ambulation, and ascending / descending stairs.        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 She has been seen for a total of 10 visits as of this date and has made moderate progress. She demos improvement in B LE strength, however is still weak in B hips. She also demos impaired balance strategies and scored 48/56 on the LEE. She did improve her TUG time to 11.7 secs, which is half of the time if took her on the first visit. Pt is walking without her AD, however is still not comfortable in unknown environments without it. Pt would benefit from continued skilled therapy to further improve balance, LE strength, and endurance. Treatment/Activity Tolerance:  [x] Patient able to complete tx  [] Patient limited by fatigue  [] Patient limited by pain  [] Patient limited by other medical complications  [] Other:     Prognosis: [x] Good [] Fair  [] Poor    Patient Requires Follow-up: [x] Yes  [] No    Plan for next treatment session:  Hip strength , balance (dynamic without AD), and endurance     PLAN: See toby. PT 2 x / week for 6 weeks. [x] Continue per plan of care [] Alter current plan (see comments)  [] Plan of care initiated [] Hold pending MD visit [] Discharge    Electronically signed by:  Jun Holley PT, DPT        Note: If patient does not return for scheduled/ recommended follow up visits, this note will serve as a discharge from care along with most recent update on progress.

## 2020-11-10 ENCOUNTER — HOSPITAL ENCOUNTER (OUTPATIENT)
Dept: PHYSICAL THERAPY | Age: 75
Setting detail: THERAPIES SERIES
Discharge: HOME | End: 2020-11-10
Payer: MEDICARE

## 2020-11-10 PROCEDURE — 97530 THERAPEUTIC ACTIVITIES: CPT

## 2020-11-10 PROCEDURE — 97110 THERAPEUTIC EXERCISES: CPT

## 2020-11-10 NOTE — FLOWSHEET NOTE
[address] [facility] Physical Therapy  Phone: [phone]   Fax: [phone]    Physical Therapy Daily Treatment Note  Date:  11/10/2020    Patient Name:  Toshia Garzon    :    MRN: [id number]  Medical/Treatment Diagnosis Information:  · Diagnosis: G61.0 (ICD-10-CM) - Guillain-[place] syndrome  · Treatment Diagnosis: gait dysfunction, weakness  Insurance/Certification information:  PT Insurance Information: [employer] DUB[employer] [facility]  Physician Information:  Referring Practitioner: Dr. Rashad Mccann  Plan of care signed (Y/N): [x]? Yes   []? No      Date of Patient follow up with Physician:  ?     Progress Report: []  Yes  [x]  No     Date Range for reporting period:  Beginnin20  Endin2020    Progress report due (10 Rx/or 30 days whichever is less): visit #10 or     Recertification due (POC duration/ or 90 days whichever is less): visit #10    Visit # Insurance Allowable Auth required? Date Range   1+ 9/9 + 1/6 1+9+6 [x]  Yes  []  No 20-11/10/20  11/5/20-     Latex Allergy:  [x]NO      []YES  Preferred Language for Healthcare:   [x]English       []other:    Functional Scale:          Date assessed:  TU.69                                               10/6/2020  TU.7                                                 2020    Pain level:  0/10     SUBJECTIVE: Pt reports she went to the park with her friend and was able to walk without her cane.       OBJECTIVE:   11/10: pt amb into clinic without AD, stable     :  Strength (0-5) Left Right   Hip flex 4/5 4/5   ext 4+/5 4/5   add 4/5 4/5   abd 4+/5 4+/5   Knee flex 5/5 5/5   ext 5/5 5/5   DF 5/5 5/5   OK 5/5 5/5         10/15: pt 5 mins late; amb into clinic holding cane      RESTRICTIONS/PRECAUTIONS:     Exercises/Interventions:     Therapeutic Exercises (65584) Resistance / level Sets/sec Reps Notes          Nu step  Step one   Bike    Level 1        X 5 min   seat 7, arms 8 Seat 16, arms 5  Bike #1, seat 4    IB gastroc stretch   HR/TR  30 sec x2  X 10 each     Cables SLR x 3  standing marches  3 way hip   Ham curl      Cues to straighten knee    TG mini squats  TG single leg squats  2   2 X 10  x10 B     Walk in track  With cane   [facility] [employer] level 1.5    TKE at cable column     Cook bridges     Leg ext  Leg abd   1  1 x10 B  x10 B    Lat band walk  Blue loop at ankles  8 feet X 4 laps                   Therapeutic Activities (77274)       Step up 6' fwd/lat 2 hands assist   Shuttle balance  Step up to DLS  step up to SLS    Stairs:   Forward step up   Forward step downs   1 UE support   1 UE support   Squat and reach for cone on step Reaching as far forward as possible without falling    Walk into TURF:  forward  Retro  Side stepping   carioca    50 feet  50 feet  50 feet  50 feet      x1  x1  x1 B  X 1 B     amb through [employer], up and down ramp, up and down steps without UE support or AD 8 inch steps  X 400 feet + 6 steps                         Neuromuscular Re-ed (88667)       AIREX marching  squat   tandem     Taping cones     At  bars:  Tandem balance   SLS  NBOS with med ball rot      on airex    On airex   No UE support, VCs for tight belly and glutes    Blue wedge balance DF/PF EO  \" EC  No UE support, CGA    Hip flexion from 6 inch step   No UE support, able to hold 1-2 sec   Biodex machine:  Random control  Maze control  Limits of stability    B UE support  B UE support   B UE support    JESUS     48/56                        Manual Intervention (77891)                                                     Pt. Education:  11/5: Reassessed goals, discussed progress made and areas remaining for improvement, discussed TUG and LEE scores with pt, encouraged her to continue with walking without AD when she feels safe      -patient educated on diagnosis, prognosis and expectations for rehab  -all patient questions were answered    Home Exercise Program:  Access Code: [email or web address] URL: [email or web address]   Date: 09/29/2020   Prepared by: Homer Fermni     Exercises   Clamshell - 10 reps - 2 sets - 1x daily - 7x weekly   Supine Bridge - 10 reps - 2 sets - 1x daily - 7x weekly   Hip Flexion Stretch - 10 reps - 2 sets - 1x daily - 7x weekly   Seated Piriformis Stretch - 10 reps - 2 sets - 1x daily - 7x weekly         Therapeutic Exercise and NMR EXR  [x] (35548) Provided verbal/tactile cueing for activities related to strengthening, flexibility, endurance, ROM for improvements in  [x] LE / Lumbar: LE, proximal hip, and core control with self care, mobility, lifting, ambulation. [] UE / Cervical: cervical, postural, scapular, scapulothoracic and UE control with self care, reaching, carrying, lifting, house/yardwork, driving, computer work.  [] (32721) Provided verbal/tactile cueing for activities related to improving balance, coordination, kinesthetic sense, posture, motor skill, proprioception to assist with   [] LE / lumbar: LE, proximal hip, and core control in self care, mobility, lifting, ambulation and eccentric single leg control. [] UE / cervical: cervical, scapular, scapulothoracic and UE control with self care, reaching, carrying, lifting, house/yardwork, driving, computer work.   [] (68846) Therapist is in constant attendance of 2 or more patients providing skilled therapy interventions, but not providing any significant amount of measurable one-on-one time to either patient, for improvements in  [] LE / lumbar: LE, proximal hip, and core control in self care, mobility, lifting, ambulation and eccentric single leg control. [] UE / cervical: cervical, scapular, scapulothoracic and UE control with self care, reaching, carrying, lifting, house/yardwork, driving, computer work.      NMR and Therapeutic Activities:    [x] (95452 or 65910) Provided verbal/tactile cueing for activities related to improving balance, coordination, kinesthetic sense, posture, motor skill, proprioception and motor activation to allow for proper function of   [x] LE: / Lumbar core, proximal hip and LE with self care and ADLs  [] UE / Cervical: cervical, postural, scapular, scapulothoracic and UE control with self care, carrying, lifting, driving, computer work.   [] (71663) Gait Re-education- Provided training and instruction to the patient for proper LE, core and proximal hip recruitment and positioning and eccentric body weight control with ambulation re-education including up and down stairs     Home Management Training / Self Care:  [] (74932) Provided self-care/home management training related to activities of daily living and compensatory training, and/or use of adaptive equipment for improvement with: ADLs and compensatory training, meal preparation, safety procedures and instruction in use of adaptive equipment, including bathing, grooming, dressing, personal hygiene, basic household cleaning and chores.      Home Exercise Program:    [] (11149) Reviewed/Progressed HEP activities related to strengthening, flexibility, endurance, ROM of   [] LE / Lumbar: core, proximal hip and LE for functional self-care, mobility, lifting and ambulation/stair navigation   [] UE / Cervical: cervical, postural, scapular, scapulothoracic and UE control with self care, reaching, carrying, lifting, house/yardwork, driving, computer work  [] (82226)Reviewed/Progressed HEP activities related to improving balance, coordination, kinesthetic sense, posture, motor skill, proprioception of   [] LE: core, proximal hip and LE for self care, mobility, lifting, and ambulation/stair navigation    [] UE / Cervical: cervical, postural,  scapular, scapulothoracic and UE control with self care, reaching, carrying, lifting, house/yardwork, driving, computer work    Manual Treatments:  PROM / STM / Oscillations-Mobs:  G-I, II, III, IV (PA's, Inf., Post.)  [] (29961) Provided manual therapy to mobilize LE, proximal hip and/or LS spine soft tissue/joints for the purpose of modulating pain, promoting relaxation,  increasing ROM, reducing/eliminating soft tissue swelling/inflammation/restriction, improving soft tissue extensibility and allowing for proper ROM for normal function with   [] LE / lumbar: self care, mobility, lifting and ambulation. [] UE / Cervical: self care, reaching, carrying, lifting, house/yardwork, driving, computer work. Modalities:  [] (69019) Vasopneumatic compression: Utilized vasopneumatic compression to decrease edema / swelling for the purpose of improving mobility and quad tone / recruitment which will allow for increased overall function including but not limited to self-care, transfers, ambulation, and ascending / descending stairs. Modalities:    11/5: CP to B knees in sitting x 10 min     Charges:  Timed Code Treatment Minutes: 38   Total Treatment Minutes: 38     [] EVAL - LOW (69490)   [] EVAL - MOD (75045)  [] EVAL - HIGH (22028)  [] RE-EVAL (26061)  [x] NS(97948) x  2     [] Ionto  [] NMR (46375) x      [] Vaso  [] Manual (88016) x       [] Ultrasound  [x] TA x 1        [] Mech Traction (62363)  [] Aquatic Therapy x                    [] ES (un) (53368):   [] Home Management Training x  [] ES(attended) (00409)   [] Dry Needling 1-2 muscles (31403):  [] Dry Needling 3+ muscles (621191)  [] Group:      [] Other:     GOALS: Patient stated goal: walk without cane     Therapist goals for Patient:   Short Term Goals: To be achieved in: 2 weeks  1. Independent in HEP and progression per patient tolerance, in order to prevent re-injury. 2. Patient will have a decrease in pain to facilitate improvement in movement, function, and ADLs as indicated by Functional Deficits.     Long Term Goals: To be achieved in: 6 weeks  1. Pt will have LEE score of 50/56 or greater to demo improved dynamic balance. - NOT MET   2.  Patient will demonstrate TUG in 10 sec or less to return to community amb speed - NOT MET 11.7 sec   3. Patient will demonstrate an increase in postural awareness and control to allow for proper functional mobility as indicated by patients Functional Deficits. - MET  4. Patient will demonstrate an increase in LE Strength to at least 4+/5 to allow for proper functional mobility as indicated by patients Functional Deficits. - NOT MET    Overall Progression Towards Functional goals/ Treatment Progress Update:  [] Patient is progressing as expected towards functional goals listed. [x] Progression is slowed due to complexities/Impairments listed. [] Progression has been slowed due to co-morbidities. [] Plan just implemented, too soon to assess goals progression <30days   [] Goals require adjustment due to lack of progress  [] Patient is not progressing as expected and requires additional follow up with physician  [] Other    Persisting Functional Limitations/Impairments:  []Sleeping []Sitting               [x]Standing [x]Transfers        [x]Walking []Kneeling               [x]Stairs [x]Squatting / bending   [x]ADLs [x]Reaching  [x]Lifting  []Housework  [x]Driving []Job related tasks  []Sports/Recreation []Other:        ASSESSMENT: Pt able to traverse hard wood, carpet, and turf field today without AD and without LOB. LEs fatigue quickly with strengthening still. Plan to continue focus on amb and endurance. Treatment/Activity Tolerance:  [x] Patient able to complete tx  [] Patient limited by fatigue  [] Patient limited by pain  [] Patient limited by other medical complications  [] Other:     Prognosis: [x] Good [] Fair  [] Poor    Patient Requires Follow-up: [x] Yes  [] No    Plan for next treatment session:  Hip strength , balance (dynamic without AD), and endurance     PLAN: See toby. PT 2 x / week for 6 weeks.    [x] Continue per plan of care [] Alter current plan (see comments)  [] Plan of care initiated [] Hold pending MD visit [] Discharge    Electronically signed by:  Elvin Ibarra, PT, DPT        Note: If patient does not return for scheduled/ recommended follow up visits, this note will serve as a discharge from care along with most recent update on progress.

## 2020-11-16 ENCOUNTER — TELEPHONE (OUTPATIENT)
Dept: FAMILY MEDICINE CLINIC | Age: 75
End: 2020-11-16

## 2020-11-16 NOTE — TELEPHONE ENCOUNTER
Patient is scheduled for a colonoscopy on December 3 and wants to know if it is okay for her to have local anesthesia. Okay to leave message on voicemail.

## 2020-11-17 ENCOUNTER — HOSPITAL ENCOUNTER (OUTPATIENT)
Dept: PHYSICAL THERAPY | Age: 75
Setting detail: THERAPIES SERIES
Discharge: HOME | End: 2020-11-17
Payer: MEDICARE

## 2020-11-19 ENCOUNTER — HOSPITAL ENCOUNTER (OUTPATIENT)
Dept: PHYSICAL THERAPY | Age: 75
Setting detail: THERAPIES SERIES
Discharge: HOME | End: 2020-11-19
Payer: MEDICARE

## 2020-11-19 PROCEDURE — 97110 THERAPEUTIC EXERCISES: CPT

## 2020-11-19 PROCEDURE — 97112 NEUROMUSCULAR REEDUCATION: CPT

## 2020-11-19 NOTE — FLOWSHEET NOTE
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 Criselda Phelan  Plan of care signed (Y/N): [x]? Yes   []? No      Date of Patient follow up with Physician:  ?     Progress Report: []  Yes  [x]  No     Date Range for reporting period:  Beginnin20  Endin2020    Progress report due (10 Rx/or 30 days whichever is less): visit #10 or 63/80/43    Recertification due (POC duration/ or 90 days whichever is less): visit #10    Visit # Insurance Allowable Auth required? Date Range   1+ 9/9 + 2/6 1+9+6 [x]  Yes  []  No 20-11/10/20  11/5/20-     Latex Allergy:  [x]NO      []YES  Preferred Language for Healthcare:   [x]English       []other:    Functional Scale:          Date assessed:  TU.69                                               10/6/2020  TU.7                                                 2020    Pain level:  0/10     SUBJECTIVE: Pt reports her knee is less swollen today, but it's still present. Hasn't been very active since Tuesday - has been icing and resting her knee. Thinks it is swollen due to previous meniscus tear.      OBJECTIVE:     : /68  11/10: pt amb into clinic without AD, stable     :  Strength (0-5) Left Right   Hip flex 4/5 4/5   ext 4+/5 4/5   add 4/5 4/5   abd 4+/5 4+/5   Knee flex 5/5 5/5   ext 5/5 5/5   DF 5/5 5/5   PA 5/5 5/5         10/15: pt 5 mins late; amb into clinic holding cane      RESTRICTIONS/PRECAUTIONS:     Exercises/Interventions:     Therapeutic Exercises (48243) Resistance / level Sets/sec Reps Notes          Nu step  Step one   Bike     TM          1.1 MPH      X 3 min  seat 7, arms 8   Seat 16, arms 5  Bike #1, seat 4    IB gastroc stretch   HR/TR     Cables SLR x 3  standing marches  3 way hip   Ham curl      Cues to straighten knee    TG mini squats  TG single leg squats     Walk in track  With cane   [facility] level 1.5    TKE at cable column     Cook bridges     Leg ext  Leg abd      Lat band walk     Monster walks forward  Monster walks retro  5 feet  5 feet x1  x1 x2   x2 No UE support, VCs for knee flexion    Cables:  Mid rows  paloff press x 3 with lateral walk out    30#  20#   2  1     x10  X 4 B     Squats on BOSU   2 x10 No UE support                         Therapeutic Activities (86063)       Step up 6' fwd/lat 2 hands assist   Shuttle balance  Step up to DLS  step up to SLS    Stairs: Forward step up   Forward step downs   1 UE support   1 UE support   Squat and reach for cone on step Reaching as far forward as possible without falling    Walk into TURF:  forward  Retro  Side stepping   carioca    amb through [employer], up and down ramp, up and down steps without UE support or AD                         Neuromuscular Re-ed (34631)       AIREX marching  squat   tandem     Taping cones     At  bars:  Tandem balance   SLS  NBOS with med ball rot      on airex    On airex   No UE support, VCs for tight belly and glutes    Blue wedge balance DF/PF EO  \" EC  No UE support, CGA    Hip flexion from 6 inch step   No UE support, able to hold 1-2 sec   Biodex machine:  Random control  Maze control  Limits of stability    B UE support  B UE support   B UE support    LEE     48/56   Shuttle:   Forward step ups   Lateral step ups   Tandem stance  NBOS with EC        1  1  30 sec   10 sec   x10 B   x10 B   X 4 B  X 4    Light B UE support   B UE support  Int UE support  Int UE support                  Manual Intervention (09091) Pt. Education:  11/5: Reassessed goals, discussed progress made and areas remaining for improvement, discussed TUG and LEE scores with pt, encouraged her to continue with walking without AD when she feels safe      -patient educated on diagnosis, prognosis and expectations for rehab  -all patient questions were answered    Home Exercise Program:  Access Code: [email or web address]   URL: [employer]/   Date: 09/29/2020   Prepared by: Juan Kruse     Exercises   Clamshell - 10 reps - 2 sets - 1x daily - 7x weekly   Supine Bridge - 10 reps - 2 sets - 1x daily - 7x weekly   Hip Flexion Stretch - 10 reps - 2 sets - 1x daily - 7x weekly   Seated Piriformis Stretch - 10 reps - 2 sets - 1x daily - 7x weekly         Therapeutic Exercise and NMR EXR  [x] (34224) Provided verbal/tactile cueing for activities related to strengthening, flexibility, endurance, ROM for improvements in  [x] LE / Lumbar: LE, proximal hip, and core control with self care, mobility, lifting, ambulation. [] UE / Cervical: cervical, postural, scapular, scapulothoracic and UE control with self care, reaching, carrying, lifting, house/yardwork, driving, computer work.  [] (11896) Provided verbal/tactile cueing for activities related to improving balance, coordination, kinesthetic sense, posture, motor skill, proprioception to assist with   [] LE / lumbar: LE, proximal hip, and core control in self care, mobility, lifting, ambulation and eccentric single leg control.    [] UE / cervical: cervical, scapular, scapulothoracic and UE control with self care, reaching, carrying, lifting, house/yardwork, driving, computer work.   [] (38601) Therapist is in constant attendance of 2 or more patients providing skilled therapy interventions, but not providing any significant amount of measurable one-on-one time to either patient, for improvements in  [] LE / lumbar: LE, proximal hip, and core control in self care, mobility, lifting, mobility, lifting, and ambulation/stair navigation    [] UE / Cervical: cervical, postural,  scapular, scapulothoracic and UE control with self care, reaching, carrying, lifting, house/yardwork, driving, computer work    Manual Treatments:  PROM / STM / Oscillations-Mobs:  G-I, II, III, IV (PA's, Inf., Post.)  [] (91163) Provided manual therapy to mobilize LE, proximal hip and/or LS spine soft tissue/joints for the purpose of modulating pain, promoting relaxation,  increasing ROM, reducing/eliminating soft tissue swelling/inflammation/restriction, improving soft tissue extensibility and allowing for proper ROM for normal function with   [] LE / lumbar: self care, mobility, lifting and ambulation. [] UE / Cervical: self care, reaching, carrying, lifting, house/yardwork, driving, computer work. Modalities:  [] (49902) Vasopneumatic compression: Utilized vasopneumatic compression to decrease edema / swelling for the purpose of improving mobility and quad tone / recruitment which will allow for increased overall function including but not limited to self-care, transfers, ambulation, and ascending / descending stairs. Modalities:    11/19: CP to L knee in sitting x 10 min   11/5: CP to B knees in sitting x 10 min     Charges:  Timed Code Treatment Minutes: 43   Total Treatment Minutes: 53     [] EVAL - LOW (47969)   [] EVAL - MOD (48553)  [] EVAL - HIGH (51514)  [] RE-EVAL (43203)  [x] LD(87675) x  2     [] Ionto  [x] NMR (36559) x 1     [] Vaso  [] Manual (46128) x       [] Ultrasound  [] TA x         [] Mech Traction (21657)  [] Aquatic Therapy x                    [] ES (un) (83939):   [] Home Management Training x  [] ES(attended) (60487)   [] Dry Needling 1-2 muscles (00480):  [] Dry Needling 3+ muscles (483983)  [] Group:      [] Other:     GOALS: Patient stated goal: walk without cane     Therapist goals for Patient:   Short Term Goals: To be achieved in: 2 weeks  1.  Independent in HEP and progression per patient tolerance, in order to prevent re-injury. 2. Patient will have a decrease in pain to facilitate improvement in movement, function, and ADLs as indicated by Functional Deficits.     Long Term Goals: To be achieved in: 6 weeks  1. Pt will have LEE score of 50/56 or greater to demo improved dynamic balance. - NOT MET   2. Patient will demonstrate TUG in 10 sec or less to return to community amb speed - NOT MET 11.7 sec   3. Patient will demonstrate an increase in postural awareness and control to allow for proper functional mobility as indicated by patients Functional Deficits. - MET  4. Patient will demonstrate an increase in LE Strength to at least 4+/5 to allow for proper functional mobility as indicated by patients Functional Deficits. - NOT MET    Overall Progression Towards Functional goals/ Treatment Progress Update:  [] Patient is progressing as expected towards functional goals listed. [x] Progression is slowed due to complexities/Impairments listed. [] Progression has been slowed due to co-morbidities. [] Plan just implemented, too soon to assess goals progression <30days   [] Goals require adjustment due to lack of progress  [] Patient is not progressing as expected and requires additional follow up with physician  [] Other    Persisting Functional Limitations/Impairments:  []Sleeping []Sitting               [x]Standing [x]Transfers        [x]Walking []Kneeling               [x]Stairs [x]Squatting / bending   [x]ADLs [x]Reaching  [x]Lifting  []Housework  [x]Driving []Job related tasks  []Sports/Recreation []Other:        ASSESSMENT: Continues to be challenged with dynamic balance - [place] without UE support. Also found lateral walks outs difficult due to requirement of spinal stability. Plan to continue as above for remaining visits to progress towards meeting all goals.        Treatment/Activity Tolerance:  [x] Patient able to complete tx  [] Patient limited by fatigue  [] Patient limited by pain  [] Patient limited by other medical complications  [] Other:     Prognosis: [x] Good [] Fair  [] Poor    Patient Requires Follow-up: [x] Yes  [] No    Plan for next treatment session:  Hip strength , balance (dynamic without AD), and endurance     PLAN: See eval. PT 2 x / week for 6 weeks. [x] Continue per plan of care [] Alter current plan (see comments)  [] Plan of care initiated [] Hold pending MD visit [] Discharge    Electronically signed by:  Jody Archibald, PT, DPT        Note: If patient does not return for scheduled/ recommended follow up visits, this note will serve as a discharge from care along with most recent update on progress.

## 2020-11-24 ENCOUNTER — HOSPITAL ENCOUNTER (OUTPATIENT)
Dept: PHYSICAL THERAPY | Age: 75
Setting detail: THERAPIES SERIES
Discharge: HOME | End: 2020-11-24
Payer: MEDICARE

## 2020-11-27 ENCOUNTER — OFFICE VISIT (OUTPATIENT)
Dept: PRIMARY CARE CLINIC | Age: 75
End: 2020-11-27
Payer: MEDICARE

## 2020-11-27 PROCEDURE — G8428 CUR MEDS NOT DOCUMENT: HCPCS | Performed by: NURSE PRACTITIONER

## 2020-11-27 PROCEDURE — 99211 OFF/OP EST MAY X REQ PHY/QHP: CPT | Performed by: NURSE PRACTITIONER

## 2020-11-27 PROCEDURE — G8417 CALC BMI ABV UP PARAM F/U: HCPCS | Performed by: NURSE PRACTITIONER

## 2020-11-27 NOTE — PROGRESS NOTES
Tia David received a viral test for COVID-19. They were educated on isolation and quarantine as appropriate. For any symptoms, they were directed to seek care from their PCP, given contact information to establish with a doctor, directed to an urgent care or the emergency room.

## 2020-11-27 NOTE — PATIENT INSTRUCTIONS
You have received a viral test for COVID-19. Below is education on quarantine per the CDC guidelines. For any symptoms, seek care from your PCP, call [phone] to establish care with a doctor, or go directly to an urgent care or the emergency room. Test results will take 2-7 days and will be sent to you in your [facility] account. If you test positive, you will be contacted via phone. If you test negative, the ONLY communication will be through [address] GO TO [place] AND SIGN UP FOR [facility]  (LOWER LEFT OF THE HOME PAGE)  No test is 100%. If you have symptoms, you should follow the guidance of quarantine as previously stated. You can still be contagious if you have symptoms. Your [facility] access hospital Health Department will reach out to you if you have a positive result. They will provide you with a return to work date and note. If you were tested for a pre-op, then you should remain in quarantine until your procedure. How do I know if I need to be in quarantine? If you live in a community where COVID-19 is or might be spreading (currently, that is virtually everywhere in the [place])  Be alert for symptoms. Watch for fever, cough, shortness of breath, or other symptoms of COVID-19.  Take your temperature if symptoms develop.  Practice social distancing. Maintain 6 feet of distance from others and stay out of crowded places.  Follow CDC guidance if symptoms develop. If you feel healthy but:   Recently had close contact with a person with COVID-19 you need to Quarantine:   Stay home until 14 days after your last exposure.  Check your temperature twice a day and watch for symptoms of COVID-19.  If possible, stay away from people who are at higher-risk for getting very sick from COVID-19.   Stay Home and Monitor Your Health if you:   Have been diagnosed with COVID-19, or   Are waiting for test results, or   Have cough, fever, or shortness of breath, or symptoms of COVID-19      When You Can

## 2020-11-28 LAB — SARS-COV-2: NOT DETECTED

## 2020-11-30 NOTE — PROGRESS NOTES
Name_______________________________________Printed:____________________  Date and time of surgery____12/3/2020   1130____________________Arrival Time:____1000   FEC____________   1. The instructions given regarding when and if a patient needs to stop oral intake prior to surgery varies. Follow the specific instructions you were given                  ___Nothing to eat or to drink after Midnight the night before. __X__Endoscopy patient follow your DRS instructions-generally you will be doing a part of the prep after Midnight                   ____Carbo loading or ERAS instructions will be given to select patients-if you have been given those instructions -please do the following                           The evening before your surgery after dinner before midnight drink 40 ounces of gatorade. If you are diabetic use sugar free. The morning of surgery drink 40 ounces of water. This needs to be finished 3 hours prior to your surgery start time. 2. Take the following pills with a small sip of water on the morning of surgery_____METOPROLOL______________________________________________               X   Do not take blood pressure medications ending in pril or sartan the yury prior to surgery or the morning of surgery_   3. Aspirin, Ibuprofen, Advil, Naproxen, Vitamin E and other Anti-inflammatory products and supplements should be stopped for 5 -7days before surgery or as directed by your physician. 4. Check with your Doctor regarding stopping Plavix, Coumadin,Eliquis, Lovenox,Effient,Pradaxa,Xarelto, Fragmin or other blood thinners and follow their instructions. 5. Do not smoke, and do not drink any alcoholic beverages 24 hours prior to surgery. This includes NA Beer. Refrain from the usage of any recreational drugs. 6. You may brush your teeth and gargle the morning of surgery. DO NOT SWALLOW WATER   7.  You MUST make arrangements for a responsible adult to stay on site while you are here and take you home after your surgery. You will not be allowed to leave alone or drive yourself home. It is strongly suggested someone stay with you the first 24 hrs. Your surgery will be cancelled if you do not have a ride home. 8. A parent/legal guardian must accompany a child scheduled for surgery and plan to stay at the hospital until the child is discharged. Please do not bring other children with you. 9. Please wear simple, loose fitting clothing to the hospital.  Madison Cerrato not bring valuables (money, credit cards, checkbooks, etc.) Do not wear any makeup (including no eye makeup) or nail polish on your fingers or toes. 10. DO NOT wear any jewelry or piercings on day of surgery. All body piercing jewelry must be removed. 11. If you have ___dentures, they will be removed before going to the OR; we will provide you a container. If you wear ___contact lenses or ___glasses, they will be removed; please bring a case for them. 12. Please see your family doctor/pediatrician for a history & physical and/or concerning medications. Bring any test results/reports from your physician's office. PCP__________________Phone___________H&P Appt. Date________             13 If you  have a Living Will and Durable Power of  for Healthcare, please bring in a copy. 15. Notify your Surgeon if you develop any illness between now and surgery  time, cough, cold, fever, sore throat, nausea, vomiting, etc.  Please notify your surgeon if you experience dizziness, shortness of breath or blurred vision between now & the time of your surgery             15. DO NOT shave your operative site 96 hours prior to surgery. For face & neck surgery, men may use an electric razor 48 hours prior to surgery. 16. Shower the night before or morning of surgery using an antibacterial soap or as you have been instructed.              17. To provide excellent care visitors will be

## 2020-12-01 ENCOUNTER — HOSPITAL ENCOUNTER (OUTPATIENT)
Dept: PHYSICAL THERAPY | Age: 75
Setting detail: THERAPIES SERIES
Discharge: HOME | End: 2020-12-01
Payer: MEDICARE

## 2020-12-01 PROCEDURE — 97112 NEUROMUSCULAR REEDUCATION: CPT

## 2020-12-01 PROCEDURE — 97110 THERAPEUTIC EXERCISES: CPT

## 2020-12-01 NOTE — FLOWSHEET NOTE
[address] [facility] Physical Therapy  Phone: [phone]   Fax: [phone]    Physical Therapy Daily Treatment Note  Date:  2020    Patient Name:  Vanessa Montenegro    :    MRN: [id number]  Medical/Treatment Diagnosis Information:  · Diagnosis: G61.0 (ICD-10-CM) - Guillain-Hazel syndrome  · Treatment Diagnosis: gait dysfunction, weakness  Insurance/Certification information:  PT Insurance Information: [employer] [facility]  Physician Information:  Referring Practitioner: Dr. Garcia Sprain  Plan of care signed (Y/N): [x]? Yes   []? No      Date of Patient follow up with Physician:  ?     Progress Report: []  Yes  [x]  No     Date Range for reporting period:  Beginnin20  Endin2020    Progress report due (10 Rx/or 30 days whichever is less): visit #10 or     Recertification due (POC duration/ or 90 days whichever is less): visit #10    Visit # Insurance Allowable Auth required? Date Range   1+ 9/9 + 3/6 1+9+6 [x]  Yes  []  No 20-11/10/20  11/5/20-     Latex Allergy:  [x]NO      []YES  Preferred Language for Healthcare:   [x]English       []other:    Functional Scale:          Date assessed:  TU.69                                               10/6/2020  TU.7                                                 2020    Pain level:  0/10     SUBJECTIVE: Pt reports her knee is doing better today. Pt just feeling tired today, main concern is weakness in legs and imbalance.     OBJECTIVE:     : /68  11/10: pt amb into clinic without AD, stable     :  Strength (0-5) Left Right   Hip flex 4/5 4/5   ext 4+/5 4/5   add 4/5 4/5   abd 4+/5 4+/5   Knee flex 5/5 5/5   ext 5/5 5/5   DF 5/5 5/5   ME 5/5 5/5         10/15: pt 5 mins late; amb into clinic holding cane      RESTRICTIONS/PRECAUTIONS:     Exercises/Interventions:     Therapeutic Exercises (43809) Resistance / level Sets/sec Reps Notes          Nu step  Step one Bike     TM   Level 1            X 6 min    seat 7, arms 8   Seat 16, arms 5  Bike #1, seat 4    IB gastroc stretch   HR/TR  30 sec x2  2X 10 each     Cables SLR x 3  standing marches  3 way hip   Ham curl  1 pl  X 10    Cues to straighten knee    TG mini squats  TG single leg squats  2   1 X 10  x10 L/R     Walk in track  With cane   [facility] level 1.5    TKE at cable column     Cook bridges     Leg ext  Leg abd      Lat band walk     Monster walks forward  Monster walks retro  No UE support, VCs for knee flexion    Cables:  Mid rows  paloff press x 3, with lateral walk out    30#  20#   2  1     x10  X 4 B     Squats on BOSU   No UE support                         Therapeutic Activities (14856)       Step up 6' fwd/lat 2 hands assist   Shuttle balance  Step up to DLS  step up to SLS    Stairs: Forward step up   Forward step downs   1 UE support   1 UE support   Squat and reach for cone on step Reaching as far forward as possible without falling    Walk into TURF:  forward  Retro  Side stepping   carioca    amb through [facility], up and down ramp, up and down steps without UE support or AD                         Neuromuscular Re-ed (83708)       AIREX: trunk twists L/R  SLS L/R    marching  squat   tandem  10 secX 10  x3     Taping cones     At  bars:  Tandem balance   SLS  NBOS with med ball rot   Balance bd A/P and lat taps and hold steady   on airex    On airex 20 sec ea10 ea  No UE support, VCs for tight belly and glutes    Blue wedge balance DF/PF EO/EC  \" EC with head nods and turns  20 sec   x1 each way  x10 each way No UE support, CGA    Hip flexion from 6 inch step   No UE support, able to hold 1-2 sec   Biodex machine:  Random control  Maze control  Limits of stability    B UE support  B UE support   B UE support    LEE     48/56   Shuttle:   Forward step ups   Lateral step ups   Tandem stance  NBOS with EC        1  1  30 sec   10 sec   x10 B   x10 B   X 2 B  X 4    Light B UE support   B UE support  Int UE support  Int UE support                  Manual Intervention ([id number])                                                     Pt. Education:  11/5: Reassessed goals, discussed progress made and areas remaining for improvement, discussed TUG and LEE scores with pt, encouraged her to continue with walking without AD when she feels safe      -patient educated on diagnosis, prognosis and expectations for rehab  -all patient questions were answered    Home Exercise Program:  12/1/20  Access Code: [email or web address]   URL: [employer]/   Date: 12/01/2020   Prepared by: Brittany Goode pt has bands and can do ex with or without depending upon knee pain    Exercises   Standing Hip Extension with Counter Support - 10 reps - 2 sets - 1x daily - 7x weekly   Standing Hip Abduction with Counter Support - 10 reps - 2 sets - 1x daily - 7x weekly     Access Code: [email or web address]   URL: [employer]/   Date: 09/29/2020   Prepared by: Boni Serrano     Exercises   Clamshell - 10 reps - 2 sets - 1x daily - 7x weekly   Supine Bridge - 10 reps - 2 sets - 1x daily - 7x weekly   Hip Flexion Stretch - 10 reps - 2 sets - 1x daily - 7x weekly   Seated Piriformis Stretch - 10 reps - 2 sets - 1x daily - 7x weekly         Therapeutic Exercise and NMR EXR  [x] (71668) Provided verbal/tactile cueing for activities related to strengthening, flexibility, endurance, ROM for improvements in  [x] LE / Lumbar: LE, proximal hip, and core control with self care, mobility, lifting, ambulation.   [] UE / Cervical: cervical, postural, scapular, scapulothoracic and UE control with self care, reaching, carrying, lifting, house/yardwork, driving, computer work.  [] (96569) Provided verbal/tactile cueing for activities related to improving balance, coordination, kinesthetic sense, posture, motor skill, proprioception to assist with   [] LE / lumbar: LE, proximal hip, and core control in self care, mobility, lifting, ambulation and eccentric [x] LE / Lumbar: core, proximal hip and LE for functional self-care, mobility, lifting and ambulation/stair navigation   [] UE / Cervical: cervical, postural, scapular, scapulothoracic and UE control with self care, reaching, carrying, lifting, house/yardwork, driving, computer work  [] (34300)Reviewed/Progressed HEP activities related to improving balance, coordination, kinesthetic sense, posture, motor skill, proprioception of   [] LE: core, proximal hip and LE for self care, mobility, lifting, and ambulation/stair navigation    [] UE / Cervical: cervical, postural,  scapular, scapulothoracic and UE control with self care, reaching, carrying, lifting, house/yardwork, driving, computer work    Manual Treatments:  PROM / STM / Oscillations-Mobs:  G-I, II, III, IV (PA's, Inf., Post.)  [] (85795) Provided manual therapy to mobilize LE, proximal hip and/or LS spine soft tissue/joints for the purpose of modulating pain, promoting relaxation,  increasing ROM, reducing/eliminating soft tissue swelling/inflammation/restriction, improving soft tissue extensibility and allowing for proper ROM for normal function with   [] LE / lumbar: self care, mobility, lifting and ambulation. [] UE / Cervical: self care, reaching, carrying, lifting, house/yardwork, driving, computer work. Modalities:  [] (91559) Vasopneumatic compression: Utilized vasopneumatic compression to decrease edema / swelling for the purpose of improving mobility and quad tone / recruitment which will allow for increased overall function including but not limited to self-care, transfers, ambulation, and ascending / descending stairs.        Modalities:    11/19: CP to L knee in sitting x 10 min   11/5: CP to B knees in sitting x 10 min     Charges:  Timed Code Treatment Minutes: 55   Total Treatment Minutes: 55     [] EVAL - LOW (05919)   [] EVAL - MOD (47618)  [] EVAL - HIGH (80006)  [] RE-EVAL (40249)  [x] KA(92308) x  2     [] Ionto  [x] NMR (35552) x 2     [] Vaso  [] Manual (28033) x       [] Ultrasound  [] TA x         [] Mech Traction (23758)  [] Aquatic Therapy x                    [] ES (un) (48739):   [] Home Management Training x  [] ES(attended) (60038)   [] Dry Needling 1-2 muscles (59354):  [] Dry Needling 3+ muscles (209248)  [] Group:      [] Other:     GOALS: Patient stated goal: walk without cane     Therapist goals for Patient:   Short Term Goals: To be achieved in: 2 weeks  1. Independent in HEP and progression per patient tolerance, in order to prevent re-injury. 2. Patient will have a decrease in pain to facilitate improvement in movement, function, and ADLs as indicated by Functional Deficits.     Long Term Goals: To be achieved in: 6 weeks  1. Pt will have LEE score of 50/56 or greater to demo improved dynamic balance. - NOT MET   2. Patient will demonstrate TUG in 10 sec or less to return to community amb speed - NOT MET 11.7 sec   3. Patient will demonstrate an increase in postural awareness and control to allow for proper functional mobility as indicated by patients Functional Deficits. - MET  4. Patient will demonstrate an increase in LE Strength to at least 4+/5 to allow for proper functional mobility as indicated by patients Functional Deficits. - NOT MET    Overall Progression Towards Functional goals/ Treatment Progress Update:  [] Patient is progressing as expected towards functional goals listed. [x] Progression is slowed due to complexities/Impairments listed. [] Progression has been slowed due to co-morbidities.   [] Plan just implemented, too soon to assess goals progression <30days   [] Goals require adjustment due to lack of progress  [] Patient is not progressing as expected and requires additional follow up with physician  [] Other    Persisting Functional Limitations/Impairments:  []Sleeping []Sitting               [x]Standing [x]Transfers        [x]Walking []Kneeling               [x]Stairs [x]Squatting / bending   [x]ADLs [x]Reaching  [x]Lifting  []Housework  [x]Driving []Job related tasks  []Sports/Recreation []Other:        ASSESSMENT: Continues to be challenged with dynamic balance - [place] without UE support. Also found lateral walks outs difficult due to requirement of spinal stability. Plan to continue as above for remaining visits to progress towards meeting all goals. Treatment/Activity Tolerance:  [x] Patient able to complete tx  [] Patient limited by fatigue  [] Patient limited by pain  [] Patient limited by other medical complications  [] Other:     Prognosis: [x] Good [] Fair  [] Poor    Patient Requires Follow-up: [x] Yes  [] No    Plan for next treatment session:  Hip strength , balance (dynamic without AD), and endurance     PLAN: See [facility] PT 2 x / week for 6 weeks. [x] Continue per plan of care [] Alter current plan (see comments)  [] Plan of care initiated [] Hold pending MD visit [] Discharge    Electronically signed by:  Marleni Espinoza PT, DPT [id number]        Note: If patient does not return for scheduled/ recommended follow up visits, this note will serve as a discharge from care along with most recent update on progress.

## 2020-12-03 ENCOUNTER — ANESTHESIA (OUTPATIENT)
Dept: ENDOSCOPY | Age: 75
End: 2020-12-03
Payer: MEDICARE

## 2020-12-03 ENCOUNTER — HOSPITAL ENCOUNTER (OUTPATIENT)
Age: 75
Setting detail: OUTPATIENT SURGERY
Discharge: HOME HEALTH | End: 2020-12-03
Attending: INTERNAL MEDICINE | Admitting: INTERNAL MEDICINE
Payer: MEDICARE

## 2020-12-03 ENCOUNTER — ANESTHESIA EVENT (OUTPATIENT)
Dept: ENDOSCOPY | Age: 75
End: 2020-12-03
Payer: MEDICARE

## 2020-12-03 VITALS
DIASTOLIC BLOOD PRESSURE: 83 MMHG | BODY MASS INDEX: 27.38 KG/M2 | HEIGHT: 61 IN | SYSTOLIC BLOOD PRESSURE: 178 MMHG | RESPIRATION RATE: 16 BRPM | HEART RATE: 73 BPM | TEMPERATURE: 98.4 F | WEIGHT: 145 LBS | OXYGEN SATURATION: 100 %

## 2020-12-03 VITALS
DIASTOLIC BLOOD PRESSURE: 73 MMHG | SYSTOLIC BLOOD PRESSURE: 163 MMHG | OXYGEN SATURATION: 99 % | RESPIRATION RATE: 14 BRPM

## 2020-12-03 PROCEDURE — 88305 TISSUE EXAM BY PATHOLOGIST: CPT

## 2020-12-03 PROCEDURE — 2580000003 HC RX 258: Performed by: ANESTHESIOLOGY

## 2020-12-03 PROCEDURE — 2709999900 HC NON-CHARGEABLE SUPPLY: Performed by: INTERNAL MEDICINE

## 2020-12-03 PROCEDURE — 7100000010 HC PHASE II RECOVERY - FIRST 15 MIN: Performed by: INTERNAL MEDICINE

## 2020-12-03 PROCEDURE — 7100000011 HC PHASE II RECOVERY - ADDTL 15 MIN: Performed by: INTERNAL MEDICINE

## 2020-12-03 PROCEDURE — 3700000001 HC ADD 15 MINUTES (ANESTHESIA): Performed by: INTERNAL MEDICINE

## 2020-12-03 PROCEDURE — 3700000000 HC ANESTHESIA ATTENDED CARE: Performed by: INTERNAL MEDICINE

## 2020-12-03 PROCEDURE — 2720000010 HC SURG SUPPLY STERILE: Performed by: INTERNAL MEDICINE

## 2020-12-03 PROCEDURE — 2500000003 HC RX 250 WO HCPCS: Performed by: NURSE ANESTHETIST, CERTIFIED REGISTERED

## 2020-12-03 PROCEDURE — 6360000002 HC RX W HCPCS: Performed by: NURSE ANESTHETIST, CERTIFIED REGISTERED

## 2020-12-03 PROCEDURE — 3609010600 HC COLONOSCOPY POLYPECTOMY SNARE/COLD BIOPSY: Performed by: INTERNAL MEDICINE

## 2020-12-03 RX ORDER — LIDOCAINE HYDROCHLORIDE 20 MG/ML
INJECTION, SOLUTION EPIDURAL; INFILTRATION; INTRACAUDAL; PERINEURAL PRN
Status: DISCONTINUED | OUTPATIENT
Start: 2020-12-03 | End: 2020-12-03 | Stop reason: SDUPTHER

## 2020-12-03 RX ORDER — GLYCOPYRROLATE 0.2 MG/ML
INJECTION INTRAMUSCULAR; INTRAVENOUS PRN
Status: DISCONTINUED | OUTPATIENT
Start: 2020-12-03 | End: 2020-12-03 | Stop reason: SDUPTHER

## 2020-12-03 RX ORDER — PROPOFOL 10 MG/ML
INJECTION, EMULSION INTRAVENOUS PRN
Status: DISCONTINUED | OUTPATIENT
Start: 2020-12-03 | End: 2020-12-03 | Stop reason: SDUPTHER

## 2020-12-03 RX ORDER — MULTIVIT-MIN/IRON/FOLIC ACID/K 18-600-40
1000 CAPSULE ORAL DAILY
COMMUNITY

## 2020-12-03 RX ORDER — SODIUM CHLORIDE 9 MG/ML
INJECTION, SOLUTION INTRAVENOUS CONTINUOUS
Status: DISCONTINUED | OUTPATIENT
Start: 2020-12-03 | End: 2020-12-03 | Stop reason: HOSPADM

## 2020-12-03 RX ADMIN — PROPOFOL 20 MG: 10 INJECTION, EMULSION INTRAVENOUS at 11:22

## 2020-12-03 RX ADMIN — PROPOFOL 20 MG: 10 INJECTION, EMULSION INTRAVENOUS at 10:52

## 2020-12-03 RX ADMIN — PROPOFOL 20 MG: 10 INJECTION, EMULSION INTRAVENOUS at 11:09

## 2020-12-03 RX ADMIN — PROPOFOL 30 MG: 10 INJECTION, EMULSION INTRAVENOUS at 11:12

## 2020-12-03 RX ADMIN — PROPOFOL 30 MG: 10 INJECTION, EMULSION INTRAVENOUS at 11:00

## 2020-12-03 RX ADMIN — LIDOCAINE HYDROCHLORIDE 80 MG: 20 INJECTION, SOLUTION EPIDURAL; INFILTRATION; INTRACAUDAL; PERINEURAL at 10:49

## 2020-12-03 RX ADMIN — PROPOFOL 30 MG: 10 INJECTION, EMULSION INTRAVENOUS at 11:15

## 2020-12-03 RX ADMIN — PROPOFOL 30 MG: 10 INJECTION, EMULSION INTRAVENOUS at 11:06

## 2020-12-03 RX ADMIN — SODIUM CHLORIDE: 9 INJECTION, SOLUTION INTRAVENOUS at 10:45

## 2020-12-03 RX ADMIN — PROPOFOL 30 MG: 10 INJECTION, EMULSION INTRAVENOUS at 11:03

## 2020-12-03 RX ADMIN — PROPOFOL 30 MG: 10 INJECTION, EMULSION INTRAVENOUS at 10:50

## 2020-12-03 RX ADMIN — PROPOFOL 30 MG: 10 INJECTION, EMULSION INTRAVENOUS at 11:18

## 2020-12-03 RX ADMIN — PROPOFOL 20 MG: 10 INJECTION, EMULSION INTRAVENOUS at 10:56

## 2020-12-03 RX ADMIN — GLYCOPYRROLATE 0.2 MG: 0.2 INJECTION, SOLUTION INTRAMUSCULAR; INTRAVENOUS at 10:54

## 2020-12-03 RX ADMIN — SODIUM CHLORIDE: 9 INJECTION, SOLUTION INTRAVENOUS at 10:47

## 2020-12-03 RX ADMIN — PROPOFOL 20 MG: 10 INJECTION, EMULSION INTRAVENOUS at 10:53

## 2020-12-03 RX ADMIN — PROPOFOL 30 MG: 10 INJECTION, EMULSION INTRAVENOUS at 10:58

## 2020-12-03 ASSESSMENT — PAIN SCALES - GENERAL
PAINLEVEL_OUTOF10: 0

## 2020-12-03 NOTE — BRIEF OP NOTE
Brief Postoperative Note - Full Note in Chart Review/Procedures tab       Patient: Tia Hernandez  YOB: 1945  MRN: [id number]    Date of Procedure: 12/3/2020    Pre-Op Diagnosis: POSITIVE COLORECTAL CANCER SCREENING USING COLOGUARD TEST R19.5    Post-Op Diagnosis: Colon polyps       Procedure(s):  COLONOSCOPY POLYPECTOMY SNARE/COLD BIOPSY    Surgeon(s):  Beronica Hernandez MD    Assistant:  * No surgical staff found *    Anesthesia: Monitor Anesthesia Care    Estimated Blood Loss (mL): Minimal    Complications: None    Specimens:   ID Type Source Tests Collected by Time Destination   A : Cecum & Ileocecal valve polyps x 2 Tissue Tissue SURGICAL PATHOLOGY Beronica Hernandez MD 12/3/2020 1108    B : Hepatic flexure polyps x 2 Tissue Tissue SURGICAL PATHOLOGY Beronica Hernandez MD 12/3/2020 1114    C : Transverse colon polyp x 1 Tissue Tissue SURGICAL PATHOLOGY Beronica Hernandez MD 12/3/2020 1121    D : Rectum polyp x 1 Tissue Tissue SURGICAL PATHOLOGY Beronica Hernandez MD 12/3/2020 1123        Implants:  * No implants in log *      Drains: * No LDAs found *    Findings:  1) Large 2 cm sessile polyp in cecum removed with hot snare. Polypectomy site closed with three hemoclips. 2) Multiple 3 mm - 4 mm polyps on IC valve, in hep flexure, transverse colon and rectum removed with cold snare. Rec:  1) Check pathology - patient to call office in 5 days for results. 2) Recall colonoscopy in 1 - 2 years depending on pathology. 3) Resume diet and meds today.     Electronically signed by Beronica Hernandez MD on 12/3/2020 at 11:29 AM

## 2020-12-03 NOTE — ANESTHESIA POSTPROCEDURE EVALUATION
Department of Anesthesiology  Postprocedure Note    Patient: Cata Joseph  MRN: [id number]  YOB: 1945  Date of evaluation: 12/3/2020  Time:  1:22 PM     Procedure Summary     Date:  12/03/20 Room / Location:  [address] [place] [address]    Anesthesia Start:  2380 Anesthesia Stop:  3470    Procedure:  COLONOSCOPY POLYPECTOMY SNARE/COLD BIOPSY (N/A ) Diagnosis:  (POSITIVE COLORECTAL CANCER SCREENING USING COLOGUARD TEST R19.5)    Surgeon:  Alejo Layne MD Responsible Provider:  Martha Smith MD    Anesthesia Type:  MAC ASA Status:  3          Anesthesia Type: MAC    Chemo Phase I: Chemo Score: 10    Chemo Phase II: Chemo Score: 10    Last vitals: Reviewed and per EMR flowsheets.        Anesthesia Post Evaluation    Patient location during evaluation: PACU  Complications: no  Cardiovascular status: hemodynamically stable  Respiratory status: acceptable

## 2020-12-03 NOTE — H&P
[address] [place] [address]    Pre-operative History and Physical    Patient: Toshia Garzon  : 1945  CSN:     History Obtained From:  patient and/or guardian. HISTORY OF PRESENT ILLNESS:    The patient is a 67 y.o. female found to have (+)Cologuard test, here for colonoscopy. Past Medical History:    Past Medical History:   Diagnosis Date    Anxiety     Colon polyp     Headache(784.0)     Hyperlipidemia     Hypertension     Osteoarthritis      Past Surgical History:    Past Surgical History:   Procedure Laterality Date    COLONOSCOPY  8/10    EYE SURGERY Left     CATARACT    KNEE ARTHROSCOPY Left      Medications Prior to Admission:   No current facility-administered medications on file prior to encounter. Current Outpatient Medications on File Prior to Encounter   Medication Sig Dispense Refill    losartan-hydroCHLOROthiazide (HYZAAR) 100-12.5 MG per tablet Take 1 tablet by mouth daily 90 tablet 3    amLODIPine (NORVASC) 5 MG tablet Take 1 tablet by mouth daily (Patient taking differently: Take 5 mg by mouth nightly ) 90 tablet 3    metoprolol tartrate (LOPRESSOR) 25 MG tablet Take 1 tablet by mouth 2 times daily 180 tablet 3    desvenlafaxine succinate (PRISTIQ) 50 MG TB24 extended release tablet Take 1 tablet by mouth daily 90 tablet 3    cetirizine (ZYRTEC) 10 MG tablet Take 1 tablet by mouth nightly 30 tablet 0    atorvastatin (LIPITOR) 10 MG tablet Take 1 tablet by mouth every other day 60 tablet 3    miconazole (MICOTIN) 2 % cream Apply topically 2 times daily. 60 g 0    clobetasol (TEMOVATE) 0.05 % cream Apply to rash or itching skin on body 3 to 4 times daily as directed      mometasone (NASONEX) 50 MCG/ACT nasal spray 2 sprays by Nasal route daily. 1 Inhaler 0    Cholecalciferol (VITAMIN D3) 2000 UNITS CAPS Take 2,000 Units by mouth daily.  naproxen (NAPROSYN) 250 MG tablet Take 500 mg by mouth 2 times daily as needed.       Multiple Vitamins-Minerals (OCUVITE) TABS tablet Take 1 tablet by mouth daily. Allergies:  Hydroxybenzoate; Prednisolone acetate; Tixocortol pivalate; Neosporin [neomycin-polymyxin-gramicidin]; Ace inhibitors; Methylchloroisothiazolinone; and Shingrix [zoster vac recomb adjuvanted]      Social History:   Social History     Tobacco Use    Smoking status: Never Smoker    Smokeless tobacco: Never Used   Substance Use Topics    Alcohol use: Yes     Alcohol/week: 0.0 standard drinks     Comment: occ wine     Family History:   Family History   Problem Relation Age of Onset    Kidney Disease Mother     Heart Disease Father     Kidney Disease Sister     Cancer Sister     Kidney Disease Brother        PHYSICAL EXAM:      Ht 5' 1\" (1.549 m)   Wt 146 lb (66.2 kg)   BMI 27.59 kg/m²  I        Heart:   RRR, normal s1s2    Lungs:  CTA bilat,  Normal effort    Abdomen:   NT, ND      ASA Grade:  ASA 3 - Patient with moderate systemic disease with functional limitations    Mallampati Class:  Class I: Soft palate, uvula, fauces, pillars visible  __________  Class II: Soft palate, uvula, fauces visible  ____X_____   Class III: Soft palate, base of uvula visible  __________  Class IV: Hard palate only visible   __________        ASSESSMENT AND PLAN:    1. Patient is a 67 y.o. female here for colonoscopy with MAC.   2.  Procedure options, risks and benefits reviewed with patient. Patient expresses understanding.      Gini Shah MD  [address]  12/3/2020

## 2020-12-03 NOTE — ANESTHESIA PRE PROCEDURE
Department of Anesthesiology  Preprocedure Note       Name:  Miriam Arroyo   Age:  67 y.o.  :  1945                                          MRN:  [id number]         Date:  12/3/2020      Surgeon: Aylin Adams):  Brianna Ricks MD    Procedure: Procedure(s):  COLONOSCOPY DIAGNOSTIC    Medications prior to admission:   Prior to Admission medications    Medication Sig Start Date End Date Taking? Authorizing Provider   losartan-hydroCHLOROthiazide (HYZAAR) 100-12.5 MG per tablet Take 1 tablet by mouth daily 10/28/20   Isaiah Hernández MD   amLODIPine (NORVASC) 5 MG tablet Take 1 tablet by mouth daily  Patient taking differently: Take 5 mg by mouth nightly  10/28/20   Isaiah Hernández MD   metoprolol tartrate (LOPRESSOR) 25 MG tablet Take 1 tablet by mouth 2 times daily 20   Isaiah Hernández MD   desvenlafaxine succinate (PRISTIQ) 50 MG TB24 extended release tablet Take 1 tablet by mouth daily 20   Isaiah Hernández MD   cetirizine (ZYRTEC) 10 MG tablet Take 1 tablet by mouth nightly 8/3/20   Alina De Souza MD   atorvastatin (LIPITOR) 10 MG tablet Take 1 tablet by mouth every other day 20   Isaiah Hernández MD   miconazole (MICOTIN) 2 % cream Apply topically 2 times daily. 16   Isaiah Hernández MD   clobetasol (TEMOVATE) 0.05 % cream Apply to rash or itching skin on body 3 to 4 times daily as directed 7/14/15   Historical Provider, MD   mometasone (NASONEX) 50 MCG/ACT nasal spray 2 sprays by Nasal route daily. 2/18/15   Isaiah Hernández MD   Cholecalciferol (VITAMIN D3) 2000 UNITS CAPS Take 2,000 Units by mouth daily. Historical Provider, MD   naproxen (NAPROSYN) 250 MG tablet Take 500 mg by mouth 2 times daily as needed. Historical Provider, MD   Multiple Vitamins-Minerals (OCUVITE) TABS tablet Take 1 tablet by mouth daily.       Historical Provider, MD       Current medications:    Current Facility-Administered Medications   Medication Dose Route Frequency Provider Last Rate Last Dose    0.9 % sodium chloride infusion   Intravenous Continuous Martha Smith MD           Allergies: Allergies   Allergen Reactions    Hydroxybenzoate     Prednisolone Acetate     Tixocortol Pivalate     Neosporin [Neomycin-Polymyxin-Gramicidin] Rash     Allergic rash     Ace Inhibitors      Cough, itchy throat    Methylchloroisothiazolinone     Shingrix [Zoster Vac Recomb Adjuvanted]      GBS       Problem List:    Patient Active Problem List   Diagnosis Code    Hypertension I10    Hyperlipidemia E78.5    Osteopenia M85.80    Hx of eczema Z87.2    Chronic idiopathic urticaria L50.1    Moderate mitral regurgitation by prior echocardiogram I34.0    Prediabetes R73.03    Major depressive disorder with single episode, in full remission ([facility]) F32.5    Frequent falls R29.6    SVT (supraventricular tachycardia) ([facility]) I47.1    GBS (Guillain [place] syndrome) ([facility]) G61.0       Past Medical History:        Diagnosis Date    Anxiety     Colon polyp     Headache(784.0)     Hyperlipidemia     Hypertension     Osteoarthritis        Past Surgical History:        Procedure Laterality Date    COLONOSCOPY  8/10    EYE SURGERY Left     CATARACT    KNEE ARTHROSCOPY Left        Social History:    Social History     Tobacco Use    Smoking status: Never Smoker    Smokeless tobacco: Never Used   Substance Use Topics    Alcohol use:  Yes     Alcohol/week: 0.0 standard drinks     Comment: occ wine                                Counseling given: Not Answered      Vital Signs (Current):   Vitals:    11/30/20 1415   Weight: 146 lb (66.2 kg)   Height: 5' 1\" (1.549 m)                                              BP Readings from Last 3 Encounters:   10/28/20 116/64   09/18/20 134/64   08/10/20 114/76       NPO Status:                                                                                 BMI:   Wt Readings from Last 3 Encounters:   11/30/20 146 lb (66.2 kg)   10/28/20 148 lb (67.1 kg)   09/18/20 148 lb (67.1 kg) Body mass index is 27.59 kg/m². CBC:   Lab Results   Component Value Date    WBC 7.0 10/28/2020    RBC 4.03 10/28/2020    HGB 12.5 10/28/2020    HCT 38.0 10/28/2020    MCV 94.2 10/28/2020    RDW 14.9 10/28/2020     10/28/2020       CMP:   Lab Results   Component Value Date     10/28/2020    K 4.3 10/28/2020    K 3.4 07/11/2020    CL 98 10/28/2020    CO2 30 10/28/2020    BUN 17 10/28/2020    CREATININE 0.7 10/28/2020    GFRAA >60 10/28/2020    GFRAA >60 09/17/2012    AGRATIO 1.9 10/28/2020    LABGLOM >60 10/28/2020    LABGLOM 78 11/02/2017    GLUCOSE 74 10/28/2020    PROT 7.0 10/28/2020    PROT 7.0 09/17/2012    CALCIUM 10.1 10/28/2020    BILITOT 0.3 10/28/2020    ALKPHOS 82 10/28/2020    AST 20 10/28/2020    ALT 13 10/28/2020       POC Tests: No results for input(s): POCGLU, POCNA, POCK, POCCL, POCBUN, POCHEMO, POCHCT in the last 72 hours.     Coags:   Lab Results   Component Value Date    PROTIME 10.9 07/13/2020    INR 0.94 07/13/2020    APTT 33.4 07/13/2020       HCG (If Applicable): No results found for: PREGTESTUR, PREGSERUM, HCG, HCGQUANT     ABGs: No results found for: PHART, PO2ART, FBW1HVS, FIB9VHV, BEART, K1UERQSE     Type & Screen (If Applicable):  No results found for: LABABO, LABRH    Drug/Infectious Status (If Applicable):  No results found for: HIV, HEPCAB    COVID-19 Screening (If Applicable):   Lab Results   Component Value Date    COVID19 Not Detected 11/27/2020    COVID19 Not Detected 07/13/2020         Anesthesia Evaluation  Patient summary reviewed and Nursing notes reviewed no history of anesthetic complications:   Airway: Mallampati: II        Dental:          Pulmonary:Negative Pulmonary ROS and normal exam                               Cardiovascular:  Exercise tolerance: good (>4 METS),   (+) hypertension:, valvular problems/murmurs: MR, dysrhythmias: SVT, hyperlipidemia      ECG reviewed  Rhythm: regular    Echocardiogram reviewed               ROS comment: EF 60% Neuro/Psych:   (+) depression/anxiety              ROS comment: GBS GI/Hepatic/Renal:             Endo/Other:    (+) blood dyscrasia: anemia, arthritis:., electrolyte abnormalities, . Abdominal:           Vascular:                                        Anesthesia Plan      MAC     ASA 3       Induction: intravenous. Anesthetic plan and risks discussed with patient. Plan discussed with CRNA.                   Shaan Tyler MD   12/3/2020

## 2020-12-08 ENCOUNTER — HOSPITAL ENCOUNTER (OUTPATIENT)
Dept: PHYSICAL THERAPY | Age: 75
Setting detail: THERAPIES SERIES
Discharge: HOME | End: 2020-12-08
Payer: MEDICARE

## 2020-12-08 PROCEDURE — 97110 THERAPEUTIC EXERCISES: CPT

## 2020-12-08 PROCEDURE — 97112 NEUROMUSCULAR REEDUCATION: CPT

## 2020-12-08 NOTE — FLOWSHEET NOTE
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 Jose Elias Najera  Plan of care signed (Y/N): [x]? Yes   []? No      Date of Patient follow up with Physician:  ?     Progress Report: []  Yes  [x]  No     Date Range for reporting period:  Beginnin20   Endin2020    Progress report due (10 Rx/or 30 days whichever is less): visit #10 or     Recertification due (POC duration/ or 90 days whichever is less): visit #10    Visit # Insurance Allowable Auth required? Date Range   1+ 9/9 + 4/6 1+9+6 [x]  Yes  []  No 20-11/10/20  11/5/20-     Latex Allergy:  [x]NO      []YES  Preferred Language for Healthcare:   [x]English       []other:    Functional Scale:          Date assessed:  TU.69                                               10/6/2020  TU.7                                                 2020    Pain level:  0/10     SUBJECTIVE: Pt reports she is feeling pretty good. Had a good thanksgiving - went to her friends house. Had a procedure last week - feeling a little nauseous intermittently.      OBJECTIVE:     : /68  11/10: pt amb into clinic without AD, stable     :  Strength (0-5) Left Right   Hip flex 4/5 4/5   ext 4+/5 4/5   add 4/5 4/5   abd 4+/5 4+/5   Knee flex 5/5 5/5   ext 5/5 5/5   DF 5/5 5/5   AL 5/5 5/5         10/15: pt 5 mins late; amb into clinic holding cane      RESTRICTIONS/PRECAUTIONS:     Exercises/Interventions:     Therapeutic Exercises (45794) Resistance / level Sets/sec Reps Notes          Nu step  Step one   Bike     TM   Level 1            X 6.5 min    seat 7, arms 8   Seat 16, arms 5  Bike #1, seat 4    IB gastroc stretch   HR/TR  30 sec x2  2X 10 each     Cables SLR x 3  standing marches  3 way hip   Ham curl      Cues to straighten knee    TG mini squats  TG single leg squats     Walk in track  With cane   [facility] level 1.5    TKE at cable column     Cook bridges     Leg ext  Leg abd     Leg press  Ham curl  leg ext        60#  25#   2  2  2x10  x10  x10   Lat band walk     Monster walks forward  Monster walks retro  No UE support, VCs for knee flexion    Cables:  Mid rows  paloff press x 3, with lateral walk out     Squats on BOSU   No UE support    TRX squats  TRX single leg squats   1  1 x10  x10 B                  Therapeutic Activities (54149)       Step up 6' fwd/lat 2 hands assist   Shuttle balance  Step up to DLS  step up to SLS    Stairs:   Forward step up   Forward step downs   1 UE support   1 UE support   Squat and reach for cone on step Reaching as far forward as possible without falling    Walk into TURF:  forward  Retro  Side stepping   carioca    amb through [place], up and down ramp, up and down steps without UE support or AD                         Neuromuscular Re-ed (05037)       AIREX: trunk twists L/R  SLS L/R    marching  squat   tandem  10 secX 10  x3     Taping cones     At  bars:  Tandem balance   SLS  NBOS with med ball rot   Tandem stance with head nods and turns Balance bd A/P and lat taps and hold steady   on airex    On airex 20 sec ea10 ea  No UE support, VCs for tight belly and glutes    Blue wedge balance DF/PF EO/EC  \" EC with head nods and turns  20 sec   x1 each way  x10 each way No UE support, CGA    Hip flexion from 6 inch step   No UE support, able to hold 1-2 sec   Biodex machine:  Random control  Maze control  Limits of stability    Level 11  Level 11  Level 11    x1  x1  x1    2 min, 84%  66%  36%   Light finger tip support to no UE support for all    LEE     48/56   Shuttle: Forward step ups   Lateral step ups   Tandem stance  NBOS with EC        Light B UE support   B UE support  Int UE support  Int UE support    Tandem walk on line 15 feet  x2 CGA          Manual Intervention (02593)                                                     Pt. Education:  11/5: Reassessed goals, discussed progress made and areas remaining for improvement, discussed TUG and LEE scores with pt, encouraged her to continue with walking without AD when she feels safe      -patient educated on diagnosis, prognosis and expectations for rehab  -all patient questions were answered    Home Exercise Program:  12/1/20  Access Code: [email or web address]   URL: [employer]/   Date: 12/01/2020   Prepared by: Kinjal Plasencia pt has bands and can do ex with or without depending upon knee pain    Exercises   Standing Hip Extension with Counter Support - 10 reps - 2 sets - 1x daily - 7x weekly   Standing Hip Abduction with Counter Support - 10 reps - 2 sets - 1x daily - 7x weekly     Access Code: [email or web address]   URL: [employer]/   Date: 09/29/2020   Prepared by: Myrna Asp     Exercises   Clamshell - 10 reps - 2 sets - 1x daily - 7x weekly   Supine Bridge - 10 reps - 2 sets - 1x daily - 7x weekly   Hip Flexion Stretch - 10 reps - 2 sets - 1x daily - 7x weekly   Seated Piriformis Stretch - 10 reps - 2 sets - 1x daily - 7x weekly         Therapeutic Exercise and NMR EXR  [x] (46360) Provided verbal/tactile cueing for activities related to strengthening, flexibility, endurance, ROM for improvements in  [x] LE / Lumbar: LE, proximal hip, and core control with self care, mobility, lifting, ambulation.   [] UE / Cervical: cervical, postural, scapular, scapulothoracic and UE control with self care, reaching, carrying, lifting, house/yardwork, driving, computer work.  [] (13784) Provided verbal/tactile cueing for activities related to improving balance, coordination, kinesthetic sense, posture, motor skill, proprioception to assist with   [] LE / lumbar: LE, proximal hip, and core control in self care, mobility, lifting, ambulation and eccentric single leg control. [] UE / cervical: cervical, scapular, scapulothoracic and UE control with self care, reaching, carrying, lifting, house/yardwork, driving, computer work.   [] (07878) Therapist is in constant attendance of 2 or more patients providing skilled therapy interventions, but not providing any significant amount of measurable one-on-one time to either patient, for improvements in  [] LE / lumbar: LE, proximal hip, and core control in self care, mobility, lifting, ambulation and eccentric single leg control. [] UE / cervical: cervical, scapular, scapulothoracic and UE control with self care, reaching, carrying, lifting, house/yardwork, driving, computer work.      NMR and Therapeutic Activities:    [x] (24808 or 09723) Provided verbal/tactile cueing for activities related to improving balance, coordination, kinesthetic sense, posture, motor skill, proprioception and motor activation to allow for proper function of   [x] LE: / Lumbar core, proximal hip and LE with self care and ADLs  [] UE / Cervical: cervical, postural, scapular, scapulothoracic and UE control with self care, carrying, lifting, driving, computer work.   [] (98377) Gait Re-education- Provided training and instruction to the patient for proper LE, core and proximal hip recruitment and positioning and eccentric body weight control with ambulation re-education including up and down stairs     Home Management Training / Self Care:  [] (47229) Provided self-care/home management training related to activities of daily living and compensatory training, and/or use of adaptive equipment for improvement with: ADLs and compensatory training, meal preparation, safety procedures and instruction in use of adaptive equipment, including bathing, grooming, Treatment Minutes: 45   Total Treatment Minutes: 45     [] EVAL - LOW (85995)   [] EVAL - MOD (82762)  [] EVAL - HIGH (62861)  [] RE-EVAL (08499)  [x] DQ(44257) x  2     [] Ionto  [x] NMR (50954) x 1     [] Vaso  [] Manual (47344) x       [] Ultrasound  [] TA x         [] Mech Traction (10062)  [] Aquatic Therapy x                    [] ES (un) (65820):   [] Home Management Training x  [] ES(attended) (19211)   [] Dry Needling 1-2 muscles (15774):  [] Dry Needling 3+ muscles (869108)  [] Group:      [] Other:     GOALS: Patient stated goal: walk without cane     Therapist goals for Patient:   Short Term Goals: To be achieved in: 2 weeks  1. Independent in HEP and progression per patient tolerance, in order to prevent re-injury. 2. Patient will have a decrease in pain to facilitate improvement in movement, function, and ADLs as indicated by Functional Deficits.     Long Term Goals: To be achieved in: 6 weeks  1. Pt will have LEE score of 50/56 or greater to demo improved dynamic balance. - NOT MET   2. Patient will demonstrate TUG in 10 sec or less to return to community amb speed - NOT MET 11.7 sec   3. Patient will demonstrate an increase in postural awareness and control to allow for proper functional mobility as indicated by patients Functional Deficits. - MET  4. Patient will demonstrate an increase in LE Strength to at least 4+/5 to allow for proper functional mobility as indicated by patients Functional Deficits. - NOT MET    Overall Progression Towards Functional goals/ Treatment Progress Update:  [] Patient is progressing as expected towards functional goals listed. [x] Progression is slowed due to complexities/Impairments listed. [] Progression has been slowed due to co-morbidities.   [] Plan just implemented, too soon to assess goals progression <30days   [] Goals require adjustment due to lack of progress  [] Patient is not progressing as expected and requires additional follow up with physician  [] Other    Persisting Functional Limitations/Impairments:  []Sleeping []Sitting               [x]Standing [x]Transfers        [x]Walking []Kneeling               [x]Stairs [x]Squatting / bending   [x]ADLs [x]Reaching  [x]Lifting  []Housework  [x]Driving []Job related tasks  []Sports/Recreation []Other:        ASSESSMENT: Improved endurance and strength this date. Able to perform balance on biodex with less UE support. Plan to DC after remaining 2 visits to HEP. Pt unsure if she is ready to be done with therapy, but willing to take a break and try things at home. Treatment/Activity Tolerance:  [x] Patient able to complete tx  [] Patient limited by fatigue  [] Patient limited by pain  [] Patient limited by other medical complications  [] Other:     Prognosis: [x] Good [] Fair  [] Poor    Patient Requires Follow-up: [x] Yes  [] No    Plan for next treatment session:  Hip strength , balance (dynamic without AD), and endurance     PLAN: See eval. PT 2 x / week for 6 weeks. [x] Continue per plan of care [] Alter current plan (see comments)  [] Plan of care initiated [] Hold pending MD visit [] Discharge    Electronically signed by:  Nimco Em, PT, DPT         Note: If patient does not return for scheduled/ recommended follow up visits, this note will serve as a discharge from care along with most recent update on progress.

## 2020-12-11 ENCOUNTER — HOSPITAL ENCOUNTER (OUTPATIENT)
Dept: PHYSICAL THERAPY | Age: 75
Setting detail: THERAPIES SERIES
Discharge: HOME | End: 2020-12-11
Payer: MEDICARE

## 2020-12-11 PROCEDURE — 97110 THERAPEUTIC EXERCISES: CPT

## 2020-12-11 NOTE — FLOWSHEET NOTE
[address] 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 Lottie Wagner  Plan of care signed (Y/N): [x]? Yes   []? No      Date of Patient follow up with Physician:  ?     Progress Report: []  Yes  [x]  No     Date Range for reporting period:  Beginnin20   Endin2020    Progress report due (10 Rx/or 30 days whichever is less): visit #10 or     Recertification due (POC duration/ or 90 days whichever is less): visit #10    Visit # Insurance Allowable Auth required? Date Range   1+ 9/9 + 5/ 1+9+6 [x]  Yes  []  No 20-11/10/20  11/5/20-     Latex Allergy:  [x]NO      []YES  Preferred Language for Healthcare:   [x]English       []other:    Functional Scale:          Date assessed:  TU.69                                               10/6/2020  TU.7                                                 2020    Pain level:  0/10     SUBJECTIVE: Pt reports she is doing okay. Was on the phone with her sister so she was late. Is okay with DC at next visit.      OBJECTIVE:     : /68  11/10: pt amb into clinic without AD, stable     :  Strength (0-5) Left Right   Hip flex 4/5 4/5   ext 4+/5 4/5   add 4/5 4/5   abd 4+/5 4+/5   Knee flex 5/5 5/5   ext 5/5 5/5   DF 5/5 5/5   HI 5/5 5/5         10/15: pt 5 mins late; amb into clinic holding cane      RESTRICTIONS/PRECAUTIONS:     Exercises/Interventions:     Therapeutic Exercises (65779) Resistance / level Sets/sec Reps Notes          Nu step  Step one   Bike LEE     48/56   Shuttle: Forward step ups   Lateral step ups   Tandem stance  NBOS with EC        Light B UE support   B UE support  Int UE support  Int UE support    Tandem walk on line CGA          Manual Intervention (89078)                                                     Pt. Education:  11/5: Reassessed goals, discussed progress made and areas remaining for improvement, discussed TUG and LEE scores with pt, encouraged her to continue with walking without AD when she feels safe      -patient educated on diagnosis, prognosis and expectations for rehab  -all patient questions were answered    Home Exercise Program:  12/1/20  Access Code: [email or web address]   URL: [email or web address]   Date: 12/01/2020   Prepared by: Ev Duque   pt has bands and can do ex with or without depending upon knee pain    Exercises   Standing Hip Extension with Counter Support - 10 reps - 2 sets - 1x daily - 7x weekly   Standing Hip Abduction with Counter Support - 10 reps - 2 sets - 1x daily - 7x weekly     Access Code: [email or web address]   URL: [email or web address]   Date: 09/29/2020   Prepared by: Ferol Gift     Exercises   Clamshell - 10 reps - 2 sets - 1x daily - 7x weekly   Supine Bridge - 10 reps - 2 sets - 1x daily - 7x weekly   Hip Flexion Stretch - 10 reps - 2 sets - 1x daily - 7x weekly   Seated Piriformis Stretch - 10 reps - 2 sets - 1x daily - 7x weekly         Therapeutic Exercise and NMR EXR  [x] (56029) Provided verbal/tactile cueing for activities related to strengthening, flexibility, endurance, ROM for improvements in  [x] LE / Lumbar: LE, proximal hip, and core control with self care, mobility, lifting, ambulation.   [] UE / Cervical: cervical, postural, scapular, scapulothoracic and UE control with self care, reaching, carrying, lifting, house/yardwork, driving, computer work.  [] (66837) Provided verbal/tactile cueing for activities related to improving balance, coordination, kinesthetic sense, posture, motor skill, proprioception to assist with   [] LE / lumbar: LE, proximal hip, and core control in self care, mobility, lifting, ambulation and eccentric single leg control. [] UE / cervical: cervical, scapular, scapulothoracic and UE control with self care, reaching, carrying, lifting, house/yardwork, driving, computer work.   [] (35723) Therapist is in constant attendance of 2 or more patients providing skilled therapy interventions, but not providing any significant amount of measurable one-on-one time to either patient, for improvements in  [] LE / lumbar: LE, proximal hip, and core control in self care, mobility, lifting, ambulation and eccentric single leg control. [] UE / cervical: cervical, scapular, scapulothoracic and UE control with self care, reaching, carrying, lifting, house/yardwork, driving, computer work.      NMR and Therapeutic Activities:    [] (78480 or 71416) Provided verbal/tactile cueing for activities related to improving balance, coordination, kinesthetic sense, posture, motor skill, proprioception and motor activation to allow for proper function of   [] LE: / Lumbar core, proximal hip and LE with self care and ADLs  [] UE / Cervical: cervical, postural, scapular, scapulothoracic and UE control with self care, carrying, lifting, driving, computer work.   [] (90021) Gait Re-education- Provided training and instruction to the patient for proper LE, core and proximal hip recruitment and positioning and eccentric body weight control with ambulation re-education including up and down stairs     Home Management Training / Self Care:  [] (26755) Provided self-care/home management training related to activities of daily living and compensatory training, and/or use of adaptive equipment for improvement with: ADLs and compensatory training, meal preparation, safety procedures and instruction in use of adaptive equipment, including bathing, grooming, dressing, personal hygiene, basic household cleaning and chores. Home Exercise Program:    [] (17646) Reviewed/Progressed HEP activities related to strengthening, flexibility, endurance, ROM of   [] LE / Lumbar: core, proximal hip and LE for functional self-care, mobility, lifting and ambulation/stair navigation   [] UE / Cervical: cervical, postural, scapular, scapulothoracic and UE control with self care, reaching, carrying, lifting, house/yardwork, driving, computer work  [] (18321)Reviewed/Progressed HEP activities related to improving balance, coordination, kinesthetic sense, posture, motor skill, proprioception of   [] LE: core, proximal hip and LE for self care, mobility, lifting, and ambulation/stair navigation    [] UE / Cervical: cervical, postural,  scapular, scapulothoracic and UE control with self care, reaching, carrying, lifting, house/yardwork, driving, computer work    Manual Treatments:  PROM / STM / Oscillations-Mobs:  G-I, II, III, IV (PA's, Inf., Post.)  [] (73732) Provided manual therapy to mobilize LE, proximal hip and/or LS spine soft tissue/joints for the purpose of modulating pain, promoting relaxation,  increasing ROM, reducing/eliminating soft tissue swelling/inflammation/restriction, improving soft tissue extensibility and allowing for proper ROM for normal function with   [] LE / lumbar: self care, mobility, lifting and ambulation. [] UE / Cervical: self care, reaching, carrying, lifting, house/yardwork, driving, computer work. Modalities:  [] (81574) Vasopneumatic compression: Utilized vasopneumatic compression to decrease edema / swelling for the purpose of improving mobility and quad tone / recruitment which will allow for increased overall function including but not limited to self-care, transfers, ambulation, and ascending / descending stairs.        Modalities:    11/19: CP to L knee in sitting x 10 min   11/5: CP to B knees in sitting x 10 min     Charges:  Timed Code Treatment Minutes: 39   Total Treatment Minutes: 39 [] EVAL - LOW (57085)   [] EVAL - MOD (04900)  [] EVAL - HIGH (70878)  [] RE-EVAL (07144)  [x] UT(32097) x  3    [] Ionto  [] NMR (09392) x      [] Vaso  [] Manual (23673) x       [] Ultrasound  [] TA x         [] Mech Traction (30914)  [] Aquatic Therapy x                    [] ES (un) (02945):   [] Home Management Training x  [] ES(attended) (74919)   [] Dry Needling 1-2 muscles (89035):  [] Dry Needling 3+ muscles (698627)  [] Group:      [] Other:     GOALS: Patient stated goal: walk without cane     Therapist goals for Patient:   Short Term Goals: To be achieved in: 2 weeks  1. Independent in HEP and progression per patient tolerance, in order to prevent re-injury. 2. Patient will have a decrease in pain to facilitate improvement in movement, function, and ADLs as indicated by Functional Deficits.     Long Term Goals: To be achieved in: 6 weeks  1. Pt will have LEE score of 50/56 or greater to demo improved dynamic balance. - NOT MET   2. Patient will demonstrate TUG in 10 sec or less to return to community amb speed - NOT MET 11.7 sec   3. Patient will demonstrate an increase in postural awareness and control to allow for proper functional mobility as indicated by patients Functional Deficits. - MET  4. Patient will demonstrate an increase in LE Strength to at least 4+/5 to allow for proper functional mobility as indicated by patients Functional Deficits. - NOT MET    Overall Progression Towards Functional goals/ Treatment Progress Update:  [] Patient is progressing as expected towards functional goals listed. [x] Progression is slowed due to complexities/Impairments listed. [] Progression has been slowed due to co-morbidities.   [] Plan just implemented, too soon to assess goals progression <30days   [] Goals require adjustment due to lack of progress  [] Patient is not progressing as expected and requires additional follow up with physician  [] Other    Persisting Functional Limitations/Impairments:  []Sleeping []Sitting               [x]Standing [x]Transfers        [x]Walking []Kneeling               [x]Stairs [x]Squatting / bending   [x]ADLs [x]Reaching  [x]Lifting  []Housework  [x]Driving []Job related tasks  []Sports/Recreation []Other:        ASSESSMENT: Pt able to complete all therex in healthplex without increase in pain with exception of hip abd. Advised pt to let pain be her guide and try to engage core if back starts to hurt, if that doesn't improve pain, then she needs to stop the exercise. Plan to DC at next visit and issue healthplex pass and final HEP. Treatment/Activity Tolerance:  [x] Patient able to complete tx  [] Patient limited by fatigue  [] Patient limited by pain  [] Patient limited by other medical complications  [] Other:     Prognosis: [x] Good [] Fair  [] Poor    Patient Requires Follow-up: [x] Yes  [] No    Plan for next treatment session:  Hip strength , balance (dynamic without AD), and endurance     PLAN: See eval. PT 2 x / week for 6 weeks. [x] Continue per plan of care [] Alter current plan (see comments)  [] Plan of care initiated [] Hold pending MD visit [] Discharge    Electronically signed by:  Bin Castellanos, PT, DPT         Note: If patient does not return for scheduled/ recommended follow up visits, this note will serve as a discharge from care along with most recent update on progress.

## 2020-12-15 ENCOUNTER — HOSPITAL ENCOUNTER (OUTPATIENT)
Dept: PHYSICAL THERAPY | Age: 75
Setting detail: THERAPIES SERIES
Discharge: HOME | End: 2020-12-15
Payer: MEDICARE

## 2020-12-15 PROCEDURE — 97110 THERAPEUTIC EXERCISES: CPT

## 2020-12-15 NOTE — DISCHARGE SUMMARY
[address] [facility] Physical Therapy  Phone: [phone]   Fax: [phone]     Physical Therapy Discharge Summary    Dear Dr. Georgia Bradley,    We had the pleasure of treating the following patient for physical therapy services at [facility]. A summary of our findings can be found in the discharge summary below. If you have any questions or concerns regarding these findings, please do not hesitate to contact me at the office phone number checked above. Thank you for the referral.     Physician Signature:________________________________Date:__________________  By signing above (or electronic signature), therapists plan is approved by physician      Functional Outcome: TUG 8 sec    Overall Response to Treatment:   [x]Patient is responding well to treatment and improvement is noted with regards to goals   []Patient should continue to improve in reasonable time if they continue HEP   []Patient has plateaued and is no longer responding to skilled PT intervention    []Patient is getting worse and would benefit from return to referring MD   []Patient unable to adhere to initial POC   []Other:     Date range of Visits: 2020-  Total Visits: 16    Recommendation:    [x] Discharge to [facility]. Follow up with MD PRN. 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 Allie Mayo  Plan of care signed (Y/N): [x]? Yes   []?   No      Date of Patient follow up with Physician:  ?     Progress Report: [x]  Yes  [x]  No     Date Range for reporting period:  Beginnin2020 level 1.5    TKE at cable column     Cook bridges     Leg ext  Leg abd     Leg press  Ham curl  leg ext     Lat band walk     Monster walks forward  Monster walks retro  No UE support, VCs for knee flexion    Cables:  Mid rows  paloff press x 3, with lateral walk out     Squats on BOSU   No UE support    TRX squats  TRX single leg squats      [facility]plex:  Ascend/descend 2 flights of stairs    Elliptical  Hip abd   Hip add  Chest press  Shoulder press  Ham curl  Leg ext  Leg press                Therapeutic Activities (17151)       Step up 6' fwd/lat 2 hands assist   Shuttle balance  Step up to DLS  step up to SLS    Stairs: Forward step up   Forward step downs   1 UE support   1 UE support   Squat and reach for cone on step Reaching as far forward as possible without falling    Walk into TURF:  forward  Retro  Side stepping   carioca    amb through healthplex, up and down ramp, up and down steps without UE support or AD                         Neuromuscular Re-ed (73653)       AIREX: trunk twists L/R  SLS L/R    marching  squat   tandem    Taping cones     At  bars:  Tandem balance   SLS  NBOS with med ball rot   Tandem stance with head nods and turns Balance bd A/P and lat taps and hold steady   No UE support, VCs for tight belly and glutes    Blue wedge balance DF/PF EO/EC  \" EC with head nods and turns No UE support, CGA    Hip flexion from 6 inch step  No UE support, able to hold 1-2 sec   Biodex machine:  Random control  Maze control  Limits of stability    Light finger tip support to no UE support for all    LEE    x1 51/56   Shuttle:   Forward step ups   Lateral step ups   Tandem stance  NBOS with EC        Light B UE support   B UE support  Int UE support  Int UE support    Tandem walk on line CGA          Manual Intervention (90141)                                                     Pt. Education:  12/15: Reassessed goals, discussed progress made and areas remaining for improvement - [facility] and LE for functional self-care, mobility, lifting and ambulation/stair navigation   [x] UE / Cervical: cervical, postural, scapular, scapulothoracic and UE control with self care, reaching, carrying, lifting, house/yardwork, driving, computer work  [] (09262)Reviewed/Progressed HEP activities related to improving balance, coordination, kinesthetic sense, posture, motor skill, proprioception of   [] LE: core, proximal hip and LE for self care, mobility, lifting, and ambulation/stair navigation    [] UE / Cervical: cervical, postural,  scapular, scapulothoracic and UE control with self care, reaching, carrying, lifting, house/yardwork, driving, computer work    Manual Treatments:  PROM / STM / Oscillations-Mobs:  G-I, II, III, IV (PA's, Inf., Post.)  [] (54153) Provided manual therapy to mobilize LE, proximal hip and/or LS spine soft tissue/joints for the purpose of modulating pain, promoting relaxation,  increasing ROM, reducing/eliminating soft tissue swelling/inflammation/restriction, improving soft tissue extensibility and allowing for proper ROM for normal function with   [] LE / lumbar: self care, mobility, lifting and ambulation. [] UE / Cervical: self care, reaching, carrying, lifting, house/yardwork, driving, computer work. Modalities:  [] (27083) Vasopneumatic compression: Utilized vasopneumatic compression to decrease edema / swelling for the purpose of improving mobility and quad tone / recruitment which will allow for increased overall function including but not limited to self-care, transfers, ambulation, and ascending / descending stairs.        Modalities:    11/19: CP to L knee in sitting x 10 min   11/5: CP to B knees in sitting x 10 min     Charges:  Timed Code Treatment Minutes: 40   Total Treatment Minutes: 40     [] EVAL - LOW (09408)   [] EVAL - MOD (70654)  [] EVAL - HIGH (47720)  [] RE-EVAL (55774)  [x] HR(68023) x  3    [] Ionto  [] NMR (14395) x      [] Vaso  [] Manual bending   [x]ADLs [x]Reaching  [x]Lifting  []Housework  [x]Driving []Job related tasks  []Sports/Recreation []Other:        ASSESSMENT: Pt is a 66 y/o female who presented to therapy following GBS dx. She has been seen for 16 visits as of this date and has made great progress. She has improved her standing and walking endurance, LE strength, walking speed, balance and overall conditioning. She has returned to recreational walking and is charlene to complete ADLs without issue. She has met all of her goals and will now be discharged to an independent HEP. She was given a comprehensive HEP focused on strength, flexibility, and endurance and given a 30 day healthplex pass. Pt advised to follow up with MD if needed. Treatment/Activity Tolerance:  [x] Patient able to complete tx  [] Patient limited by fatigue  [] Patient limited by pain  [] Patient limited by other medical complications  [] Other:     Prognosis: [x] Good [] Fair  [] Poor    Patient Requires Follow-up: [] Yes  [x] No    Plan for next treatment session:  Hip strength , balance (dynamic without AD), and endurance     PLAN:    [] Continue per plan of care [] Alter current plan (see comments)  [] Plan of care initiated [] Hold pending MD visit [x] Discharge    Electronically signed by:  Ricardo Navarro, PT, DPT         Note: If patient does not return for scheduled/ recommended follow up visits, this note will serve as a discharge from care along with most recent update on progress.

## 2021-01-13 ENCOUNTER — TELEPHONE (OUTPATIENT)
Dept: FAMILY MEDICINE CLINIC | Age: 76
End: 2021-01-13

## 2021-01-13 NOTE — TELEPHONE ENCOUNTER
Pt informed and no question.   Pt stated that she will call the pharmacist and she may also her neuroglist

## 2021-01-13 NOTE — TELEPHONE ENCOUNTER
Have her discuss with her pharmacist  Be sure to tell them she had Benin Barré with her Shingrix vaccine a few months ago

## 2021-02-05 ENCOUNTER — NURSE TRIAGE (OUTPATIENT)
Dept: OTHER | Facility: CLINIC | Age: 76
End: 2021-02-05

## 2021-02-05 NOTE — TELEPHONE ENCOUNTER
Reason for Disposition   COVID-19 vaccine, Frequently Asked Questions (FAQs)    Answer Assessment - Initial Assessment Questions  1. MAIN CONCERN OR SYMPTOM:  \"What is your main concern right now? \" \"What question do you have? \" \"What's the main symptom you're worried about? \" (e.g., fever, pain, redness, swelling)      \"Can I get the Covid vaccine if I have had Guillain-[place] syndrome?\"    2. VACCINE: \"What vaccination did you receive? \" \"Is this your first or second shot? \" (e.g., none; Jeppie Prophet, other)      NA    3. SYMPTOM ONSET: \"When did the *No Answer* begin? \" (e.g., not relevant; hours, days)       NA    4. SYMPTOM SEVERITY: \"How bad is it? \"       NA    5. FEVER: \"Is there a fever? \" If so, ask: \"What is it, how was it measured, and when did it start? \"       NA    6. PAST REACTIONS: \"Have you reacted to immunizations before? \" If so, ask: \"What happened? \"      Yes, patient got GBS after Shingrex vaccine last summer. 7. OTHER SYMPTOMS: \"Do you have any other symptoms? \"       NA    Protocols used: CORONAVIRUS (COVID-19) VACCINE QUESTIONS AND REACTIONS-ADULT-[place]    Call received on [address]. Brief description of triage: Patient asking if she is able to receive Covid-19 vaccine if she developed Guillain-[place] Syndrome last summer after receiving the Shingles vaccine. Patient reports her PCP told her to ask the pharmacist who advised patient not to receive any vaccines. Advised patient to follow the advice of her pharmacist; pt v/u. Care advice provided. Recommended using local department of health website for testing locations and up to date information. Caller verbalizes understanding, denies any other questions or concerns; instructed to call back for any new or worsening symptoms.

## 2021-02-08 ENCOUNTER — TELEPHONE (OUTPATIENT)
Dept: FAMILY MEDICINE CLINIC | Age: 76
End: 2021-02-08

## 2021-02-08 NOTE — TELEPHONE ENCOUNTER
Pt states she gargled with salt and water. She will call tomorrow to schedule VV if she is not feeling any better.

## 2021-03-10 RX ORDER — ATORVASTATIN CALCIUM 10 MG/1
10 TABLET, FILM COATED ORAL EVERY OTHER DAY
Qty: 45 TABLET | Refills: 3 | Status: SHIPPED | OUTPATIENT
Start: 2021-03-10 | End: 2021-08-02 | Stop reason: SDUPTHER

## 2021-03-10 RX ORDER — ATORVASTATIN CALCIUM 10 MG/1
10 TABLET, FILM COATED ORAL EVERY OTHER DAY
Qty: 60 TABLET | Refills: 3 | Status: SHIPPED | OUTPATIENT
Start: 2021-03-10 | End: 2021-03-10

## 2021-03-10 NOTE — TELEPHONE ENCOUNTER
Medication and Quantity requested: atorvastatin (LIPITOR) 10 MG tablet  #45     Last Visit  10/28/20    Pharmacy and phone number updated in EPIC:  Yes, [facility]

## 2021-03-10 NOTE — TELEPHONE ENCOUNTER
Medication:   Requested Prescriptions     Pending Prescriptions Disp Refills    atorvastatin (LIPITOR) 10 MG tablet 60 tablet 3     Sig: Take 1 tablet by mouth every other day       Last Filled:  02/18/2020 #60 3rf     Patient Phone Number: [phone] (home)     Last appt: 10/28/2020   Next appt: Visit date not found    Last Lipid:   Lab Results   Component Value Date    CHOL 181 06/18/2020    TRIG 82 06/18/2020    HDL 79 06/18/2020    HDL 79 04/14/2011    LDLCALC 86 06/18/2020

## 2021-03-22 ENCOUNTER — TELEPHONE (OUTPATIENT)
Dept: FAMILY MEDICINE CLINIC | Age: 76
End: 2021-03-22

## 2021-03-22 NOTE — TELEPHONE ENCOUNTER
Pt notified, pt asked for Norberto Del Cid to contact her to discuss further.  Pt also wants to ask if Dr. Lady Ricci would like pt to have Covid-19 vaccine

## 2021-03-22 NOTE — TELEPHONE ENCOUNTER
No labs for Delonte Russell    I tend to agree with the pharmacist that she should not risk  getting vaccine at this time/not enough information

## 2021-03-22 NOTE — TELEPHONE ENCOUNTER
Pt is asking if there is any labs that can be done for Guillain [place]? Also, pt has asked different pharmacist about the Covid vaccine and they have told her no, due to not enough information if it would cause her problems due to GB.

## 2021-03-22 NOTE — TELEPHONE ENCOUNTER
Patient should check with pharmacist about getting Covid vaccine because she had a Guillain [place] reaction when she took the new shingles vaccine

## 2021-04-14 ENCOUNTER — NURSE TRIAGE (OUTPATIENT)
Dept: OTHER | Facility: CLINIC | Age: 76
End: 2021-04-14

## 2021-04-14 NOTE — TELEPHONE ENCOUNTER
Pt is scheduled with Dr. Osbaldo Henson on Monday, pt says that she is using ice and elevating her knee and taking something for it.   Pt will be okay to wait and be seen by Dr. Osbaldo Henson

## 2021-04-14 NOTE — TELEPHONE ENCOUNTER
Received call from [address] [facility] Lyric with Red Flag Complaint. Brief description of triage: Left knee pain    Triage indicates for patient to be seen today or tomorrow. Care advice provided, patient verbalizes understanding; denies any other questions or concerns; instructed to call back for any new or worsening symptoms. Writer provided warm transfer to [facility] for appointment scheduling. Attention Provider: Thank you for allowing me to participate in the care of your patient. The patient was connected to triage in response to information provided to the [facility]. Please do not respond through this encounter as the response is not directed to a shared pool. Reason for Disposition   Patient wants to be seen    Answer Assessment - Initial Assessment Questions  1. ONSET: \"When did the pain start? \"    4/13  Left knee pain  2. LOCATION: \"Where is the pain located? \"     Left knee pain  3. PAIN: \"How bad is the pain? \"    (Scale 1-10; or mild, moderate, severe)    -  MILD (1-3): doesn't interfere with normal activities     -  MODERATE (4-7): interferes with normal activities (e.g., work or school) or awakens from sleep, limping     -  SEVERE (8-10): excruciating pain, unable to do any normal activities, unable to walk    9/10 yesterday, no pain today after Naproxen   4. WORK OR EXERCISE: \"Has there been any recent work or exercise that involved this part of the body? \"   \"doesn't think she over did it, not sure why its getting worse\"  5. CAUSE: \"What do you think is causing the leg pain? \"   \"competed 6 months of therapy, s/p GBS\"  6. OTHER SYMPTOMS: \"Do you have any other symptoms? \" (e.g., chest pain, back pain, breathing difficulty, swelling, rash, fever, numbness, weakness)    Swelling,no redness. 7. PREGNANCY: \"Is there any chance you are pregnant? \" \"When was your last menstrual period? \"     NA    Protocols used: LEG PAIN-ADULT-[place]

## 2021-04-18 NOTE — PROGRESS NOTES
Dg Brand is a 67 y.o. female. HPI:  Here for knee pain   Has been working with a  to get herself stronger status post guillane barre , doing well but had knee pain which is calm down with ice and Advil and Voltaren gel  Guillain [place]il[place] from shingrix vaccine in the past  Been counseled not to receive Covid vaccine  Needing reassurance on who she can see where she can go etc.  Was all reviewed with her in detail    C/o of  lower leg edema , rcommend  compression stockings for her    Meds, vitamins and allergies reviewed with pt    Wt Readings from Last 3 Encounters:   04/19/21 154 lb (69.9 kg)   12/03/20 145 lb (65.8 kg)   10/28/20 148 lb (67.1 kg)       REVIEW OF SYSTEMS:   CONSTITUTIONAL: See history of present illness,   Weight noted   HEENT: No new vision difficulties or ringing in the ears. RESPIRATORY: No new SOB, PND, orthopnea or cough. CARDIOVASCULAR: no CP, palpitations or SOB with exertion  GI: No nausea, vomiting, diarrhea, constipation, abdominal pain or changes in bowel habits. : No urinary frequency, urgency, incontinence hematuria or dysuria. SKIN: No cyanosis or skin lesions. MUSCULOSKELETAL: See HPI  NEUROLOGICAL: No syncope or TIA-like symptoms. PSYCHIATRIC: No anxiety, insomnia or depression     Allergies   Allergen Reactions    Hydroxybenzoate     Prednisolone Acetate     Tixocortol Pivalate     Neosporin [Neomycin-Polymyxin-Gramicidin] Rash     Allergic rash     Ace Inhibitors      Cough, itchy throat    Methylchloroisothiazolinone     Shingrix [Zoster Vac Recomb Adjuvanted]      GBS       Prior to Visit Medications    Medication Sig Taking?  Authorizing Provider   atorvastatin (LIPITOR) 10 MG tablet TAKE 1 TABLET BY MOUTH EVERY OTHER DAY Yes Figueroa Moore MD   vitamin D (D3-1000) 25 MCG (1000 UT) TABS tablet Take 1,000 Units by mouth daily Yes Historical Provider, MD   Calcium Citrate-Vitamin D (CALCIUM + D PO) Take by mouth 2 times daily Yes Historical Provider, MD   losartan-hydroCHLOROthiazide (HYZAAR) 100-12.5 MG per tablet Take 1 tablet by mouth daily Yes Dayana Landa MD   amLODIPine (NORVASC) 5 MG tablet Take 1 tablet by mouth daily  Patient taking differently: Take 5 mg by mouth nightly  Yes Dayana Landa MD   metoprolol tartrate (LOPRESSOR) 25 MG tablet Take 1 tablet by mouth 2 times daily Yes Dayana Landa MD   desvenlafaxine succinate (PRISTIQ) 50 MG TB24 extended release tablet Take 1 tablet by mouth daily Yes Dayana Landa MD   cetirizine (ZYRTEC) 10 MG tablet Take 1 tablet by mouth nightly Yes Yonathan James MD   clobetasol (TEMOVATE) 0.05 % cream Apply to rash or itching skin on body 3 to 4 times daily as directed Yes Historical Provider, MD   mometasone (NASONEX) 50 MCG/ACT nasal spray 2 sprays by Nasal route daily. Yes Dayana Landa MD   naproxen (NAPROSYN) 250 MG tablet Take 500 mg by mouth 2 times daily as needed. Yes Historical Provider, MD   Multiple Vitamins-Minerals (OCUVITE) TABS tablet Take 1 tablet by mouth daily. Yes Historical Provider, MD       Past Medical History:   Diagnosis Date    Anxiety     Colon polyp     Guillain Barré syndrome ([address]     Headache(784.0)     Hyperlipidemia     Hypertension     Mitral regurgitation     Osteoarthritis     SVT [facility]supra[facility]ricular tachycardia) ([facility])        Social History     Tobacco Use    Smoking status: Never Smoker    Smokeless tobacco: Never Used   Substance Use Topics    Alcohol use:  Yes     Alcohol/week: 1.0 standard drinks     Types: 1 Glasses of wine per week     Comment: rarely       Family History   Problem Relation Age of Onset    Kidney Disease Mother     Heart Disease Father     Kidney Disease Sister     Cancer Sister     Kidney Disease Brother        OBJECTIVE:  /76   Pulse 63   Temp 97.1 °F (36.2 °C)   Ht 5' 1\" (1.549 m)   Wt 154 lb (69.9 kg)   SpO2 97%   BMI 29.10 kg/m²   GEN:  in NAD  HEENT:  NCAT, TMs:normal and throat: clear  NECK:  Supple without adenopathy. CV:  Regular rate and rhythm, S1 and S2 normal, no murmurs, clicks  PULM:  Chest is clear, no wheezing ,  symmetric air entry throughout both lung fields. ABD: Soft, NT  EXT: No rash or edema  NEURO: Alert oriented ×3, nonfocal     ASSESSMENT/PLAN:  1. Primary osteoarthritis of left knee  Avoid heavy workouts  Ice, Advil, Voltaren gel  2. Lower leg edema  Compression stockings/thigh-high at [facility] pharmacy  - Compression Stocking    3. Breast cancer screening by mammogram  Screen  - RICHY DIGITAL SCREENING AUGMENTED BILATERAL; Future    4. GBS (Guillain [place] syndrome) ([facility]  Doing well continue exercise    5. Major depressive disorder with single episode, in full remission ([facility]  Stable continue Pristiq    6.  SVT (supraventricular tachycardia) ([facility])  Stable continue medication      30 Total Minutes spent pre charting (reviewing problem list, meds, any test results, consultant and hospital notes ) and  obtaining present visit history, performing appropriate medical exam/evaluation, counseling and educating the patient (and family), ordering medications ,tests, and procedures as needed, refilling medication(s), placing referral(s) when needed in addition to coordinating care for this patient and documenting in electronic health record

## 2021-04-19 ENCOUNTER — OFFICE VISIT (OUTPATIENT)
Dept: FAMILY MEDICINE CLINIC | Age: 76
End: 2021-04-19
Payer: MEDICARE

## 2021-04-19 VITALS
TEMPERATURE: 97.1 F | HEART RATE: 63 BPM | DIASTOLIC BLOOD PRESSURE: 76 MMHG | SYSTOLIC BLOOD PRESSURE: 118 MMHG | OXYGEN SATURATION: 97 % | WEIGHT: 154 LBS | HEIGHT: 61 IN | BODY MASS INDEX: 29.07 KG/M2

## 2021-04-19 DIAGNOSIS — R60.0 LOWER LEG EDEMA: ICD-10-CM

## 2021-04-19 DIAGNOSIS — Z12.31 BREAST CANCER SCREENING BY MAMMOGRAM: ICD-10-CM

## 2021-04-19 DIAGNOSIS — G61.0 GBS (GUILLAIN BARRE SYNDROME) (HCC): ICD-10-CM

## 2021-04-19 DIAGNOSIS — F32.5 MAJOR DEPRESSIVE DISORDER WITH SINGLE EPISODE, IN FULL REMISSION (HCC): ICD-10-CM

## 2021-04-19 DIAGNOSIS — I47.1 SVT (SUPRAVENTRICULAR TACHYCARDIA) (HCC): ICD-10-CM

## 2021-04-19 DIAGNOSIS — M17.12 PRIMARY OSTEOARTHRITIS OF LEFT KNEE: Primary | ICD-10-CM

## 2021-04-19 PROCEDURE — 1123F ACP DISCUSS/DSCN MKR DOCD: CPT | Performed by: FAMILY MEDICINE

## 2021-04-19 PROCEDURE — 1090F PRES/ABSN URINE INCON ASSESS: CPT | Performed by: FAMILY MEDICINE

## 2021-04-19 PROCEDURE — 4040F PNEUMOC VAC/ADMIN/RCVD: CPT | Performed by: FAMILY MEDICINE

## 2021-04-19 PROCEDURE — 99214 OFFICE O/P EST MOD 30 MIN: CPT | Performed by: FAMILY MEDICINE

## 2021-04-19 PROCEDURE — G8427 DOCREV CUR MEDS BY ELIG CLIN: HCPCS | Performed by: FAMILY MEDICINE

## 2021-04-19 PROCEDURE — G8417 CALC BMI ABV UP PARAM F/U: HCPCS | Performed by: FAMILY MEDICINE

## 2021-04-19 PROCEDURE — 1036F TOBACCO NON-USER: CPT | Performed by: FAMILY MEDICINE

## 2021-04-19 PROCEDURE — G8399 PT W/DXA RESULTS DOCUMENT: HCPCS | Performed by: FAMILY MEDICINE

## 2021-06-03 ENCOUNTER — HOSPITAL ENCOUNTER (OUTPATIENT)
Dept: WOMENS IMAGING | Age: 76
Discharge: HOME | End: 2021-06-03
Payer: MEDICARE

## 2021-06-03 DIAGNOSIS — Z12.31 BREAST CANCER SCREENING BY MAMMOGRAM: ICD-10-CM

## 2021-06-03 PROCEDURE — 77067 SCR MAMMO BI INCL CAD: CPT

## 2021-06-07 ENCOUNTER — TELEPHONE (OUTPATIENT)
Dept: FAMILY MEDICINE CLINIC | Age: 76
End: 2021-06-07

## 2021-06-07 NOTE — TELEPHONE ENCOUNTER
Patient  asking if Dr. Danley Galeazzi still wants her to wait on having Covid Vaccine? Patient wants to know if she can travel by plane? Not overseas  Patient is still feeling tired and should she travel at all?   Contact patient

## 2021-06-08 NOTE — TELEPHONE ENCOUNTER
Offer appointment with Dr. Cammie Sutton on this Friday at noon or change video visit slot to in person slot for her on Friday

## 2021-06-10 NOTE — PROGRESS NOTES
Adrian Lynn is a 67 y.o. female. HPI:  For blood pressure check and lab order  Guillain-Barré syndrome from Shingrix  Has Been counseled not to get any further vaccine  Misses her family terribly, wondering how to travel and if safe    Meds, vitamins and allergies reviewed with pt    Wt Readings from Last 3 Encounters:   06/11/21 153 lb 6 oz (69.6 kg)   04/19/21 154 lb (69.9 kg)   12/03/20 145 lb (65.8 kg)       REVIEW OF SYSTEMS:   CONSTITUTIONAL: See history of present illness,   Weight noted   HEENT: No new vision difficulties or ringing in the ears. RESPIRATORY: No new SOB, PND, orthopnea or cough. CARDIOVASCULAR: no CP, palpitations or SOB with exertion  GI: No nausea, vomiting, diarrhea, constipation, abdominal pain or changes in bowel habits. : No urinary frequency, urgency, incontinence hematuria or dysuria. SKIN: No cyanosis or skin lesions. MUSCULOSKELETAL: No new muscle or joint pain. NEUROLOGICAL: No syncope or TIA-like symptoms. PSYCHIATRIC: No anxiety, insomnia or depression     Allergies   Allergen Reactions    Hydroxybenzoate     Prednisolone Acetate     Tixocortol Pivalate     Neosporin [Neomycin-Polymyxin-Gramicidin] Rash     Allergic rash     Ace Inhibitors      Cough, itchy throat    Methylchloroisothiazolinone     Shingrix [Zoster Vac Recomb Adjuvanted]      GBS       Prior to Visit Medications    Medication Sig Taking?  Authorizing Provider   atorvastatin (LIPITOR) 10 MG tablet TAKE 1 TABLET BY MOUTH EVERY OTHER DAY Yes Goldy Goodwin MD   vitamin D (D3-1000) 25 MCG (1000 UT) TABS tablet Take 1,000 Units by mouth daily Yes Historical Provider, MD   Calcium Citrate-Vitamin D (CALCIUM + D PO) Take by mouth 2 times daily Yes Historical Provider, MD   losartan-hydroCHLOROthiazide (HYZAAR) 100-12.5 MG per tablet Take 1 tablet by mouth daily Yes Goldy Goodwin MD   amLODIPine (NORVASC) 5 MG tablet Take 1 tablet by mouth daily  Patient taking differently: Take 5 mg by

## 2021-06-11 ENCOUNTER — OFFICE VISIT (OUTPATIENT)
Dept: FAMILY MEDICINE CLINIC | Age: 76
End: 2021-06-11
Payer: MEDICARE

## 2021-06-11 VITALS
WEIGHT: 153.38 LBS | BODY MASS INDEX: 28.98 KG/M2 | DIASTOLIC BLOOD PRESSURE: 80 MMHG | HEART RATE: 59 BPM | OXYGEN SATURATION: 97 % | SYSTOLIC BLOOD PRESSURE: 112 MMHG

## 2021-06-11 DIAGNOSIS — G61.0 GBS (GUILLAIN BARRE SYNDROME) (HCC): ICD-10-CM

## 2021-06-11 DIAGNOSIS — R73.03 PREDIABETES: ICD-10-CM

## 2021-06-11 DIAGNOSIS — E78.2 MIXED HYPERLIPIDEMIA: ICD-10-CM

## 2021-06-11 DIAGNOSIS — I10 ESSENTIAL HYPERTENSION: Primary | ICD-10-CM

## 2021-06-11 DIAGNOSIS — Z11.59 NEED FOR HEPATITIS C SCREENING TEST: ICD-10-CM

## 2021-06-11 DIAGNOSIS — F32.5 MAJOR DEPRESSIVE DISORDER WITH SINGLE EPISODE, IN FULL REMISSION (HCC): ICD-10-CM

## 2021-06-11 PROCEDURE — G8399 PT W/DXA RESULTS DOCUMENT: HCPCS | Performed by: FAMILY MEDICINE

## 2021-06-11 PROCEDURE — 99214 OFFICE O/P EST MOD 30 MIN: CPT | Performed by: FAMILY MEDICINE

## 2021-06-11 PROCEDURE — 4040F PNEUMOC VAC/ADMIN/RCVD: CPT | Performed by: FAMILY MEDICINE

## 2021-06-11 PROCEDURE — 1123F ACP DISCUSS/DSCN MKR DOCD: CPT | Performed by: FAMILY MEDICINE

## 2021-06-11 PROCEDURE — 1090F PRES/ABSN URINE INCON ASSESS: CPT | Performed by: FAMILY MEDICINE

## 2021-06-11 PROCEDURE — 1036F TOBACCO NON-USER: CPT | Performed by: FAMILY MEDICINE

## 2021-06-11 PROCEDURE — G8427 DOCREV CUR MEDS BY ELIG CLIN: HCPCS | Performed by: FAMILY MEDICINE

## 2021-06-11 PROCEDURE — G8417 CALC BMI ABV UP PARAM F/U: HCPCS | Performed by: FAMILY MEDICINE

## 2021-06-11 SDOH — ECONOMIC STABILITY: FOOD INSECURITY: WITHIN THE PAST 12 MONTHS, THE FOOD YOU BOUGHT JUST DIDN'T LAST AND YOU DIDN'T HAVE MONEY TO GET MORE.: NEVER TRUE

## 2021-06-11 SDOH — ECONOMIC STABILITY: FOOD INSECURITY: WITHIN THE PAST 12 MONTHS, YOU WORRIED THAT YOUR FOOD WOULD RUN OUT BEFORE YOU GOT MONEY TO BUY MORE.: NEVER TRUE

## 2021-06-11 ASSESSMENT — SOCIAL DETERMINANTS OF HEALTH (SDOH): HOW HARD IS IT FOR YOU TO PAY FOR THE VERY BASICS LIKE FOOD, HOUSING, MEDICAL CARE, AND HEATING?: NOT HARD AT ALL

## 2021-07-12 ENCOUNTER — TELEPHONE (OUTPATIENT)
Dept: FAMILY MEDICINE CLINIC | Age: 76
End: 2021-07-12

## 2021-07-13 NOTE — TELEPHONE ENCOUNTER
Pt informed. Pt will check with pharmacy about side effects.   Pt will call office if she has any concerns or questions

## 2021-07-13 NOTE — TELEPHONE ENCOUNTER
Pt informed of results. Pt has question about her  LDL being High, what can she do to help get her numbers down. Pt is taking Lipitor every other day. Please advise.

## 2021-08-02 RX ORDER — ATORVASTATIN CALCIUM 10 MG/1
10 TABLET, FILM COATED ORAL EVERY OTHER DAY
Qty: 45 TABLET | Refills: 3 | Status: SHIPPED | OUTPATIENT
Start: 2021-08-02 | End: 2022-08-01 | Stop reason: SDUPTHER

## 2021-08-02 NOTE — TELEPHONE ENCOUNTER
Medication and Quantity requested: atorvastatin (LIPITOR) 10 MG tablet         Last Visit  6/11/21    Pharmacy and phone number updated in EPIC:  Yes    [facility]

## 2021-08-02 NOTE — TELEPHONE ENCOUNTER
Medication:   Requested Prescriptions     Pending Prescriptions Disp Refills    atorvastatin (LIPITOR) 10 MG tablet 45 tablet 3     Sig: Take 1 tablet by mouth every other day       Last Filled:  03/10/2021    Patient Phone Number: [phone] (home)     Last appt: 6/11/2021   Next appt: Visit date not found    Last Lipid:   Lab Results   Component Value Date    CHOL 191 07/09/2021    TRIG 120 07/09/2021    HDL 65 07/09/2021    HDL 79 04/14/2011    [address] 105 07/09/2021

## 2021-08-04 ENCOUNTER — TELEPHONE (OUTPATIENT)
Dept: FAMILY MEDICINE CLINIC | Age: 76
End: 2021-08-04

## 2021-08-04 NOTE — TELEPHONE ENCOUNTER
----- Message from Jason Colvin sent at 8/4/2021  1:51 PM EDT -----  Subject: Message to Provider    QUESTIONS  Information for Provider? Pt said she had [place] last year she   wants to know if she should get the vaccine now. She wants to know if it   is safe for her to get the vaccine now.  ---------------------------------------------------------------------------  --------------  CALL BACK INFO  What is the best way for the office to contact you? OK to leave message on   voicemail  Preferred Call Back Phone Number? [phone]  ---------------------------------------------------------------------------  --------------  SCRIPT ANSWERS  Relationship to Patient?  Self

## 2021-09-08 NOTE — TELEPHONE ENCOUNTER
Medication:   Requested Prescriptions     Pending Prescriptions Disp Refills    metoprolol tartrate (LOPRESSOR) 25 MG tablet 180 tablet 3     Sig: Take 1 tablet by mouth 2 times daily       Last Filled:  08/27/2020 #180 [id number]    Patient Phone Number: [phone] (home)     Last appt: 6/11/2021   Next appt: Visit date not found    Lab Results   Component Value Date     07/09/2021    K 5.0 07/09/2021    CL 98 07/09/2021    CO2 24 07/09/2021    BUN 12 07/09/2021    CREATININE 0.87 07/09/2021    GLUCOSE 97 07/09/2021    CALCIUM 9.6 07/09/2021    PROT 6.7 07/09/2021    LABALBU 4.2 07/09/2021    BILITOT 0.3 07/09/2021    ALKPHOS 74 07/09/2021    AST 18 07/09/2021    ALT 15 07/09/2021    LABGLOM 65 07/09/2021    GFRAA 75 07/09/2021    AGRATIO 1.7 07/09/2021    GLOB 2.5 07/09/2021

## 2021-09-09 ENCOUNTER — TELEPHONE (OUTPATIENT)
Dept: FAMILY MEDICINE CLINIC | Age: 76
End: 2021-09-09

## 2021-09-09 NOTE — TELEPHONE ENCOUNTER
----- Message from Zully Selby sent at 9/9/2021 10:47 AM EDT -----  Subject: Message to Provider    QUESTIONS  Information for Provider? PT called and wants to know if her pcp can   refill her metoprolol. She has not seen her heart doctor in a year so did   not know if she should see them before she fills this and continues it?  ---------------------------------------------------------------------------  --------------  [address]  What is the best way for the office to contact you? OK to leave message on   voicemail  Preferred Call Back Phone Number? [phone]  ---------------------------------------------------------------------------  --------------  SCRIPT ANSWERS  Relationship to Patient?  Self

## 2021-09-15 DIAGNOSIS — F33.41 RECURRENT MAJOR DEPRESSIVE DISORDER, IN PARTIAL REMISSION (HCC): ICD-10-CM

## 2021-09-15 RX ORDER — DESVENLAFAXINE 50 MG/1
TABLET, EXTENDED RELEASE ORAL
Qty: 90 TABLET | Refills: 3 | Status: SHIPPED | OUTPATIENT
Start: 2021-09-15 | End: 2022-09-13

## 2021-09-15 NOTE — TELEPHONE ENCOUNTER
Medication:   Requested Prescriptions     Pending Prescriptions Disp Refills    desvenlafaxine succinate (PRISTIQ) 50 MG TB24 extended release tablet [Pharmacy Med Name: DESVENLAFAXINE ER 50 MG Tablet Extended Release 24 Hour] 90 tablet 3     Sig: TAKE 1 TABLET EVERY DAY        Last Filled:  08/26/2020    Patient Phone Number: [phone] (home)     Last appt: 6/11/2021   Next appt: Visit date not found    Last OARRS: No flowsheet data found.

## 2021-10-15 ENCOUNTER — TELEPHONE (OUTPATIENT)
Dept: FAMILY MEDICINE CLINIC | Age: 76
End: 2021-10-15

## 2021-10-15 NOTE — TELEPHONE ENCOUNTER
Pt would like to know if she needs to have lab work done before her next appt and she also has questions in regards to the covid vaccine.     Please call back and assist with questions

## 2021-10-15 NOTE — TELEPHONE ENCOUNTER
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

## 2021-10-20 DIAGNOSIS — I10 ESSENTIAL HYPERTENSION: ICD-10-CM

## 2021-10-20 RX ORDER — LOSARTAN POTASSIUM AND HYDROCHLOROTHIAZIDE 12.5; 1 MG/1; MG/1
TABLET ORAL
Qty: 90 TABLET | Refills: 3 | Status: SHIPPED | OUTPATIENT
Start: 2021-10-20 | End: 2022-09-14 | Stop reason: ALTCHOICE

## 2021-10-20 RX ORDER — AMLODIPINE BESYLATE 5 MG/1
5 TABLET ORAL NIGHTLY
Qty: 90 TABLET | Refills: 2 | Status: SHIPPED | OUTPATIENT
Start: 2021-10-20 | End: 2022-08-08

## 2021-11-01 ENCOUNTER — OFFICE VISIT (OUTPATIENT)
Dept: FAMILY MEDICINE CLINIC | Age: 76
End: 2021-11-01
Payer: MEDICARE

## 2021-11-01 VITALS
OXYGEN SATURATION: 97 % | BODY MASS INDEX: 29.45 KG/M2 | TEMPERATURE: 97 F | HEART RATE: 64 BPM | DIASTOLIC BLOOD PRESSURE: 82 MMHG | SYSTOLIC BLOOD PRESSURE: 138 MMHG | WEIGHT: 156 LBS | HEIGHT: 61 IN

## 2021-11-01 DIAGNOSIS — I34.0 MODERATE MITRAL REGURGITATION BY PRIOR ECHOCARDIOGRAM: ICD-10-CM

## 2021-11-01 DIAGNOSIS — E78.2 MIXED HYPERLIPIDEMIA: ICD-10-CM

## 2021-11-01 DIAGNOSIS — I10 PRIMARY HYPERTENSION: ICD-10-CM

## 2021-11-01 DIAGNOSIS — F32.5 MAJOR DEPRESSIVE DISORDER WITH SINGLE EPISODE, IN FULL REMISSION (HCC): ICD-10-CM

## 2021-11-01 DIAGNOSIS — Z00.00 ENCOUNTER FOR MEDICARE ANNUAL WELLNESS EXAM: Primary | ICD-10-CM

## 2021-11-01 DIAGNOSIS — G61.0 GBS (GUILLAIN BARRE SYNDROME) (HCC): ICD-10-CM

## 2021-11-01 PROCEDURE — G0439 PPPS, SUBSEQ VISIT: HCPCS | Performed by: FAMILY MEDICINE

## 2021-11-01 PROCEDURE — 1123F ACP DISCUSS/DSCN MKR DOCD: CPT | Performed by: FAMILY MEDICINE

## 2021-11-01 PROCEDURE — G8484 FLU IMMUNIZE NO ADMIN: HCPCS | Performed by: FAMILY MEDICINE

## 2021-11-01 PROCEDURE — 4040F PNEUMOC VAC/ADMIN/RCVD: CPT | Performed by: FAMILY MEDICINE

## 2021-11-01 ASSESSMENT — LIFESTYLE VARIABLES
AUDIT-C TOTAL SCORE: 1
HOW OFTEN DO YOU HAVE A DRINK CONTAINING ALCOHOL: 1
HOW MANY STANDARD DRINKS CONTAINING ALCOHOL DO YOU HAVE ON A TYPICAL DAY: 0
HOW OFTEN DO YOU HAVE SIX OR MORE DRINKS ON ONE OCCASION: 0

## 2021-11-01 ASSESSMENT — PATIENT HEALTH QUESTIONNAIRE - PHQ9
SUM OF ALL RESPONSES TO PHQ9 QUESTIONS 1 & 2: 0
2. FEELING DOWN, DEPRESSED OR HOPELESS: 0
1. LITTLE INTEREST OR PLEASURE IN DOING THINGS: 0
SUM OF ALL RESPONSES TO PHQ QUESTIONS 1-9: 0

## 2021-11-01 NOTE — PATIENT INSTRUCTIONS
Personalized Preventive Plan for Cleo De La Garza - 11/1/2021  Medicare offers a range of preventive health benefits. Some of the tests and screenings are paid in full while other may be subject to a deductible, co-insurance, and/or copay. Some of these benefits include a comprehensive review of your medical history including lifestyle, illnesses that may run in your family, and various assessments and screenings as appropriate. After reviewing your medical record and screening and assessments performed today your provider may have ordered immunizations, labs, imaging, and/or referrals for you. A list of these orders (if applicable) as well as your Preventive Care list are included within your After Visit Summary for your review. Other Preventive Recommendations:    · A preventive eye exam performed by an eye specialist is recommended every 1-2 years to screen for glaucoma; cataracts, macular degeneration, and other eye disorders. · A preventive dental visit is recommended every 6 months. · Try to get at least 150 minutes of exercise per week or 10,000 steps per day on a pedometer . · Order or download the FREE \"Exercise & Physical Activity: Your Everyday Guide\" from [employer]. Call 2-876.979.9388 or search [employer] online. · You need 3841-2765 mg of calcium and 6030-9674 IU of vitamin D per day. It is possible to meet your calcium requirement with diet alone, but a vitamin D supplement is usually necessary to meet this goal.  · When exposed to the sun, use a sunscreen that protects against both UVA and UVB radiation with an SPF of 30 or greater. Reapply every 2 to 3 hours or after sweating, drying off with a towel, or swimming. · Always wear a seat belt when traveling in a car. Always wear a helmet when riding a bicycle or motorcycle.

## 2021-11-01 NOTE — PROGRESS NOTES
Medicare Annual Wellness Visit  Name: Anali El Date: 2021   MRN: [id number] Sex: Female   Age: 67 y.o. Ethnicity: Non- / Non    : 1945 Race: White (non-)      Hai Branch is here for Medicare AWV    Screenings for behavioral, psychosocial and functional/safety risks, and cognitive dysfunction are all negative except as indicated below. These results, as well as other patient data from the [address] form, are documented in Flowsheets linked to this Encounter. Labs 2021 reviewed with patient in detail/all acceptable    Cannot have vaccines due to Guillain-Barré with the Shingrix vaccine over 1 year ago, leg weakness, needed therapy infusion therapy    Allergies   Allergen Reactions    Hydroxybenzoate     Prednisolone Acetate     Tixocortol Pivalate     Neosporin [Neomycin-Polymyxin-Gramicidin] Rash     Allergic rash     Ace Inhibitors      Cough, itchy throat    Methylchloroisothiazolinone     Shingrix [Zoster Vac Recomb Adjuvanted]      GBS         Prior to Visit Medications    Medication Sig Taking?  Authorizing Provider   amLODIPine (NORVASC) 5 MG tablet Take 1 tablet by mouth nightly Yes Immanuel Fonseca MD   losartan-hydroCHLOROthiazide (HYZAAR) 100-12.5 MG per tablet TAKE 1 TABLET EVERY DAY Yes Immanuel Fonseca MD   desvenlafaxine succinate (PRISTIQ) 50 MG TB24 extended release tablet TAKE 1 TABLET EVERY DAY Yes Immanuel Fonseca MD   metoprolol tartrate (LOPRESSOR) 25 MG tablet Take 1 tablet by mouth 2 times daily Yes Immanuel Fonseca MD   atorvastatin (LIPITOR) 10 MG tablet Take 1 tablet by mouth every other day Yes Immanuel Fonseca MD   vitamin D (D3-1000) 25 MCG (1000 UT) TABS tablet Take 1,000 Units by mouth daily Yes Historical Provider, MD   Calcium Citrate-Vitamin D (CALCIUM + D PO) Take by mouth 2 times daily Yes Historical Provider, MD   cetirizine (ZYRTEC) 10 MG tablet Take 1 tablet by mouth nightly Yes Artemio Carey MD clobetasol (TEMOVATE) 0.05 % cream Apply to rash or itching skin on body 3 to 4 times daily as directed Yes Historical Provider, MD   mometasone (NASONEX) 50 MCG/ACT nasal spray 2 sprays by Nasal route daily. Yes Ulyess Curling, MD   naproxen (NAPROSYN) 250 MG tablet Take 500 mg by mouth 2 times daily as needed. Yes Historical Provider, MD   Multiple Vitamins-Minerals (OCUVITE) TABS tablet Take 1 tablet by mouth daily. Yes Historical Provider, MD         Past Medical History:   Diagnosis Date    Anxiety     Colon polyp     Guillain Barré syndrome ([facility]     Headache(784.0)     Hyperlipidemia     Hypertension     Mitral regurgitation     Osteoarthritis     SVT [facility]supraventricular tachycardia) ([facility])        Past Surgical History:   Procedure Laterality Date    COLONOSCOPY  8/10    COLONOSCOPY N/A 12/3/2020    COLONOSCOPY POLYPECTOMY SNARE/COLD BIOPSY performed by Nathan Beckford MD at [facility] Left     CATARACT    KNEE ARTHROSCOPY Left          Family History   Problem Relation Age of Onset    Kidney Disease Mother     Heart Disease Father     Kidney Disease Sister     Cancer Sister     Kidney Disease Brother        CareTeam (Including outside providers/suppliers regularly involved in providing care):   Patient Care Team:  Ulyess Curling, MD as PCP - Zully Herrera MD as PCP - [facility] Empaneled Provider    Wt Readings from Last 3 Encounters:   11/01/21 156 lb (70.8 kg)   06/11/21 153 lb 6 oz (69.6 kg)   04/19/21 154 lb (69.9 kg)     Vitals:    11/01/21 1319   BP: 138/82   Pulse: 64   Temp: 97 °F (36.1 °C)   SpO2: 97%   Weight: 156 lb (70.8 kg)   Height: 5' 1\" (1.549 m)     Body mass index is 29.48 kg/m². Based upon direct observation of the patient, evaluation of cognition reveals recent and remote memory intact.     General Appearance: alert and oriented to person, place and time, well developed and well- nourished, in no acute distress, always pleasant  Skin: warm and dry, no rash or erythema  Head: normocephalic and atraumatic  Eyes: pupils equal, round, and reactive to light, extraocular eye movements intact, conjunctivae normal  ENT: tympanic membrane, external ear and ear canals= normal  Neck: supple and non-tender without mass, no thyromegaly or thyroid nodules, no cervical lymphadenopathy  Pulmonary/Chest: clear to auscultation bilaterally- no wheezes, rales or rhonchi, normal air movement, no respiratory distress  Cardiovascular: normal rate, regular rhythm, normal S1 and S2, no murmurs, rubs, clicks, or gallops, distal pulses intact, no carotid bruits  Abdomen: soft, non-tender, non-distended, normal bowel sounds, no masses or organomegaly  Breast: appear normal, no suspicious masses, no skin or nipple changes or axillary nodes  Extremities: no cyanosis, clubbing or edema  Musculoskeletal: normal range of motion, no joint swelling, deformity or tenderness  Neurologic: reflexes normal and symmetric, no cranial nerve deficit, gait, coordination and speech normal    Patient's complete Health Risk Assessment and screening values have been reviewed and are found in Flowsheets. The following problems were reviewed today and where indicated follow up appointments were made and/or referrals ordered.     Positive Risk Factor Screenings with Interventions:            General Health and ACP:  General  In general, how would you say your health is?: Very Good  Do you get the social and emotional support that you need?: Yes  Do you have a Living Will?: Yes  Advance Directives     Power of [address] [place] [address] Will ACP-Advance Directive ACP-Power of     Not on File Not on File Not on File Not on File      General Health Risk Interventions:  · copy to us      Hearing/Vision:  No exam data present  Hearing/Vision  Do you or your family notice any trouble with your hearing that hasn't been managed with hearing aids?: (!) Yes (Sometimes)  Do you have difficulty driving, watching TV, patient instructions/AVS.  . Recommended screening schedule for the next 5-10 years is provided to the patient in written form: see Patient Instructions/AVS.    Loy was seen today for medicare awv.     Diagnoses and all orders for this visit:    Encounter for Medicare annual wellness exam  Copy LivingWell to     GBS ([facility]illain [place] syndrome) ([facility]  No vaccines either flu or Covid due to history of [place]    Primary hypertension  Stable continue medication    Mixed hyperlipidemia  Stable continue    Moderate mitral regurgitation by prior echocardiogram  Stable echo in the spring  Major depressive disorder with single episode, in full remission ([facility]  Stable continue Pristiq

## 2021-12-20 ENCOUNTER — TELEPHONE (OUTPATIENT)
Dept: FAMILY MEDICINE CLINIC | Age: 76
End: 2021-12-20

## 2021-12-20 NOTE — TELEPHONE ENCOUNTER
Pt states Dr. Amanda Calzada told her not to get the COVID Vaccine and is wondering if this is still true, or if Dr. Amanda Calzada would like her to get vaccinated

## 2021-12-20 NOTE — TELEPHONE ENCOUNTER
Patient should not get vaccinated. .. With her history of Guillain-Barré. ..  Too risky    She can double check with pharmacist

## 2022-01-11 ENCOUNTER — TELEPHONE (OUTPATIENT)
Dept: FAMILY MEDICINE CLINIC | Age: 77
End: 2022-01-11

## 2022-01-11 NOTE — TELEPHONE ENCOUNTER
----- Message from Linda Tillman sent at 1/11/2022  3:16 PM EST -----  Subject: Message to Provider    QUESTIONS  Information for Provider? pt wants to know about the best vitamin to take   and wants to discuss the covid vaccine weather she should take it or not   and if she would need a letter to be able to travel   ---------------------------------------------------------------------------  --------------  [address]  What is the best way for the office to contact you? OK to leave message on   voicemail  Preferred Call Back Phone Number?  [phone]  ---------------------------------------------------------------------------  --------------  SCRIPT ANSWERS  undefined

## 2022-01-12 NOTE — TELEPHONE ENCOUNTER
No vitamin needed if she is eating healthy every day    Be sure she is taking at least 1000 IUs of vitamin D3 daily    Am happy to write her a letter to travel, not safe to get any vaccines with her history of Guillain-Barré    She should be wearing a K N95 mask if she is out and about at any time

## 2022-01-17 NOTE — TELEPHONE ENCOUNTER
Medication:   Requested Prescriptions     Pending Prescriptions Disp Refills    metoprolol tartrate (LOPRESSOR) 25 MG tablet [Pharmacy Med Name: METOPROLOL TARTRATE 25 MG Tablet] 180 tablet 3     Sig: TAKE 1 TABLET TWICE DAILY       Last Filled:  09/08/2021 #180 3rf     Patient Phone Number: [phone] (home)     Last appt: 11/1/2021   Next appt: Visit date not found    Lab Results   Component Value Date     07/09/2021    K 5.0 07/09/2021    CL 98 07/09/2021    CO2 24 07/09/2021    BUN 12 07/09/2021    CREATININE 0.87 07/09/2021    GLUCOSE 97 07/09/2021    CALCIUM 9.6 07/09/2021    PROT 6.7 07/09/2021    LABALBU 4.2 07/09/2021    BILITOT 0.3 07/09/2021    ALKPHOS 74 07/09/2021    AST 18 07/09/2021    ALT 15 07/09/2021    LABGLOM 65 07/09/2021    GFRAA 75 07/09/2021    AGRATIO 1.7 07/09/2021    GLOB 2.5 07/09/2021

## 2022-03-29 ENCOUNTER — TELEPHONE (OUTPATIENT)
Dept: FAMILY MEDICINE CLINIC | Age: 77
End: 2022-03-29

## 2022-03-29 NOTE — TELEPHONE ENCOUNTER
Patient informed. Patient is asking if it is safe for her to travel in May with Covid and her not being vaccinated?  Please advise

## 2022-03-29 NOTE — TELEPHONE ENCOUNTER
Patient would like for Viola Baker MA  To give her a call. She would yuli to know if she is do for any labs?

## 2022-03-30 NOTE — TELEPHONE ENCOUNTER
Try to call patient, no answer    I have talked to  her about this before . ..  depends on where she has travelling  If she is traveling out of the country she needs to check the CDC site as they may have vaccination requirements  People around her need to be vaccinated and tested  We will obviously need to wear a mask in and out of public places etc.    Close this call

## 2022-06-24 ENCOUNTER — TELEPHONE (OUTPATIENT)
Dept: FAMILY MEDICINE CLINIC | Age: 77
End: 2022-06-24

## 2022-06-24 DIAGNOSIS — M85.851 OSTEOPENIA OF NECKS OF BOTH FEMURS: ICD-10-CM

## 2022-06-24 DIAGNOSIS — R73.03 PREDIABETES: ICD-10-CM

## 2022-06-24 DIAGNOSIS — M85.852 OSTEOPENIA OF NECKS OF BOTH FEMURS: ICD-10-CM

## 2022-06-24 DIAGNOSIS — E78.2 MIXED HYPERLIPIDEMIA: ICD-10-CM

## 2022-06-24 DIAGNOSIS — F32.5 MAJOR DEPRESSIVE DISORDER WITH SINGLE EPISODE, IN FULL REMISSION (HCC): Primary | ICD-10-CM

## 2022-06-24 DIAGNOSIS — I10 PRIMARY HYPERTENSION: ICD-10-CM

## 2022-06-24 NOTE — TELEPHONE ENCOUNTER
Patient called in asking if she is due for lab? And she plans on travelling, since she is not vaccinated can she get a letter to travel?     Please call : [phone] or [phone]

## 2022-06-24 NOTE — TELEPHONE ENCOUNTER
Due for labs after July 9, order in [facility]    I do not think she needs a letter to travel, she can check with the airlines to be sure

## 2022-06-27 ENCOUNTER — NURSE ONLY (OUTPATIENT)
Dept: FAMILY MEDICINE CLINIC | Age: 77
End: 2022-06-27
Payer: MEDICARE

## 2022-06-27 DIAGNOSIS — R52 BODY ACHES: Primary | ICD-10-CM

## 2022-06-27 LAB
INFLUENZA A ANTIGEN, POC: NORMAL
INFLUENZA B ANTIGEN, POC: NORMAL

## 2022-06-27 PROCEDURE — 87804 INFLUENZA ASSAY W/OPTIC: CPT | Performed by: NURSE PRACTITIONER

## 2022-06-28 ENCOUNTER — TELEPHONE (OUTPATIENT)
Dept: FAMILY MEDICINE CLINIC | Age: 77
End: 2022-06-28

## 2022-06-28 DIAGNOSIS — U07.1 COVID: Primary | ICD-10-CM

## 2022-06-28 LAB — SARS-COV-2: DETECTED

## 2022-07-01 ENCOUNTER — TELEPHONE (OUTPATIENT)
Dept: FAMILY MEDICINE CLINIC | Age: 77
End: 2022-07-01

## 2022-07-01 NOTE — TELEPHONE ENCOUNTER
Patient called in stating her mouth is sore and red , gums and lips and tongue are hurting, tongue is white, everything taste bad. Patient wants to know if it is a covid symptom or medication reaction nirmatrelvir/ritonavir (PAXLOVID) 20 x 150 MG & 10 x 100MG  ?     Please call Patient back   [phone]

## 2022-07-05 ENCOUNTER — TELEPHONE (OUTPATIENT)
Dept: FAMILY MEDICINE CLINIC | Age: 77
End: 2022-07-05

## 2022-07-05 NOTE — TELEPHONE ENCOUNTER
Ok to use flonase nasal spray     Antibiotic ointment or A&D ointment to lips /corner of mouth     Covid is a virus , will take time to fully improve

## 2022-07-08 ENCOUNTER — TELEPHONE (OUTPATIENT)
Dept: FAMILY MEDICINE CLINIC | Age: 77
End: 2022-07-08

## 2022-07-08 RX ORDER — VALACYCLOVIR HYDROCHLORIDE 500 MG/1
500 TABLET, FILM COATED ORAL 2 TIMES DAILY
Qty: 14 TABLET | Refills: 0 | Status: SHIPPED | OUTPATIENT
Start: 2022-07-08 | End: 2022-07-15

## 2022-07-08 NOTE — TELEPHONE ENCOUNTER
----- Message from Primitivo Espinoza sent at 7/8/2022 11:24 AM EDT -----  Subject: Message to Provider    QUESTIONS  Information for Provider? Patient tested positive for covid 19 June 27th,   and has a bad cough with phlegm and cold sores. The pharmacy told her that   the doctor needs to prescribe an anti-viral medication for the cold sores   in and out of mouth. Please call back and advise.  ---------------------------------------------------------------------------  --------------  Alan Overwatch INFO  [phone]; OK to leave message on voicemail  ---------------------------------------------------------------------------  --------------  SCRIPT ANSWERS  Relationship to Patient?  Self

## 2022-07-08 NOTE — TELEPHONE ENCOUNTER
Patient called in wanting to know what is [facility] covid protocol ? , Should she test again?     Please call patient: [phone]

## 2022-07-12 ENCOUNTER — TELEPHONE (OUTPATIENT)
Dept: FAMILY MEDICINE CLINIC | Age: 77
End: 2022-07-12

## 2022-07-12 NOTE — TELEPHONE ENCOUNTER
----- Message from Mervat Scherer sent at 7/12/2022  9:19 AM EDT -----  Subject: Message to Provider    QUESTIONS  Information for Provider? Patient is calling because she was diagnosed   with covid-19 and is taking medication for it that gave her a cold sore. She also received medication to treat her cold sore that is causing   nausea, vomiting and diarrhea. She wants to know if she should stop taking   the medication.   ---------------------------------------------------------------------------  --------------  Sally Nguyen INFO  [phone]; OK to leave message on voicemail  ---------------------------------------------------------------------------  --------------  SCRIPT ANSWERS  Relationship to Patient?  Self

## 2022-07-21 LAB
A/G RATIO: 1.9 (ref 1.2–2.2)
ALBUMIN SERPL-MCNC: 4.2 G/DL (ref 3.7–4.7)
ALP BLD-CCNC: 66 IU/L (ref 44–121)
ALT SERPL-CCNC: 16 IU/L (ref 0–32)
AST SERPL-CCNC: 19 IU/L (ref 0–40)
BASOPHILS ABSOLUTE: 0.1 X10E3/UL (ref 0–0.2)
BASOPHILS RELATIVE PERCENT: 1 %
BILIRUB SERPL-MCNC: 0.4 MG/DL (ref 0–1.2)
BUN / CREAT RATIO: 21 (ref 12–28)
BUN BLDV-MCNC: 20 MG/DL (ref 8–27)
CALCIUM SERPL-MCNC: 9.6 MG/DL (ref 8.7–10.3)
CHLORIDE BLD-SCNC: 103 MMOL/L (ref 96–106)
CHOLESTEROL, TOTAL: 173 MG/DL (ref 100–199)
CO2: 26 MMOL/L (ref 20–29)
COMMENT: NORMAL
CREAT SERPL-MCNC: 0.97 MG/DL (ref 0.57–1)
EOSINOPHILS ABSOLUTE: 0.1 X10E3/UL (ref 0–0.4)
EOSINOPHILS RELATIVE PERCENT: 1 %
ERYTHROCYTES, NUCLEATED/100 LEU: ABNORMAL
ESTIMATED GLOMERULAR FILTRATION RATE CREATININE EQUATION: 60 ML/MIN/1.73
GLOBULIN: 2.2 G/DL (ref 1.5–4.5)
GLUCOSE BLD-MCNC: 97 MG/DL (ref 65–99)
HBA1C MFR BLD: 6 % (ref 4.8–5.6)
HCT VFR BLD CALC: 33.8 % (ref 34–46.6)
HDLC SERPL-MCNC: 62 MG/DL
HEMOGLOBIN: 11.4 G/DL (ref 11.1–15.9)
IMMATURE CELLS ABSOLUTE COUNT: ABNORMAL
IMMATURE GRANS (ABS): 0 X10E3/UL (ref 0–0.1)
IMMATURE GRANULOCYTES: 0 %
LDL CHOLESTEROL CALCULATED: 94 MG/DL (ref 0–99)
LYMPHOCYTES ABSOLUTE: 1.4 X10E3/UL (ref 0.7–3.1)
LYMPHOCYTES RELATIVE PERCENT: 25 %
MCH RBC QN AUTO: 32.2 PG (ref 26.6–33)
MCHC RBC AUTO-ENTMCNC: 33.7 G/DL (ref 31.5–35.7)
MCV RBC AUTO: 96 FL (ref 79–97)
MONOCYTES ABSOLUTE: 0.4 X10E3/UL (ref 0.1–0.9)
MONOCYTES RELATIVE PERCENT: 7 %
MORPHOLOGY: ABNORMAL
NEUTROPHILS ABSOLUTE: 3.5 X10E3/UL (ref 1.4–7)
PDW BLD-RTO: 12.6 % (ref 11.7–15.4)
PLATELET # BLD: 301 X10E3/UL (ref 150–450)
POTASSIUM SERPL-SCNC: 5.3 MMOL/L (ref 3.5–5.2)
RBC # BLD: 3.54 X10E6/UL (ref 3.77–5.28)
SEGMENTED NEUTROPHILS RELATIVE PERCENT: 66 %
SODIUM BLD-SCNC: 140 MMOL/L (ref 134–144)
TOTAL PROTEIN: 6.4 G/DL (ref 6–8.5)
TRIGL SERPL-MCNC: 94 MG/DL (ref 0–149)
TSH SERPL DL<=0.05 MIU/L-ACNC: 3.77 UIU/ML (ref 0.45–4.5)
VLDLC SERPL CALC-MCNC: 17 MG/DL (ref 5–40)
WBC # BLD: 5.4 X10E3/UL (ref 3.4–10.8)

## 2022-07-31 NOTE — PROGRESS NOTES
Ector Cowden is a 67 y.o. female. HPI:   Here for medication review and lab review    States she always feels somewhat tired    Meds+ Labs reviewed with patient in detail    Meds, vitamins and allergies reviewed with pt  Wt Readings from Last 3 Encounters:   08/01/22 154 lb 9.6 oz (70.1 kg)   11/01/21 156 lb (70.8 kg)   06/11/21 153 lb 6 oz (69.6 kg)       REVIEW OF SYSTEMS:   CONSTITUTIONAL: See history of present illness,   Weight noted   HEENT: No new vision difficulties or ringing in the ears. RESPIRATORY: No new SOB, PND, orthopnea or cough. CARDIOVASCULAR: no CP, palpitations or SOB with exertion  GI: No nausea, vomiting, diarrhea, constipation, abdominal pain or changes in bowel habits. : No urinary frequency, urgency, incontinence hematuria or dysuria. SKIN: No cyanosis or skin lesions. MUSCULOSKELETAL: No new muscle or joint pain. NEUROLOGICAL: No syncope or TIA-like symptoms. PSYCHIATRIC: No anxiety, insomnia or depression     Allergies   Allergen Reactions    Hydroxybenzoate     Prednisolone Acetate     Tixocortol Pivalate     Neosporin [Neomycin-Polymyxin-Gramicidin] Rash     Allergic rash     Ace Inhibitors      Cough, itchy throat    Methylchloroisothiazolinone     Shingrix [Zoster Vac Recomb Adjuvanted]      GBS       Prior to Visit Medications    Medication Sig Taking? Authorizing Provider   atorvastatin (LIPITOR) 10 MG tablet Take 1 tablet by mouth every other day Yes Krupa Olivares MD   atorvastatin (LIPITOR) 10 MG tablet Take 1 tablet by mouth four times a week Yes Krupa Olivares MD   nirmatrelvir/ritonavir (PAXLOVID) 20 x 150 MG & 10 x 100MG Take 3 tablets (two 150 mg nirmatrelvir and one 100 mg ritonavir tablets) by mouth every 12 hours for 5 days.  Yes Lubna Manjarrez APRN - CNP   metoprolol tartrate (LOPRESSOR) 25 MG tablet TAKE 1 TABLET TWICE DAILY Yes Krupa Olivares MD   amLODIPine (NORVASC) 5 MG tablet Take 1 tablet by mouth nightly Yes Krupa Olivares MD losartan-hydroCHLOROthiazide (HYZAAR) 100-12.5 MG per tablet TAKE 1 TABLET EVERY DAY Yes Krupa Olivares MD   desvenlafaxine succinate (PRISTIQ) 50 MG TB24 extended release tablet TAKE 1 TABLET EVERY DAY Yes Krupa Olivares MD   vitamin D (D3-1000) 25 MCG (1000 UT) TABS tablet Take 1,000 Units by mouth daily Yes Historical Provider, MD   Calcium Citrate-Vitamin D (CALCIUM + D PO) Take by mouth 2 times daily Yes Historical Provider, MD   cetirizine (ZYRTEC) 10 MG tablet Take 1 tablet by mouth nightly Yes Austin Espinoza MD   clobetasol (TEMOVATE) 0.05 % cream Apply to rash or itching skin on body 3 to 4 times daily as directed Yes Historical Provider, MD   mometasone (NASONEX) 50 MCG/ACT nasal spray 2 sprays by Nasal route daily. Yes Krupa Olivares MD   naproxen (NAPROSYN) 250 MG tablet Take 500 mg by mouth 2 times daily as needed. Yes Historical Provider, MD   Multiple Vitamins-Minerals (OCUVITE) TABS tablet Take 1 tablet by mouth daily. Yes Historical Provider, MD       Past Medical History:   Diagnosis Date    Anxiety     Colon polyp     Guillain Barré syndrome (HCC)     Headache(784.0)     Hyperlipidemia     Hypertension     Mitral regurgitation     Osteoarthritis     SVT (supraventricular tachycardia) ([facility])        Social History     Tobacco Use    Smoking status: Never    Smokeless tobacco: Never   Substance Use Topics    Alcohol use: Yes     Alcohol/week: 1.0 standard drink     Types: 1 Glasses of wine per week     Comment: rarely       Family History   Problem Relation Age of Onset    Kidney Disease Mother     Heart Disease Father     Kidney Disease Sister     Cancer Sister     Kidney Disease Brother        OBJECTIVE:  /60   Pulse 57   Resp 16   Ht 5' 1\" (1.549 m)   Wt 154 lb 9.6 oz (70.1 kg)   SpO2 96%   BMI 29.21 kg/m²   GEN:  in NAD  HEENT:  NCAT, TMs:normal and throat: clear  NECK:  Supple without adenopathy.   CV:  Regular rate and rhythm, S1 and S2 normal, no murmurs, clicks  PULM:  Chest is clear, no wheezing ,  symmetric air entry throughout both lung fields. ABD: Soft, NT  EXT: No rash or edema  NEURO: Alert oriented ×3, nonfocal     Lab Results   Component Value Date    CREATININE 0.97 07/20/2022        Lab Results   Component Value Date    WBC 5.4 07/20/2022    HGB 11.4 07/20/2022    HCT 33.8 (L) 07/20/2022    MCV 96 07/20/2022     07/20/2022      Lab Results   Component Value Date    LABA1C 6.0 (H) 07/20/2022     Lab Results   Component Value Date    .4 07/11/2020   `c    ASSESSMENT/PLAN:  1. Primary hypertension  Check blood pressure at home  Continue metoprolol twice daily, Norvasc and Hyzaar  - ECHO Complete 2D W Doppler W Color; Future    Annual wellness visit mid November    2. Mixed hyperlipidemia  Labs stable, continue Lipitor 4 times a week 10 mg    3. Major depressive disorder with single episode, in full remission ([address]  Stable on Pristiq, continue    4. Moderate mitral regurgitation by prior echocardiogram  Recheck/fatigue  - ECHO Complete 2D W Doppler W Color; Future    5. GBS (Guillain Pinch syndrome) (HCC)  Recovered, avoid vaccines    6. SVT (supraventricular tachycardia) (HCC)  Resolved    7. COVID-19 virus infection  Fatigue check echo  - ECHO Complete 2D W Doppler W Color; Future    8. Osteopenia of multiple sites  Rescreen  - DEXA BONE DENSITY AXIAL SKELETON; Future    9. Breast cancer screening by mammogram  Screening  - [facility] DIGITAL SCREEN W OR WO CAD BILATERAL; Future    10.   Foreign body granuloma of skin  Pulled out of her umbilicus without difficulty  Q-tips to clean umbilicus and not cotton swabs        30 Total Minutes spent pre charting (reviewing problem list, meds, any test results, consultant and hospital notes ) and  obtaining present visit history, performing appropriate medical exam/evaluation, counseling and educating the patient (and family), ordering medications ,tests, and procedures as needed, refilling medication(s), placing referral(s) when needed in addition to coordinating care for this patient and documenting in electronic health record

## 2022-08-01 ENCOUNTER — OFFICE VISIT (OUTPATIENT)
Dept: FAMILY MEDICINE CLINIC | Age: 77
End: 2022-08-01
Payer: MEDICARE

## 2022-08-01 VITALS
DIASTOLIC BLOOD PRESSURE: 60 MMHG | SYSTOLIC BLOOD PRESSURE: 136 MMHG | HEIGHT: 61 IN | OXYGEN SATURATION: 96 % | WEIGHT: 154.6 LBS | HEART RATE: 57 BPM | BODY MASS INDEX: 29.19 KG/M2 | RESPIRATION RATE: 16 BRPM

## 2022-08-01 DIAGNOSIS — E78.2 MIXED HYPERLIPIDEMIA: ICD-10-CM

## 2022-08-01 DIAGNOSIS — L92.3 FOREIGN BODY GRANULOMA OF SKIN: ICD-10-CM

## 2022-08-01 DIAGNOSIS — I47.1 SVT (SUPRAVENTRICULAR TACHYCARDIA) (HCC): ICD-10-CM

## 2022-08-01 DIAGNOSIS — I34.0 MODERATE MITRAL REGURGITATION BY PRIOR ECHOCARDIOGRAM: ICD-10-CM

## 2022-08-01 DIAGNOSIS — Z12.31 BREAST CANCER SCREENING BY MAMMOGRAM: ICD-10-CM

## 2022-08-01 DIAGNOSIS — M85.89 OSTEOPENIA OF MULTIPLE SITES: ICD-10-CM

## 2022-08-01 DIAGNOSIS — U07.1 COVID-19 VIRUS INFECTION: ICD-10-CM

## 2022-08-01 DIAGNOSIS — I10 PRIMARY HYPERTENSION: Primary | ICD-10-CM

## 2022-08-01 DIAGNOSIS — G61.0 GBS (GUILLAIN BARRE SYNDROME) (HCC): ICD-10-CM

## 2022-08-01 DIAGNOSIS — F32.5 MAJOR DEPRESSIVE DISORDER WITH SINGLE EPISODE, IN FULL REMISSION (HCC): ICD-10-CM

## 2022-08-01 PROCEDURE — G8427 DOCREV CUR MEDS BY ELIG CLIN: HCPCS | Performed by: FAMILY MEDICINE

## 2022-08-01 PROCEDURE — 1090F PRES/ABSN URINE INCON ASSESS: CPT | Performed by: FAMILY MEDICINE

## 2022-08-01 PROCEDURE — G8417 CALC BMI ABV UP PARAM F/U: HCPCS | Performed by: FAMILY MEDICINE

## 2022-08-01 PROCEDURE — 99214 OFFICE O/P EST MOD 30 MIN: CPT | Performed by: FAMILY MEDICINE

## 2022-08-01 PROCEDURE — 1036F TOBACCO NON-USER: CPT | Performed by: FAMILY MEDICINE

## 2022-08-01 PROCEDURE — 1123F ACP DISCUSS/DSCN MKR DOCD: CPT | Performed by: FAMILY MEDICINE

## 2022-08-01 PROCEDURE — G8399 PT W/DXA RESULTS DOCUMENT: HCPCS | Performed by: FAMILY MEDICINE

## 2022-08-01 RX ORDER — ATORVASTATIN CALCIUM 10 MG/1
10 TABLET, FILM COATED ORAL
Qty: 50 TABLET | Refills: 3 | Status: SHIPPED | OUTPATIENT
Start: 2022-08-02 | End: 2023-08-02

## 2022-08-01 RX ORDER — ATORVASTATIN CALCIUM 10 MG/1
10 TABLET, FILM COATED ORAL EVERY OTHER DAY
Qty: 30 TABLET | Refills: 0 | Status: SHIPPED | OUTPATIENT
Start: 2022-08-01

## 2022-08-01 ASSESSMENT — PATIENT HEALTH QUESTIONNAIRE - PHQ9
5. POOR APPETITE OR OVEREATING: 0
8. MOVING OR SPEAKING SO SLOWLY THAT OTHER PEOPLE COULD HAVE NOTICED. OR THE OPPOSITE, BEING SO FIGETY OR RESTLESS THAT YOU HAVE BEEN MOVING AROUND A LOT MORE THAN USUAL: 0
SUM OF ALL RESPONSES TO PHQ QUESTIONS 1-9: 10
9. THOUGHTS THAT YOU WOULD BE BETTER OFF DEAD, OR OF HURTING YOURSELF: 0
SUM OF ALL RESPONSES TO PHQ9 QUESTIONS 1 & 2: 0
4. FEELING TIRED OR HAVING LITTLE ENERGY: 3
1. LITTLE INTEREST OR PLEASURE IN DOING THINGS: 0
3. TROUBLE FALLING OR STAYING ASLEEP: 2
SUM OF ALL RESPONSES TO PHQ QUESTIONS 1-9: 10
6. FEELING BAD ABOUT YOURSELF - OR THAT YOU ARE A FAILURE OR HAVE LET YOURSELF OR YOUR FAMILY DOWN: 3
7. TROUBLE CONCENTRATING ON THINGS, SUCH AS READING THE NEWSPAPER OR WATCHING TELEVISION: 2
2. FEELING DOWN, DEPRESSED OR HOPELESS: 0
SUM OF ALL RESPONSES TO PHQ QUESTIONS 1-9: 10
10. IF YOU CHECKED OFF ANY PROBLEMS, HOW DIFFICULT HAVE THESE PROBLEMS MADE IT FOR YOU TO DO YOUR WORK, TAKE CARE OF THINGS AT HOME, OR GET ALONG WITH OTHER PEOPLE: 0
SUM OF ALL RESPONSES TO PHQ QUESTIONS 1-9: 10

## 2022-08-08 DIAGNOSIS — I10 ESSENTIAL HYPERTENSION: ICD-10-CM

## 2022-08-08 RX ORDER — AMLODIPINE BESYLATE 5 MG/1
TABLET ORAL
Qty: 90 TABLET | Refills: 3 | Status: SHIPPED | OUTPATIENT
Start: 2022-08-08

## 2022-08-08 NOTE — TELEPHONE ENCOUNTER
Medication:   Requested Prescriptions     Pending Prescriptions Disp Refills    amLODIPine (NORVASC) 5 MG tablet [Pharmacy Med Name: AMLODIPINE BESYLATE 5 MG Tablet] 90 tablet 2     Sig: TAKE 1 TABLET EVERY NIGHT       Last Filled:  10/20/2021    Patient Phone Number: [phone] (home)     Last appt: 8/1/2022   Next appt: Visit date not found    Lab Results   Component Value Date     07/20/2022    K 5.3 (H) 07/20/2022     07/20/2022    CO2 26 07/20/2022    BUN 20 07/20/2022    CREATININE 0.97 07/20/2022    GLUCOSE 97 07/20/2022    CALCIUM 9.6 07/20/2022    PROT 6.4 07/20/2022    LABALBU 4.2 07/20/2022    BILITOT 0.4 07/20/2022    ALKPHOS 66 07/20/2022    AST 19 07/20/2022    ALT 16 07/20/2022    LABGLOM 65 07/09/2021    GFRAA 75 07/09/2021    AGRATIO 1.9 07/20/2022    GLOB 2.2 07/20/2022 Pt is a 89 y/o female who presented from home with cough and SOB. Pt was just discharged from [facility] with RSV.

## 2022-08-30 ENCOUNTER — HOSPITAL ENCOUNTER (OUTPATIENT)
Dept: WOMENS IMAGING | Age: 77
Discharge: HOME | End: 2022-08-30
Payer: MEDICARE

## 2022-08-30 VITALS — BODY MASS INDEX: 28.32 KG/M2 | HEIGHT: 61 IN | WEIGHT: 150 LBS

## 2022-08-30 DIAGNOSIS — Z12.31 BREAST CANCER SCREENING BY MAMMOGRAM: ICD-10-CM

## 2022-08-30 PROCEDURE — 77063 BREAST TOMOSYNTHESIS BI: CPT

## 2022-09-13 ENCOUNTER — HOSPITAL ENCOUNTER (OUTPATIENT)
Dept: GENERAL RADIOLOGY | Age: 77
Discharge: HOME | End: 2022-09-13
Payer: MEDICARE

## 2022-09-13 ENCOUNTER — HOSPITAL ENCOUNTER (OUTPATIENT)
Dept: NON INVASIVE DIAGNOSTICS | Age: 77
Discharge: HOME | End: 2022-09-13
Payer: MEDICARE

## 2022-09-13 DIAGNOSIS — F33.41 RECURRENT MAJOR DEPRESSIVE DISORDER, IN PARTIAL REMISSION (HCC): ICD-10-CM

## 2022-09-13 DIAGNOSIS — I10 PRIMARY HYPERTENSION: ICD-10-CM

## 2022-09-13 DIAGNOSIS — M85.89 OSTEOPENIA OF MULTIPLE SITES: ICD-10-CM

## 2022-09-13 DIAGNOSIS — U07.1 COVID-19 VIRUS INFECTION: ICD-10-CM

## 2022-09-13 DIAGNOSIS — I34.0 MODERATE MITRAL REGURGITATION BY PRIOR ECHOCARDIOGRAM: ICD-10-CM

## 2022-09-13 LAB
LV EF: 58 %
LVEF MODALITY: NORMAL

## 2022-09-13 PROCEDURE — 93306 TTE W/DOPPLER COMPLETE: CPT

## 2022-09-13 PROCEDURE — 77080 DXA BONE DENSITY AXIAL: CPT

## 2022-09-13 RX ORDER — DESVENLAFAXINE 50 MG/1
TABLET, EXTENDED RELEASE ORAL
Qty: 90 TABLET | Refills: 3 | Status: SHIPPED | OUTPATIENT
Start: 2022-09-13

## 2022-09-13 NOTE — TELEPHONE ENCOUNTER
Medication:   Requested Prescriptions     Pending Prescriptions Disp Refills    desvenlafaxine succinate (PRISTIQ) 50 MG TB24 extended release tablet [Pharmacy Med Name: DESVENLAFAXINE ER 50 MG Tablet Extended Release 24 Hour] 90 tablet 3     Sig: TAKE 1 TABLET EVERY DAY      90 x 3 RF 9/15/21  Last Filled:      Patient Phone Number: [phone] (home)     Last appt: 8/1/2022   Next appt: Visit date not found    Last OARRS: No flowsheet data found.

## 2022-09-14 ENCOUNTER — OFFICE VISIT (OUTPATIENT)
Dept: FAMILY MEDICINE CLINIC | Age: 77
End: 2022-09-14
Payer: MEDICARE

## 2022-09-14 VITALS
OXYGEN SATURATION: 98 % | HEART RATE: 62 BPM | RESPIRATION RATE: 16 BRPM | BODY MASS INDEX: 28.92 KG/M2 | WEIGHT: 153.2 LBS | HEIGHT: 61 IN | SYSTOLIC BLOOD PRESSURE: 155 MMHG | DIASTOLIC BLOOD PRESSURE: 80 MMHG

## 2022-09-14 DIAGNOSIS — Z71.2 ENCOUNTER TO DISCUSS TEST RESULTS: ICD-10-CM

## 2022-09-14 DIAGNOSIS — G61.0 GBS (GUILLAIN BARRE SYNDROME) (HCC): ICD-10-CM

## 2022-09-14 DIAGNOSIS — I10 PRIMARY HYPERTENSION: ICD-10-CM

## 2022-09-14 DIAGNOSIS — M85.89 OSTEOPENIA OF MULTIPLE SITES: Primary | ICD-10-CM

## 2022-09-14 DIAGNOSIS — I47.1 SVT (SUPRAVENTRICULAR TACHYCARDIA) (HCC): ICD-10-CM

## 2022-09-14 PROCEDURE — G8427 DOCREV CUR MEDS BY ELIG CLIN: HCPCS | Performed by: FAMILY MEDICINE

## 2022-09-14 PROCEDURE — G8417 CALC BMI ABV UP PARAM F/U: HCPCS | Performed by: FAMILY MEDICINE

## 2022-09-14 PROCEDURE — G8399 PT W/DXA RESULTS DOCUMENT: HCPCS | Performed by: FAMILY MEDICINE

## 2022-09-14 PROCEDURE — 99213 OFFICE O/P EST LOW 20 MIN: CPT | Performed by: FAMILY MEDICINE

## 2022-09-14 PROCEDURE — 1036F TOBACCO NON-USER: CPT | Performed by: FAMILY MEDICINE

## 2022-09-14 PROCEDURE — 1123F ACP DISCUSS/DSCN MKR DOCD: CPT | Performed by: FAMILY MEDICINE

## 2022-09-14 PROCEDURE — 1090F PRES/ABSN URINE INCON ASSESS: CPT | Performed by: FAMILY MEDICINE

## 2022-09-14 RX ORDER — OLMESARTAN MEDOXOMIL AND HYDROCHLOROTHIAZIDE 40/12.5 40; 12.5 MG/1; MG/1
1 TABLET ORAL
Qty: 90 TABLET | Refills: 3 | Status: SHIPPED | OUTPATIENT
Start: 2022-09-14

## 2022-09-14 RX ORDER — ALENDRONATE SODIUM 70 MG/1
70 TABLET ORAL
Qty: 4 TABLET | Refills: 5 | Status: SHIPPED | OUTPATIENT
Start: 2022-09-14 | End: 2022-10-04 | Stop reason: SDUPTHER

## 2022-09-14 SDOH — ECONOMIC STABILITY: FOOD INSECURITY: WITHIN THE PAST 12 MONTHS, YOU WORRIED THAT YOUR FOOD WOULD RUN OUT BEFORE YOU GOT MONEY TO BUY MORE.: NEVER TRUE

## 2022-09-14 SDOH — ECONOMIC STABILITY: FOOD INSECURITY: WITHIN THE PAST 12 MONTHS, THE FOOD YOU BOUGHT JUST DIDN'T LAST AND YOU DIDN'T HAVE MONEY TO GET MORE.: NEVER TRUE

## 2022-09-14 ASSESSMENT — PATIENT HEALTH QUESTIONNAIRE - PHQ9
5. POOR APPETITE OR OVEREATING: 0
8. MOVING OR SPEAKING SO SLOWLY THAT OTHER PEOPLE COULD HAVE NOTICED. OR THE OPPOSITE, BEING SO FIGETY OR RESTLESS THAT YOU HAVE BEEN MOVING AROUND A LOT MORE THAN USUAL: 0
1. LITTLE INTEREST OR PLEASURE IN DOING THINGS: 0
SUM OF ALL RESPONSES TO PHQ QUESTIONS 1-9: 2
9. THOUGHTS THAT YOU WOULD BE BETTER OFF DEAD, OR OF HURTING YOURSELF: 0
SUM OF ALL RESPONSES TO PHQ QUESTIONS 1-9: 2
SUM OF ALL RESPONSES TO PHQ QUESTIONS 1-9: 2
2. FEELING DOWN, DEPRESSED OR HOPELESS: 0
SUM OF ALL RESPONSES TO PHQ QUESTIONS 1-9: 2
4. FEELING TIRED OR HAVING LITTLE ENERGY: 1
SUM OF ALL RESPONSES TO PHQ9 QUESTIONS 1 & 2: 0
3. TROUBLE FALLING OR STAYING ASLEEP: 0
10. IF YOU CHECKED OFF ANY PROBLEMS, HOW DIFFICULT HAVE THESE PROBLEMS MADE IT FOR YOU TO DO YOUR WORK, TAKE CARE OF THINGS AT HOME, OR GET ALONG WITH OTHER PEOPLE: 0
6. FEELING BAD ABOUT YOURSELF - OR THAT YOU ARE A FAILURE OR HAVE LET YOURSELF OR YOUR FAMILY DOWN: 1
7. TROUBLE CONCENTRATING ON THINGS, SUCH AS READING THE NEWSPAPER OR WATCHING TELEVISION: 0

## 2022-09-14 ASSESSMENT — SOCIAL DETERMINANTS OF HEALTH (SDOH): HOW HARD IS IT FOR YOU TO PAY FOR THE VERY BASICS LIKE FOOD, HOUSING, MEDICAL CARE, AND HEATING?: NOT HARD AT ALL

## 2022-09-14 NOTE — PROGRESS NOTES
Chitra Corado is a 67 y.o. female. HPI:  Here To review DEXA results and discuss treatment options for osteopenia  Systolic blood pressure elevated    Story of Guillain-Barré with Zostavax vaccine still with left-sided leg weakness which bothers her some  Recommend bicycling with stationary bike as she does not like to walk    Meds, vitamins and allergies reviewed with pt    Wt Readings from Last 3 Encounters:   09/14/22 153 lb 3.2 oz (69.5 kg)   08/30/22 150 lb (68 kg)   08/01/22 154 lb 9.6 oz (70.1 kg)       REVIEW OF SYSTEMS:   CONSTITUTIONAL: See history of present illness,   Weight noted   HEENT: No new vision difficulties or ringing in the ears. RESPIRATORY: No new SOB, PND, orthopnea or cough. CARDIOVASCULAR: no CP, palpitations or SOB with exertion  GI: No nausea, vomiting, diarrhea, constipation, abdominal pain or changes in bowel habits. : No urinary frequency, urgency, incontinence hematuria or dysuria. SKIN: No cyanosis or skin lesions. MUSCULOSKELETAL: No new muscle or joint pain. NEUROLOGICAL: No syncope or TIA-like symptoms. PSYCHIATRIC: No anxiety, insomnia or depression     Allergies   Allergen Reactions    Hydroxybenzoate     Prednisolone Acetate     Tixocortol Pivalate     Neosporin [Neomycin-Polymyxin-Gramicidin] Rash     Allergic rash     Ace Inhibitors      Cough, itchy throat    Methylchloroisothiazolinone     Shingrix [Zoster Vac Recomb Adjuvanted]      GBS       Prior to Visit Medications    Medication Sig Taking?  Authorizing Provider   alendronate (FOSAMAX) 70 MG tablet Take 1 tablet by mouth every 7 days Yes Chemo Lin MD   olmesartan-hydroCHLOROthiazide (BENICAR HCT) 40-12.5 MG per tablet Take 1 tablet by mouth daily (with breakfast) Yes Chemo Lin MD   desvenlafaxine succinate (PRISTIQ) 50 MG TB24 extended release tablet TAKE 1 TABLET EVERY DAY Yes Chemo Lin MD   amLODIPine (NORVASC) 5 MG tablet TAKE 1 TABLET EVERY NIGHT Yes Chemo Lin MD atorvastatin (LIPITOR) 10 MG tablet Take 1 tablet by mouth every other day Yes Mikie Martines MD   atorvastatin (LIPITOR) 10 MG tablet Take 1 tablet by mouth four times a week Yes Mikie Martines MD   nirmatrelvir/ritonavir (PAXLOVID) 20 x 150 MG & 10 x 100MG Take 3 tablets (two 150 mg nirmatrelvir and one 100 mg ritonavir tablets) by mouth every 12 hours for 5 days. Yes KIMBERLY Gibson - KAY   metoprolol tartrate (LOPRESSOR) 25 MG tablet TAKE 1 TABLET TWICE DAILY Yes Mikie Martines MD   vitamin D (D3-1000) 25 MCG (1000 UT) TABS tablet Take 1,000 Units by mouth daily Yes Historical Provider, MD   Calcium Citrate-Vitamin D (CALCIUM + D PO) Take by mouth 2 times daily Yes Historical Provider, MD   cetirizine (ZYRTEC) 10 MG tablet Take 1 tablet by mouth nightly Yes Delores Hairston MD   clobetasol (TEMOVATE) 0.05 % cream Apply to rash or itching skin on body 3 to 4 times daily as directed Yes Historical Provider, MD   mometasone (NASONEX) 50 MCG/ACT nasal spray 2 sprays by Nasal route daily. Yes Mikie Martines MD   naproxen (NAPROSYN) 250 MG tablet Take 500 mg by mouth 2 times daily as needed. Yes Historical Provider, MD   Multiple Vitamins-Minerals (OCUVITE) TABS tablet Take 1 tablet by mouth daily. Yes Historical Provider, MD       Past Medical History:   Diagnosis Date    Anxiety     Colon polyp     Guillain Barré syndrome (HCC)     Headache(784.0)     Hyperlipidemia     Hypertension     Mitral regurgitation     Osteoarthritis     SVT (supraventricular tachycardia) ([facility])        Social History     Tobacco Use    Smoking status: Never    Smokeless tobacco: Never   Substance Use Topics    Alcohol use:  Yes     Alcohol/week: 1.0 standard drink     Types: 1 Glasses of wine per week     Comment: rarely       Family History   Problem Relation Age of Onset    Kidney Disease Mother     Heart Disease Father     Kidney Disease Sister     Cancer Sister     Kidney Disease Brother        OBJECTIVE:  BP (!) 155/80   Pulse 62   Resp 16   Ht 5' 1\" (1.549 m)   Wt 153 lb 3.2 oz (69.5 kg)   SpO2 98%   BMI 28.95 kg/m²   GEN:  in NAD  HEENT:  NCAT, TMs:normal and throat: clear  NECK:  Supple without adenopathy. CV:  Regular rate and rhythm, S1 and S2 normal, no murmurs, clicks  PULM:  Chest is clear, no wheezing ,  symmetric air entry throughout both lung fields. ABD: Soft, NT  EXT: No rash or edema  NEURO: Alert oriented ×3, nonfocal     Scan reviewed with patient in detail  FRAX score reviewed increased risk of overall fracture and hip fracture over the next 10 years, recommend trial of Fosamax    ASSESSMENT/PLAN:  1. Osteopenia of multiple sites  Healthy diet, weightbearing exercise, calcium and vitamin D supplement discussed  Start Fosamax weekly take standing first thing in the morning once a week    2. Encounter to discuss test results  DEXA reviewed with patient in detail    3. GBS (Guillain [place] syndrome) ([address]  Resolved but still with left-sided leg weakness especially  Recommend bicycling stash stationary bike, she states she needs to get back to the gym    4. SVT (supraventricular tachycardia) ([facility])  Stable, no recurrence    5.  Primary hypertension  Changed to Benicar HCT 4012.5 from [facility]  Follow-up 1 month for blood pressure check  Continue amlodipine in the evening 5 mg    Cannot take vaccines due to history of Guillain-Barré with Shingrix vaccine    30 Total Minutes spent pre charting (reviewing problem list, meds, any test results, consultant and hospital notes ) and  obtaining present visit history, performing appropriate medical exam/evaluation, counseling and educating the patient (and family), ordering medications ,tests, and procedures as needed, refilling medication(s), placing referral(s) when needed in addition to coordinating care for this patient and documenting in electronic health record

## 2022-10-04 RX ORDER — ALENDRONATE SODIUM 70 MG/1
70 TABLET ORAL
Qty: 12 TABLET | Refills: 3 | Status: SHIPPED | OUTPATIENT
Start: 2022-10-04

## 2022-10-04 NOTE — TELEPHONE ENCOUNTER
----- Message from [facility] sent at 10/4/2022 11:56 AM EDT -----  Subject: Message to Provider    QUESTIONS  Information for Provider? Patient says she was prescribed alendronate   (FOSAMAX) 70 MG tablet and told to call back once she has completed the   medication. she would to know what her next steps should be. Please follow   up with the patient   ---------------------------------------------------------------------------  --------------  [facility]  [phone]; OK to leave message on voicemail  ---------------------------------------------------------------------------  --------------  SCRIPT ANSWERS  Relationship to Patient?  Self

## 2022-10-07 ENCOUNTER — TELEPHONE (OUTPATIENT)
Dept: FAMILY MEDICINE CLINIC | Age: 77
End: 2022-10-07

## 2022-10-07 RX ORDER — VALACYCLOVIR HYDROCHLORIDE 500 MG/1
500 TABLET, FILM COATED ORAL 2 TIMES DAILY
Qty: 14 TABLET | Refills: 0 | Status: SHIPPED | OUTPATIENT
Start: 2022-10-07 | End: 2022-10-14

## 2022-10-07 NOTE — TELEPHONE ENCOUNTER
----- Message from Yo Fu sent at 10/7/2022  1:42 PM EDT -----  Subject: Message to Provider    QUESTIONS  Information for Provider? Pt sees Yordan Adames at [facility]. Called   today 10/7/22 returning a call she just received from office. Clinical   staff did not answer. No notes in system regarding the call so I could not   assist pt. Please call her back. Thanks.  ---------------------------------------------------------------------------  --------------  Corine MAS  [phone]; OK to leave message on voicemail  ---------------------------------------------------------------------------  --------------  SCRIPT ANSWERS  Relationship to Patient?  Self

## 2022-10-07 NOTE — TELEPHONE ENCOUNTER
Patient has a cold sore on her upper lip and put hydrocortisone on it  Left side   Feels nauseated  headache and pressure on eye brows     Needs to know what to take or what to do     Blood pressure went up 168-175 yesterday     Please call and advise Buddy Iyer

## 2022-10-07 NOTE — TELEPHONE ENCOUNTER
----- Message from Chepe Schmitt sent at 10/7/2022  1:42 PM EDT -----  Subject: Message to Provider    QUESTIONS  Information for Provider? Pt sees Cadence Archer at [facility]. Called   today 10/7/22 returning a call she just received from office. Clinical   staff did not answer. No notes in system regarding the call so I could not   assist pt. Please call her back. Thanks.  ---------------------------------------------------------------------------  --------------  S[facility]  [phone]; OK to leave message on voicemail  ---------------------------------------------------------------------------  --------------  SCRIPT ANSWERS  Relationship to Patient?  Self

## 2022-10-10 RX ORDER — ACYCLOVIR 400 MG/1
TABLET ORAL
Qty: 30 TABLET | Refills: 0 | Status: SHIPPED | OUTPATIENT
Start: 2022-10-10

## 2022-10-10 NOTE — TELEPHONE ENCOUNTER
Medication:   Requested Prescriptions     Pending Prescriptions Disp Refills    acyclovir (ZOVIRAX) 400 MG tablet [Pharmacy Med Name: Acyclovir Oral Tablet 400 MG] 30 tablet 0     Sig: TAKE 1 TABLET BY MOUTH THREE TIMES A DAY        Last Filled: Medication not listed      Patient Phone Number: [phone] (home)     Last appt: 9/14/2022   Next appt: Visit date not found    Last OARRS: No flowsheet data found.

## 2022-10-25 ENCOUNTER — TELEPHONE (OUTPATIENT)
Dept: FAMILY MEDICINE CLINIC | Age: 77
End: 2022-10-25

## 2022-10-25 NOTE — TELEPHONE ENCOUNTER
Pt is having nausea, lightheaded, severe abdominal pain, chills, sore throat, very tired      Originally all pt told me she had ABD pain so I scheduled her for tomorrow at 11:30 with . She is now calling stating she took a covid test twice and it is the QuickVue. Pt stated the square on it has pink on it which regular blue line on strip. I googled it, the square is not supposed to have anything on it. I'm wondering if she took a faulty test... but I do not want to schedule her if she would be + for covid        Pt needs call back asap to figure out next step whether that is cancel appointment, make virtual, or do covid test here for more accuracy? Her number is [phone]    Thank you !

## 2022-10-25 NOTE — TELEPHONE ENCOUNTER
Pt stated she still has ABD pain.  She wants to know what to do as she has tried OTC medications    She wants to find out the cause whether that means scans or what has to happen         Please advise as she would like 's opinion and her number is [phone]

## 2022-10-26 ENCOUNTER — NURSE ONLY (OUTPATIENT)
Dept: FAMILY MEDICINE CLINIC | Age: 77
End: 2022-10-26
Payer: MEDICARE

## 2022-10-26 DIAGNOSIS — Z11.52 ENCOUNTER FOR SCREENING FOR COVID-19: Primary | ICD-10-CM

## 2022-10-26 LAB
INFLUENZA A ANTIBODY: NORMAL
INFLUENZA B ANTIBODY: NORMAL

## 2022-10-26 PROCEDURE — 87804 INFLUENZA ASSAY W/OPTIC: CPT | Performed by: FAMILY MEDICINE

## 2022-10-26 NOTE — PROGRESS NOTES
Patient reached ____ yes  ___X__ no   VM instructions left __X__ yes   phone number __[phone]______                                ____ no-office notified          Date _11/3/22________  Time __1120_____  Arrival _0950  hosp-endo_____    Nothing to eat or drink after midnight-follow your doctors prep instructions-this may include taking a second dose of your prep after midnight  Responsible adult 25 or older to stay on site while you are here-drive you home-stay with you after  Follow any instructions your doctors office has given you  Bring a complete list of all your medications and supplements including name,dose,how often taken the day of your procedure  If you normally take the following medications in the morning please do so the AM of your procedure with a small sip of water       Heart,blood pressure,seizure,thyroid or breathing medications-use your inhalers-bring any rescue inhalers with you DOS       DO NOT take blood pressure medications ending in \"joe\" or \"pril\" the AM of procedure or evening prior  Take half or your normal dose of any long acting insulins the night before your procedure-do not take any diabetic medications the AM of procedure  Follow your doctors instructions regarding stopping or taking  any blood thinners-if you do not have instructions-call them  Any questions call your doctor  Other ______________________________________________________________      [facility] POLICY(subject to change)             The current policy is 2 visitors per patient. There are no children allowed. Mask at discretion of facility. Visiting hours are 8a-8p. Overnight visitors will be at the discretion of the nurse. All policies are subject to change.

## 2022-10-27 LAB — SARS-COV-2: NOT DETECTED

## 2022-11-03 ENCOUNTER — ANESTHESIA (OUTPATIENT)
Dept: ENDOSCOPY | Age: 77
End: 2022-11-03
Payer: MEDICARE

## 2022-11-03 ENCOUNTER — ANESTHESIA EVENT (OUTPATIENT)
Dept: ENDOSCOPY | Age: 77
End: 2022-11-03
Payer: MEDICARE

## 2022-11-03 ENCOUNTER — HOSPITAL ENCOUNTER (OUTPATIENT)
Age: 77
Setting detail: OUTPATIENT SURGERY
Discharge: HOME | End: 2022-11-03
Attending: INTERNAL MEDICINE | Admitting: INTERNAL MEDICINE
Payer: MEDICARE

## 2022-11-03 VITALS
OXYGEN SATURATION: 98 % | SYSTOLIC BLOOD PRESSURE: 178 MMHG | WEIGHT: 149 LBS | HEIGHT: 64 IN | DIASTOLIC BLOOD PRESSURE: 67 MMHG | RESPIRATION RATE: 18 BRPM | TEMPERATURE: 97.2 F | HEART RATE: 86 BPM | BODY MASS INDEX: 25.44 KG/M2

## 2022-11-03 PROCEDURE — 2709999900 HC NON-CHARGEABLE SUPPLY: Performed by: INTERNAL MEDICINE

## 2022-11-03 PROCEDURE — 7100000001 HC PACU RECOVERY - ADDTL 15 MIN: Performed by: INTERNAL MEDICINE

## 2022-11-03 PROCEDURE — 3609027000 HC COLONOSCOPY: Performed by: INTERNAL MEDICINE

## 2022-11-03 PROCEDURE — 2580000003 HC RX 258: Performed by: NURSE ANESTHETIST, CERTIFIED REGISTERED

## 2022-11-03 PROCEDURE — 2500000003 HC RX 250 WO HCPCS: Performed by: NURSE ANESTHETIST, CERTIFIED REGISTERED

## 2022-11-03 PROCEDURE — 2580000003 HC RX 258: Performed by: ANESTHESIOLOGY

## 2022-11-03 PROCEDURE — 3700000000 HC ANESTHESIA ATTENDED CARE: Performed by: INTERNAL MEDICINE

## 2022-11-03 PROCEDURE — 3700000001 HC ADD 15 MINUTES (ANESTHESIA): Performed by: INTERNAL MEDICINE

## 2022-11-03 PROCEDURE — 7100000000 HC PACU RECOVERY - FIRST 15 MIN: Performed by: INTERNAL MEDICINE

## 2022-11-03 PROCEDURE — 7100000011 HC PHASE II RECOVERY - ADDTL 15 MIN: Performed by: INTERNAL MEDICINE

## 2022-11-03 PROCEDURE — 7100000010 HC PHASE II RECOVERY - FIRST 15 MIN: Performed by: INTERNAL MEDICINE

## 2022-11-03 PROCEDURE — 6360000002 HC RX W HCPCS: Performed by: NURSE ANESTHETIST, CERTIFIED REGISTERED

## 2022-11-03 RX ORDER — LIDOCAINE HYDROCHLORIDE 20 MG/ML
INJECTION, SOLUTION EPIDURAL; INFILTRATION; INTRACAUDAL; PERINEURAL PRN
Status: DISCONTINUED | OUTPATIENT
Start: 2022-11-03 | End: 2022-11-03 | Stop reason: SDUPTHER

## 2022-11-03 RX ORDER — GLYCOPYRROLATE 0.2 MG/ML
INJECTION INTRAMUSCULAR; INTRAVENOUS PRN
Status: DISCONTINUED | OUTPATIENT
Start: 2022-11-03 | End: 2022-11-03 | Stop reason: SDUPTHER

## 2022-11-03 RX ORDER — PROPOFOL 10 MG/ML
INJECTION, EMULSION INTRAVENOUS PRN
Status: DISCONTINUED | OUTPATIENT
Start: 2022-11-03 | End: 2022-11-03 | Stop reason: SDUPTHER

## 2022-11-03 RX ORDER — SODIUM CHLORIDE 9 MG/ML
INJECTION, SOLUTION INTRAVENOUS CONTINUOUS
Status: DISCONTINUED | OUTPATIENT
Start: 2022-11-03 | End: 2022-11-03 | Stop reason: HOSPADM

## 2022-11-03 RX ORDER — SODIUM CHLORIDE 9 MG/ML
INJECTION, SOLUTION INTRAVENOUS CONTINUOUS PRN
Status: DISCONTINUED | OUTPATIENT
Start: 2022-11-03 | End: 2022-11-03 | Stop reason: SDUPTHER

## 2022-11-03 RX ADMIN — PROPOFOL 50 MG: 10 INJECTION, EMULSION INTRAVENOUS at 10:52

## 2022-11-03 RX ADMIN — SODIUM CHLORIDE: 9 INJECTION, SOLUTION INTRAVENOUS at 10:23

## 2022-11-03 RX ADMIN — PROPOFOL 100 MCG/KG/MIN: 10 INJECTION, EMULSION INTRAVENOUS at 10:53

## 2022-11-03 RX ADMIN — LIDOCAINE HYDROCHLORIDE 100 MG: 20 INJECTION, SOLUTION EPIDURAL; INFILTRATION; INTRACAUDAL; PERINEURAL at 10:51

## 2022-11-03 RX ADMIN — GLYCOPYRROLATE 0.2 MG: 0.2 INJECTION, SOLUTION INTRAMUSCULAR; INTRAVENOUS at 10:57

## 2022-11-03 RX ADMIN — SODIUM CHLORIDE: 9 INJECTION, SOLUTION INTRAVENOUS at 10:44

## 2022-11-03 ASSESSMENT — PAIN - FUNCTIONAL ASSESSMENT: PAIN_FUNCTIONAL_ASSESSMENT: 0-10

## 2022-11-03 NOTE — DISCHARGE INSTRUCTIONS
ENDOSCOPY DISCHARGE INSTRUCTIONS    You may experience some lightheadedness for the next several hours. Plan on quiet relaxation for the rest of today. A responsible adult needs to stay with you today. Because of the medications you received today-do not drive,operate machinery,or sign any contractual agreement for the next 24 hours. Do not drink any alcoholic beverages or take any unprescribed medications tonight. Eat bland food and avoid anything greasy or spicy initially-progress to your normal diet gradually. Diet restrictions as instructed. You may resume home medications as instructed. It is not unusual to experience some mild cramping or gas pains, and you may not have a bowel movement for several days. If you have any of the following problems, notify your physician or return to the hospital emergency room : fever, chills, excessive bleeding, excessive vomiting, difficulty swallowing, uncontrolled pain, increased abdominal distention, shortness of breath or any other problems. If you have a sore throat, you may use lozenges or salt water gargles. If you had biopsy's taken from your procedure call the office for results in 5-7 days. ANESTHESIA DISCHARGE INSTRUCTIONS    Wear your seatbelt home. You are under the influence of drugs-do not drink alcohol,drive,operate machinery,or make any important decisions or sign any legal documentsfor 24 hours  A responsible adult needs to be with you for 24 hours. You may experience lightheadedness,dizziness,or sleepiness following surgery. Rest at home today- increase activity as tolerated. Progress slowly to a regular diet unless your physician has instructed you otherwise. Drink plenty of water. If nausea becomes a problem call your physician. Coughing,sore throat,and muscle aches are other side effects of anesthesia,and should improve with time. Do not drive,operate machinery while taking narcotics.

## 2022-11-03 NOTE — ANESTHESIA POSTPROCEDURE EVALUATION
Department of Anesthesiology  Postprocedure Note    Patient: Jamia Yeager  MRN: [id number]  YOB: 1945  Date of evaluation: 11/3/2022      Procedure Summary     Date: 11/03/22 Room / Location: [address] [place] [address]    Anesthesia Start: 6030 Anesthesia Stop: 1120    Procedure: COLONOSCOPY DIAGNOSTIC Diagnosis:       Polyp of colon, unspecified part of colon, unspecified type      (Polyp of colon, unspecified part of colon, unspecified type [K63.5])    Surgeons: David Mcmullen MD Responsible Provider: Carolyn Zavala MD    Anesthesia Type: MAC ASA Status: 3          Anesthesia Type: No value filed.     Chemo Phase I: Chemo Score: 10    Chemo Phase II: Chemo Score: 10      Anesthesia Post Evaluation    Patient location during evaluation: PACU  Patient participation: complete - patient participated  Level of consciousness: awake and alert  Airway patency: patent  Nausea & Vomiting: no vomiting and no nausea  Complications: no  Cardiovascular status: hemodynamically stable  Respiratory status: acceptable  Hydration status: stable

## 2022-11-03 NOTE — PROGRESS NOTES
Pt arrived from Endo to PACU bay 2. Report received from Endo staff. Pt arousable to voice. Pt on RA, NSR, and VSS. Will continue to monitor.

## 2022-11-03 NOTE — BRIEF OP NOTE
Brief Postoperative Note - Full Note in Chart Review/Procedures tab       Patient: Tiffanie Peace  YOB: 1945  MRN: [id number]    Date of Procedure: 11/3/2022    Pre-Op Diagnosis: Polyp of colon, unspecified part of colon, unspecified type [K63.5]    Post-Op Diagnosis: Same       Procedure(s):  COLONOSCOPY DIAGNOSTIC    Surgeon(s):  Vonna Mcardle, MD    Assistant:  * No surgical staff found *    Anesthesia: Monitor Anesthesia Care    Estimated Blood Loss (mL): Minimal    Complications: None    Specimens:   * No specimens in log *    Implants:  * No implants in log *      Drains: * No LDAs found *    Findings:   Normal colon  Normal terminal ileum    Rec:  Resume diet  Continue present treatment.   Followup colonoscopy is not recommeded  Followup with referring physician as previously instucted  Followup in three months  Schedule RUQ US    Electronically signed by Vonna Mcardle, MD on 11/3/2022 at 11:26 AM

## 2022-11-03 NOTE — ANESTHESIA PRE PROCEDURE
Department of Anesthesiology  Preprocedure Note       Name:  Juli Zamarripa   Age:  65 y.o.  :  1945                                          MRN:  [id number]         Date:  11/3/2022      Surgeon: Red Alvarez):  Safia Murphy MD    Procedure: Procedure(s):  COLONOSCOPY DIAGNOSTIC    Medications prior to admission:   Prior to Admission medications    Medication Sig Start Date End Date Taking? Authorizing Provider   acyclovir (ZOVIRAX) 400 MG tablet TAKE 1 TABLET BY MOUTH THREE TIMES A DAY 10/10/22   Valente Teran MD   alendronate (FOSAMAX) 70 MG tablet Take 1 tablet by mouth every 7 days 10/4/22   Valente Teran MD   olmesartan-hydroCHLOROthiazide (BENICAR HCT) 40-12.5 MG per tablet Take 1 tablet by mouth daily (with breakfast) 22   Valente Teran MD   desvenlafaxine succinate (PRISTIQ) 50 MG TB24 extended release tablet TAKE 1 TABLET EVERY DAY 22   Valente Teran MD   amLODIPine (NORVASC) 5 MG tablet TAKE 1 TABLET EVERY NIGHT 22   Valente Teran MD   atorvastatin (LIPITOR) 10 MG tablet Take 1 tablet by mouth every other day 22   Valente Teran MD   atorvastatin (LIPITOR) 10 MG tablet Take 1 tablet by mouth four times a week 22  Valente Teran MD   nirmatrelvir/ritonavir (PAXLOVID) 20 x 150 MG & 10 x 100MG Take 3 tablets (two 150 mg nirmatrelvir and one 100 mg ritonavir tablets) by mouth every 12 hours for 5 days.  22   KIMBERLY Degroot - CNP   metoprolol tartrate (LOPRESSOR) 25 MG tablet TAKE 1 TABLET TWICE DAILY 22   Valente Teran MD   vitamin D (D3-1000) 25 MCG (1000 UT) TABS tablet Take 1,000 Units by mouth daily    Historical Provider, MD   Calcium Citrate-Vitamin D (CALCIUM + D PO) Take by mouth 2 times daily    Historical Provider, MD   cetirizine (ZYRTEC) 10 MG tablet Take 1 tablet by mouth nightly 8/3/20   Jayleen Car MD   clobetasol (TEMOVATE) 0.05 % cream Apply to rash or itching skin on body 3 to 4 times daily as directed 7/14/15   Historical Provider, MD   mometasone (NASONEX) 50 MCG/ACT nasal spray 2 sprays by Nasal route daily. 2/18/15   Sarah Restrepo MD   naproxen (NAPROSYN) 250 MG tablet Take 500 mg by mouth 2 times daily as needed. Historical Provider, MD   Multiple Vitamins-Minerals (OCUVITE) TABS tablet Take 1 tablet by mouth daily. Historical Provider, MD       Current medications:    No current outpatient medications on file. No current facility-administered medications for this visit. Allergies:     Allergies   Allergen Reactions    Hydroxybenzoate     Prednisolone Acetate     Tixocortol Pivalate     Neosporin [Neomycin-Polymyxin-Gramicidin] Rash     Allergic rash     Ace Inhibitors      Cough, itchy throat    Methylchloroisothiazolinone     Shingrix [Zoster Vac Recomb Adjuvanted]      GBS       Problem List:    Patient Active Problem List   Diagnosis Code    Hypertension I10    Hyperlipidemia E78.5    Osteopenia M85.80    Hx of eczema Z87.2    Chronic idiopathic urticaria L50.1    Moderate mitral regurgitation by prior echocardiogram I34.0    Prediabetes R73.03    Major depressive disorder with single episode, in full remission ([facility]) F32.5    Frequent falls R29.6    SVT (supraventricular tachycardia) ([facility]) I47.1    GBS (Guillain [place] syndrome) ([facility]) G61.0    COVID-19 virus infection U07.1       Past Medical History:        Diagnosis Date    Anxiety     Colon polyp     Guillain Barré syndrome ([facility]     Headache(784.0)     Hyperlipidemia     Hypertension     Mitral regurgitation     Osteoarthritis     SVT (supraventricular tachycardia) ([facility])        Past Surgical History:        Procedure Laterality Date    COLONOSCOPY  8/10    COLONOSCOPY N/A 12/3/2020    COLONOSCOPY POLYPECTOMY SNARE/COLD BIOPSY performed by Kay Chin MD at [facility] Left     CATARACT    KNEE ARTHROSCOPY Left        Social History:    Social History     Tobacco Use    Smoking status: Never    Smokeless tobacco: Never   Substance Use Topics    Alcohol use: Yes     Alcohol/week: 1.0 standard drink     Types: 1 Glasses of wine per week     Comment: rarely                                Counseling given: Not Answered      Vital Signs (Current): There were no vitals filed for this visit. BP Readings from Last 3 Encounters:   09/14/22 (!) 155/80   08/01/22 136/60   11/01/21 138/82       NPO Status:                                                                                 BMI:   Wt Readings from Last 3 Encounters:   09/14/22 153 lb 3.2 oz (69.5 kg)   08/30/22 150 lb (68 kg)   08/01/22 154 lb 9.6 oz (70.1 kg)     There is no height or weight on file to calculate BMI.    CBC:   Lab Results   Component Value Date/Time    WBC 5.4 07/20/2022 08:55 AM    RBC 3.54 07/20/2022 08:55 AM    HGB 11.4 07/20/2022 08:55 AM    HCT 33.8 07/20/2022 08:55 AM    MCV 96 07/20/2022 08:55 AM    RDW 12.6 07/20/2022 08:55 AM     07/20/2022 08:55 AM       CMP:   Lab Results   Component Value Date/Time     07/20/2022 08:55 AM    K 5.3 07/20/2022 08:55 AM    K 3.4 07/11/2020 07:14 PM     07/20/2022 08:55 AM    CO2 26 07/20/2022 08:55 AM    BUN 20 07/20/2022 08:55 AM    CREATININE 0.97 07/20/2022 08:55 AM    GFRAA 75 07/09/2021 09:25 AM    GFRAA >60 09/17/2012 10:20 AM    AGRATIO 1.9 07/20/2022 08:55 AM    LABGLOM 65 07/09/2021 09:25 AM    LABGLOM 78 11/02/2017 10:02 AM    GLUCOSE 97 07/20/2022 08:55 AM    PROT 6.4 07/20/2022 08:55 AM    PROT 7.0 09/17/2012 10:20 AM    CALCIUM 9.6 07/20/2022 08:55 AM    BILITOT 0.4 07/20/2022 08:55 AM    ALKPHOS 66 07/20/2022 08:55 AM    AST 19 07/20/2022 08:55 AM    ALT 16 07/20/2022 08:55 AM       POC Tests: No results for input(s): POCGLU, POCNA, POCK, POCCL, POCBUN, POCHEMO, POCHCT in the last 72 hours.     Coags:   Lab Results   Component Value Date/Time    PROTIME 10.9 07/13/2020 03:23 PM    INR 0.94 07/13/2020 03:23 PM    APTT 33.4 07/13/2020 03:23 PM       HCG (If Applicable): No results found for: PREGTESTUR, PREGSERUM, HCG, HCGQUANT     ABGs: No results found for: PHART, PO2ART, JKO1UCP, QRI6AOF, BEART, O0MIXQCC     Type & Screen (If Applicable):  No results found for: LABABO, LABRH    Drug/Infectious Status (If Applicable):  No results found for: HIV, HEPCAB    COVID-19 Screening (If Applicable):   Lab Results   Component Value Date/Time    COVID19 Not Detected 10/26/2022 11:24 AM    COVID19 Not Detected 07/13/2020 03:47 PM         Anesthesia Evaluation  Patient summary reviewed and Nursing notes reviewed no history of anesthetic complications:   Airway: Mallampati: II          Dental:          Pulmonary:Negative Pulmonary ROS and normal exam                               Cardiovascular:  Exercise tolerance: good (>4 METS),   (+) hypertension:, valvular problems/murmurs: MR, dysrhythmias: SVT, hyperlipidemia      ECG reviewed  Rhythm: regular    Echocardiogram reviewed               ROS comment: EF 60%     Neuro/Psych:   (+) neuromuscular disease (s/p GBS):, depression/anxiety              ROS comment: GBS GI/Hepatic/Renal:             Endo/Other:    (+) blood dyscrasia: anemia, arthritis:., electrolyte abnormalities, . Abdominal:             Vascular: Other Findings:             Anesthesia Plan      MAC     ASA 3       Induction: intravenous. Anesthetic plan and risks discussed with patient. Plan discussed with CRNA.                     Airam Bryant MD   11/3/2022

## 2022-11-03 NOTE — H&P
Gastroenterology Note             Pre-operative History and Physical    Patient: Luis Quiles  : 1945  CSN:     History Obtained From:  patient and/or guardian. HISTORY OF PRESENT ILLNESS:    The patient is a 65 y.o. female with history of large cecal polyp and multiple other colonic polyps removed 2 yrs ago, here for surveillance colonoscopy. Past Medical History:    Past Medical History:   Diagnosis Date    Anxiety     Colon polyp     Guillain Barré syndrome (HCC)     Headache(784.0)     Hyperlipidemia     Hypertension     Mitral regurgitation     Osteoarthritis     SVT (supraventricular tachycardia) ([address]      Past Surgical History:    Past Surgical History:   Procedure Laterality Date    COLONOSCOPY  8/10    COLONOSCOPY N/A 12/3/2020    COLONOSCOPY POLYPECTOMY SNARE/COLD BIOPSY performed by Loly Christiansen MD at [address]  Left     CATARACT    KNEE ARTHROSCOPY Left      Medications Prior to Admission:   No current facility-administered medications on file prior to encounter. Current Outpatient Medications on File Prior to Encounter   Medication Sig Dispense Refill    olmesartan-hydroCHLOROthiazide (BENICAR HCT) 40-12.5 MG per tablet Take 1 tablet by mouth daily (with breakfast) 90 tablet 3    desvenlafaxine succinate (PRISTIQ) 50 MG TB24 extended release tablet TAKE 1 TABLET EVERY DAY 90 tablet 3    amLODIPine (NORVASC) 5 MG tablet TAKE 1 TABLET EVERY NIGHT 90 tablet 3    atorvastatin (LIPITOR) 10 MG tablet Take 1 tablet by mouth every other day 30 tablet 0    atorvastatin (LIPITOR) 10 MG tablet Take 1 tablet by mouth four times a week 50 tablet 3    nirmatrelvir/ritonavir (PAXLOVID) 20 x 150 MG & 10 x 100MG Take 3 tablets (two 150 mg nirmatrelvir and one 100 mg ritonavir tablets) by mouth every 12 hours for 5 days.  30 tablet 0    metoprolol tartrate (LOPRESSOR) 25 MG tablet TAKE 1 TABLET TWICE DAILY 180 tablet 3    vitamin D (D3-1000) 25 MCG (1000 UT) TABS tablet Take 1,000 Units by mouth daily      Calcium Citrate-Vitamin D (CALCIUM + D PO) Take by mouth 2 times daily      cetirizine (ZYRTEC) 10 MG tablet Take 1 tablet by mouth nightly 30 tablet 0    clobetasol (TEMOVATE) 0.05 % cream Apply to rash or itching skin on body 3 to 4 times daily as directed      mometasone (NASONEX) 50 MCG/ACT nasal spray 2 sprays by Nasal route daily. 1 Inhaler 0    naproxen (NAPROSYN) 250 MG tablet Take 500 mg by mouth 2 times daily as needed. Multiple Vitamins-Minerals (OCUVITE) TABS tablet Take 1 tablet by mouth daily. Allergies:  Hydroxybenzoate, Prednisolone acetate, Tixocortol pivalate, Neosporin [neomycin-polymyxin-gramicidin], Ace inhibitors, Methylchloroisothiazolinone, and Shingrix [zoster vac recomb adjuvanted]      Social History:   Social History     Tobacco Use    Smoking status: Never    Smokeless tobacco: Never   Substance Use Topics    Alcohol use: Yes     Alcohol/week: 1.0 standard drink     Types: 1 Glasses of wine per week     Comment: rarely     Family History:   Family History   Problem Relation Age of Onset    Kidney Disease Mother     Heart Disease Father     Kidney Disease Sister     Cancer Sister     Kidney Disease Brother        PHYSICAL EXAM:      There were no vitals taken for this visit. I        Heart:   RRR, normal s1s2    Lungs:  CTA bilat,  Normal effort    Abdomen:   NT, ND      ASA Grade:  ASA 3 - Patient with moderate systemic disease with functional limitations    Mallampati Class:  Class I: Soft palate, uvula, fauces, pillars visible  __________  Class II: Soft palate, uvula, fauces visible  ____X_____   Class III: Soft palate, base of uvula visible  __________  Class IV: Hard palate only visible   __________        ASSESSMENT AND PLAN:    1. Patient is a 65 y.o. female here for colonoscopy with MAC.   2.  Procedure options, risks and benefits reviewed with patient. Patient expresses understanding.      Mark SHELTON Maame Staff, [address] [place] [address]  11/3/2022

## 2022-11-03 NOTE — PROGRESS NOTES
Discharge instructions reviewed with patient and pts friend Arelis Donaldson. All home medications have been reviewed, pt v/u. Discharge instructions signed. Pt discharged via wheelchair. Pt discharged with all belongings. Duane Arvin taking stable pt home.

## 2022-11-15 ENCOUNTER — TELEPHONE (OUTPATIENT)
Dept: FAMILY MEDICINE CLINIC | Age: 77
End: 2022-11-15

## 2022-11-15 DIAGNOSIS — R73.03 PREDIABETES: Primary | ICD-10-CM

## 2022-11-30 ENCOUNTER — OFFICE VISIT (OUTPATIENT)
Dept: FAMILY MEDICINE CLINIC | Age: 77
End: 2022-11-30
Payer: MEDICARE

## 2022-11-30 VITALS
HEIGHT: 64 IN | RESPIRATION RATE: 16 BRPM | DIASTOLIC BLOOD PRESSURE: 60 MMHG | HEART RATE: 70 BPM | BODY MASS INDEX: 25.99 KG/M2 | SYSTOLIC BLOOD PRESSURE: 150 MMHG | WEIGHT: 152.2 LBS | OXYGEN SATURATION: 95 %

## 2022-11-30 DIAGNOSIS — F32.5 MAJOR DEPRESSIVE DISORDER WITH SINGLE EPISODE, IN FULL REMISSION (HCC): ICD-10-CM

## 2022-11-30 DIAGNOSIS — G61.0 GBS (GUILLAIN BARRE SYNDROME) (HCC): ICD-10-CM

## 2022-11-30 DIAGNOSIS — H91.93 DECREASED HEARING, BILATERAL: ICD-10-CM

## 2022-11-30 DIAGNOSIS — Z00.00 MEDICARE ANNUAL WELLNESS VISIT, SUBSEQUENT: Primary | ICD-10-CM

## 2022-11-30 DIAGNOSIS — I10 PRIMARY HYPERTENSION: ICD-10-CM

## 2022-11-30 DIAGNOSIS — E78.2 MIXED HYPERLIPIDEMIA: ICD-10-CM

## 2022-11-30 PROCEDURE — G8484 FLU IMMUNIZE NO ADMIN: HCPCS | Performed by: FAMILY MEDICINE

## 2022-11-30 PROCEDURE — 1123F ACP DISCUSS/DSCN MKR DOCD: CPT | Performed by: FAMILY MEDICINE

## 2022-11-30 PROCEDURE — 3074F SYST BP LT 130 MM HG: CPT | Performed by: FAMILY MEDICINE

## 2022-11-30 PROCEDURE — 3078F DIAST BP <80 MM HG: CPT | Performed by: FAMILY MEDICINE

## 2022-11-30 PROCEDURE — G0439 PPPS, SUBSEQ VISIT: HCPCS | Performed by: FAMILY MEDICINE

## 2022-11-30 ASSESSMENT — PATIENT HEALTH QUESTIONNAIRE - PHQ9
SUM OF ALL RESPONSES TO PHQ QUESTIONS 1-9: 3
SUM OF ALL RESPONSES TO PHQ QUESTIONS 1-9: 3
5. POOR APPETITE OR OVEREATING: 0
7. TROUBLE CONCENTRATING ON THINGS, SUCH AS READING THE NEWSPAPER OR WATCHING TELEVISION: 1
3. TROUBLE FALLING OR STAYING ASLEEP: 1
6. FEELING BAD ABOUT YOURSELF - OR THAT YOU ARE A FAILURE OR HAVE LET YOURSELF OR YOUR FAMILY DOWN: 0
SUM OF ALL RESPONSES TO PHQ9 QUESTIONS 1 & 2: 0
SUM OF ALL RESPONSES TO PHQ QUESTIONS 1-9: 3
8. MOVING OR SPEAKING SO SLOWLY THAT OTHER PEOPLE COULD HAVE NOTICED. OR THE OPPOSITE, BEING SO FIGETY OR RESTLESS THAT YOU HAVE BEEN MOVING AROUND A LOT MORE THAN USUAL: 0
9. THOUGHTS THAT YOU WOULD BE BETTER OFF DEAD, OR OF HURTING YOURSELF: 0
SUM OF ALL RESPONSES TO PHQ QUESTIONS 1-9: 3
1. LITTLE INTEREST OR PLEASURE IN DOING THINGS: 0
2. FEELING DOWN, DEPRESSED OR HOPELESS: 0
4. FEELING TIRED OR HAVING LITTLE ENERGY: 1
10. IF YOU CHECKED OFF ANY PROBLEMS, HOW DIFFICULT HAVE THESE PROBLEMS MADE IT FOR YOU TO DO YOUR WORK, TAKE CARE OF THINGS AT HOME, OR GET ALONG WITH OTHER PEOPLE: 0

## 2022-11-30 ASSESSMENT — LIFESTYLE VARIABLES
HOW OFTEN DO YOU HAVE A DRINK CONTAINING ALCOHOL: 2-4 TIMES A MONTH
HOW MANY STANDARD DRINKS CONTAINING ALCOHOL DO YOU HAVE ON A TYPICAL DAY: 1 OR 2

## 2022-11-30 NOTE — PATIENT INSTRUCTIONS
Personalized Preventive Plan for [facility] - 11/30/2022  Medicare offers a range of preventive health benefits. Some of the tests and screenings are paid in full while other may be subject to a deductible, co-insurance, and/or copay. Some of these benefits include a comprehensive review of your medical history including lifestyle, illnesses that may run in your family, and various assessments and screenings as appropriate. After reviewing your medical record and screening and assessments performed today your provider may have ordered immunizations, labs, imaging, and/or referrals for you. A list of these orders (if applicable) as well as your Preventive Care list are included within your After Visit Summary for your review. Other Preventive Recommendations:    A preventive eye exam performed by an eye specialist is recommended every 1-2 years to screen for glaucoma; cataracts, macular degeneration, and other eye disorders. A preventive dental visit is recommended every 6 months. Try to get at least 150 minutes of exercise per week or 10,000 steps per day on a pedometer . Order or download the FREE \"Exercise & Physical Activity: Your Everyday Guide\" from [employer]. Call 7-863.626.8256 or search [employer] online. You need 6223-6657 mg of calcium and 1195-2005 IU of vitamin D per day. It is possible to meet your calcium requirement with diet alone, but a vitamin D supplement is usually necessary to meet this goal.  When exposed to the sun, use a sunscreen that protects against both UVA and UVB radiation with an SPF of 30 or greater. Reapply every 2 to 3 hours or after sweating, drying off with a towel, or swimming. Always wear a seat belt when traveling in a car. Always wear a helmet when riding a bicycle or motorcycle.

## 2022-11-30 NOTE — PROGRESS NOTES
Medicare Annual Wellness Visit    Ameya Bryan is here for Medicare AWV (Pt is here for an annual wellness visit )    Assessment & Plan   Medicare annual wellness visit, subsequent    Sad about losing her 75-year-old brother  Tearful today  Blood pressure up    Primary hypertension  Elevated systolic blood pressure, alternate 5 mg with 10 mg amlodipine in the evening  Monitor blood pressure, follow-up 1 to 2 months or as needed    Mixed hyperlipidemia  Stable on statin Lipitor 10mg nightly    Major depressive disorder with single episode, in full remission ([facility] [address]  Continue Pristiq and vitamin D3    GBS (Guillain Haugen syndrome) ([facility] [address]  No Immunizations for her    Decreased hearing, bilateral  screen  -     [place], Audiology, [facility]      Recommendations for Preventive Services Due: see orders and patient instructions/AVS.  Recommended screening schedule for the next 5-10 years is provided to the patient in written form: see Patient Instructions/AVS.     Return for Medicare Annual Wellness Visit in 1 year. Subjective   The following acute and/or chronic problems were also addressed today:  Lost her old brother, very sad      Patient's complete Health Risk Assessment and screening values have been reviewed and are found in Flowsheets. The following problems were reviewed today and where indicated follow up appointments were made and/or referrals ordered.     Positive Risk Factor Screenings with Interventions:             General Health and ACP:  General  In general, how would you say your health is?: Good  In the past 7 days, have you experienced any of the following: New or Increased Pain, New or Increased Fatigue, Loneliness, Social Isolation, Stress or Anger?: (!) Yes  Select all that apply: (!) Anger  Do you get the social and emotional support that you need?: Yes  Do you have a Living Will?: Yes    Advance Directives       Power of  Living Will ACP-Advance Directive ACP-Power of     Not on File Not on File Not on File Not on File        General Health Risk Interventions:  Living will to us      Hearing/Vision:  Do you or your family notice any trouble with your hearing that hasn't been managed with hearing aids?: (!) Yes  Do you have difficulty driving, watching TV, or doing any of your daily activities because of your eyesight?: No  Have you had an eye exam within the past year?: Yes  No results found. Hearing/Vision Interventions:  Doing ok   Interested in hearing screen, sees eye specialist yearly  Labs reviewed with patient in detail    Safety:  Do you have working smoke detectors?: Yes  Do you have any tripping hazards - loose or unsecured carpets or rugs?: No  Do you have any tripping hazards - clutter in doorways, halls, or stairs?: No  Do you have either shower bars, grab bars, non-slip mats or non-slip surfaces in your shower or bathtub?: (!) No  Do all of your stairways have a railing or banister?: Yes  Do you always fasten your seatbelt when you are in a car?: Yes  Safety Interventions:  Patient declines any further evaluation/treatment for this issue           Objective   Vitals:    11/30/22 1317 11/30/22 1338   BP: (!) 160/60 (!) 150/60   Site: Left Upper Arm    Position: Sitting    Cuff Size: Small Adult    Pulse: 70    Resp: 16    SpO2: 95%    Weight: 152 lb 3.2 oz (69 kg)    Height: 5' 4\" (1.626 m)       Body mass index is 26.13 kg/m².       General Appearance: alert and oriented to person, place and time, well developed and well- nourished, tearful about losing her brother to liver cancer from metastatic melanoma  Skin: warm and dry, no rash or erythema  Head: normocephalic and atraumatic  Eyes: pupils equal, round, and reactive to light, extraocular eye movements intact, conjunctivae normal  ENT: tympanic membrane, external ear and ear canal normal bilaterally, nose without deformity  Neck: supple and non-tender without mass, no thyromegaly or thyroid nodules, no cervical lymphadenopathy  Pulmonary/Chest: clear to auscultation bilaterally- no wheezes, rales or rhonchi, normal air movement, no respiratory distress  Cardiovascular: normal rate, regular rhythm, normal S1 and S2, no murmurs, rubs, clicks, or gallops, distal pulses intact, no carotid bruits  Abdomen: soft, non-tender, non-distended, normal bowel sounds, no masses or organomegaly  Breast: appear normal, no suspicious masses, no skin or nipple changes or axillary nodes  Extremities: no cyanosis, clubbing or edema  Musculoskeletal: normal range of motion, no joint swelling, deformity or tenderness  Neurologic: reflexes normal and symmetric, no cranial nerve deficit, gait, coordination and speech normal       Allergies   Allergen Reactions    Hydroxybenzoate     Prednisolone Acetate     Tixocortol Pivalate     Neosporin [Neomycin-Polymyxin-Gramicidin] Rash     Allergic rash     Ace Inhibitors      Cough, itchy throat    Methylchloroisothiazolinone     Shingrix [Zoster Vac Recomb Adjuvanted]      GBS     Prior to Visit Medications    Medication Sig Taking?  Authorizing Provider   acyclovir (ZOVIRAX) 400 MG tablet TAKE 1 TABLET BY MOUTH THREE TIMES A DAY Yes John Cortes MD   alendronate (FOSAMAX) 70 MG tablet Take 1 tablet by mouth every 7 days Yes John Crotes MD   olmesartan-hydroCHLOROthiazide (BENICAR HCT) 40-12.5 MG per tablet Take 1 tablet by mouth daily (with breakfast) Yes John Cortes MD   desvenlafaxine succinate (PRISTIQ) 50 MG TB24 extended release tablet TAKE 1 TABLET EVERY DAY Yes John Cortes MD   amLODIPine (NORVASC) 5 MG tablet TAKE 1 TABLET EVERY NIGHT Yes John Cortes MD   atorvastatin (LIPITOR) 10 MG tablet Take 1 tablet by mouth every other day Yes John Cortes MD   atorvastatin (LIPITOR) 10 MG tablet Take 1 tablet by mouth four times a week Yes John Cortes MD   nirmatrelvir/ritonavir (PAXLOVID) 20 x 150 MG & 10 x 100MG Take 3 tablets (two 150 mg nirmatrelvir and one 100 mg ritonavir tablets) by mouth every 12 hours for 5 days. Yes KIMBERLY Perkins - KAY   metoprolol tartrate (LOPRESSOR) 25 MG tablet TAKE 1 TABLET TWICE DAILY Yes Marisabel Green MD   vitamin D (D3-1000) 25 MCG (1000 UT) TABS tablet Take 1,000 Units by mouth daily Yes Historical Provider, MD   Calcium Citrate-Vitamin D (CALCIUM + D PO) Take by mouth 2 times daily Yes Historical Provider, MD   cetirizine (ZYRTEC) 10 MG tablet Take 1 tablet by mouth nightly Yes Jennifer De Leon MD   clobetasol (TEMOVATE) 0.05 % cream Apply to rash or itching skin on body 3 to 4 times daily as directed Yes Historical Provider, MD   mometasone (NASONEX) 50 MCG/ACT nasal spray 2 sprays by Nasal route daily. Yes Marisabel Green MD   naproxen (NAPROSYN) 250 MG tablet Take 500 mg by mouth 2 times daily as needed. Yes Historical Provider, MD   Multiple Vitamins-Minerals (OCUVITE) TABS tablet Take 1 tablet by mouth daily.    Yes Historical Provider, MD Reich (Including outside providers/suppliers regularly involved in providing care):   Patient Care Team:  Marisabel Green MD as PCP - Therese Rodriguez MD as PCP - [facility] Empaneled Provider     Reviewed and updated this visit:  Tobacco  Allergies  Meds  Problems  Med Hx  Surg Hx  Soc Hx  Fam Hx

## 2022-12-19 ENCOUNTER — TELEPHONE (OUTPATIENT)
Dept: FAMILY MEDICINE CLINIC | Age: 77
End: 2022-12-19

## 2022-12-19 NOTE — TELEPHONE ENCOUNTER
All passengers travelling to and from states need proof of vaccination regulations of [address]. From MD needs to let [employer] know why patient cant get covid vaccination, please fax letter asap patient is at airport waiting for flight at 1pm to see family for holidays.     Fax to  [employer] [phone]

## 2023-02-21 ENCOUNTER — TELEPHONE (OUTPATIENT)
Dept: FAMILY MEDICINE CLINIC | Age: 78
End: 2023-02-21

## 2023-02-21 NOTE — TELEPHONE ENCOUNTER
Medication and Quantity requested:  metoprolol tartrate (LOPRESSOR) 25 MG tablet       Last Visit  11/30/22 - Dr Uma Hines and phone number updated in [facility]:  yes    [address] AND OTHER MEDICATIONS AND WOULD LIKE A CALL TO DISCUSS.

## 2023-04-20 ENCOUNTER — OFFICE VISIT (OUTPATIENT)
Dept: FAMILY MEDICINE CLINIC | Age: 78
End: 2023-04-20

## 2023-04-20 VITALS
WEIGHT: 152.13 LBS | HEIGHT: 64 IN | BODY MASS INDEX: 25.97 KG/M2 | OXYGEN SATURATION: 98 % | DIASTOLIC BLOOD PRESSURE: 70 MMHG | HEART RATE: 70 BPM | SYSTOLIC BLOOD PRESSURE: 140 MMHG

## 2023-04-20 DIAGNOSIS — F32.5 MAJOR DEPRESSIVE DISORDER WITH SINGLE EPISODE, IN FULL REMISSION (HCC): ICD-10-CM

## 2023-04-20 DIAGNOSIS — G61.0 GBS (GUILLAIN BARRE SYNDROME) (HCC): ICD-10-CM

## 2023-04-20 DIAGNOSIS — J06.9 PROTRACTED URI: Primary | ICD-10-CM

## 2023-04-20 DIAGNOSIS — I10 PRIMARY HYPERTENSION: ICD-10-CM

## 2023-04-20 DIAGNOSIS — R09.82 PND (POST-NASAL DRIP): ICD-10-CM

## 2023-04-20 DIAGNOSIS — I47.1 SVT (SUPRAVENTRICULAR TACHYCARDIA) (HCC): ICD-10-CM

## 2023-04-20 RX ORDER — AMOXICILLIN 500 MG/1
500 CAPSULE ORAL 3 TIMES DAILY
Qty: 21 CAPSULE | Refills: 0 | Status: SHIPPED | OUTPATIENT
Start: 2023-04-20 | End: 2023-04-27

## 2023-04-20 RX ORDER — VALSARTAN AND HYDROCHLOROTHIAZIDE 80; 12.5 MG/1; MG/1
1 TABLET, FILM COATED ORAL DAILY
Qty: 90 TABLET | Refills: 1 | Status: SHIPPED | OUTPATIENT
Start: 2023-04-20

## 2023-04-20 ASSESSMENT — PATIENT HEALTH QUESTIONNAIRE - PHQ9
8. MOVING OR SPEAKING SO SLOWLY THAT OTHER PEOPLE COULD HAVE NOTICED. OR THE OPPOSITE, BEING SO FIGETY OR RESTLESS THAT YOU HAVE BEEN MOVING AROUND A LOT MORE THAN USUAL: 0
7. TROUBLE CONCENTRATING ON THINGS, SUCH AS READING THE NEWSPAPER OR WATCHING TELEVISION: 0
SUM OF ALL RESPONSES TO PHQ QUESTIONS 1-9: 0
9. THOUGHTS THAT YOU WOULD BE BETTER OFF DEAD, OR OF HURTING YOURSELF: 0
10. IF YOU CHECKED OFF ANY PROBLEMS, HOW DIFFICULT HAVE THESE PROBLEMS MADE IT FOR YOU TO DO YOUR WORK, TAKE CARE OF THINGS AT HOME, OR GET ALONG WITH OTHER PEOPLE: 0
5. POOR APPETITE OR OVEREATING: 0
SUM OF ALL RESPONSES TO PHQ QUESTIONS 1-9: 0
SUM OF ALL RESPONSES TO PHQ QUESTIONS 1-9: 0
2. FEELING DOWN, DEPRESSED OR HOPELESS: 0
1. LITTLE INTEREST OR PLEASURE IN DOING THINGS: 0
SUM OF ALL RESPONSES TO PHQ QUESTIONS 1-9: 0
6. FEELING BAD ABOUT YOURSELF - OR THAT YOU ARE A FAILURE OR HAVE LET YOURSELF OR YOUR FAMILY DOWN: 0
3. TROUBLE FALLING OR STAYING ASLEEP: 0
SUM OF ALL RESPONSES TO PHQ9 QUESTIONS 1 & 2: 0
4. FEELING TIRED OR HAVING LITTLE ENERGY: 0

## 2023-04-20 NOTE — PROGRESS NOTES
Haseeb Herman is a 65 y.o. female. HPI:  Here with complaint of ongoing sinus congestion postnasal drip and cough    Sinus congestion last Tuesday, no fever   PND and cough persisting    SBP elevated today    Taking extra dose of amlodipine makes her swell recommend she go back to 5 mg  We will increase Diovan HCT to 8012.5 daily  Follow back up in 1 month for blood pressure check    Meds, vitamins and allergies reviewed with pt    Wt Readings from Last 3 Encounters:   04/20/23 152 lb 2 oz (69 kg)   11/30/22 152 lb 3.2 oz (69 kg)   11/03/22 149 lb (67.6 kg)       REVIEW OF SYSTEMS:   CONSTITUTIONAL: See history of present illness,   Weight noted   HEENT: No new vision difficulties or ringing in the ears. RESPIRATORY: No new SOB, PND, orthopnea or cough. CARDIOVASCULAR: no CP, palpitations or SOB with exertion  GI: No nausea, vomiting, diarrhea, constipation, abdominal pain or changes in bowel habits. : No urinary frequency, urgency, incontinence hematuria or dysuria. SKIN: No cyanosis or skin lesions. MUSCULOSKELETAL: Still with lower extremity imbalance after Guillain-Barré  Recommend she recorder at her exercises  NEUROLOGICAL: No syncope or TIA-like symptoms. PSYCHIATRIC: No anxiety, insomnia or depression     Allergies   Allergen Reactions    Hydroxybenzoate     Prednisolone Acetate     Tixocortol Pivalate     Neosporin [Neomycin-Polymyxin-Gramicidin] Rash     Allergic rash     Ace Inhibitors      Cough, itchy throat    Methylchloroisothiazolinone     Shingrix [Zoster Vac Recomb Adjuvanted]      GBS       Prior to Visit Medications    Medication Sig Taking?  Authorizing Provider   valsartan-hydroCHLOROthiazide (DIOVAN-HCT) 80-12.5 MG per tablet Take 1 tablet by mouth daily With breakfast Yes Daisy Solorzano MD   amoxicillin (AMOXIL) 500 MG capsule Take 1 capsule by mouth 3 times daily for 7 days Yes Daisy Solorzano MD   metoprolol tartrate (LOPRESSOR) 25 MG tablet Take 1 tablet by mouth 2 times

## 2023-04-21 ENCOUNTER — TELEPHONE (OUTPATIENT)
Dept: FAMILY MEDICINE CLINIC | Age: 78
End: 2023-04-21

## 2023-04-21 NOTE — TELEPHONE ENCOUNTER
Patient is calling about a prescription she was given 2 years ago for some compression socks. Patient never used this script and she is wanting a new one possibly? Please f/u with patient for mor information as she was also kind of confused on what exactly shes asking for.

## 2023-04-26 ENCOUNTER — TELEPHONE (OUTPATIENT)
Dept: FAMILY MEDICINE CLINIC | Age: 78
End: 2023-04-26

## 2023-04-26 NOTE — TELEPHONE ENCOUNTER
Spoke to pt and she finished the amoxicillin. She doesn't know if that made her fatigue. Sinus drainage but not like before, headache, and hands cold. No urinary problem or cough and she did a home covid test today and it was negative.  Temperature 97

## 2023-04-26 NOTE — TELEPHONE ENCOUNTER
Patient is calling stating she is now having body aches along with chills. She was in here on 4/20/23 for similar symptoms. Patient is wondering what she should do next about this? She is all out of the medication she was given. Please advise and f/u with patient if needed.

## 2023-05-30 ENCOUNTER — TELEPHONE (OUTPATIENT)
Dept: FAMILY MEDICINE CLINIC | Age: 78
End: 2023-05-30

## 2023-05-30 DIAGNOSIS — H91.93 DECREASED HEARING OF BOTH EARS: Primary | ICD-10-CM

## 2023-07-05 DIAGNOSIS — E78.2 MIXED HYPERLIPIDEMIA: Primary | ICD-10-CM

## 2023-07-05 RX ORDER — ATORVASTATIN CALCIUM 10 MG/1
TABLET, FILM COATED ORAL
Qty: 48 TABLET | Refills: 3 | Status: SHIPPED | OUTPATIENT
Start: 2023-07-05

## 2023-07-05 NOTE — TELEPHONE ENCOUNTER
Medication:   Requested Prescriptions     Pending Prescriptions Disp Refills    atorvastatin (LIPITOR) 10 MG tablet [Pharmacy Med Name: ATORVASTATIN CALCIUM 10 MG Tablet] 48 tablet 0     Sig: TAKE 1 TABLET FOUR TIMES WEEKLY         Last appt: 4/20/2023   Next appt: Visit date not found    Last Lipid:   Lab Results   Component Value Date/Time    CHOL 173 07/20/2022 08:55 AM    TRIG 94 07/20/2022 08:55 AM    HDL 62 07/20/2022 08:55 AM    HDL 79 04/14/2011 10:38 AM    LDLCALC 94 07/20/2022 08:55 AM

## 2023-07-06 ENCOUNTER — OFFICE VISIT (OUTPATIENT)
Dept: ENT CLINIC | Age: 78
End: 2023-07-06

## 2023-07-06 VITALS
HEIGHT: 64 IN | WEIGHT: 150 LBS | BODY MASS INDEX: 25.61 KG/M2 | TEMPERATURE: 97.5 F | DIASTOLIC BLOOD PRESSURE: 73 MMHG | SYSTOLIC BLOOD PRESSURE: 161 MMHG | HEART RATE: 63 BPM | OXYGEN SATURATION: 98 %

## 2023-07-06 DIAGNOSIS — H90.3 BILATERAL SENSORINEURAL HEARING LOSS: ICD-10-CM

## 2023-07-06 DIAGNOSIS — H93.A3 PULSATILE TINNITUS OF BOTH EARS: Primary | ICD-10-CM

## 2023-07-06 ASSESSMENT — ENCOUNTER SYMPTOMS
SINUS PAIN: 0
RHINORRHEA: 0
SORE THROAT: 0

## 2023-07-06 NOTE — PROGRESS NOTES
appeared to be normal, including normal pneumatic mobility of the TMs. Binaural binocular otomicroscopy was performed. Nose: The external nose, nasal septum, turbinates, secretions, and mucosa appeared to be normal.   Sinuses:  Maxillary and frontal sinuses were nontender to palpation and percussion. Oral cavity:  Mucosa, secretions, tongue, and gingiva appeared to be normal.   Oropharynx:  The palatine tonsils, hard and soft palates, uvula, tongue, posterior oropharyngeal wall, mucosa and secretions appeared to be normal.     Salivary Glands:  Normal bilateral parotid and bilateral submandibular salivary glands. Neck:  No masses or tenderness. Trachea midline. Laryngeal cartilages and hyoid bone normal.  Normal laryngeal crepitus. Thyroid:  Normal, nontender, no goiter or nodules palpable. Lymph nodes:  No cervical lymphadenopathy. AUDIOGRAM (6/13/2023): Bacilio Lui / Gabriel Bland / Olya James was seen today for tinnitus. Diagnoses and all orders for this visit:    Pulsatile tinnitus of both ears  -     MRI IAC POSTERIOR FOSSA W WO CONTRAST  -     MRA HEAD WO CONTRAST  -     MRA NECK W CONTRAST    Bilateral sensorineural hearing loss  Comments:  \"Cookie bite\" pattern. RECOMMENDATIONS/PLAN      MRI brain  MRA brain  MRA neck  Patient was advised to review and follow my instructions in the AVS and to call and speak to the MA or LPN with any questions regarding these instructions. Return in about 6 weeks (around 8/17/2023) for recheck/follow-up, after MRI/MRA scans, and sooner if condition worsens. Patient Instructions   I recommend an MRI scan of the IACs/brain, with contrast, to rule out a vestibular schwannoma (\"acoustic neuroma\") or central nervous system disease as the reason for your pulsatile tinnitus.   There are adverse consequences of untreated acoustic neuroma, i.e. Total loss of hearing, paralysis of the face, permanent dizziness or pressure

## 2023-07-26 ENCOUNTER — TELEPHONE (OUTPATIENT)
Dept: FAMILY MEDICINE CLINIC | Age: 78
End: 2023-07-26

## 2023-07-26 DIAGNOSIS — I10 PRIMARY HYPERTENSION: Primary | ICD-10-CM

## 2023-07-26 NOTE — TELEPHONE ENCOUNTER
Patient is calling to see if doctor Earlene Saint needs to see her in the office? She would also like to know if she needs to have any blood work. Please advise.

## 2023-08-02 ENCOUNTER — TELEPHONE (OUTPATIENT)
Dept: FAMILY MEDICINE CLINIC | Age: 78
End: 2023-08-02

## 2023-08-02 DIAGNOSIS — I10 PRIMARY HYPERTENSION: ICD-10-CM

## 2023-08-02 DIAGNOSIS — E78.2 MIXED HYPERLIPIDEMIA: ICD-10-CM

## 2023-08-02 DIAGNOSIS — F32.5 MAJOR DEPRESSIVE DISORDER WITH SINGLE EPISODE, IN FULL REMISSION (HCC): Primary | ICD-10-CM

## 2023-08-02 DIAGNOSIS — R73.03 PREDIABETES: ICD-10-CM

## 2023-08-02 NOTE — TELEPHONE ENCOUNTER
Pt states that she thinks its time for her to have blood work done and she wants Dr Kaci Hameed to fax over an order to [employer] to fax #[phone]

## 2023-08-04 LAB
A/G RATIO: 1.7 (ref 1.2–2.2)
ALBUMIN SERPL-MCNC: 4.2 G/DL (ref 3.8–4.8)
ALP BLD-CCNC: 71 IU/L (ref 44–121)
ALT SERPL-CCNC: 13 IU/L (ref 0–32)
AMBIGUOUS ABBREVIATION: NORMAL
AST SERPL-CCNC: 20 IU/L (ref 0–40)
BASOPHILS ABSOLUTE: 0.1 X10E3/UL (ref 0–0.2)
BASOPHILS RELATIVE PERCENT: 1 %
BILIRUB SERPL-MCNC: 0.3 MG/DL (ref 0–1.2)
BUN / CREAT RATIO: 23 (ref 12–28)
BUN BLDV-MCNC: 21 MG/DL (ref 8–27)
CALCIUM SERPL-MCNC: 9.6 MG/DL (ref 8.7–10.3)
CHLORIDE BLD-SCNC: 97 MMOL/L (ref 96–106)
CHOLESTEROL, TOTAL: 169 MG/DL (ref 100–199)
CO2: 24 MMOL/L (ref 20–29)
COMMENT: NORMAL
CREAT SERPL-MCNC: 0.91 MG/DL (ref 0.57–1)
EOSINOPHILS ABSOLUTE: 0.2 X10E3/UL (ref 0–0.4)
EOSINOPHILS RELATIVE PERCENT: 2 %
ERYTHROCYTES, NUCLEATED/100 LEU: NORMAL
ESTIMATED GLOMERULAR FILTRATION RATE CREATININE EQUATION: 65 ML/MIN/1.73
FOLATE: >20 NG/ML
GLOBULIN: 2.5 G/DL (ref 1.5–4.5)
GLUCOSE BLD-MCNC: 96 MG/DL (ref 70–99)
HBA1C MFR BLD: 5.7 % (ref 4.8–5.6)
HCT VFR BLD CALC: 37.1 % (ref 34–46.6)
HDLC SERPL-MCNC: 66 MG/DL
HEMOGLOBIN: 12.4 G/DL (ref 11.1–15.9)
IMMATURE CELLS ABSOLUTE COUNT: NORMAL
IMMATURE GRANS (ABS): 0 X10E3/UL (ref 0–0.1)
IMMATURE GRANULOCYTES: 0 %
LDL CHOLESTEROL CALCULATED: 85 MG/DL (ref 0–99)
LYMPHOCYTES ABSOLUTE: 1.6 X10E3/UL (ref 0.7–3.1)
LYMPHOCYTES RELATIVE PERCENT: 23 %
MCH RBC QN AUTO: 31.2 PG (ref 26.6–33)
MCHC RBC AUTO-ENTMCNC: 33.4 G/DL (ref 31.5–35.7)
MCV RBC AUTO: 93 FL (ref 79–97)
MONOCYTES ABSOLUTE: 0.5 X10E3/UL (ref 0.1–0.9)
MONOCYTES RELATIVE PERCENT: 8 %
MORPHOLOGY: NORMAL
NEUTROPHILS ABSOLUTE: 4.5 X10E3/UL (ref 1.4–7)
PDW BLD-RTO: 12.5 % (ref 11.7–15.4)
PLATELET # BLD: 265 X10E3/UL (ref 150–450)
POTASSIUM SERPL-SCNC: 4.7 MMOL/L (ref 3.5–5.2)
RBC # BLD: 3.98 X10E6/UL (ref 3.77–5.28)
SEGMENTED NEUTROPHILS RELATIVE PERCENT: 66 %
SODIUM BLD-SCNC: 137 MMOL/L (ref 134–144)
TOTAL PROTEIN: 6.7 G/DL (ref 6–8.5)
TRIGL SERPL-MCNC: 101 MG/DL (ref 0–149)
TSH SERPL DL<=0.05 MIU/L-ACNC: 4 UIU/ML (ref 0.45–4.5)
VITAMIN B-12: 962 PG/ML (ref 232–1245)
VLDLC SERPL CALC-MCNC: 18 MG/DL (ref 5–40)
WBC # BLD: 6.8 X10E3/UL (ref 3.4–10.8)

## 2023-08-07 ENCOUNTER — HOSPITAL ENCOUNTER (OUTPATIENT)
Dept: MRI IMAGING | Age: 78
Discharge: HOME | End: 2023-08-07
Attending: OTOLARYNGOLOGY
Payer: MEDICARE

## 2023-08-07 DIAGNOSIS — H93.A3 PULSATILE TINNITUS OF BOTH EARS: ICD-10-CM

## 2023-08-07 PROCEDURE — 70553 MRI BRAIN STEM W/O & W/DYE: CPT

## 2023-08-07 PROCEDURE — 6360000004 HC RX CONTRAST MEDICATION: Performed by: OTOLARYNGOLOGY

## 2023-08-07 PROCEDURE — A9577 INJ MULTIHANCE: HCPCS | Performed by: OTOLARYNGOLOGY

## 2023-08-07 RX ADMIN — GADOBENATE DIMEGLUMINE 14 ML: 529 INJECTION, SOLUTION INTRAVENOUS at 12:23

## 2023-08-10 ENCOUNTER — HOSPITAL ENCOUNTER (OUTPATIENT)
Dept: MRI IMAGING | Age: 78
Discharge: HOME | End: 2023-08-10
Attending: OTOLARYNGOLOGY
Payer: MEDICARE

## 2023-08-10 DIAGNOSIS — H93.A3 PULSATILE TINNITUS OF BOTH EARS: ICD-10-CM

## 2023-08-10 PROCEDURE — 2580000003 HC RX 258: Performed by: OTOLARYNGOLOGY

## 2023-08-10 PROCEDURE — 70544 MR ANGIOGRAPHY HEAD W/O DYE: CPT

## 2023-08-10 PROCEDURE — A9577 INJ MULTIHANCE: HCPCS | Performed by: OTOLARYNGOLOGY

## 2023-08-10 PROCEDURE — 70549 MR ANGIOGRAPH NECK W/O&W/DYE: CPT

## 2023-08-10 PROCEDURE — 6360000004 HC RX CONTRAST MEDICATION: Performed by: OTOLARYNGOLOGY

## 2023-08-10 RX ORDER — SODIUM CHLORIDE 9 MG/ML
INJECTION, SOLUTION INTRAVENOUS ONCE
Status: COMPLETED | OUTPATIENT
Start: 2023-08-10 | End: 2023-08-10

## 2023-08-10 RX ADMIN — SODIUM CHLORIDE: 9 INJECTION, SOLUTION INTRAVENOUS at 12:59

## 2023-08-10 RX ADMIN — GADOBENATE DIMEGLUMINE 15 ML: 529 INJECTION, SOLUTION INTRAVENOUS at 12:58

## 2023-08-17 ENCOUNTER — OFFICE VISIT (OUTPATIENT)
Dept: ENT CLINIC | Age: 78
End: 2023-08-17
Payer: MEDICARE

## 2023-08-17 VITALS
BODY MASS INDEX: 25.78 KG/M2 | OXYGEN SATURATION: 96 % | HEART RATE: 71 BPM | SYSTOLIC BLOOD PRESSURE: 148 MMHG | TEMPERATURE: 97.1 F | HEIGHT: 64 IN | DIASTOLIC BLOOD PRESSURE: 68 MMHG | WEIGHT: 151 LBS

## 2023-08-17 DIAGNOSIS — H93.A3 PULSATILE TINNITUS OF BOTH EARS: Primary | Chronic | ICD-10-CM

## 2023-08-17 PROCEDURE — 1090F PRES/ABSN URINE INCON ASSESS: CPT | Performed by: OTOLARYNGOLOGY

## 2023-08-17 PROCEDURE — G8417 CALC BMI ABV UP PARAM F/U: HCPCS | Performed by: OTOLARYNGOLOGY

## 2023-08-17 PROCEDURE — G8399 PT W/DXA RESULTS DOCUMENT: HCPCS | Performed by: OTOLARYNGOLOGY

## 2023-08-17 PROCEDURE — 3078F DIAST BP <80 MM HG: CPT | Performed by: OTOLARYNGOLOGY

## 2023-08-17 PROCEDURE — 3074F SYST BP LT 130 MM HG: CPT | Performed by: OTOLARYNGOLOGY

## 2023-08-17 PROCEDURE — 1036F TOBACCO NON-USER: CPT | Performed by: OTOLARYNGOLOGY

## 2023-08-17 PROCEDURE — G8427 DOCREV CUR MEDS BY ELIG CLIN: HCPCS | Performed by: OTOLARYNGOLOGY

## 2023-08-17 PROCEDURE — 99212 OFFICE O/P EST SF 10 MIN: CPT | Performed by: OTOLARYNGOLOGY

## 2023-08-17 PROCEDURE — 1123F ACP DISCUSS/DSCN MKR DOCD: CPT | Performed by: OTOLARYNGOLOGY

## 2023-08-17 NOTE — PROGRESS NOTES
Chief Complaint   Patient presents with    Tinnitus     Pulsatile. HISTORY OF PRESENT ILLNESS    Capo Freeman is a 65 y.o. female here for recheck and follow up of pulsatile tinnitus. REVIEW OF SYSTEMS  Constitutional:  Denied fever and chills. ENT/sinus:  Denied pain and drainage. EXAMINATION    WDWN, NAD  Ears:  TMs, EACs, mastoids and pinnae were normal.    Nose:  Normal with no lesions. Sinuses:  NT x 4   Throat,  OC/ OP:  Normal with no lesions. Neck:  NT, No masses. Trachea midline. Nodes:  No lymphadenopathy. REVIEW OF IMAGES:         I independently interpreted the images of the MRI scan of the IACs/brain/head, and the MRA scans of the cerebral and carotid vessels, showing no evidence fo vestibular schwannoma and no evidence of vascular tumor. MRI IAC POSTERIOR FOSSA W WO CONTRAST  Order: [id number]  Status: Final result     Visible to patient: Yes (seen)     Next appt: None     Dx: Pulsatile tinnitus of both ears     0 Result Notes  Details    Reading Physician Reading Date Result Priority   Susan Duffy DO  [phone] 8/12/2023      Narrative & Impression  EXAMINATION:  MRA OF THE NECK WITH AND WITHOUT CONTRAST; MRA OF THE HEAD WITHOUT CONTRAST;  MRI OF THE BRAIN AND INTERNAL AUDITORY CANALS WITH AND WITHOUT CONTRAST  8/10/2023 12:16 pm; 8/10/2023 12:11 pm; 8/7/2023 11:40 am     TECHNIQUE:  Multiplanar multisequence MRA of the neck was performed with and without the  administration of intravenous contrast. Stenosis of the internal carotid  arteries measured using NASCET criteria. Maximum intensity projection images  were reviewed.; MRA of the head was performed utilizing time-of-flight  imaging with MIP images. No intravenous contrast was administered.;  Multiplanar multisequence MRI of the brain focused on the internal auditory  canals was performed with and without the administration of intravenous  contrast.     COMPARISON:  None.      HISTORY:  ORDERING

## 2023-09-07 DIAGNOSIS — I10 ESSENTIAL HYPERTENSION: ICD-10-CM

## 2023-09-07 DIAGNOSIS — I10 PRIMARY HYPERTENSION: ICD-10-CM

## 2023-09-07 RX ORDER — AMLODIPINE BESYLATE 5 MG/1
TABLET ORAL
Qty: 90 TABLET | Refills: 2 | Status: SHIPPED | OUTPATIENT
Start: 2023-09-07

## 2023-09-07 RX ORDER — VALSARTAN AND HYDROCHLOROTHIAZIDE 80; 12.5 MG/1; MG/1
TABLET, FILM COATED ORAL
Qty: 90 TABLET | Refills: 2 | Status: SHIPPED | OUTPATIENT
Start: 2023-09-07

## 2023-09-07 NOTE — TELEPHONE ENCOUNTER
Lov 4/20/23  Lrf  90 3 8/8/22  0 0 6/28/23  Lab Results   Component Value Date     08/03/2023    K 4.7 08/03/2023    CL 97 08/03/2023    CO2 24 08/03/2023    BUN 21 08/03/2023    CREATININE 0.91 08/03/2023    GLUCOSE 96 08/03/2023    CALCIUM 9.6 08/03/2023    PROT 6.7 08/03/2023    LABALBU 4.2 08/03/2023    BILITOT 0.3 08/03/2023    ALKPHOS 71 08/03/2023    AST 20 08/03/2023    ALT 13 08/03/2023    LABGLOM 65 07/09/2021    GFRAA 75 07/09/2021    AGRATIO 1.7 08/03/2023    GLOB 2.5 08/03/2023       Lab Results   Component Value Date     08/03/2023    K 4.7 08/03/2023    CL 97 08/03/2023    CO2 24 08/03/2023    BUN 21 08/03/2023    CREATININE 0.91 08/03/2023    GLUCOSE 96 08/03/2023    CALCIUM 9.6 08/03/2023    PROT 6.7 08/03/2023    LABALBU 4.2 08/03/2023    BILITOT 0.3 08/03/2023    ALKPHOS 71 08/03/2023    AST 20 08/03/2023    ALT 13 08/03/2023    LABGLOM 65 07/09/2021    GFRAA 75 07/09/2021    AGRATIO 1.7 08/03/2023    GLOB 2.5 08/03/2023

## 2023-10-06 RX ORDER — ALENDRONATE SODIUM 70 MG/1
70 TABLET ORAL
Qty: 12 TABLET | Refills: 10 | Status: SHIPPED | OUTPATIENT
Start: 2023-10-06

## 2023-11-06 ENCOUNTER — TELEPHONE (OUTPATIENT)
Dept: FAMILY MEDICINE CLINIC | Age: 78
End: 2023-11-06

## 2023-11-06 NOTE — TELEPHONE ENCOUNTER
Pt would like a call back from Dr Joaquin to discuss certain test that she had done and when should she have a physical

## 2023-12-01 ENCOUNTER — OFFICE VISIT (OUTPATIENT)
Dept: FAMILY MEDICINE CLINIC | Age: 78
End: 2023-12-01

## 2023-12-01 VITALS
HEART RATE: 68 BPM | DIASTOLIC BLOOD PRESSURE: 68 MMHG | BODY MASS INDEX: 28.32 KG/M2 | HEIGHT: 61 IN | OXYGEN SATURATION: 96 % | WEIGHT: 150 LBS | SYSTOLIC BLOOD PRESSURE: 138 MMHG

## 2023-12-01 DIAGNOSIS — Z01.818 PREOP EXAMINATION: Primary | ICD-10-CM

## 2023-12-01 DIAGNOSIS — H26.9 CATARACT OF RIGHT EYE, UNSPECIFIED CATARACT TYPE: ICD-10-CM

## 2023-12-01 DIAGNOSIS — I10 PRIMARY HYPERTENSION: ICD-10-CM

## 2023-12-01 RX ORDER — VALSARTAN AND HYDROCHLOROTHIAZIDE 160; 12.5 MG/1; MG/1
1 TABLET, FILM COATED ORAL DAILY
Qty: 90 TABLET | Refills: 3 | Status: SHIPPED | OUTPATIENT
Start: 2023-12-01

## 2023-12-28 DIAGNOSIS — F33.41 RECURRENT MAJOR DEPRESSIVE DISORDER, IN PARTIAL REMISSION (HCC): ICD-10-CM

## 2023-12-29 RX ORDER — DESVENLAFAXINE SUCCINATE 50 MG/1
TABLET, EXTENDED RELEASE ORAL
Qty: 90 TABLET | Refills: 3 | Status: SHIPPED | OUTPATIENT
Start: 2023-12-29

## 2023-12-29 NOTE — TELEPHONE ENCOUNTER
Medication:   Requested Prescriptions     Pending Prescriptions Disp Refills    desvenlafaxine succinate (PRISTIQ) 50 MG TB24 extended release tablet [Pharmacy Med Name: DESVENLAFAXINE ER 50 MG Tablet Extended Release 24 Hour] 90 tablet 3     Sig: TAKE 1 TABLET EVERY DAY        Last Filled:  9/13/2022    Patient Phone Number: [phone] (home)     Last appt: 12/1/2023   Next appt: Visit date not found    Last OARRS:        No data to display

## 2024-01-23 ENCOUNTER — TELEPHONE (OUTPATIENT)
Dept: FAMILY MEDICINE CLINIC | Age: 79
End: 2024-01-23

## 2024-01-23 NOTE — TELEPHONE ENCOUNTER
Pt would like a call back from Dr Joaquin    Pt stated that Dr Joaquin told her to call her if her BP is still running high    Pt was offered an appt but didn't want to make an appt    Pt only wants a call back to see what Dr Joaquin wants her to do

## 2024-02-27 ENCOUNTER — TELEPHONE (OUTPATIENT)
Dept: FAMILY MEDICINE CLINIC | Age: 79
End: 2024-02-27

## 2024-03-01 ENCOUNTER — TELEPHONE (OUTPATIENT)
Dept: FAMILY MEDICINE CLINIC | Age: 79
End: 2024-03-01

## 2024-03-01 DIAGNOSIS — R73.03 PREDIABETES: ICD-10-CM

## 2024-03-01 DIAGNOSIS — M85.89 OSTEOPENIA OF MULTIPLE SITES: ICD-10-CM

## 2024-03-01 DIAGNOSIS — E78.2 MIXED HYPERLIPIDEMIA: ICD-10-CM

## 2024-03-01 DIAGNOSIS — I10 PRIMARY HYPERTENSION: Primary | ICD-10-CM

## 2024-03-01 NOTE — TELEPHONE ENCOUNTER
----- Message from Shawna Blanco sent at 2/28/2024  2:47 PM EST -----  Subject: Referral Request    Reason for referral request? Marisol has her AWV on 3-6-2024. She would   like you to fax her lab orders to [facility] on [address] [address]. Please call her to let her know when she can get those done.   Provider patient wants to be referred to(if known):     Provider Phone Number(if known):    Additional Information for Provider?   ---------------------------------------------------------------------------  --------------  CALL BACK INFO    [phone]; OK to leave message on voicemail  ---------------------------------------------------------------------------  --------------

## 2024-03-06 ENCOUNTER — OFFICE VISIT (OUTPATIENT)
Dept: FAMILY MEDICINE CLINIC | Age: 79
End: 2024-03-06

## 2024-03-06 VITALS
DIASTOLIC BLOOD PRESSURE: 66 MMHG | HEIGHT: 62 IN | BODY MASS INDEX: 27.6 KG/M2 | WEIGHT: 150 LBS | HEART RATE: 62 BPM | OXYGEN SATURATION: 96 % | SYSTOLIC BLOOD PRESSURE: 154 MMHG

## 2024-03-06 DIAGNOSIS — I34.0 MODERATE MITRAL REGURGITATION BY PRIOR ECHOCARDIOGRAM: ICD-10-CM

## 2024-03-06 DIAGNOSIS — F32.5 MAJOR DEPRESSIVE DISORDER WITH SINGLE EPISODE, IN FULL REMISSION (HCC): ICD-10-CM

## 2024-03-06 DIAGNOSIS — R73.03 PREDIABETES: ICD-10-CM

## 2024-03-06 DIAGNOSIS — R53.82 CHRONIC FATIGUE: ICD-10-CM

## 2024-03-06 DIAGNOSIS — E78.2 MIXED HYPERLIPIDEMIA: ICD-10-CM

## 2024-03-06 DIAGNOSIS — G61.0 GBS (GUILLAIN BARRE SYNDROME) (HCC): ICD-10-CM

## 2024-03-06 DIAGNOSIS — Z00.00 MEDICARE ANNUAL WELLNESS VISIT, SUBSEQUENT: Primary | ICD-10-CM

## 2024-03-06 DIAGNOSIS — I10 PRIMARY HYPERTENSION: ICD-10-CM

## 2024-03-06 DIAGNOSIS — I47.10 SVT (SUPRAVENTRICULAR TACHYCARDIA): ICD-10-CM

## 2024-03-06 RX ORDER — TERAZOSIN 2 MG/1
2 CAPSULE ORAL NIGHTLY
Qty: 30 CAPSULE | Refills: 3 | Status: SHIPPED | OUTPATIENT
Start: 2024-03-06 | End: 2024-03-06 | Stop reason: CLARIF

## 2024-03-06 SDOH — ECONOMIC STABILITY: HOUSING INSECURITY
IN THE LAST 12 MONTHS, WAS THERE A TIME WHEN YOU DID NOT HAVE A STEADY PLACE TO SLEEP OR SLEPT IN A SHELTER (INCLUDING NOW)?: NO

## 2024-03-06 SDOH — ECONOMIC STABILITY: INCOME INSECURITY: HOW HARD IS IT FOR YOU TO PAY FOR THE VERY BASICS LIKE FOOD, HOUSING, MEDICAL CARE, AND HEATING?: NOT HARD AT ALL

## 2024-03-06 SDOH — ECONOMIC STABILITY: FOOD INSECURITY: WITHIN THE PAST 12 MONTHS, YOU WORRIED THAT YOUR FOOD WOULD RUN OUT BEFORE YOU GOT MONEY TO BUY MORE.: NEVER TRUE

## 2024-03-06 SDOH — ECONOMIC STABILITY: FOOD INSECURITY: WITHIN THE PAST 12 MONTHS, THE FOOD YOU BOUGHT JUST DIDN'T LAST AND YOU DIDN'T HAVE MONEY TO GET MORE.: NEVER TRUE

## 2024-03-06 ASSESSMENT — PATIENT HEALTH QUESTIONNAIRE - PHQ9
SUM OF ALL RESPONSES TO PHQ9 QUESTIONS 1 & 2: 0
5. POOR APPETITE OR OVEREATING: 0
SUM OF ALL RESPONSES TO PHQ QUESTIONS 1-9: 0
SUM OF ALL RESPONSES TO PHQ QUESTIONS 1-9: 0
1. LITTLE INTEREST OR PLEASURE IN DOING THINGS: 0
10. IF YOU CHECKED OFF ANY PROBLEMS, HOW DIFFICULT HAVE THESE PROBLEMS MADE IT FOR YOU TO DO YOUR WORK, TAKE CARE OF THINGS AT HOME, OR GET ALONG WITH OTHER PEOPLE: 0
2. FEELING DOWN, DEPRESSED OR HOPELESS: 0
9. THOUGHTS THAT YOU WOULD BE BETTER OFF DEAD, OR OF HURTING YOURSELF: 0
4. FEELING TIRED OR HAVING LITTLE ENERGY: 0
8. MOVING OR SPEAKING SO SLOWLY THAT OTHER PEOPLE COULD HAVE NOTICED. OR THE OPPOSITE, BEING SO FIGETY OR RESTLESS THAT YOU HAVE BEEN MOVING AROUND A LOT MORE THAN USUAL: 0
7. TROUBLE CONCENTRATING ON THINGS, SUCH AS READING THE NEWSPAPER OR WATCHING TELEVISION: 0
SUM OF ALL RESPONSES TO PHQ QUESTIONS 1-9: 0
SUM OF ALL RESPONSES TO PHQ QUESTIONS 1-9: 0
6. FEELING BAD ABOUT YOURSELF - OR THAT YOU ARE A FAILURE OR HAVE LET YOURSELF OR YOUR FAMILY DOWN: 0
3. TROUBLE FALLING OR STAYING ASLEEP: 0

## 2024-03-06 ASSESSMENT — LIFESTYLE VARIABLES: HOW OFTEN DO YOU HAVE A DRINK CONTAINING ALCOHOL: NEVER

## 2024-03-06 NOTE — PATIENT INSTRUCTIONS
Learning About Being Active as an Older Adult  Why is being active important as you get older?     Being active is one of the best things you can do for your health. And it's never too late to start. Being active--or getting active, if you aren't already--has definite benefits. It can:  Give you more energy,  Keep your mind sharp.  Improve balance to reduce your risk of falls.  Help you manage chronic illness with fewer medicines.  No matter how old you are, how fit you are, or what health problems you have, there is a form of activity that will work for you. And the more physical activity you can do, the better your overall health will be.  What kinds of activity can help you stay healthy?  Being more active will make your daily activities easier. Physical activity includes planned exercise and things you do in daily life. There are four types of activity:  Aerobic.  Doing aerobic activity makes your heart and lungs strong.  Includes walking, dancing, and gardening.  Aim for at least 2½ hours spread throughout the week.  It improves your energy and can help you sleep better.  Muscle-strengthening.  This type of activity can help maintain muscle and strengthen bones.  Includes climbing stairs, using resistance bands, and lifting or carrying heavy loads.  Aim for at least twice a week.  It can help protect the knees and other joints.  Stretching.  Stretching gives you better range of motion in joints and muscles.  Includes upper arm stretches, calf stretches, and gentle yoga.  Aim for at least twice a week, preferably after your muscles are warmed up from other activities.  It can help you function better in daily life.  Balancing.  This helps you stay coordinated and have good posture.  Includes heel-to-toe walking, nikole chi, and certain types of yoga.  Aim for at least 3 days a week.  It can reduce your risk of falling.  Even if you have a hard time meeting the recommendations, it's better to be more active

## 2024-03-06 NOTE — PROGRESS NOTES
Medicare Annual Wellness Visit    Marisol Spear is here for Medicare AWV    Assessment & Plan   Medicare annual wellness visit, subsequent  Healthy diet, stay as active as possible, try to walk daily  Light weights  Cannot take immunizations due to Guillain-Barré    Primary hypertension  Elevated systolic blood pressure, check sleep study  More exercise, she is watching her salt  -     [facility], [facility]    Prediabetes  Stable with watching diet, continue  Continue exercise also    Major depressive disorder with single episode, in full remission ([facility])  Wants to start trying to wean off Pristiq, try with a half a tablet daily    GBS (Guillain Seltzer syndrome) ([facility])  No further immunizations, had a bad reaction to the shingles vaccine    Mixed hyperlipidemia  Continue statin, 10 mg nightly  Last lipid check acceptable    Moderate mitral regurgitation by prior echocardiogram  Due for repeat echo, ordered  -     ECHO Complete 2D W Doppler W Color; Future    SVT (supraventricular tachycardia)  Stable on metoprolol 25 mg twice daily, continue    Chronic fatigue  Check sleep study  -     Alex Ruiz MD, Sleep Medicine, [facility]    Recommendations for Preventive Services Due: see orders and patient instructions/AVS.  Recommended screening schedule for the next 5-10 years is provided to the patient in written form: see Patient Instructions/AVS.     No follow-ups on file.     Subjective   The following acute and/or chronic problems were also addressed today:  SBP at home = 150s     Patient's complete Health Risk Assessment and screening values have been reviewed and are found in Flowsheets. The following problems were reviewed today and where indicated follow up appointments were made and/or referrals ordered.    Positive Risk Factor Screenings with Interventions:                Activity, Diet, and Weight:  On average, how many days per week do you engage in

## 2024-04-03 PROBLEM — G61.0 GBS (GUILLAIN BARRE SYNDROME) (HCC): Chronic | Status: ACTIVE | Noted: 2020-07-19

## 2024-04-03 PROBLEM — F32.5 MAJOR DEPRESSIVE DISORDER WITH SINGLE EPISODE, IN FULL REMISSION (HCC): Chronic | Status: ACTIVE | Noted: 2018-05-21

## 2024-04-08 ENCOUNTER — TELEPHONE (OUTPATIENT)
Dept: SLEEP CENTER | Age: 79
End: 2024-04-08

## 2024-04-08 NOTE — TELEPHONE ENCOUNTER
Called to schedule an hst per Filipe singletary for the pt to return my call     [employer] medic[employer] insurance

## 2024-04-12 ENCOUNTER — HOSPITAL ENCOUNTER (OUTPATIENT)
Dept: NON INVASIVE DIAGNOSTICS | Age: 79
Discharge: HOME | End: 2024-04-12
Attending: FAMILY MEDICINE

## 2024-04-12 DIAGNOSIS — I34.0 MODERATE MITRAL REGURGITATION BY PRIOR ECHOCARDIOGRAM: ICD-10-CM

## 2024-04-26 ENCOUNTER — TELEPHONE (OUTPATIENT)
Dept: PULMONOLOGY | Age: 79
End: 2024-04-26

## 2024-04-26 NOTE — TELEPHONE ENCOUNTER
PT called in stating that she has a HST on Monday and would like some questions regarding study answered. Pt asked what the purpose of the HST was and would like to know what the purpose of the study is. Pt also asked why her insurance said she had no copay but jordan stated she has a $69 dollar copay. Unformed pt that the second question may need to be answered by lab or insurance but would have someone explain the process of HST.     Ph.[phone]

## 2024-04-29 ENCOUNTER — HOSPITAL ENCOUNTER (OUTPATIENT)
Dept: SLEEP CENTER | Age: 79
Discharge: HOME | End: 2024-04-29
Attending: INTERNAL MEDICINE
Payer: MEDICARE

## 2024-04-29 DIAGNOSIS — G47.10 HYPERSOMNIA: ICD-10-CM

## 2024-04-29 DIAGNOSIS — R06.83 SNORING: ICD-10-CM

## 2024-04-29 PROCEDURE — 95806 SLEEP STUDY UNATT&RESP EFFT: CPT

## 2024-04-30 ENCOUNTER — TELEPHONE (OUTPATIENT)
Dept: FAMILY MEDICINE CLINIC | Age: 79
End: 2024-04-30

## 2024-04-30 NOTE — TELEPHONE ENCOUNTER
Patient requesting call back regarding her In Home Sleep Study    Patient is unable to work the equipment provided and asking if there is another alternative for sleep.    Call Back # [phone]

## 2024-05-01 ENCOUNTER — TELEPHONE (OUTPATIENT)
Dept: PULMONOLOGY | Age: 79
End: 2024-05-01

## 2024-05-01 NOTE — TELEPHONE ENCOUNTER
Pt states that she was physically unable to use the HST unit due to limited mobility. Pt states that she lives alone and could not get the equipment on. Pt is returning the unit today.

## 2024-05-06 ENCOUNTER — TELEPHONE (OUTPATIENT)
Dept: SLEEP CENTER | Age: 79
End: 2024-05-06

## 2024-05-06 NOTE — TELEPHONE ENCOUNTER
Spoke with pt - she said she's traveling now and will call back in June.   Didn't want to schedule until then.

## 2024-05-06 NOTE — TELEPHONE ENCOUNTER
Called to schedule a PSG per Filipe singletary for the pt to return my call     [employer] medic[employer] insurance

## 2024-06-07 NOTE — TELEPHONE ENCOUNTER
Lov 3/6/24  Lrf 180 3 2/21/23 Medication:   Requested Prescriptions     Pending Prescriptions Disp Refills    metoprolol tartrate (LOPRESSOR) 25 MG tablet [Pharmacy Med Name: METOPROLOL TARTRATE 25 MG Tablet] 180 tablet 3     Sig: TAKE 1 TABLET TWICE DAILY       Last Filled:      Patient Phone Number: [phone] (home)     Last appt: 3/6/2024   Next appt: Visit date not found    Lab Results   Component Value Date     08/03/2023    K 4.7 08/03/2023    CL 97 08/03/2023    CO2 24 08/03/2023    BUN 21 08/03/2023    CREATININE 0.91 08/03/2023    GLUCOSE 96 08/03/2023    CALCIUM 9.6 08/03/2023    PROT 7.0 09/17/2012    BILITOT 0.3 08/03/2023    ALKPHOS 71 08/03/2023    AST 20 08/03/2023    ALT 13 08/03/2023    LABGLOM 65 07/09/2021    GFRAA 75 07/09/2021    AGRATIO 1.7 08/03/2023    GLOB 2.5 08/03/2023

## 2024-06-24 ENCOUNTER — TELEPHONE (OUTPATIENT)
Dept: FAMILY MEDICINE CLINIC | Age: 79
End: 2024-06-24

## 2024-06-24 NOTE — TELEPHONE ENCOUNTER
Dr. Joaquin requested patient have sleep study done  Patient could not do the sleep study at home as suggested by Dr. Johnson  Patient is supposed to have done in hospital  Patient is asking how important is it for her to have sleep study done? And why does Dr. Joaquin want her to have done?  Also:    Is patient due for lab work?

## 2024-08-01 DIAGNOSIS — E78.2 MIXED HYPERLIPIDEMIA: ICD-10-CM

## 2024-08-01 DIAGNOSIS — I10 ESSENTIAL HYPERTENSION: ICD-10-CM

## 2024-08-02 RX ORDER — ATORVASTATIN CALCIUM 10 MG/1
TABLET, FILM COATED ORAL
Qty: 48 TABLET | Refills: 3 | Status: SHIPPED | OUTPATIENT
Start: 2024-08-02

## 2024-08-02 RX ORDER — AMLODIPINE BESYLATE 5 MG/1
TABLET ORAL
Qty: 90 TABLET | Refills: 3 | Status: SHIPPED | OUTPATIENT
Start: 2024-08-02

## 2024-08-02 NOTE — TELEPHONE ENCOUNTER
Medication:   Requested Prescriptions     Pending Prescriptions Disp Refills    atorvastatin (LIPITOR) 10 MG tablet [Pharmacy Med Name: ATORVASTATIN CALCIUM 10 MG Tablet] 48 tablet 3     Sig: TAKE 1 TABLET FOUR TIMES WEEKLY    amLODIPine (NORVASC) 5 MG tablet [Pharmacy Med Name: AMLODIPINE BESYLATE 5 MG Tablet] 90 tablet 3     Sig: TAKE 1 TABLET EVERY NIGHT        Last Filled:  90.2 09.07.2023  48.3 07.05.2023    Patient Phone Number: [phone] (home)     Last appt: 3/6/2024   Next appt: Visit date not found    Last OARRS:        No data to display

## 2024-08-05 ENCOUNTER — TELEPHONE (OUTPATIENT)
Dept: FAMILY MEDICINE CLINIC | Age: 79
End: 2024-08-05

## 2024-08-05 DIAGNOSIS — I10 PRIMARY HYPERTENSION: Chronic | ICD-10-CM

## 2024-08-05 DIAGNOSIS — E78.2 MIXED HYPERLIPIDEMIA: ICD-10-CM

## 2024-08-05 DIAGNOSIS — M85.89 OSTEOPENIA OF MULTIPLE SITES: ICD-10-CM

## 2024-08-05 DIAGNOSIS — R73.03 PREDIABETES: Primary | ICD-10-CM

## 2024-08-05 NOTE — TELEPHONE ENCOUNTER
Patient would like to have her labs ordered  and would like to have them done at     [facility].     She said she is due for them     She will have done next week when Dr Joaquin orders them

## 2024-08-16 LAB
ALBUMIN: 4.1 G/DL
ALP BLD-CCNC: 72 U/L
ALT SERPL-CCNC: 16 U/L
ANION GAP SERPL CALCULATED.3IONS-SCNC: NORMAL MMOL/L
AST SERPL-CCNC: 42 U/L
BILIRUB SERPL-MCNC: 0.3 MG/DL (ref 0.1–1.4)
BUN BLDV-MCNC: 15 MG/DL
CALCIUM SERPL-MCNC: 9.6 MG/DL
CHLORIDE BLD-SCNC: 101 MMOL/L
CHOLESTEROL, TOTAL: 195 MG/DL
CHOLESTEROL/HDL RATIO: NORMAL
CO2: 16 MMOL/L
CREAT SERPL-MCNC: 0.93 MG/DL
ESTIMATED AVERAGE GLUCOSE: NORMAL
GFR, ESTIMATED: NORMAL
GLUCOSE BLD-MCNC: 99 MG/DL
HBA1C MFR BLD: 5.9 %
HDLC SERPL-MCNC: 70 MG/DL (ref 35–70)
LDL CHOLESTEROL: 98
NONHDLC SERPL-MCNC: NORMAL MG/DL
POTASSIUM SERPL-SCNC: NORMAL MMOL/L
SODIUM BLD-SCNC: 137 MMOL/L
TOTAL PROTEIN: 7 G/DL (ref 6.4–8.2)
TRIGL SERPL-MCNC: 155 MG/DL
TSH SERPL DL<=0.05 MIU/L-ACNC: 4.86 UIU/ML
VLDLC SERPL CALC-MCNC: 27 MG/DL

## 2024-08-20 ENCOUNTER — TELEPHONE (OUTPATIENT)
Dept: FAMILY MEDICINE CLINIC | Age: 79
End: 2024-08-20

## 2024-08-20 NOTE — TELEPHONE ENCOUNTER
Patient called and asking about her lab results from [place].     Called [facility] and they are faxing over the results.     Please call patient and advise

## 2024-08-22 ENCOUNTER — TELEPHONE (OUTPATIENT)
Dept: FAMILY MEDICINE CLINIC | Age: 79
End: 2024-08-22

## 2024-08-22 NOTE — TELEPHONE ENCOUNTER
Patient called and is asking that the lab results be sent by mail to her and by email     Her email is     [email or web address]    Please call patient when done .

## 2024-08-23 NOTE — TELEPHONE ENCOUNTER
Talked to pt, pt verbalized on phone she sees results on MyChart and she is good with that and stated she doesn't need anything to be mailed

## 2024-09-09 ENCOUNTER — OFFICE VISIT (OUTPATIENT)
Dept: FAMILY MEDICINE CLINIC | Age: 79
End: 2024-09-09

## 2024-09-09 VITALS
SYSTOLIC BLOOD PRESSURE: 147 MMHG | HEART RATE: 65 BPM | HEIGHT: 62 IN | DIASTOLIC BLOOD PRESSURE: 72 MMHG | OXYGEN SATURATION: 97 % | BODY MASS INDEX: 27.79 KG/M2 | WEIGHT: 151 LBS

## 2024-09-09 DIAGNOSIS — I10 PRIMARY HYPERTENSION: Chronic | ICD-10-CM

## 2024-09-09 DIAGNOSIS — G61.0 GBS (GUILLAIN BARRE SYNDROME) (HCC): Chronic | ICD-10-CM

## 2024-09-09 DIAGNOSIS — I47.10 SVT (SUPRAVENTRICULAR TACHYCARDIA) (HCC): ICD-10-CM

## 2024-09-09 DIAGNOSIS — E78.2 MIXED HYPERLIPIDEMIA: ICD-10-CM

## 2024-09-09 DIAGNOSIS — Z12.31 BREAST CANCER SCREENING BY MAMMOGRAM: Primary | ICD-10-CM

## 2024-09-16 ENCOUNTER — TELEPHONE (OUTPATIENT)
Dept: FAMILY MEDICINE CLINIC | Age: 79
End: 2024-09-16

## 2024-09-24 DIAGNOSIS — F33.41 RECURRENT MAJOR DEPRESSIVE DISORDER, IN PARTIAL REMISSION (HCC): ICD-10-CM

## 2024-09-24 DIAGNOSIS — E78.2 MIXED HYPERLIPIDEMIA: ICD-10-CM

## 2024-09-24 RX ORDER — METOPROLOL TARTRATE 25 MG/1
25 TABLET, FILM COATED ORAL 2 TIMES DAILY
Qty: 180 TABLET | Refills: 3 | Status: SHIPPED | OUTPATIENT
Start: 2024-09-24

## 2024-09-24 RX ORDER — DESVENLAFAXINE 50 MG/1
50 TABLET, FILM COATED, EXTENDED RELEASE ORAL DAILY
Qty: 90 TABLET | Refills: 3 | Status: SHIPPED | OUTPATIENT
Start: 2024-09-24

## 2024-09-24 RX ORDER — ATORVASTATIN CALCIUM 10 MG/1
10 TABLET, FILM COATED ORAL DAILY
Qty: 48 TABLET | Refills: 3 | Status: SHIPPED | OUTPATIENT
Start: 2024-09-24

## 2024-10-18 RX ORDER — VALSARTAN AND HYDROCHLOROTHIAZIDE 160; 12.5 MG/1; MG/1
1 TABLET, FILM COATED ORAL DAILY
Qty: 90 TABLET | Refills: 3 | Status: SHIPPED | OUTPATIENT
Start: 2024-10-18

## 2024-10-18 NOTE — TELEPHONE ENCOUNTER
Medication:   Requested Prescriptions     Pending Prescriptions Disp Refills    valsartan-hydroCHLOROthiazide (DIOVAN-HCT) 160-12.5 MG per tablet [Pharmacy Med Name: Valsartan-hydroCHLOROthiazide Oral Tablet 160-12.5 MG] 90 tablet 3     Sig: TAKE 1 TABLET EVERY DAY       Last Filled:  12/1/2023, 90, 3    Patient Phone Number: [phone] (home)     Last appt: 9/9/2024   Next appt: Visit date not found    Lab Results   Component Value Date     08/16/2024    K 4.7 08/03/2023     08/16/2024    CO2 16 08/16/2024    BUN 15 08/16/2024    CREATININE 0.93 08/16/2024    GLUCOSE 99 08/16/2024    CALCIUM 9.6 08/16/2024    BILITOT 0.3 08/16/2024    ALKPHOS 72 08/16/2024    AST 42 08/16/2024    ALT 16 08/16/2024    LABGLOM 65 07/09/2021    GFRAA 75 07/09/2021    AGRATIO 1.7 08/03/2023    GLOB 2.5 08/03/2023          7

## 2024-10-21 ENCOUNTER — TELEPHONE (OUTPATIENT)
Dept: FAMILY MEDICINE CLINIC | Age: 79
End: 2024-10-21

## 2024-10-21 RX ORDER — ATORVASTATIN CALCIUM 10 MG/1
10 TABLET, FILM COATED ORAL NIGHTLY
Qty: 90 TABLET | Refills: 3 | Status: SHIPPED | OUTPATIENT
Start: 2024-10-21 | End: 2024-10-23

## 2024-10-21 NOTE — TELEPHONE ENCOUNTER
Patient called and she got sick while in [place]  and stayed in [place] for a week  to try and feel better.   She took covid test and it was neg     She has congestion and coughing     She has been taking over the counter musinex and cough syrup and helping some but not getting rid of it.     [facility] [address]     Please call and advise

## 2024-10-22 NOTE — PROGRESS NOTES
rhythm, S1 and S2 normal, no murmurs, clicks  PULM:  Chest is clear, no wheezing ,  symmetric air entry throughout both lung fields.  ABD: Soft, NT  EXT: No rash or edema  NEURO: Alert oriented ×3, nonfocal     ASSESSMENT/PLAN:  1. Viral URI with cough  Reassured that she is improved, no antibiotic given today  Claritin and Flonase  Rest and fluids    Follow-up as needed    2. Postnasal drip  Claritin and Flonase    3. Elevated blood pressure reading  Refer for home sleep study  - Mark Mas MD, Sleep Medicine, [facility]    4. Sleep difficulties  Refer for home sleep study  - Mark Mas MD, Sleep Medicine, [facility]    5. Impacted cerumen of right ear  Debrox drops right ear twice a day 3 to 5 drops  Lavage in future with either us or ENT      25 Total Minutes spent pre charting (reviewing problem list, meds, any test results, consultant and hospital notes ) and  obtaining present visit history, performing appropriate medical exam/evaluation, counseling and educating the patient (and family), ordering medications ,tests, and procedures as needed, refilling medication(s), placing referral(s) when needed in addition to coordinating care for this patient and documenting in electronic health record

## 2024-10-23 ENCOUNTER — OFFICE VISIT (OUTPATIENT)
Dept: FAMILY MEDICINE CLINIC | Age: 79
End: 2024-10-23

## 2024-10-23 VITALS
HEART RATE: 57 BPM | SYSTOLIC BLOOD PRESSURE: 147 MMHG | WEIGHT: 149 LBS | BODY MASS INDEX: 27.7 KG/M2 | DIASTOLIC BLOOD PRESSURE: 66 MMHG | TEMPERATURE: 97.7 F | OXYGEN SATURATION: 97 %

## 2024-10-23 DIAGNOSIS — R03.0 ELEVATED BLOOD PRESSURE READING: ICD-10-CM

## 2024-10-23 DIAGNOSIS — R09.82 POSTNASAL DRIP: ICD-10-CM

## 2024-10-23 DIAGNOSIS — H61.21 IMPACTED CERUMEN OF RIGHT EAR: ICD-10-CM

## 2024-10-23 DIAGNOSIS — G47.9 SLEEP DIFFICULTIES: ICD-10-CM

## 2024-10-23 DIAGNOSIS — J06.9 VIRAL URI WITH COUGH: Primary | ICD-10-CM

## 2024-10-30 DIAGNOSIS — E78.2 MIXED HYPERLIPIDEMIA: ICD-10-CM

## 2024-10-30 RX ORDER — ATORVASTATIN CALCIUM 10 MG/1
10 TABLET, FILM COATED ORAL NIGHTLY
Qty: 90 TABLET | Refills: 3 | Status: SHIPPED | OUTPATIENT
Start: 2024-10-30

## 2024-10-30 NOTE — TELEPHONE ENCOUNTER
Lov 10/23/24  Lrf 48 3 9/24/24 Medication:   Requested Prescriptions     Pending Prescriptions Disp Refills    atorvastatin (LIPITOR) 10 MG tablet [Pharmacy Med Name: ATORVASTATIN 10MG TAB]  0       Last Filled:      Patient Phone Number: [phone] (home)     Last appt: 10/23/2024   Next appt: Visit date not found    Last Lipid:   Lab Results   Component Value Date/Time    CHOL 195 08/16/2024 12:00 AM    TRIG 155 08/16/2024 12:00 AM    HDL 70 08/16/2024 12:00 AM    HDL 79 04/14/2011 10:38 AM

## 2024-11-05 ENCOUNTER — HOSPITAL ENCOUNTER (OUTPATIENT)
Dept: WOMENS IMAGING | Age: 79
Discharge: HOME | End: 2024-11-05
Attending: FAMILY MEDICINE
Payer: MEDICARE

## 2024-11-05 VITALS — WEIGHT: 149 LBS | BODY MASS INDEX: 27.42 KG/M2 | HEIGHT: 62 IN

## 2024-11-05 DIAGNOSIS — Z12.31 BREAST CANCER SCREENING BY MAMMOGRAM: ICD-10-CM

## 2024-11-05 PROCEDURE — 77067 SCR MAMMO BI INCL CAD: CPT

## 2024-11-05 NOTE — PROGRESS NOTES
2020  Timo Arciniega (:  1945)    FLU/COVID-19 CLINIC EVALUATION    HPI: Patient presents today for recheck of SPO2 due to low reading yesterday. No concerns.    SYMPTOMS:    Symptom duration, days:  [] 1   [] 2   [] 3   [] 4 - 7   [] 8 - 10   [] 11 - 13   [] >14    [] Fevers    [] Symptom (not measured)  [] Measured (Result:  degrees)  [] Chills  [] Cough  [] Coughing up blood  }  [] Chest Congestion  [] Nasal Congestion  [] Sneezing  [] Feeling short of breath  [] Sometimes  [] Frequently   [] All the time     [] Chest pain     [] Headaches  []Tolerable  [] Severe     [] Sore throat  [] Muscle aches  [] Nausea  [] Vomiting  []Unable to keep fluids down     [] Diarrhea  []Severe       [] OTHER SYMPTOMS:      Symptom course:   [] Worsening     [] Stable     [] Improving    RISK FACTORS:1}  [] Pregnant or possibly pregnant  [] Age over 61  [] Diabetes  [] Heart disease  [] Asthma  [] COPD/Other chronic lung diseases  [] Active Cancer  [] On Chemotherapy  [] Taking oral steroids  [] History Lymphoma/Leukemia  [] Close contact with a lab confirmed COVID-19 patient within 14 days of symptom onset  [] History of travel from affected geographical areas within 14 days of symptom onset     SOCIAL HISTORY:  Number of people living in patient's home (counting the patient as 1):  [] 1   [] 2   [] 3   [] 4   [] 5   [] >6      PHYSICAL EXAMINATION:  Vitals:    20 1340   SpO2: 97%        INSTRUCTIONS:  \"[x]\" Indicates a positive item  \"[]\" Indicates a negative item    DELETE ALL ITEMS NOT EXAMINED    [] Alert  [] Oriented to person/place/time    [] No apparent distress  [] Toxic appearing    [] Face flushed appearing [] Sclera clear  [] Lips are cyanotic      [] Breathing appears normal  [] Appears tachypneic      [] Rash on visible skin    [] Cranial Nerves II-XII grossly intact    [] Motor grossly intact in visible upper extremities    [] Motor grossly intact in visible lower extremities    [] Normal Mood Advance Care Planning   Ambulatory ACP Specialist Patient Outreach    Date:  11/5/2024    ACP Specialist:  Cindy Cunningham LPN    Outreach call to patient in follow-up to ACP Specialist referral from:Steffanie Blake APRN - CNP    [] PCP  [x] Provider   [] Ambulatory Care Management [] Other     For:                  [x] Advance Directive Assistance              [] Complete Portable DNR order              [] Complete POST/POLST/MOST              [] Code Status Discussion             [] Discuss Goals of Care             [] Early ACP Decision-Making              [] Other (Specify)    Date Referral Received:11/4/24    Next Step:   [] ACP scheduled conversation  [] Outreach again in one week               [] Email / Mail ACP Info Sheets  [] Email / Mail Advance Directive   [x] Closing referral.  Routing closure to referring provider/staff and to ACP Specialist .    [x] Closure letter mailed to patient with invitation to contact ACP Specialist if / when ready.   [x] Other (Specify here):       [x] At this time, Healthcare Decision Maker Is:     Primary Decision Maker: Peter Ulloa - [facility] - [phone]          [] Primary agent named in scanned advance directive.    [x] Legal Next of Kin.     [] Unable to determine legal decision maker at this time.    Outreaches:       [x] 1st -  Date:  11/5/24               Intervention:  [] Spoke with Patient   [] Left Voice mail [x] Mail    [] [employer]  [] Other (Specify) :     Outcomes: Per physician orders, patient would like packet mailed to her.  A packet will be mailed along with ACP Coordinator contact information, as well as ACP Information sheets for patient to contact us the future for any of her ACP needs.  Referral can be closed.            [] 2nd -  Date:                 Intervention:  [] Spoke with Patient  [] Left Voice mail [] Email / Mail    [] MyChart  [] Other (Specify) :              Outcomes:                [] 3rd -  Date:

## 2024-11-20 ENCOUNTER — OFFICE VISIT (OUTPATIENT)
Dept: FAMILY MEDICINE CLINIC | Age: 79
End: 2024-11-20

## 2024-11-20 VITALS
SYSTOLIC BLOOD PRESSURE: 122 MMHG | DIASTOLIC BLOOD PRESSURE: 76 MMHG | OXYGEN SATURATION: 97 % | WEIGHT: 150 LBS | HEART RATE: 69 BPM | BODY MASS INDEX: 27.88 KG/M2

## 2024-11-20 DIAGNOSIS — I10 ELEVATED SYSTOLIC BLOOD PRESSURE READING WITH DIAGNOSIS OF HYPERTENSION: Primary | ICD-10-CM

## 2024-11-20 DIAGNOSIS — H93.A9 PULSATILE TINNITUS: ICD-10-CM

## 2024-11-20 DIAGNOSIS — R35.0 URINARY FREQUENCY: ICD-10-CM

## 2024-11-20 DIAGNOSIS — G47.9 SLEEP DIFFICULTIES: ICD-10-CM

## 2024-11-20 LAB
BILIRUBIN, POC: NORMAL
BLOOD URINE, POC: NORMAL
CLARITY, POC: NORMAL
COLOR, POC: YELLOW
GLUCOSE URINE, POC: NORMAL MG/DL
KETONES, POC: NORMAL MG/DL
LEUKOCYTE EST, POC: NORMAL
NITRITE, POC: NORMAL
PH, POC: 6.5
PROTEIN, POC: NORMAL MG/DL
SPECIFIC GRAVITY, POC: 1.01
UROBILINOGEN, POC: 0.2 MG/DL

## 2024-11-20 RX ORDER — ALENDRONATE SODIUM 70 MG/1
70 TABLET ORAL
Qty: 12 TABLET | Refills: 3 | Status: SHIPPED | OUTPATIENT
Start: 2024-11-20

## 2024-11-21 LAB — BACTERIA UR CULT: NORMAL

## 2024-12-09 ENCOUNTER — TELEPHONE (OUTPATIENT)
Dept: FAMILY MEDICINE CLINIC | Age: 79
End: 2024-12-09

## 2024-12-09 NOTE — TELEPHONE ENCOUNTER
Patient called and has a lesion on inside of her upper lip   She saw a dentist and they told her to go a periodontists and if Dr Joaquin knows of one she would recommend for her.     Dentist gave her a couple names but they were out of network .     Dentist called in amoxicillin  for her and she needs to know if this is ok take.     Patient called and would like to have a call back on her advice if that is ok to start the antibiotic

## 2024-12-10 NOTE — PROGRESS NOTES
Marisol Spear is a 79 y.o. female.    HPI:  Concerned about a lesion inside her upper lip  It was scraped during dental procedure  Now she has an aphthous ulcer    Blood pressure check today, urged her to proceed with home sleep study    Patient had Guillain-Barré with shingles vaccine, no further vaccines for this patient    Plans to get left cataract surgery in the future, may need preop    Meds, vitamins and allergies reviewed with pt    Wt Readings from Last 3 Encounters:   12/11/24 68.5 kg (151 lb)   11/20/24 68 kg (150 lb)   11/05/24 67.6 kg (149 lb)       REVIEW OF SYSTEMS:   CONSTITUTIONAL: See history of present illness,   Weight noted   HEENT: No new vision difficulties or ringing in the ears.   RESPIRATORY: No new SOB, PND, orthopnea or cough.   CARDIOVASCULAR: no CP, palpitations or SOB with exertion  GI: No nausea, vomiting, diarrhea, constipation, abdominal pain or changes in bowel habits.   : No urinary frequency, urgency, incontinence hematuria or dysuria.   SKIN: No cyanosis or skin lesions.   MUSCULOSKELETAL: No new muscle or joint pain.   NEUROLOGICAL: No syncope or TIA-like symptoms.   PSYCHIATRIC: No anxiety, insomnia or depression     Allergies   Allergen Reactions    Hydroxybenzoate     Prednisolone Acetate     Tixocortol Pivalate     Neosporin [Neomycin-Polymyxin-Gramicidin] Rash     Allergic rash     Ace Inhibitors      Cough, itchy throat    Methylchloroisothiazolinone     Shingrix [Zoster Vac Recomb Adjuvanted]      GBS       Prior to Visit Medications    Medication Sig Taking? Authorizing Provider   amoxicillin (AMOXIL) 500 MG tablet Take 1 tablet by mouth 3 times daily Yes Provider, MD Carrol   alendronate (FOSAMAX) 70 MG tablet Take 1 tablet by mouth every 7 days Yes Luanne Joaquin MD   atorvastatin (LIPITOR) 10 MG tablet Take 1 tablet by mouth nightly Yes Luanne Joaquin MD   valsartan-hydroCHLOROthiazide (DIOVAN-HCT) 160-12.5 MG per tablet TAKE 1 TABLET EVERY DAY Yes

## 2024-12-11 ENCOUNTER — TELEPHONE (OUTPATIENT)
Dept: FAMILY MEDICINE CLINIC | Age: 79
End: 2024-12-11

## 2024-12-11 ENCOUNTER — OFFICE VISIT (OUTPATIENT)
Dept: FAMILY MEDICINE CLINIC | Age: 79
End: 2024-12-11

## 2024-12-11 VITALS
DIASTOLIC BLOOD PRESSURE: 71 MMHG | OXYGEN SATURATION: 99 % | SYSTOLIC BLOOD PRESSURE: 150 MMHG | HEART RATE: 76 BPM | BODY MASS INDEX: 28.07 KG/M2 | WEIGHT: 151 LBS

## 2024-12-11 DIAGNOSIS — I10 ESSENTIAL HYPERTENSION: ICD-10-CM

## 2024-12-11 DIAGNOSIS — K12.0 APHTHOUS ULCER OF MOUTH: Primary | ICD-10-CM

## 2024-12-11 DIAGNOSIS — R53.82 CHRONIC FATIGUE: ICD-10-CM

## 2024-12-11 DIAGNOSIS — R06.83 SNORES: ICD-10-CM

## 2024-12-11 RX ORDER — AMOXICILLIN 500 MG/1
500 TABLET, FILM COATED ORAL 3 TIMES DAILY
COMMUNITY
Start: 2024-12-09

## 2024-12-11 NOTE — TELEPHONE ENCOUNTER
[facility] called and had a question about the written compound medication for the magic mouthwash for the pt that kath wrote.     [facility] doesn't do compunded medications, and they told patient to go elsewhere to get the medication filled, but the patient refused to go somewhere else.    Pharmacy would like to get a new prescription for lidocaine mouthwash called in to them instead if that's ok with kath

## 2025-01-29 RX ORDER — AMLODIPINE BESYLATE 5 MG/1
5 TABLET ORAL NIGHTLY
Qty: 90 TABLET | Refills: 1 | Status: SHIPPED | OUTPATIENT
Start: 2025-01-29

## 2025-01-29 NOTE — TELEPHONE ENCOUNTER
Pt said that she is taking amlodipine 5 mg at bed time she has been taking this for the past 3 years she said.

## 2025-01-29 NOTE — TELEPHONE ENCOUNTER
Pt wants to know if she should stop taking the prescription listed since its been discontinued.     Please advise pt on what to do  [phone].

## 2025-01-29 NOTE — TELEPHONE ENCOUNTER
Lov 12/11/24  Lrf  Medication:   Requested Prescriptions     Pending Prescriptions Disp Refills    amLODIPine (NORVASC) 5 MG tablet [Pharmacy Med Name: AMLODIPINE  5MG TAB] 90 tablet 0     Sig: Take 1 tablet by mouth daily       Last Filled:      Patient Phone Number: [phone] (home)     Last appt: 12/11/2024   Next appt: Visit date not found    Lab Results   Component Value Date     08/16/2024    K 4.7 08/03/2023     08/16/2024    CO2 16 08/16/2024    BUN 15 08/16/2024    CREATININE 0.93 08/16/2024    GLUCOSE 99 08/16/2024    CALCIUM 9.6 08/16/2024    BILITOT 0.3 08/16/2024    ALKPHOS 72 08/16/2024    AST 42 08/16/2024    ALT 16 08/16/2024    LABGLOM 65 07/09/2021    GFRAA 75 07/09/2021    AGRATIO 1.7 08/03/2023    GLOB 2.5 08/03/2023

## 2025-01-30 NOTE — TELEPHONE ENCOUNTER
Patient called back and said she is taking both of the medications    She takes amLODIPine (NORVASC) 5 MG tablet [[id number]] and     metoprolol tartrate (LOPRESSOR) 25 MG tablet [[id number]]

## 2025-02-11 ENCOUNTER — TELEPHONE (OUTPATIENT)
Dept: FAMILY MEDICINE CLINIC | Age: 80
End: 2025-02-11

## 2025-02-11 RX ORDER — LORATADINE 10 MG/1
10 TABLET ORAL DAILY
Qty: 30 TABLET | Refills: 0 | Status: SHIPPED | OUTPATIENT
Start: 2025-02-11

## 2025-02-11 RX ORDER — AMOXICILLIN 500 MG/1
500 CAPSULE ORAL 3 TIMES DAILY
Qty: 21 CAPSULE | Refills: 0 | Status: SHIPPED | OUTPATIENT
Start: 2025-02-11 | End: 2025-02-18

## 2025-02-11 NOTE — TELEPHONE ENCOUNTER
Patient called with complaints of productive cough with yellow phlegm x 5days.  Patient is treating with OTC Mucinex and cough medication

## 2025-03-11 ENCOUNTER — TELEPHONE (OUTPATIENT)
Dept: FAMILY MEDICINE CLINIC | Age: 80
End: 2025-03-11

## 2025-03-11 DIAGNOSIS — E78.2 MIXED HYPERLIPIDEMIA: ICD-10-CM

## 2025-03-11 DIAGNOSIS — R53.82 CHRONIC FATIGUE: Primary | ICD-10-CM

## 2025-03-11 DIAGNOSIS — I10 ESSENTIAL HYPERTENSION: ICD-10-CM

## 2025-03-11 DIAGNOSIS — R73.03 PREDIABETES: ICD-10-CM

## 2025-03-17 ENCOUNTER — HOSPITAL ENCOUNTER (OUTPATIENT)
Age: 80
Discharge: HOME | End: 2025-03-17
Payer: MEDICARE

## 2025-03-17 DIAGNOSIS — R73.03 PREDIABETES: ICD-10-CM

## 2025-03-17 DIAGNOSIS — E78.2 MIXED HYPERLIPIDEMIA: ICD-10-CM

## 2025-03-17 DIAGNOSIS — I10 ESSENTIAL HYPERTENSION: ICD-10-CM

## 2025-03-17 DIAGNOSIS — R53.82 CHRONIC FATIGUE: ICD-10-CM

## 2025-03-17 LAB
25(OH)D3 SERPL-MCNC: 57 NG/ML
ALBUMIN SERPL-MCNC: 4.3 G/DL (ref 3.4–5)
ALBUMIN/GLOB SERPL: 1.5 {RATIO} (ref 1.1–2.2)
ALP SERPL-CCNC: 69 U/L (ref 40–129)
ALT SERPL-CCNC: 23 U/L (ref 10–40)
ANION GAP SERPL CALCULATED.3IONS-SCNC: 9 MMOL/L (ref 3–16)
AST SERPL-CCNC: 24 U/L (ref 15–37)
BASOPHILS # BLD: 0 K/UL (ref 0–0.2)
BASOPHILS NFR BLD: 0.8 %
BILIRUB SERPL-MCNC: 0.4 MG/DL (ref 0–1)
BUN SERPL-MCNC: 15 MG/DL (ref 7–20)
CALCIUM SERPL-MCNC: 9.5 MG/DL (ref 8.3–10.6)
CHLORIDE SERPL-SCNC: 97 MMOL/L (ref 99–110)
CHOLEST SERPL-MCNC: 166 MG/DL (ref 0–199)
CO2 SERPL-SCNC: 28 MMOL/L (ref 21–32)
CREAT SERPL-MCNC: 0.8 MG/DL (ref 0.6–1.2)
DEPRECATED RDW RBC AUTO: 13.8 % (ref 12.4–15.4)
EOSINOPHIL # BLD: 0.1 K/UL (ref 0–0.6)
EOSINOPHIL NFR BLD: 1.4 %
EST. AVERAGE GLUCOSE BLD GHB EST-MCNC: 114 MG/DL
GFR SERPLBLD CREATININE-BSD FMLA CKD-EPI: 74 ML/MIN/{1.73_M2}
GLUCOSE SERPL-MCNC: 96 MG/DL (ref 70–99)
HBA1C MFR BLD: 5.6 %
HCT VFR BLD AUTO: 37.2 % (ref 36–48)
HDLC SERPL-MCNC: 76 MG/DL (ref 40–60)
HGB BLD-MCNC: 12.3 G/DL (ref 12–16)
LDLC SERPL CALC-MCNC: 76 MG/DL
LYMPHOCYTES # BLD: 1.1 K/UL (ref 1–5.1)
LYMPHOCYTES NFR BLD: 19.4 %
MCH RBC QN AUTO: 31.8 PG (ref 26–34)
MCHC RBC AUTO-ENTMCNC: 33.1 G/DL (ref 31–36)
MCV RBC AUTO: 96.2 FL (ref 80–100)
MONOCYTES # BLD: 0.5 K/UL (ref 0–1.3)
MONOCYTES NFR BLD: 7.7 %
NEUTROPHILS # BLD: 4.2 K/UL (ref 1.7–7.7)
NEUTROPHILS NFR BLD: 70.7 %
PLATELET # BLD AUTO: 278 K/UL (ref 135–450)
PMV BLD AUTO: 8.2 FL (ref 5–10.5)
POTASSIUM SERPL-SCNC: 4.3 MMOL/L (ref 3.5–5.1)
PROT SERPL-MCNC: 7.2 G/DL (ref 6.4–8.2)
RBC # BLD AUTO: 3.87 M/UL (ref 4–5.2)
SODIUM SERPL-SCNC: 134 MMOL/L (ref 136–145)
TRIGL SERPL-MCNC: 69 MG/DL (ref 0–150)
TSH SERPL DL<=0.005 MIU/L-ACNC: 3.45 UIU/ML (ref 0.27–4.2)
VLDLC SERPL CALC-MCNC: 14 MG/DL
WBC # BLD AUTO: 5.9 K/UL (ref 4–11)

## 2025-03-17 PROCEDURE — 82306 VITAMIN D 25 HYDROXY: CPT

## 2025-03-17 PROCEDURE — 36415 COLL VENOUS BLD VENIPUNCTURE: CPT

## 2025-03-17 PROCEDURE — 85025 COMPLETE CBC W/AUTO DIFF WBC: CPT

## 2025-03-17 PROCEDURE — 83036 HEMOGLOBIN GLYCOSYLATED A1C: CPT

## 2025-03-17 PROCEDURE — 80053 COMPREHEN METABOLIC PANEL: CPT

## 2025-03-17 PROCEDURE — 80061 LIPID PANEL: CPT

## 2025-03-17 PROCEDURE — 84443 ASSAY THYROID STIM HORMONE: CPT

## 2025-03-18 ENCOUNTER — RESULTS FOLLOW-UP (OUTPATIENT)
Dept: FAMILY MEDICINE CLINIC | Age: 80
End: 2025-03-18

## 2025-03-19 ENCOUNTER — OFFICE VISIT (OUTPATIENT)
Dept: FAMILY MEDICINE CLINIC | Age: 80
End: 2025-03-19

## 2025-03-19 VITALS
BODY MASS INDEX: 30.64 KG/M2 | HEART RATE: 56 BPM | WEIGHT: 152 LBS | DIASTOLIC BLOOD PRESSURE: 66 MMHG | HEIGHT: 59 IN | OXYGEN SATURATION: 98 % | SYSTOLIC BLOOD PRESSURE: 134 MMHG

## 2025-03-19 DIAGNOSIS — F32.5 MAJOR DEPRESSIVE DISORDER WITH SINGLE EPISODE, IN FULL REMISSION: Chronic | ICD-10-CM

## 2025-03-19 DIAGNOSIS — Z00.00 MEDICARE ANNUAL WELLNESS VISIT, SUBSEQUENT: Primary | ICD-10-CM

## 2025-03-19 DIAGNOSIS — F33.41 RECURRENT MAJOR DEPRESSIVE DISORDER, IN PARTIAL REMISSION: ICD-10-CM

## 2025-03-19 DIAGNOSIS — E78.2 MIXED HYPERLIPIDEMIA: ICD-10-CM

## 2025-03-19 DIAGNOSIS — I34.0 MODERATE MITRAL REGURGITATION BY PRIOR ECHOCARDIOGRAM: ICD-10-CM

## 2025-03-19 DIAGNOSIS — R73.03 PREDIABETES: ICD-10-CM

## 2025-03-19 DIAGNOSIS — R06.83 SNORING: ICD-10-CM

## 2025-03-19 DIAGNOSIS — Z86.69 HX OF GUILLAIN-BARRE SYNDROME: ICD-10-CM

## 2025-03-19 DIAGNOSIS — I10 PRIMARY HYPERTENSION: Chronic | ICD-10-CM

## 2025-03-19 DIAGNOSIS — G47.9 SLEEP DIFFICULTIES: ICD-10-CM

## 2025-03-19 RX ORDER — DESVENLAFAXINE 25 MG/1
25 TABLET, EXTENDED RELEASE ORAL DAILY
Qty: 30 TABLET | Refills: 1 | Status: SHIPPED | OUTPATIENT
Start: 2025-03-19

## 2025-03-19 SDOH — ECONOMIC STABILITY: FOOD INSECURITY: WITHIN THE PAST 12 MONTHS, THE FOOD YOU BOUGHT JUST DIDN'T LAST AND YOU DIDN'T HAVE MONEY TO GET MORE.: NEVER TRUE

## 2025-03-19 SDOH — ECONOMIC STABILITY: FOOD INSECURITY: WITHIN THE PAST 12 MONTHS, YOU WORRIED THAT YOUR FOOD WOULD RUN OUT BEFORE YOU GOT MONEY TO BUY MORE.: NEVER TRUE

## 2025-03-19 ASSESSMENT — PATIENT HEALTH QUESTIONNAIRE - PHQ9
1. LITTLE INTEREST OR PLEASURE IN DOING THINGS: NOT AT ALL
SUM OF ALL RESPONSES TO PHQ QUESTIONS 1-9: 0
8. MOVING OR SPEAKING SO SLOWLY THAT OTHER PEOPLE COULD HAVE NOTICED. OR THE OPPOSITE, BEING SO FIGETY OR RESTLESS THAT YOU HAVE BEEN MOVING AROUND A LOT MORE THAN USUAL: NOT AT ALL
7. TROUBLE CONCENTRATING ON THINGS, SUCH AS READING THE NEWSPAPER OR WATCHING TELEVISION: NOT AT ALL
4. FEELING TIRED OR HAVING LITTLE ENERGY: NOT AT ALL
10. IF YOU CHECKED OFF ANY PROBLEMS, HOW DIFFICULT HAVE THESE PROBLEMS MADE IT FOR YOU TO DO YOUR WORK, TAKE CARE OF THINGS AT HOME, OR GET ALONG WITH OTHER PEOPLE: NOT DIFFICULT AT ALL
SUM OF ALL RESPONSES TO PHQ QUESTIONS 1-9: 0
5. POOR APPETITE OR OVEREATING: NOT AT ALL
6. FEELING BAD ABOUT YOURSELF - OR THAT YOU ARE A FAILURE OR HAVE LET YOURSELF OR YOUR FAMILY DOWN: NOT AT ALL
SUM OF ALL RESPONSES TO PHQ QUESTIONS 1-9: 0
3. TROUBLE FALLING OR STAYING ASLEEP: NOT AT ALL
SUM OF ALL RESPONSES TO PHQ QUESTIONS 1-9: 0
2. FEELING DOWN, DEPRESSED OR HOPELESS: NOT AT ALL
9. THOUGHTS THAT YOU WOULD BE BETTER OFF DEAD, OR OF HURTING YOURSELF: NOT AT ALL

## 2025-03-19 ASSESSMENT — LIFESTYLE VARIABLES: HOW OFTEN DO YOU HAVE A DRINK CONTAINING ALCOHOL: NEVER

## 2025-03-19 NOTE — PROGRESS NOTES
Medicare Annual Wellness Visit    Marisol Spear is here for Medicare AWV and Discuss Medications (For depression )    Assessment & Plan   Medicare annual wellness visit, subsequent  Healthy diet, stay active  Recent trip to [facility]    Primary hypertension  Intermittent elevated systolic blood pressure  Continue medication including Diovan /12 point 5 in the morning and Lopressor 25 mg twice a day  Proceed with sleep study eval    Prediabetes  Continue healthy diet and exercise, A1c improved to 5.6= normal range    Moderate mitral regurgitation by prior echocardiogram  Sees cardiology    Major depressive disorder with single episode, in full remission  Wants to try to wean off Pristiq, try decreasing dose from 50 mg to 25 mg  Follow-up one 1 month  Continue healthy diet, exercise and vitamin D3    Mixed hyperlipidemia  Lipids stable on Lipitor 10 mg nightly, continue    Hx of Guillain-Kamiah syndrome from Zostavax vaccine  No further immunizations    Recurrent major depressive disorder, in partial remission  Trial of decreasing Pristiq to 25 mg from 50 mg daily  Follow-up 1 month  -     desvenlafaxine succinate (PRISTIQ) 25 MG TB24 extended release tablet; Take 1 tablet by mouth daily, Disp-30 tablet, R-1Normal  Snoring  Refer for sleep study  -     Mark Mas MD, Sleep Medicine, [facility]  Sleep difficulties  Refer for sleep study  -     Mark Mas MD, Sleep Medicine, [facility]       No follow-ups on file.     Subjective   The following acute and/or chronic problems were also addressed today:  Not sleeping well  Wanting to wean Pristiq  Worrier, proceed with sleep study and then refer to Dr. Mcnamara    Patient's complete Health Risk Assessment and screening values have been reviewed and are found in Flowsheets. The following problems were reviewed today and where indicated follow up appointments were made and/or referrals ordered.    Positive Risk Factor

## 2025-04-14 ENCOUNTER — TELEPHONE (OUTPATIENT)
Dept: FAMILY MEDICINE CLINIC | Age: 80
End: 2025-04-14

## 2025-04-14 DIAGNOSIS — R01.1 HEART MURMUR: Primary | ICD-10-CM

## 2025-04-14 DIAGNOSIS — M85.89 OSTEOPENIA OF MULTIPLE SITES: ICD-10-CM

## 2025-04-14 DIAGNOSIS — F33.41 RECURRENT MAJOR DEPRESSIVE DISORDER, IN PARTIAL REMISSION: ICD-10-CM

## 2025-04-14 DIAGNOSIS — I34.0 MODERATE MITRAL REGURGITATION BY PRIOR ECHOCARDIOGRAM: ICD-10-CM

## 2025-04-14 DIAGNOSIS — R06.02 SHORTNESS OF BREATH: ICD-10-CM

## 2025-04-14 RX ORDER — DESVENLAFAXINE 25 MG/1
25 TABLET, EXTENDED RELEASE ORAL DAILY
Qty: 30 TABLET | Refills: 2 | Status: SHIPPED | OUTPATIENT
Start: 2025-04-14

## 2025-04-14 NOTE — TELEPHONE ENCOUNTER
Pt advised that her and Dr. Joaquin spoke and asked pt to take 50MG of prescription listed and pt stated there is no difference between the 2.     Pt advised that she needs to be scheduled with Mary Mcnamara.     Please advise on what pt should do next  [phone]    desvenlafaxine succinate (PRISTIQ) 25 MG TB24 extended release tablet [[id number]]

## 2025-04-14 NOTE — TELEPHONE ENCOUNTER
Patient called and needs to see if she is due for     Bone Density   Echo   EKG     She said she wasn't sure if she needed to have these done.     Please advise

## 2025-04-24 ENCOUNTER — OFFICE VISIT (OUTPATIENT)
Dept: PSYCHOLOGY | Age: 80
End: 2025-04-24
Payer: MEDICARE

## 2025-04-24 DIAGNOSIS — F33.0 MILD EPISODE OF RECURRENT MAJOR DEPRESSIVE DISORDER: Primary | ICD-10-CM

## 2025-04-24 PROCEDURE — 90791 PSYCH DIAGNOSTIC EVALUATION: CPT | Performed by: PSYCHOLOGIST

## 2025-04-24 PROCEDURE — 1123F ACP DISCUSS/DSCN MKR DOCD: CPT | Performed by: PSYCHOLOGIST

## 2025-04-24 PROCEDURE — 1036F TOBACCO NON-USER: CPT | Performed by: PSYCHOLOGIST

## 2025-05-15 ENCOUNTER — OFFICE VISIT (OUTPATIENT)
Dept: PSYCHOLOGY | Age: 80
End: 2025-05-15
Payer: MEDICARE

## 2025-05-15 DIAGNOSIS — F33.0 MILD EPISODE OF RECURRENT MAJOR DEPRESSIVE DISORDER: Primary | ICD-10-CM

## 2025-05-15 PROCEDURE — 90837 PSYTX W PT 60 MINUTES: CPT | Performed by: PSYCHOLOGIST

## 2025-05-15 PROCEDURE — 1123F ACP DISCUSS/DSCN MKR DOCD: CPT | Performed by: PSYCHOLOGIST

## 2025-05-15 PROCEDURE — 1036F TOBACCO NON-USER: CPT | Performed by: PSYCHOLOGIST

## 2025-05-20 DIAGNOSIS — F33.41 RECURRENT MAJOR DEPRESSIVE DISORDER, IN PARTIAL REMISSION: ICD-10-CM

## 2025-05-20 NOTE — TELEPHONE ENCOUNTER
Patient called needing a refill of     desvenlafaxine succinate (PRISTIQ) extended release tablet    Patient said she has taken the 25mg for a month but she doesn't feel any different.     Patient wanted to know if her dose can be bumped up to 50mg now.       [facility] - [place], [place] - [address] - P [phone] - F [phone]  [address], [facility] [address]  Phone: [phone]  Fax: [phone]

## 2025-05-21 DIAGNOSIS — F33.41 RECURRENT MAJOR DEPRESSIVE DISORDER, IN PARTIAL REMISSION: ICD-10-CM

## 2025-05-21 RX ORDER — DESVENLAFAXINE 25 MG/1
25 TABLET, EXTENDED RELEASE ORAL DAILY
Qty: 90 TABLET | Refills: 1 | Status: SHIPPED | OUTPATIENT
Start: 2025-05-21

## 2025-05-22 DIAGNOSIS — H91.90 HEARING LOSS, UNSPECIFIED HEARING LOSS TYPE, UNSPECIFIED LATERALITY: Primary | ICD-10-CM

## 2025-05-29 ENCOUNTER — OFFICE VISIT (OUTPATIENT)
Dept: FAMILY MEDICINE CLINIC | Age: 80
End: 2025-05-29
Payer: MEDICARE

## 2025-05-29 VITALS
DIASTOLIC BLOOD PRESSURE: 72 MMHG | SYSTOLIC BLOOD PRESSURE: 156 MMHG | HEART RATE: 60 BPM | OXYGEN SATURATION: 98 % | BODY MASS INDEX: 30.84 KG/M2 | WEIGHT: 153 LBS

## 2025-05-29 DIAGNOSIS — M25.611 DECREASED RANGE OF MOTION OF RIGHT SHOULDER: ICD-10-CM

## 2025-05-29 DIAGNOSIS — I10 PRIMARY HYPERTENSION: Primary | Chronic | ICD-10-CM

## 2025-05-29 PROCEDURE — 3078F DIAST BP <80 MM HG: CPT | Performed by: FAMILY MEDICINE

## 2025-05-29 PROCEDURE — G8399 PT W/DXA RESULTS DOCUMENT: HCPCS | Performed by: FAMILY MEDICINE

## 2025-05-29 PROCEDURE — G8417 CALC BMI ABV UP PARAM F/U: HCPCS | Performed by: FAMILY MEDICINE

## 2025-05-29 PROCEDURE — 1036F TOBACCO NON-USER: CPT | Performed by: FAMILY MEDICINE

## 2025-05-29 PROCEDURE — 3077F SYST BP >= 140 MM HG: CPT | Performed by: FAMILY MEDICINE

## 2025-05-29 PROCEDURE — 1123F ACP DISCUSS/DSCN MKR DOCD: CPT | Performed by: FAMILY MEDICINE

## 2025-05-29 PROCEDURE — 1090F PRES/ABSN URINE INCON ASSESS: CPT | Performed by: FAMILY MEDICINE

## 2025-05-29 PROCEDURE — G8427 DOCREV CUR MEDS BY ELIG CLIN: HCPCS | Performed by: FAMILY MEDICINE

## 2025-05-29 PROCEDURE — 99213 OFFICE O/P EST LOW 20 MIN: CPT | Performed by: FAMILY MEDICINE

## 2025-05-29 PROCEDURE — 1160F RVW MEDS BY RX/DR IN RCRD: CPT | Performed by: FAMILY MEDICINE

## 2025-05-29 PROCEDURE — 1159F MED LIST DOCD IN RCRD: CPT | Performed by: FAMILY MEDICINE

## 2025-05-29 RX ORDER — VALSARTAN AND HYDROCHLOROTHIAZIDE 320; 12.5 MG/1; MG/1
1 TABLET, FILM COATED ORAL
Qty: 30 TABLET | Refills: 2 | Status: SHIPPED | OUTPATIENT
Start: 2025-05-29

## 2025-05-29 NOTE — PROGRESS NOTES
Marisol Spear is a 79 y.o. female.    HPI:  Multiple concerns  Elevated systolic blood pressure, urged her to schedule sleep study  Meds reviewed in detail as were supplements  Concerned that amlodipine is causing her ankles to swell from time to time  Commend decreasing amlodipine to 2.5 mg in the evening and increasing her Diovan to 320 mg/12 point 5 in the morning, she is agreeable    Seeing Ortho tomorrow for knee pain and shoulder pain    Meds, vitamins and allergies reviewed with pt    Wt Readings from Last 3 Encounters:   05/29/25 69.4 kg (153 lb)   03/19/25 68.9 kg (152 lb)   12/11/24 68.5 kg (151 lb)       REVIEW OF SYSTEMS:   CONSTITUTIONAL: See history of present illness,   Weight noted/stable  HEENT: No new vision difficulties or ringing in the ears.   RESPIRATORY: No new SOB, PND, orthopnea or cough.   CARDIOVASCULAR: no CP, palpitations or SOB with exertion  GI: No nausea, vomiting, diarrhea, constipation, abdominal pain or changes in bowel habits.   : No urinary frequency, urgency, incontinence hematuria or dysuria.   SKIN: No cyanosis or skin lesions.   MUSCULOSKELETAL: See HPI, right shoulder and knee pain, he is scheduled with Ortho  NEUROLOGICAL: No syncope or TIA-like symptoms.   PSYCHIATRIC: No anxiety, insomnia or depression     Allergies   Allergen Reactions    Hydroxybenzoate     Prednisolone Acetate     Tixocortol Pivalate     Neosporin [Neomycin-Polymyxin-Gramicidin] Rash     Allergic rash     Ace Inhibitors      Cough, itchy throat    Methylchloroisothiazolinone     Shingrix [Zoster Vac Recomb Adjuvanted]      GBS       Prior to Visit Medications    Medication Sig Taking? Authorizing Provider   valsartan-hydroCHLOROthiazide (DIOVAN-HCT) 320-12.5 MG per tablet Take 1 tablet by mouth daily (with breakfast) Yes Luanne Joaquin MD   desvenlafaxine succinate (PRISTIQ) 25 MG TB24 extended release tablet Take 1 tablet by mouth daily Yes Luanne Joaquin MD   desvenlafaxine succinate

## 2025-05-30 ENCOUNTER — TELEPHONE (OUTPATIENT)
Dept: FAMILY MEDICINE CLINIC | Age: 80
End: 2025-05-30

## 2025-05-30 NOTE — TELEPHONE ENCOUNTER
Patient needs clarification on below medications    desvenlafaxine succinate (PRISTIQ) 25 MG TB24 extended release tablet    Patient wanted to know if she can go back on the 50 mg, and take 2 a day.

## 2025-06-01 DIAGNOSIS — F33.41 RECURRENT MAJOR DEPRESSIVE DISORDER, IN PARTIAL REMISSION: ICD-10-CM

## 2025-06-01 RX ORDER — DESVENLAFAXINE 50 MG/1
50 TABLET, FILM COATED, EXTENDED RELEASE ORAL DAILY
Qty: 90 TABLET | Refills: 1 | Status: SHIPPED | OUTPATIENT
Start: 2025-06-01

## 2025-06-16 NOTE — PROGRESS NOTES
Behavioral Health Psychotherapy  Mary Mcnamara, Ph.D.  Licensed Clinical Psychologist  Date:  5/15/25           Time spent with client:  60 minutes from 12:00 - 1:00 pm.  This is patient's second psychotherapy appointment with Dr. Mcnamara.            Chief Complaint   Patient presents with    Stress    Depression            S:    Relationship with sister who lives in [place] - hurt feelings, unresolved frustration  Dilemma - sense that if she moves to be near a sibling they may soon die  Sense that her nieces and nephews are unlikely to be willing to take care of her  Awareness that she incomplete tasks she must attend to re: legal matters  Grief and loss re: , sister  Pattern of indecisiveness     O:  MSE:  Appearance:  Alert, cooperative, mild distress   Appetite:  Normal  Sleep disturbance:  No   Fatigue:  Yes   Loss of pleasure:  Yes   Impulsive behavior:  No  Speech:  Within normal limits  Mood:  Mildly anxious  Affect:  Full range with anxiety  Thought Content:  Intact   Thought Process:  Coherent  Associations:  Logical connections  Insight:  Good   Judgment:  Intact   Orientation:  Oriented to person, place, time, and general circumstances   Memory:  Recent and remote memory intact  Attention/Concentration:  Intact  Morbid ideation:  No   Threat Assessment:  No history of any suicidal ideation, suicide attempts, self-harm urges/behaviors, or homicidal ideation/behavior   Protective Factors against Suicide:  Adequate family/social support, contacts reliable for safety, future oriented/reason to live, Taoist beliefs/value system, and responsibilities        A:  Marisol presented for a second psychotherapy appointment with Dr. Mcnamara regarding concerns related to mild depression symptoms, grief/loss, family of origin issues, and the need to decide whether to move.        Diagnosis:  1. Mild episode of recurrent major depressive disorder          Plan:  Pt interventions:  Decrease depression

## 2025-06-23 ENCOUNTER — TELEPHONE (OUTPATIENT)
Dept: FAMILY MEDICINE CLINIC | Age: 80
End: 2025-06-23

## 2025-06-23 RX ORDER — METOPROLOL TARTRATE 25 MG/1
25 TABLET, FILM COATED ORAL 2 TIMES DAILY
Qty: 60 TABLET | Refills: 0 | Status: CANCELLED | OUTPATIENT
Start: 2025-06-23

## 2025-06-23 NOTE — TELEPHONE ENCOUNTER
Cannot find that pharm in our system in [facility] is there another pharm she can give us ?     IVELISSEM

## 2025-06-23 NOTE — TELEPHONE ENCOUNTER
Patient called requesting a refill of her     metoprolol tartrate (LOPRESSOR) 25 MG tablet      qty(30)    Patient is in [place] and she has ran out of medication.     If okay to fill patient asked for prescription to be sent into     [facility]   Address: [address], [place] [address]  Phone: [phone]

## 2025-06-24 ENCOUNTER — PATIENT MESSAGE (OUTPATIENT)
Dept: FAMILY MEDICINE CLINIC | Age: 80
End: 2025-06-24

## 2025-07-09 ENCOUNTER — OFFICE VISIT (OUTPATIENT)
Dept: ENT CLINIC | Age: 80
End: 2025-07-09
Payer: MEDICARE

## 2025-07-09 ENCOUNTER — TELEPHONE (OUTPATIENT)
Dept: FAMILY MEDICINE CLINIC | Age: 80
End: 2025-07-09

## 2025-07-09 VITALS
DIASTOLIC BLOOD PRESSURE: 69 MMHG | OXYGEN SATURATION: 96 % | WEIGHT: 151 LBS | SYSTOLIC BLOOD PRESSURE: 187 MMHG | HEART RATE: 120 BPM | BODY MASS INDEX: 30.44 KG/M2 | HEIGHT: 59 IN

## 2025-07-09 DIAGNOSIS — M54.2 NECK PAIN: ICD-10-CM

## 2025-07-09 DIAGNOSIS — H90.3 SENSORINEURAL HEARING LOSS (SNHL) OF BOTH EARS: ICD-10-CM

## 2025-07-09 DIAGNOSIS — H93.A3 PULSATILE TINNITUS OF BOTH EARS: Primary | ICD-10-CM

## 2025-07-09 DIAGNOSIS — I10 PRIMARY HYPERTENSION: Chronic | ICD-10-CM

## 2025-07-09 PROCEDURE — 3077F SYST BP >= 140 MM HG: CPT | Performed by: STUDENT IN AN ORGANIZED HEALTH CARE EDUCATION/TRAINING PROGRAM

## 2025-07-09 PROCEDURE — 1036F TOBACCO NON-USER: CPT | Performed by: STUDENT IN AN ORGANIZED HEALTH CARE EDUCATION/TRAINING PROGRAM

## 2025-07-09 PROCEDURE — G8417 CALC BMI ABV UP PARAM F/U: HCPCS | Performed by: STUDENT IN AN ORGANIZED HEALTH CARE EDUCATION/TRAINING PROGRAM

## 2025-07-09 PROCEDURE — G8427 DOCREV CUR MEDS BY ELIG CLIN: HCPCS | Performed by: STUDENT IN AN ORGANIZED HEALTH CARE EDUCATION/TRAINING PROGRAM

## 2025-07-09 PROCEDURE — 1123F ACP DISCUSS/DSCN MKR DOCD: CPT | Performed by: STUDENT IN AN ORGANIZED HEALTH CARE EDUCATION/TRAINING PROGRAM

## 2025-07-09 PROCEDURE — 1090F PRES/ABSN URINE INCON ASSESS: CPT | Performed by: STUDENT IN AN ORGANIZED HEALTH CARE EDUCATION/TRAINING PROGRAM

## 2025-07-09 PROCEDURE — G8399 PT W/DXA RESULTS DOCUMENT: HCPCS | Performed by: STUDENT IN AN ORGANIZED HEALTH CARE EDUCATION/TRAINING PROGRAM

## 2025-07-09 PROCEDURE — 99214 OFFICE O/P EST MOD 30 MIN: CPT | Performed by: STUDENT IN AN ORGANIZED HEALTH CARE EDUCATION/TRAINING PROGRAM

## 2025-07-09 PROCEDURE — 3078F DIAST BP <80 MM HG: CPT | Performed by: STUDENT IN AN ORGANIZED HEALTH CARE EDUCATION/TRAINING PROGRAM

## 2025-07-09 PROCEDURE — 1159F MED LIST DOCD IN RCRD: CPT | Performed by: STUDENT IN AN ORGANIZED HEALTH CARE EDUCATION/TRAINING PROGRAM

## 2025-07-09 NOTE — TELEPHONE ENCOUNTER
Patient had appt today with dr. Arana through [facility] and her bp was 186/69. Patient is concerned on if she should come in and see kath to check on it or what. Please advise

## 2025-07-09 NOTE — PROGRESS NOTES
sloping to moderate rising to mild sloping to moderate sensorineural hearing loss bilaterally with type a tympanograms.  There is excellent word recognition score in the right ear and good word recognition score in the left ear.    The patient had extensive workup including MRI of the IAC, MRA of the head and neck with and without contrast in 6 August 2023.  I independently reviewed and interpreted the scans.  There is no evidence of retrocochlear pathology or obvious stenosis or vascular malformation.    The patient presents today with several issues.  She has an acute new issue related to some pain in the trapezius.  She states she was on a trip cruise and she developed pain in her right shoulder as well as some pain that radiates down her arm.  She was seen by both her PCP for this as well as an orthopedist.  They recommended physical therapy.  No new lumps or bumps of the head and neck.    She is also having pulsatile tinnitus.  Overall it is improved.  Her blood pressure was 180/69.  She has a history of hypertension.  She takes multiple medications for this.  She is asymptomatic at this time.    She reports no changes to her hearing.  She denies exacerbating factors of her pulsatile tinnitus.  Valsalva does not make it worse.             REVIEW OF SYSTEMS  The following systems were reviewed and revealed the following in addition to any already discussed in the HPI:    PHYSICAL EXAM    GENERAL: No acute distress, alert and oriented, no hoarseness, strong voice  EYES: EOMI, Anti-icteric  HENT:   Head: Normocephalic and atraumatic.   Face:  Symmetric, facial nerve intact  Right Ear: Normal external ear, normal external auditory canal, intact tympanic membrane with normal mobility and aerated middle ear  Left Ear: Normal external ear, normal external auditory canal, intact tympanic membrane with normal mobility and aerated middle ear  Mouth/Oral Cavity:  normal lips, Uvula is midline, no mucosal lesions, no

## 2025-07-28 ENCOUNTER — TELEPHONE (OUTPATIENT)
Dept: FAMILY MEDICINE CLINIC | Age: 80
End: 2025-07-28

## 2025-07-28 NOTE — TELEPHONE ENCOUNTER
Patient called requesting a letter to renew her handicap placard.     Patient also said she was hoping to see Dr. Joaquin one more before she leaves to discuss her blood pressure.     Patient was wondering if Dr. Joaquin would have any time to squeeze her in before she is gone.     Please advise

## 2025-07-29 RX ORDER — AMLODIPINE BESYLATE 5 MG/1
5 TABLET ORAL NIGHTLY
Qty: 90 TABLET | Refills: 3 | Status: SHIPPED | OUTPATIENT
Start: 2025-07-29

## 2025-07-29 NOTE — TELEPHONE ENCOUNTER
Medication:   Requested Prescriptions     Pending Prescriptions Disp Refills    amLODIPine (NORVASC) 5 MG tablet [Pharmacy Med Name: AMLODIPINE BESYLATE 5 MG Oral Tablet] 90 tablet 3     Sig: TAKE 1 TABLET AT BEDTIME       Last Filled:  01/29/25 90 tabs 1 refill     Patient Phone Number: [phone] (home)     Last appt: 5/29/2025   Next appt: 8/1/2025    Lab Results   Component Value Date     (L) 03/17/2025    K 4.3 03/17/2025    CL 97 (L) 03/17/2025    CO2 28 03/17/2025    BUN 15 03/17/2025    CREATININE 0.8 03/17/2025    GLUCOSE 96 03/17/2025    CALCIUM 9.5 03/17/2025    BILITOT 0.4 03/17/2025    ALKPHOS 69 03/17/2025    AST 24 03/17/2025    ALT 23 03/17/2025    LABGLOM 74 03/17/2025    GFRAA 75 07/09/2021    AGRATIO 1.5 03/17/2025    GLOB 2.5 08/03/2023

## 2025-07-31 NOTE — PROGRESS NOTES
Marisol Spear is a 79 y.o. female.    HPI:  Here for BP check , tolerating increased dose of Diovan HCT  Declines sleep study despite urging    Will try changing metoprolol twice daily to Coreg twice daily to see if this will help blood pressure    Taking amlodipine every other night because of swelling in her legs  Needs to stay on her Pristiq 50 mg, try to wean to 25 without success    Just back from a cruise with family, went well    Had Guillain-Barré a few years ago, feels like her balance is off, recommend physical therapy again for her    Cannot have vaccines moving forward to her history of Guillain-Barré from shingles vaccine    Requesting disability parking letter, printed for her    Meds, vitamins and allergies reviewed with pt    Wt Readings from Last 3 Encounters:   08/01/25 68 kg (150 lb)   07/09/25 68.5 kg (151 lb)   05/29/25 69.4 kg (153 lb)       REVIEW OF SYSTEMS:   CONSTITUTIONAL: See history of present illness,   Weight noted/stable  HEENT: No new vision difficulties or ringing in the ears.   RESPIRATORY: No new SOB, PND, orthopnea or cough.   CARDIOVASCULAR: no CP, palpitations or SOB with exertion  GI: No nausea, vomiting, diarrhea, constipation, abdominal pain or changes in bowel habits.   : No urinary frequency, urgency, incontinence hematuria or dysuria.   SKIN: No cyanosis or skin lesions.   MUSCULOSKELETAL: No new muscle or joint pain.  Feels as if balance is is off, denies vertigo  NEUROLOGICAL: No syncope or TIA-like symptoms.   PSYCHIATRIC: No anxiety, insomnia or depression     Allergies   Allergen Reactions    Hydroxybenzoate     Prednisolone Acetate     Tixocortol Pivalate     Neosporin [Neomycin-Polymyxin-Gramicidin] Rash     Allergic rash     Ace Inhibitors      Cough, itchy throat    Methylchloroisothiazolinone     Shingrix [Zoster Vac Recomb Adjuvanted]      GBS       Prior to Visit Medications    Medication Sig Taking? Authorizing Provider   Glucosamine 500 MG CAPS Take

## 2025-08-01 ENCOUNTER — OFFICE VISIT (OUTPATIENT)
Dept: FAMILY MEDICINE CLINIC | Age: 80
End: 2025-08-01
Payer: MEDICARE

## 2025-08-01 VITALS
HEART RATE: 61 BPM | BODY MASS INDEX: 30.3 KG/M2 | SYSTOLIC BLOOD PRESSURE: 168 MMHG | TEMPERATURE: 98.3 F | DIASTOLIC BLOOD PRESSURE: 70 MMHG | WEIGHT: 150 LBS | RESPIRATION RATE: 12 BRPM

## 2025-08-01 DIAGNOSIS — I10 PRIMARY HYPERTENSION: Chronic | ICD-10-CM

## 2025-08-01 DIAGNOSIS — R26.89 BALANCE PROBLEM: Primary | ICD-10-CM

## 2025-08-01 DIAGNOSIS — Z86.69 HX OF GUILLAIN-BARRE SYNDROME: ICD-10-CM

## 2025-08-01 PROCEDURE — G8417 CALC BMI ABV UP PARAM F/U: HCPCS | Performed by: FAMILY MEDICINE

## 2025-08-01 PROCEDURE — 1159F MED LIST DOCD IN RCRD: CPT | Performed by: FAMILY MEDICINE

## 2025-08-01 PROCEDURE — G2211 COMPLEX E/M VISIT ADD ON: HCPCS | Performed by: FAMILY MEDICINE

## 2025-08-01 PROCEDURE — 1123F ACP DISCUSS/DSCN MKR DOCD: CPT | Performed by: FAMILY MEDICINE

## 2025-08-01 PROCEDURE — 3078F DIAST BP <80 MM HG: CPT | Performed by: FAMILY MEDICINE

## 2025-08-01 PROCEDURE — G8427 DOCREV CUR MEDS BY ELIG CLIN: HCPCS | Performed by: FAMILY MEDICINE

## 2025-08-01 PROCEDURE — 3077F SYST BP >= 140 MM HG: CPT | Performed by: FAMILY MEDICINE

## 2025-08-01 PROCEDURE — 1090F PRES/ABSN URINE INCON ASSESS: CPT | Performed by: FAMILY MEDICINE

## 2025-08-01 PROCEDURE — 1036F TOBACCO NON-USER: CPT | Performed by: FAMILY MEDICINE

## 2025-08-01 PROCEDURE — 99213 OFFICE O/P EST LOW 20 MIN: CPT | Performed by: FAMILY MEDICINE

## 2025-08-01 PROCEDURE — G8399 PT W/DXA RESULTS DOCUMENT: HCPCS | Performed by: FAMILY MEDICINE

## 2025-08-01 RX ORDER — PYRIDOXINE HCL (VITAMIN B6) 50 MG
TABLET ORAL
COMMUNITY

## 2025-08-01 RX ORDER — GLUCOSAMINE SULFATE 500 MG
500 CAPSULE ORAL
COMMUNITY

## 2025-08-01 RX ORDER — VALSARTAN AND HYDROCHLOROTHIAZIDE 320; 12.5 MG/1; MG/1
1 TABLET, FILM COATED ORAL
Qty: 90 TABLET | Refills: 1 | Status: SHIPPED | OUTPATIENT
Start: 2025-08-01

## 2025-08-01 RX ORDER — CARVEDILOL 12.5 MG/1
12.5 TABLET ORAL 2 TIMES DAILY
Qty: 60 TABLET | Refills: 3 | Status: SHIPPED | OUTPATIENT
Start: 2025-08-01

## 2025-08-12 ENCOUNTER — TELEPHONE (OUTPATIENT)
Age: 80
End: 2025-08-12

## 2025-08-15 ENCOUNTER — HOSPITAL ENCOUNTER (OUTPATIENT)
Age: 80
Discharge: HOME | End: 2025-08-17
Attending: FAMILY MEDICINE
Payer: MEDICARE

## 2025-08-15 ENCOUNTER — HOSPITAL ENCOUNTER (OUTPATIENT)
Dept: GENERAL RADIOLOGY | Age: 80
Discharge: HOME | End: 2025-08-15
Attending: FAMILY MEDICINE
Payer: MEDICARE

## 2025-08-15 VITALS
BODY MASS INDEX: 30.24 KG/M2 | DIASTOLIC BLOOD PRESSURE: 52 MMHG | WEIGHT: 150 LBS | HEIGHT: 59 IN | SYSTOLIC BLOOD PRESSURE: 160 MMHG

## 2025-08-15 DIAGNOSIS — R06.02 SHORTNESS OF BREATH: ICD-10-CM

## 2025-08-15 DIAGNOSIS — R01.1 HEART MURMUR: ICD-10-CM

## 2025-08-15 DIAGNOSIS — M85.89 OSTEOPENIA OF MULTIPLE SITES: ICD-10-CM

## 2025-08-15 LAB
ECHO AO ASC DIAM: 3 CM
ECHO AO ASCENDING AORTA INDEX: 1.84 CM/M2
ECHO AO ROOT DIAM: 3.1 CM
ECHO AO ROOT INDEX: 1.9 CM/M2
ECHO AR MAX VEL PISA: 3 M/S
ECHO AV AREA PEAK VELOCITY: 1.8 CM2
ECHO AV AREA VTI: 1.7 CM2
ECHO AV AREA/BSA PEAK VELOCITY: 1.1 CM2/M2
ECHO AV AREA/BSA VTI: 1 CM2/M2
ECHO AV MEAN GRADIENT: 5 MMHG
ECHO AV MEAN VELOCITY: 1 M/S
ECHO AV PEAK GRADIENT: 10 MMHG
ECHO AV PEAK VELOCITY: 1.6 M/S
ECHO AV REGURGITANT PHT: 593 MS
ECHO AV VELOCITY RATIO: 0.69
ECHO AV VTI: 34.3 CM
ECHO BSA: 1.68 M2
ECHO EST RA PRESSURE: 3 MMHG
ECHO LA AREA 2C: 19.5 CM2
ECHO LA AREA 4C: 15.7 CM2
ECHO LA DIAMETER INDEX: 2.27 CM/M2
ECHO LA DIAMETER: 3.7 CM
ECHO LA MAJOR AXIS: 4.4 CM
ECHO LA MINOR AXIS: 5.2 CM
ECHO LA TO AORTIC ROOT RATIO: 1.19
ECHO LA VOL BP: 56 ML (ref 22–52)
ECHO LA VOL MOD A2C: 61 ML (ref 22–52)
ECHO LA VOL MOD A4C: 45 ML (ref 22–52)
ECHO LA VOL/BSA BIPLANE: 34 ML/M2 (ref 16–34)
ECHO LA VOLUME INDEX MOD A2C: 37 ML/M2 (ref 16–34)
ECHO LA VOLUME INDEX MOD A4C: 28 ML/M2 (ref 16–34)
ECHO LV E' LATERAL VELOCITY: 7.3 CM/S
ECHO LV E' SEPTAL VELOCITY: 4.05 CM/S
ECHO LV EDV A2C: 75 ML
ECHO LV EDV A4C: 59 ML
ECHO LV EDV INDEX A4C: 36 ML/M2
ECHO LV EDV NDEX A2C: 46 ML/M2
ECHO LV EF PHYSICIAN: 60 %
ECHO LV EJECTION FRACTION A2C: 61 %
ECHO LV EJECTION FRACTION A4C: 64 %
ECHO LV EJECTION FRACTION BIPLANE: 64 % (ref 55–100)
ECHO LV ESV A2C: 29 ML
ECHO LV ESV A4C: 21 ML
ECHO LV ESV INDEX A2C: 18 ML/M2
ECHO LV ESV INDEX A4C: 13 ML/M2
ECHO LV FRACTIONAL SHORTENING: 34 % (ref 28–44)
ECHO LV INTERNAL DIMENSION DIASTOLE INDEX: 2.15 CM/M2
ECHO LV INTERNAL DIMENSION DIASTOLIC: 3.5 CM (ref 3.9–5.3)
ECHO LV INTERNAL DIMENSION SYSTOLIC INDEX: 1.41 CM/M2
ECHO LV INTERNAL DIMENSION SYSTOLIC: 2.3 CM
ECHO LV IVSD: 0.9 CM (ref 0.6–0.9)
ECHO LV MASS 2D: 95.9 G (ref 67–162)
ECHO LV MASS INDEX 2D: 58.9 G/M2 (ref 43–95)
ECHO LV POSTERIOR WALL DIASTOLIC: 1 CM (ref 0.6–0.9)
ECHO LV RELATIVE WALL THICKNESS RATIO: 0.57
ECHO LVOT AREA: 2.5 CM2
ECHO LVOT AV VTI INDEX: 0.67
ECHO LVOT DIAM: 1.8 CM
ECHO LVOT MEAN GRADIENT: 2 MMHG
ECHO LVOT PEAK GRADIENT: 5 MMHG
ECHO LVOT PEAK VELOCITY: 1.1 M/S
ECHO LVOT STROKE VOLUME INDEX: 36 ML/M2
ECHO LVOT SV: 58.8 ML
ECHO LVOT VTI: 23.1 CM
ECHO MV A VELOCITY: 1.06 M/S
ECHO MV E VELOCITY: 0.89 M/S
ECHO MV E/A RATIO: 0.84
ECHO MV E/E' LATERAL: 12.19
ECHO MV E/E' RATIO (AVERAGED): 17.08
ECHO MV E/E' SEPTAL: 21.98
ECHO MV REGURGITANT PEAK GRADIENT: 104 MMHG
ECHO MV REGURGITANT PEAK VELOCITY: 5.1 M/S
ECHO PV MAX VELOCITY: 1 M/S
ECHO PV PEAK GRADIENT: 4 MMHG
ECHO RA AREA 4C: 12 CM2
ECHO RA END SYSTOLIC VOLUME APICAL 4 CHAMBER INDEX BSA: 15 ML/M2
ECHO RA VOLUME: 24 ML
ECHO RIGHT VENTRICULAR SYSTOLIC PRESSURE (RVSP): 24 MMHG
ECHO RV BASAL DIMENSION: 3.5 CM
ECHO RV FREE WALL PEAK S': 13.7 CM/S
ECHO RV LONGITUDINAL DIMENSION: 6.4 CM
ECHO RV MID DIMENSION: 2.8 CM
ECHO RV TAPSE: 2.4 CM (ref 1.7–?)
ECHO TV REGURGITANT MAX VELOCITY: 2.3 M/S
ECHO TV REGURGITANT PEAK GRADIENT: 21 MMHG

## 2025-08-15 PROCEDURE — 77080 DXA BONE DENSITY AXIAL: CPT

## 2025-08-15 PROCEDURE — 93306 TTE W/DOPPLER COMPLETE: CPT

## 2025-08-15 PROCEDURE — 93306 TTE W/DOPPLER COMPLETE: CPT | Performed by: INTERNAL MEDICINE

## 2025-08-21 ENCOUNTER — OFFICE VISIT (OUTPATIENT)
Age: 80
End: 2025-08-21
Payer: MEDICARE

## 2025-08-21 VITALS
SYSTOLIC BLOOD PRESSURE: 142 MMHG | DIASTOLIC BLOOD PRESSURE: 80 MMHG | TEMPERATURE: 97.8 F | HEART RATE: 62 BPM | WEIGHT: 150.3 LBS | BODY MASS INDEX: 29.51 KG/M2 | OXYGEN SATURATION: 96 % | HEIGHT: 60 IN

## 2025-08-21 DIAGNOSIS — F33.41 RECURRENT MAJOR DEPRESSIVE DISORDER, IN PARTIAL REMISSION: ICD-10-CM

## 2025-08-21 DIAGNOSIS — I10 PRIMARY HYPERTENSION: Primary | ICD-10-CM

## 2025-08-21 PROCEDURE — 1123F ACP DISCUSS/DSCN MKR DOCD: CPT | Performed by: FAMILY MEDICINE

## 2025-08-21 PROCEDURE — 1090F PRES/ABSN URINE INCON ASSESS: CPT | Performed by: FAMILY MEDICINE

## 2025-08-21 PROCEDURE — 3077F SYST BP >= 140 MM HG: CPT | Performed by: FAMILY MEDICINE

## 2025-08-21 PROCEDURE — 1160F RVW MEDS BY RX/DR IN RCRD: CPT | Performed by: FAMILY MEDICINE

## 2025-08-21 PROCEDURE — G8417 CALC BMI ABV UP PARAM F/U: HCPCS | Performed by: FAMILY MEDICINE

## 2025-08-21 PROCEDURE — G8427 DOCREV CUR MEDS BY ELIG CLIN: HCPCS | Performed by: FAMILY MEDICINE

## 2025-08-21 PROCEDURE — G8399 PT W/DXA RESULTS DOCUMENT: HCPCS | Performed by: FAMILY MEDICINE

## 2025-08-21 PROCEDURE — 3079F DIAST BP 80-89 MM HG: CPT | Performed by: FAMILY MEDICINE

## 2025-08-21 PROCEDURE — 99214 OFFICE O/P EST MOD 30 MIN: CPT | Performed by: FAMILY MEDICINE

## 2025-08-21 PROCEDURE — 1159F MED LIST DOCD IN RCRD: CPT | Performed by: FAMILY MEDICINE

## 2025-08-21 PROCEDURE — 1036F TOBACCO NON-USER: CPT | Performed by: FAMILY MEDICINE

## 2025-08-21 RX ORDER — VALSARTAN AND HYDROCHLOROTHIAZIDE 320; 25 MG/1; MG/1
1 TABLET, FILM COATED ORAL DAILY
Qty: 90 TABLET | Refills: 1 | Status: SHIPPED | OUTPATIENT
Start: 2025-08-21 | End: 2026-02-17

## 2025-08-21 ASSESSMENT — ENCOUNTER SYMPTOMS
WHEEZING: 0
ABDOMINAL PAIN: 0
BLOOD IN STOOL: 0
NAUSEA: 0
CONSTIPATION: 0
DIARRHEA: 0
SORE THROAT: 0
SHORTNESS OF BREATH: 0
VOMITING: 0
RHINORRHEA: 0
COUGH: 0
BACK PAIN: 0

## 2025-08-21 ASSESSMENT — PATIENT HEALTH QUESTIONNAIRE - PHQ9
SUM OF ALL RESPONSES TO PHQ QUESTIONS 1-9: 9
9. THOUGHTS THAT YOU WOULD BE BETTER OFF DEAD, OR OF HURTING YOURSELF: NOT AT ALL
SUM OF ALL RESPONSES TO PHQ QUESTIONS 1-9: 9
SUM OF ALL RESPONSES TO PHQ QUESTIONS 1-9: 9
3. TROUBLE FALLING OR STAYING ASLEEP: MORE THAN HALF THE DAYS
2. FEELING DOWN, DEPRESSED OR HOPELESS: MORE THAN HALF THE DAYS
5. POOR APPETITE OR OVEREATING: NOT AT ALL
4. FEELING TIRED OR HAVING LITTLE ENERGY: MORE THAN HALF THE DAYS
8. MOVING OR SPEAKING SO SLOWLY THAT OTHER PEOPLE COULD HAVE NOTICED. OR THE OPPOSITE, BEING SO FIGETY OR RESTLESS THAT YOU HAVE BEEN MOVING AROUND A LOT MORE THAN USUAL: NOT AT ALL
6. FEELING BAD ABOUT YOURSELF - OR THAT YOU ARE A FAILURE OR HAVE LET YOURSELF OR YOUR FAMILY DOWN: NOT AT ALL
1. LITTLE INTEREST OR PLEASURE IN DOING THINGS: MORE THAN HALF THE DAYS
7. TROUBLE CONCENTRATING ON THINGS, SUCH AS READING THE NEWSPAPER OR WATCHING TELEVISION: SEVERAL DAYS
SUM OF ALL RESPONSES TO PHQ QUESTIONS 1-9: 9

## 2025-08-26 RX ORDER — ALENDRONATE SODIUM 70 MG/1
70 TABLET ORAL
Qty: 12 TABLET | Refills: 3 | Status: SHIPPED | OUTPATIENT
Start: 2025-08-26

## 2025-08-28 DIAGNOSIS — R93.1 ABNORMAL ECHOCARDIOGRAM: Primary | ICD-10-CM

## 2025-08-28 DIAGNOSIS — I51.7 CONCENTRIC LEFT VENTRICULAR HYPERTROPHY: ICD-10-CM

## 2025-09-02 ENCOUNTER — TELEPHONE (OUTPATIENT)
Dept: CARDIOLOGY CLINIC | Age: 80
End: 2025-09-02

## (undated) DEVICE — PROCEDURE KIT ENDOSCP CUST

## (undated) DEVICE — SOLUTION IV IRRIG WATER 500ML POUR BRL ST 2F7113

## (undated) DEVICE — Device: Brand: DISPOSABLE ELECTROSURGICAL SNARE

## (undated) DEVICE — ENDOSCOPIC KIT 6X3/16 FT COLON W/ 1.1 OZ 2 GWN W/O BRSH

## (undated) DEVICE — SNARE ENDOSCP L240CM SHTH DIA24MM LOOP W10MM POLYP RND REINF

## (undated) DEVICE — AIR/WATER CLEANING ADAPTER FOR OLYMPUS® GI ENDOSCOPE: Brand: BULLDOG®

## (undated) DEVICE — TRAP SPEC RETRV CLR PLAS POLYP IN LN SUCT QUIK CTCH

## (undated) DEVICE — REPLAY HEMOSTASIS CLIP, 16MM SPAN: Brand: REPLAY

## (undated) DEVICE — BW-412T DISP COMBO CLEANING BRUSH: Brand: SINGLE USE COMBINATION CLEANING BRUSH

## (undated) DEVICE — ELECTRODE PT RET AD L9FT HI MOIST COND ADH HYDRGEL CORDED

## (undated) DEVICE — SET VLV 3 PC AWS DISPOSABLE GRDIAN SCOPEVALET

## (undated) DEVICE — VALVE SUCTION AIR H2O SET ORCA POD + DISP